# Patient Record
Sex: MALE | Race: WHITE | Employment: UNEMPLOYED | URBAN - METROPOLITAN AREA
[De-identification: names, ages, dates, MRNs, and addresses within clinical notes are randomized per-mention and may not be internally consistent; named-entity substitution may affect disease eponyms.]

---

## 2018-05-14 ENCOUNTER — HOSPITAL ENCOUNTER (EMERGENCY)
Age: 33
Discharge: HOME | End: 2018-05-14
Payer: MEDICAID

## 2018-05-14 VITALS
WEIGHT: 266 LBS | HEIGHT: 78 IN | OXYGEN SATURATION: 97 % | RESPIRATION RATE: 17 BRPM | SYSTOLIC BLOOD PRESSURE: 133 MMHG | DIASTOLIC BLOOD PRESSURE: 115 MMHG | HEART RATE: 110 BPM | TEMPERATURE: 97.7 F | BODY MASS INDEX: 30.78 KG/M2

## 2018-05-14 DIAGNOSIS — L03.114 CELLULITIS OF LEFT UPPER EXTREMITY: ICD-10-CM

## 2018-05-14 DIAGNOSIS — L02.91 ABSCESS: Primary | ICD-10-CM

## 2018-05-14 PROCEDURE — 99282 EMERGENCY DEPT VISIT SF MDM: CPT

## 2018-05-14 PROCEDURE — 10060 I&D ABSCESS SIMPLE/SINGLE: CPT

## 2018-05-14 PROCEDURE — 2500000003 HC RX 250 WO HCPCS: Performed by: PHYSICIAN ASSISTANT

## 2018-05-14 RX ORDER — IBUPROFEN 600 MG/1
600 TABLET ORAL EVERY 8 HOURS PRN
Qty: 21 TABLET | Refills: 0 | Status: SHIPPED | OUTPATIENT
Start: 2018-05-14 | End: 2018-12-17 | Stop reason: SDDI

## 2018-05-14 RX ORDER — LIDOCAINE HYDROCHLORIDE 10 MG/ML
5 INJECTION, SOLUTION EPIDURAL; INFILTRATION; INTRACAUDAL; PERINEURAL ONCE
Status: COMPLETED | OUTPATIENT
Start: 2018-05-14 | End: 2018-05-14

## 2018-05-14 RX ORDER — SULFAMETHOXAZOLE AND TRIMETHOPRIM 800; 160 MG/1; MG/1
1 TABLET ORAL 2 TIMES DAILY
Qty: 20 TABLET | Refills: 0 | Status: SHIPPED | OUTPATIENT
Start: 2018-05-14 | End: 2018-05-24

## 2018-05-14 RX ORDER — CEPHALEXIN 500 MG/1
500 CAPSULE ORAL 3 TIMES DAILY
Qty: 30 CAPSULE | Refills: 0 | Status: SHIPPED | OUTPATIENT
Start: 2018-05-14 | End: 2018-05-24

## 2018-05-14 RX ADMIN — LIDOCAINE HYDROCHLORIDE 5 ML: 10 INJECTION, SOLUTION EPIDURAL; INFILTRATION; INTRACAUDAL; PERINEURAL at 20:13

## 2018-05-17 ENCOUNTER — CARE COORDINATION (OUTPATIENT)
Dept: CARE COORDINATION | Age: 33
End: 2018-05-17

## 2018-12-17 ENCOUNTER — OFFICE VISIT (OUTPATIENT)
Dept: FAMILY MEDICINE CLINIC | Age: 33
End: 2018-12-17
Payer: MEDICAID

## 2018-12-17 VITALS
RESPIRATION RATE: 20 BRPM | HEART RATE: 82 BPM | WEIGHT: 289.6 LBS | HEIGHT: 78 IN | BODY MASS INDEX: 33.51 KG/M2 | OXYGEN SATURATION: 98 % | TEMPERATURE: 98.4 F | SYSTOLIC BLOOD PRESSURE: 158 MMHG | DIASTOLIC BLOOD PRESSURE: 100 MMHG

## 2018-12-17 DIAGNOSIS — I10 ESSENTIAL HYPERTENSION: ICD-10-CM

## 2018-12-17 DIAGNOSIS — R56.9 SEIZURE (HCC): Primary | ICD-10-CM

## 2018-12-17 DIAGNOSIS — F41.9 ANXIETY: ICD-10-CM

## 2018-12-17 PROCEDURE — G8484 FLU IMMUNIZE NO ADMIN: HCPCS | Performed by: STUDENT IN AN ORGANIZED HEALTH CARE EDUCATION/TRAINING PROGRAM

## 2018-12-17 PROCEDURE — 99212 OFFICE O/P EST SF 10 MIN: CPT | Performed by: STUDENT IN AN ORGANIZED HEALTH CARE EDUCATION/TRAINING PROGRAM

## 2018-12-17 PROCEDURE — G8427 DOCREV CUR MEDS BY ELIG CLIN: HCPCS | Performed by: STUDENT IN AN ORGANIZED HEALTH CARE EDUCATION/TRAINING PROGRAM

## 2018-12-17 PROCEDURE — 99214 OFFICE O/P EST MOD 30 MIN: CPT | Performed by: STUDENT IN AN ORGANIZED HEALTH CARE EDUCATION/TRAINING PROGRAM

## 2018-12-17 PROCEDURE — 4004F PT TOBACCO SCREEN RCVD TLK: CPT | Performed by: STUDENT IN AN ORGANIZED HEALTH CARE EDUCATION/TRAINING PROGRAM

## 2018-12-17 PROCEDURE — G8417 CALC BMI ABV UP PARAM F/U: HCPCS | Performed by: STUDENT IN AN ORGANIZED HEALTH CARE EDUCATION/TRAINING PROGRAM

## 2018-12-17 RX ORDER — LISINOPRIL 5 MG/1
5 TABLET ORAL DAILY
Qty: 30 TABLET | Refills: 3 | Status: SHIPPED | OUTPATIENT
Start: 2018-12-17 | End: 2019-06-18

## 2018-12-17 RX ORDER — PHENYTOIN SODIUM 100 MG/1
100 CAPSULE, EXTENDED RELEASE ORAL 3 TIMES DAILY
Qty: 90 CAPSULE | Refills: 0 | Status: SHIPPED | OUTPATIENT
Start: 2018-12-17 | End: 2019-04-19 | Stop reason: SDUPTHER

## 2018-12-17 ASSESSMENT — ENCOUNTER SYMPTOMS
COUGH: 1
CONSTIPATION: 0
EYE DISCHARGE: 0
CHEST TIGHTNESS: 0
WHEEZING: 0
APNEA: 0
FACIAL SWELLING: 0
BLOOD IN STOOL: 0
SHORTNESS OF BREATH: 0
SORE THROAT: 0
CHOKING: 0
DIARRHEA: 0

## 2018-12-17 ASSESSMENT — PATIENT HEALTH QUESTIONNAIRE - PHQ9
1. LITTLE INTEREST OR PLEASURE IN DOING THINGS: 1
SUM OF ALL RESPONSES TO PHQ QUESTIONS 1-9: 1
SUM OF ALL RESPONSES TO PHQ QUESTIONS 1-9: 1
DEPRESSION UNABLE TO ASSESS: URGENT/EMERGENT SITUATION

## 2018-12-17 NOTE — PROGRESS NOTES
[facility]  FAMILY MEDICINE RESIDENCY PROGRAM   OFFICE PROGRESS NOTE  DATE OF VISIT : 2018    Patient : Renetta Thomas   Sex : maleAge : 26 y.o.  : 1985   MRN : [id number]              Chief Complaint :   Chief Complaint   Patient presents with    Seizures       History of Present Illness : Renetta Thomas  26 y.o. male with past medical history of Seizure disorder, polysubstance abuse, depression who presented to the clinic today for first time to establish with a new PCP. Seizures: He has history of seizure since last 13 years and states he has tried different doctors and medications ( Phenytoin, Lamotrigine, levetiracetam). His last episode of seizures was few weeks ago, he has has total of 5 episodes this year. He states he looses consciousness and bites his tongue and has had incontinence in past episodes. He states he has been non adherent with the his medications and has not taken any anti seizure medications in last 15 months. He cites reason for not taking medication is his feeling that he have had more seizure episode on medication compared to when he is not taking them. Currently he is feeling anxious about having a episode and states he is afraid to go to bed as most of his seizure episodes have taken place during sleep. He also feels that he has a pressure on chest most of the time due to anxiety but denies any chest pain associated with anxiety. He states that he had a head trauma at the age of 21 and his seizures started after that. Depression: As per patient he has been treated for depression in past, currently he is not taking any medication for it since last 15 months. On questioning he denies any suicidal ideation intent or plan. He states that he is excited about the nery and planning it with his 3 kids. Although he states he feels he is depressed and his sleep latency has decreased.  He denies any decrease in concentration, denies any loss of interest, HAND W/ WOUND VA C       Family Medical History :   Family History   Problem Relation Age of Onset    Kidney Disease Mother     Heart Disease Father        Social History :   Social History     Social History    Marital status: Single     Spouse name: N/A    Number of children: N/A    Years of education: N/A     Occupational History    Not on file. Social History Main Topics    Smoking status: Current Every Day Smoker     Packs/day: 0.40     Years: 19.00     Types: Cigarettes    Smokeless tobacco: Never Used    Alcohol use No      Comment: not in three years     Drug use: Yes     Types: Other-see comments      Comment: hx drug history, herion. Last use 12/2/2016    Sexual activity: Yes     Partners: Female     Other Topics Concern    Not on file     Social History Narrative    No narrative on file        Current Medications    Current Outpatient Prescriptions   Medication Sig Dispense Refill    phenytoin (DILANTIN) 100 MG ER capsule Take 1 capsule by mouth 3 times daily 90 capsule 0    lisinopril (PRINIVIL;ZESTRIL) 5 MG tablet Take 1 tablet by mouth daily 30 tablet 3     No current facility-administered medications for this visit. Allergies : No Known Allergies    Review of Systems :    Review of Systems   HENT: Negative for congestion, ear discharge, ear pain, facial swelling and sore throat. Eyes: Negative for discharge and visual disturbance. Respiratory: Positive for cough. Negative for apnea, choking, chest tightness, shortness of breath and wheezing. Cardiovascular: Negative for chest pain. Gastrointestinal: Negative for blood in stool, constipation and diarrhea. Genitourinary: Negative for dysuria. Musculoskeletal: Negative for arthralgias. Neurological: Positive for seizures. Negative for headaches. Psychiatric/Behavioral: Positive for sleep disturbance. Negative for confusion, decreased concentration, hallucinations, self-injury and suicidal ideas.

## 2019-01-03 ENCOUNTER — TELEPHONE (OUTPATIENT)
Dept: NEUROLOGY | Age: 34
End: 2019-01-03

## 2019-01-08 ENCOUNTER — TELEPHONE (OUTPATIENT)
Dept: NEUROLOGY | Age: 34
End: 2019-01-08

## 2019-04-19 ENCOUNTER — APPOINTMENT (OUTPATIENT)
Dept: GENERAL RADIOLOGY | Age: 34
End: 2019-04-19
Payer: MEDICAID

## 2019-04-19 ENCOUNTER — HOSPITAL ENCOUNTER (EMERGENCY)
Age: 34
Discharge: HOME | End: 2019-04-20
Attending: EMERGENCY MEDICINE
Payer: MEDICAID

## 2019-04-19 ENCOUNTER — APPOINTMENT (OUTPATIENT)
Dept: CT IMAGING | Age: 34
End: 2019-04-19
Payer: MEDICAID

## 2019-04-19 VITALS
BODY MASS INDEX: 33.44 KG/M2 | DIASTOLIC BLOOD PRESSURE: 81 MMHG | TEMPERATURE: 98.1 F | WEIGHT: 289 LBS | SYSTOLIC BLOOD PRESSURE: 128 MMHG | HEIGHT: 78 IN | OXYGEN SATURATION: 95 % | HEART RATE: 86 BPM | RESPIRATION RATE: 18 BRPM

## 2019-04-19 DIAGNOSIS — R56.9 SEIZURE (HCC): Primary | ICD-10-CM

## 2019-04-19 LAB
ACETAMINOPHEN LEVEL: <5 MCG/ML (ref 10–30)
AMPHETAMINE SCREEN, URINE: NOT DETECTED
ANION GAP SERPL CALCULATED.3IONS-SCNC: 11 MMOL/L (ref 7–16)
BACTERIA: NORMAL /HPF
BARBITURATE SCREEN URINE: NOT DETECTED
BASOPHILS ABSOLUTE: 0.04 E9/L (ref 0–0.2)
BASOPHILS RELATIVE PERCENT: 0.4 % (ref 0–2)
BENZODIAZEPINE SCREEN, URINE: NOT DETECTED
BILIRUBIN URINE: NEGATIVE
BLOOD, URINE: NEGATIVE
BUN BLDV-MCNC: 14 MG/DL (ref 6–20)
CALCIUM SERPL-MCNC: 9.3 MG/DL (ref 8.6–10.2)
CANNABINOID SCREEN URINE: NOT DETECTED
CHLORIDE BLD-SCNC: 105 MMOL/L (ref 98–107)
CHP ED QC CHECK: YES
CLARITY: CLEAR
CO2: 21 MMOL/L (ref 22–29)
COCAINE METABOLITE SCREEN URINE: NOT DETECTED
COLOR: YELLOW
CREAT SERPL-MCNC: 1 MG/DL (ref 0.7–1.2)
EOSINOPHILS ABSOLUTE: 0.08 E9/L (ref 0.05–0.5)
EOSINOPHILS RELATIVE PERCENT: 0.8 % (ref 0–6)
ETHANOL: <10 MG/DL (ref 0–0.08)
GFR AFRICAN AMERICAN: >60
GFR NON-AFRICAN AMERICAN: >60 ML/MIN/1.73
GLUCOSE BLD-MCNC: 142 MG/DL
GLUCOSE BLD-MCNC: 242 MG/DL (ref 74–99)
GLUCOSE URINE: NEGATIVE MG/DL
HCT VFR BLD CALC: 47.4 % (ref 37–54)
HEMOGLOBIN: 16.1 G/DL (ref 12.5–16.5)
IMMATURE GRANULOCYTES #: 0.07 E9/L
IMMATURE GRANULOCYTES %: 0.7 % (ref 0–5)
KETONES, URINE: NEGATIVE MG/DL
LACTIC ACID: 1.5 MMOL/L (ref 0.5–2.2)
LACTIC ACID: 2.9 MMOL/L (ref 0.5–2.2)
LEUKOCYTE ESTERASE, URINE: NEGATIVE
LYMPHOCYTES ABSOLUTE: 2.6 E9/L (ref 1.5–4)
LYMPHOCYTES RELATIVE PERCENT: 26.4 % (ref 20–42)
MCH RBC QN AUTO: 28.5 PG (ref 26–35)
MCHC RBC AUTO-ENTMCNC: 34 % (ref 32–34.5)
MCV RBC AUTO: 84 FL (ref 80–99.9)
METER GLUCOSE: 142 MG/DL (ref 74–99)
METHADONE SCREEN, URINE: NOT DETECTED
MONOCYTES ABSOLUTE: 0.65 E9/L (ref 0.1–0.95)
MONOCYTES RELATIVE PERCENT: 6.6 % (ref 2–12)
NEUTROPHILS ABSOLUTE: 6.4 E9/L (ref 1.8–7.3)
NEUTROPHILS RELATIVE PERCENT: 65.1 % (ref 43–80)
NITRITE, URINE: NEGATIVE
OPIATE SCREEN URINE: NOT DETECTED
PDW BLD-RTO: 13.4 FL (ref 11.5–15)
PH UA: 5 (ref 5–9)
PHENCYCLIDINE SCREEN URINE: NOT DETECTED
PHENYTOIN DOSE AMOUNT: ABNORMAL
PHENYTOIN DOSE AMOUNT: NORMAL
PHENYTOIN LEVEL: <0.8 UG/ML (ref 10–20)
PLATELET # BLD: 309 E9/L (ref 130–450)
PMV BLD AUTO: 9.6 FL (ref 7–12)
POTASSIUM SERPL-SCNC: 4.4 MMOL/L (ref 3.5–5)
PROPOXYPHENE SCREEN: NOT DETECTED
PROTEIN UA: NORMAL MG/DL
RBC # BLD: 5.64 E12/L (ref 3.8–5.8)
RBC UA: NORMAL /HPF (ref 0–2)
SALICYLATE, SERUM: <0.3 MG/DL (ref 0–30)
SODIUM BLD-SCNC: 137 MMOL/L (ref 132–146)
SPECIFIC GRAVITY UA: 1.02 (ref 1–1.03)
TRICYCLIC ANTIDEPRESSANTS SCREEN SERUM: NEGATIVE NG/ML
TROPONIN: <0.01 NG/ML (ref 0–0.03)
UROBILINOGEN, URINE: 0.2 E.U./DL
WBC # BLD: 9.8 E9/L (ref 4.5–11.5)
WBC UA: NORMAL /HPF (ref 0–5)

## 2019-04-19 PROCEDURE — 82962 GLUCOSE BLOOD TEST: CPT

## 2019-04-19 PROCEDURE — 2580000003 HC RX 258: Performed by: STUDENT IN AN ORGANIZED HEALTH CARE EDUCATION/TRAINING PROGRAM

## 2019-04-19 PROCEDURE — 80307 DRUG TEST PRSMV CHEM ANLYZR: CPT

## 2019-04-19 PROCEDURE — 99285 EMERGENCY DEPT VISIT HI MDM: CPT

## 2019-04-19 PROCEDURE — 84484 ASSAY OF TROPONIN QUANT: CPT

## 2019-04-19 PROCEDURE — G0480 DRUG TEST DEF 1-7 CLASSES: HCPCS

## 2019-04-19 PROCEDURE — 6370000000 HC RX 637 (ALT 250 FOR IP): Performed by: STUDENT IN AN ORGANIZED HEALTH CARE EDUCATION/TRAINING PROGRAM

## 2019-04-19 PROCEDURE — 6360000002 HC RX W HCPCS: Performed by: STUDENT IN AN ORGANIZED HEALTH CARE EDUCATION/TRAINING PROGRAM

## 2019-04-19 PROCEDURE — 80185 ASSAY OF PHENYTOIN TOTAL: CPT

## 2019-04-19 PROCEDURE — 70450 CT HEAD/BRAIN W/O DYE: CPT

## 2019-04-19 PROCEDURE — 96365 THER/PROPH/DIAG IV INF INIT: CPT

## 2019-04-19 PROCEDURE — 83605 ASSAY OF LACTIC ACID: CPT

## 2019-04-19 PROCEDURE — 85025 COMPLETE CBC W/AUTO DIFF WBC: CPT

## 2019-04-19 PROCEDURE — 81001 URINALYSIS AUTO W/SCOPE: CPT

## 2019-04-19 PROCEDURE — 36415 COLL VENOUS BLD VENIPUNCTURE: CPT

## 2019-04-19 PROCEDURE — 71045 X-RAY EXAM CHEST 1 VIEW: CPT

## 2019-04-19 PROCEDURE — 80048 BASIC METABOLIC PNL TOTAL CA: CPT

## 2019-04-19 RX ORDER — LEVETIRACETAM 5 MG/ML
500 INJECTION INTRAVASCULAR ONCE
Status: DISCONTINUED | OUTPATIENT
Start: 2019-04-19 | End: 2019-04-19

## 2019-04-19 RX ORDER — PHENYTOIN SODIUM 100 MG/1
100 CAPSULE, EXTENDED RELEASE ORAL ONCE
Status: DISCONTINUED | OUTPATIENT
Start: 2019-04-19 | End: 2019-04-19

## 2019-04-19 RX ORDER — PHENYTOIN SODIUM 100 MG/1
100 CAPSULE, EXTENDED RELEASE ORAL 3 TIMES DAILY
Qty: 90 CAPSULE | Refills: 0 | Status: SHIPPED | OUTPATIENT
Start: 2019-04-19 | End: 2019-06-18 | Stop reason: SDUPTHER

## 2019-04-19 RX ORDER — ACETAMINOPHEN 500 MG
1000 TABLET ORAL ONCE
Status: COMPLETED | OUTPATIENT
Start: 2019-04-19 | End: 2019-04-19

## 2019-04-19 RX ADMIN — PHENYTOIN SODIUM 1000 MG: 50 INJECTION INTRAMUSCULAR; INTRAVENOUS at 21:52

## 2019-04-19 RX ADMIN — ACETAMINOPHEN 1000 MG: 500 TABLET ORAL at 21:46

## 2019-04-19 ASSESSMENT — PAIN SCALES - GENERAL
PAINLEVEL_OUTOF10: 0
PAINLEVEL_OUTOF10: 7

## 2019-04-19 ASSESSMENT — ENCOUNTER SYMPTOMS
SHORTNESS OF BREATH: 0
VOMITING: 0
COLOR CHANGE: 0
CONSTIPATION: 0
WHEEZING: 0
NAUSEA: 0
DIARRHEA: 0
COUGH: 0
ABDOMINAL PAIN: 0

## 2019-04-19 ASSESSMENT — PAIN DESCRIPTION - PAIN TYPE: TYPE: ACUTE PAIN

## 2019-04-19 ASSESSMENT — PAIN DESCRIPTION - LOCATION: LOCATION: THROAT

## 2019-04-20 NOTE — ED PROVIDER NOTES
PM.            Review of Systems   Constitutional: Negative for chills and fever. Respiratory: Negative for cough, shortness of breath and wheezing. Cardiovascular: Negative for chest pain and palpitations. Gastrointestinal: Negative for abdominal pain, constipation, diarrhea, nausea and vomiting. Genitourinary: Negative for dysuria and hematuria. Musculoskeletal: Negative for neck pain and neck stiffness. Skin: Negative for color change, pallor, rash and wound. Neurological: Positive for seizures. Negative for dizziness, syncope, light-headedness, numbness and headaches. Psychiatric/Behavioral: Negative for confusion and decreased concentration. The patient is not nervous/anxious. Physical Exam   Constitutional: He is oriented to person, place, and time. He appears well-developed and well-nourished. No distress. HENT:   Head: Normocephalic and atraumatic. Right Ear: External ear normal.   Left Ear: External ear normal.   Mouth/Throat: No oropharyngeal exudate. Eyes: Pupils are equal, round, and reactive to light. EOM are normal.   Neck: Normal range of motion. Cardiovascular: Normal rate, regular rhythm, normal heart sounds and intact distal pulses. Exam reveals no gallop and no friction rub. No murmur heard. Pulmonary/Chest: Effort normal and breath sounds normal. No respiratory distress. He has no wheezes. He has no rales. He exhibits no tenderness. Abdominal: Soft. Bowel sounds are normal. He exhibits no distension and no mass. There is no tenderness. There is no rebound and no guarding. No hernia. Musculoskeletal: Normal range of motion. He exhibits no edema, tenderness or deformity. Decreased  strength on the right upper extremity, chronic. Lymphadenopathy:     He has no cervical adenopathy. Neurological: He is alert and oriented to person, place, and time. He displays normal reflexes. No cranial nerve deficit or sensory deficit. He exhibits normal muscle tone. DETECTED Negative <300 ng/mL   Serum Drug Screen   Result Value Ref Range    Ethanol Lvl <10 mg/dL    Acetaminophen Level <5.0 (L) 10.0 - 38.8 mcg/mL    Salicylate, Serum <7.2 0.0 - 30.0 mg/dL    TCA Scrn NEGATIVE Cutoff:300 ng/mL   Troponin   Result Value Ref Range    Troponin <0.01 0.00 - 0.03 ng/mL   Phenytoin level, total   Result Value Ref Range    Phenytoin Dose Amount Unknown    Phenytoin level, total   Result Value Ref Range    Phenytoin Lvl <0.8 (L) 10.0 - 20.0 ug/mL    Phenytoin Dose Amount Unknown    POCT glucose   Result Value Ref Range    Glucose 142 mg/dL    QC OK? yes    POCT Glucose   Result Value Ref Range    Meter Glucose 142 (H) 74 - 99 mg/dL       Radiology:  XR CHEST PORTABLE   Final Result      NO ACUTE CARDIOPULMONARY PROCESS         CT Head WO Contrast   Final Result      No evidence for acute intracranial process. Encephalomalacia within the left parietal lobe from remote infarct or   underlying congenital process. There is low density within the right   centrum semiovale which measures approximately 8.5 mm. This was   present on the prior studies and show no worrisome change.          ------------------------- NURSING NOTES AND VITALS REVIEWED ---------------------------  Date / Time Roomed:  4/19/2019  7:19 PM  ED Bed Assignment:  15/15    The nursing notes within the ED encounter and vital signs as below have been reviewed. /81   Pulse 86   Temp 98.1 °F (36.7 °C) (Temporal)   Resp 18   Ht 6' 6\" (1.981 m)   Wt 289 lb (131.1 kg)   SpO2 95%   BMI 33.40 kg/m²   Oxygen Saturation Interpretation: Normal      ------------------------------------------ PROGRESS NOTES ------------------------------------------  ED Course as of Apr 20 0046 Fri Apr 19, 2019   2340 Patient resting comfortably in no acute distress. Patient will be discharged shortly. No other active seizures in the ED. Patient loaded with Dilantin.     [KS]      ED Course User Index  [KS] Avel Goodwin DO 12:46 AM  I have spoken with the patient and discussed todays results, in addition to providing specific details for the plan of care and counseling regarding the diagnosis and prognosis. Their questions are answered at this time and they are agreeable with the plan. I discussed at length with them reasons for immediate return here for re evaluation. They will followup with their primary care physician by calling their office on Monday.      --------------------------------- ADDITIONAL PROVIDER NOTES ---------------------------------  At this time the patient is without objective evidence of an acute process requiring hospitalization or inpatient management. They have remained hemodynamically stable throughout their entire ED visit and are stable for discharge with outpatient follow-up. The plan has been discussed in detail and they are aware of the specific conditions for emergent return, as well as the importance of follow-up. Discharge Medication List as of 4/19/2019 11:42 PM          Diagnosis:  1. Seizure [facility])        Disposition:  Patient's disposition: Discharge to home  Patient's condition is stable. MDM  Number of Diagnoses or Management Options  [facility]):   Diagnosis management comments: Patient is a 25-year-old male who presents to the ED for seizures. Patient was evaluated, did not have any additional seizures in the ED. Patient was loaded with phenytoin, and was monitored in the ED. He was otherwise resting, debris no acute distress. Patient had a weakness to the right upper extremity, which was chronic and not new from his previous stroke when he was younger. Patient otherwise was resting comfortably in no acute distress. Patient will be discharged at this time with a prescription for phenytoin. He was in agreement with discharge at this time.  Patient was advised to follow-up with his PCP as well as neurologist. Patient was instructed to come back to the ED if his symptoms

## 2019-04-20 NOTE — ED NOTES
Pt in room at this time, alert and oriented. Respirations are easy and unlabored. Pt aware he is awaiting disposition from ER Dr. Dutch Gonzales comfort measures at this time.   Will continue to monitor     Copiah KALEY Santa  04/20/19 0001

## 2019-04-27 LAB
EKG ATRIAL RATE: 101 BPM
EKG P AXIS: 28 DEGREES
EKG P-R INTERVAL: 164 MS
EKG Q-T INTERVAL: 324 MS
EKG QRS DURATION: 98 MS
EKG QTC CALCULATION (BAZETT): 420 MS
EKG R AXIS: -9 DEGREES
EKG T AXIS: 38 DEGREES
EKG VENTRICULAR RATE: 101 BPM

## 2019-06-18 ENCOUNTER — HOSPITAL ENCOUNTER (EMERGENCY)
Age: 34
Discharge: HOME | End: 2019-06-18
Attending: EMERGENCY MEDICINE
Payer: MEDICAID

## 2019-06-18 ENCOUNTER — APPOINTMENT (OUTPATIENT)
Dept: GENERAL RADIOLOGY | Age: 34
End: 2019-06-18
Payer: MEDICAID

## 2019-06-18 ENCOUNTER — APPOINTMENT (OUTPATIENT)
Dept: CT IMAGING | Age: 34
End: 2019-06-18
Payer: MEDICAID

## 2019-06-18 VITALS
DIASTOLIC BLOOD PRESSURE: 67 MMHG | BODY MASS INDEX: 31.82 KG/M2 | TEMPERATURE: 97.8 F | OXYGEN SATURATION: 97 % | SYSTOLIC BLOOD PRESSURE: 116 MMHG | RESPIRATION RATE: 16 BRPM | WEIGHT: 275 LBS | HEIGHT: 78 IN | HEART RATE: 84 BPM

## 2019-06-18 DIAGNOSIS — M54.2 NECK PAIN: ICD-10-CM

## 2019-06-18 DIAGNOSIS — R78.89 SUBTHERAPEUTIC SERUM DILANTIN LEVEL: ICD-10-CM

## 2019-06-18 DIAGNOSIS — Z76.0 ENCOUNTER FOR MEDICATION REFILL: ICD-10-CM

## 2019-06-18 DIAGNOSIS — S43.401A SPRAIN OF RIGHT SHOULDER, UNSPECIFIED SHOULDER SPRAIN TYPE, INITIAL ENCOUNTER: ICD-10-CM

## 2019-06-18 DIAGNOSIS — G40.919 BREAKTHROUGH SEIZURE (HCC): Primary | ICD-10-CM

## 2019-06-18 DIAGNOSIS — R56.9 SEIZURE (HCC): ICD-10-CM

## 2019-06-18 DIAGNOSIS — Z91.14 NON COMPLIANCE W MEDICATION REGIMEN: ICD-10-CM

## 2019-06-18 LAB
ACETAMINOPHEN LEVEL: <5 MCG/ML (ref 10–30)
ALBUMIN SERPL-MCNC: 3.8 G/DL (ref 3.5–5.2)
ALP BLD-CCNC: 146 U/L (ref 40–129)
ALT SERPL-CCNC: 182 U/L (ref 0–40)
AMPHETAMINE SCREEN, URINE: NOT DETECTED
ANION GAP SERPL CALCULATED.3IONS-SCNC: 14 MMOL/L (ref 7–16)
AST SERPL-CCNC: 89 U/L (ref 0–39)
BARBITURATE SCREEN URINE: NOT DETECTED
BENZODIAZEPINE SCREEN, URINE: NOT DETECTED
BILIRUB SERPL-MCNC: 0.2 MG/DL (ref 0–1.2)
BUN BLDV-MCNC: 15 MG/DL (ref 6–20)
CALCIUM SERPL-MCNC: 9.3 MG/DL (ref 8.6–10.2)
CANNABINOID SCREEN URINE: NOT DETECTED
CHLORIDE BLD-SCNC: 105 MMOL/L (ref 98–107)
CHP ED QC CHECK: YES
CO2: 20 MMOL/L (ref 22–29)
COCAINE METABOLITE SCREEN URINE: NOT DETECTED
CREAT SERPL-MCNC: 1.1 MG/DL (ref 0.7–1.2)
EKG ATRIAL RATE: 100 BPM
EKG P AXIS: 14 DEGREES
EKG P-R INTERVAL: 160 MS
EKG Q-T INTERVAL: 324 MS
EKG QRS DURATION: 96 MS
EKG QTC CALCULATION (BAZETT): 417 MS
EKG R AXIS: -14 DEGREES
EKG T AXIS: 24 DEGREES
EKG VENTRICULAR RATE: 100 BPM
ETHANOL: <10 MG/DL (ref 0–0.08)
GFR AFRICAN AMERICAN: >60
GFR NON-AFRICAN AMERICAN: >60 ML/MIN/1.73
GLUCOSE BLD-MCNC: 140 MG/DL
GLUCOSE BLD-MCNC: 163 MG/DL (ref 74–99)
HCT VFR BLD CALC: 46.9 % (ref 37–54)
HEMOGLOBIN: 15.6 G/DL (ref 12.5–16.5)
MCH RBC QN AUTO: 28.4 PG (ref 26–35)
MCHC RBC AUTO-ENTMCNC: 33.3 % (ref 32–34.5)
MCV RBC AUTO: 85.4 FL (ref 80–99.9)
METER GLUCOSE: 140 MG/DL (ref 74–99)
METHADONE SCREEN, URINE: NOT DETECTED
OPIATE SCREEN URINE: NOT DETECTED
PDW BLD-RTO: 14.2 FL (ref 11.5–15)
PHENCYCLIDINE SCREEN URINE: NOT DETECTED
PHENYTOIN DOSE AMOUNT: ABNORMAL
PHENYTOIN LEVEL: <0.8 UG/ML (ref 10–20)
PLATELET # BLD: 294 E9/L (ref 130–450)
PMV BLD AUTO: 9.4 FL (ref 7–12)
POTASSIUM SERPL-SCNC: 4.1 MMOL/L (ref 3.5–5)
PROPOXYPHENE SCREEN: NOT DETECTED
RBC # BLD: 5.49 E12/L (ref 3.8–5.8)
SALICYLATE, SERUM: <0.3 MG/DL (ref 0–30)
SODIUM BLD-SCNC: 139 MMOL/L (ref 132–146)
TOTAL PROTEIN: 8 G/DL (ref 6.4–8.3)
TRICYCLIC ANTIDEPRESSANTS SCREEN SERUM: NEGATIVE NG/ML
WBC # BLD: 9.8 E9/L (ref 4.5–11.5)

## 2019-06-18 PROCEDURE — 2580000003 HC RX 258: Performed by: STUDENT IN AN ORGANIZED HEALTH CARE EDUCATION/TRAINING PROGRAM

## 2019-06-18 PROCEDURE — 80185 ASSAY OF PHENYTOIN TOTAL: CPT

## 2019-06-18 PROCEDURE — 93005 ELECTROCARDIOGRAM TRACING: CPT | Performed by: EMERGENCY MEDICINE

## 2019-06-18 PROCEDURE — 70450 CT HEAD/BRAIN W/O DYE: CPT

## 2019-06-18 PROCEDURE — 72125 CT NECK SPINE W/O DYE: CPT

## 2019-06-18 PROCEDURE — 2580000003 HC RX 258: Performed by: EMERGENCY MEDICINE

## 2019-06-18 PROCEDURE — 85027 COMPLETE CBC AUTOMATED: CPT

## 2019-06-18 PROCEDURE — 6360000002 HC RX W HCPCS: Performed by: STUDENT IN AN ORGANIZED HEALTH CARE EDUCATION/TRAINING PROGRAM

## 2019-06-18 PROCEDURE — 73030 X-RAY EXAM OF SHOULDER: CPT

## 2019-06-18 PROCEDURE — 93010 ELECTROCARDIOGRAM REPORT: CPT | Performed by: INTERNAL MEDICINE

## 2019-06-18 PROCEDURE — 80307 DRUG TEST PRSMV CHEM ANLYZR: CPT

## 2019-06-18 PROCEDURE — G0480 DRUG TEST DEF 1-7 CLASSES: HCPCS

## 2019-06-18 PROCEDURE — 36415 COLL VENOUS BLD VENIPUNCTURE: CPT

## 2019-06-18 PROCEDURE — 99285 EMERGENCY DEPT VISIT HI MDM: CPT

## 2019-06-18 PROCEDURE — 96365 THER/PROPH/DIAG IV INF INIT: CPT

## 2019-06-18 PROCEDURE — 80053 COMPREHEN METABOLIC PANEL: CPT

## 2019-06-18 PROCEDURE — 82962 GLUCOSE BLOOD TEST: CPT

## 2019-06-18 RX ORDER — SODIUM CHLORIDE 9 MG/ML
INJECTION, SOLUTION INTRAVENOUS CONTINUOUS
Status: DISCONTINUED | OUTPATIENT
Start: 2019-06-18 | End: 2019-06-18 | Stop reason: HOSPADM

## 2019-06-18 RX ORDER — PHENYTOIN SODIUM 100 MG/1
200 CAPSULE, EXTENDED RELEASE ORAL NIGHTLY
COMMUNITY
End: 2019-06-18 | Stop reason: ALTCHOICE

## 2019-06-18 RX ORDER — LEVETIRACETAM 10 MG/ML
1000 INJECTION INTRAVASCULAR ONCE
Status: DISCONTINUED | OUTPATIENT
Start: 2019-06-18 | End: 2019-06-18

## 2019-06-18 RX ORDER — PHENYTOIN SODIUM 100 MG/1
100 CAPSULE, EXTENDED RELEASE ORAL 3 TIMES DAILY
Qty: 90 CAPSULE | Refills: 0 | Status: SHIPPED | OUTPATIENT
Start: 2019-06-18 | End: 2020-05-26 | Stop reason: ALTCHOICE

## 2019-06-18 RX ADMIN — SODIUM CHLORIDE: 9 INJECTION, SOLUTION INTRAVENOUS at 01:12

## 2019-06-18 RX ADMIN — PHENYTOIN SODIUM 1000 MG: 50 INJECTION INTRAMUSCULAR; INTRAVENOUS at 01:44

## 2019-06-18 ASSESSMENT — PAIN DESCRIPTION - PROGRESSION
CLINICAL_PROGRESSION_2: GRADUALLY WORSENING
CLINICAL_PROGRESSION: GRADUALLY WORSENING

## 2019-06-18 ASSESSMENT — ENCOUNTER SYMPTOMS
VOMITING: 0
ABDOMINAL DISTENTION: 0
SHORTNESS OF BREATH: 0
DIARRHEA: 0
COUGH: 0
NAUSEA: 0
ABDOMINAL PAIN: 0
PHOTOPHOBIA: 0
COLOR CHANGE: 0
CHEST TIGHTNESS: 0

## 2019-06-18 ASSESSMENT — PAIN SCALES - GENERAL: PAINLEVEL_OUTOF10: 5

## 2019-06-18 ASSESSMENT — PAIN DESCRIPTION - ORIENTATION
ORIENTATION: RIGHT;LEFT
ORIENTATION_2: MID

## 2019-06-18 ASSESSMENT — PAIN DESCRIPTION - ONSET
ONSET_2: SUDDEN
ONSET: SUDDEN

## 2019-06-18 ASSESSMENT — PAIN DESCRIPTION - PAIN TYPE
TYPE_2: ACUTE PAIN
TYPE: ACUTE PAIN

## 2019-06-18 ASSESSMENT — PAIN DESCRIPTION - DESCRIPTORS
DESCRIPTORS: TIGHTNESS
DESCRIPTORS_2: SHARP

## 2019-06-18 ASSESSMENT — PAIN DESCRIPTION - LOCATION
LOCATION: LEG
LOCATION_2: NECK

## 2019-06-18 ASSESSMENT — PAIN DESCRIPTION - FREQUENCY: FREQUENCY: CONTINUOUS

## 2019-06-18 ASSESSMENT — PAIN DESCRIPTION - DURATION: DURATION_2: CONTINUOUS

## 2019-06-18 ASSESSMENT — PAIN DESCRIPTION - INTENSITY: RATING_2: 7

## 2019-06-18 NOTE — ED NOTES
Pt states that he ran out of his medication for seizures either Friday night or Saturday morning. Pt states that he has been under a lot of stress the past few days with the passing of his unborn daughter. Pt states that he felt like he was going to have a seizure, so he got himself out of bed and laid himself on the floor. Pt states that he was told that his seizure lasted 10-15 minutes. Pt states that he does not think that he hit his head on anything, but can not be sure. Pt complains of right shoulder pain and neck pain.       Shaquille Kruse RN  06/18/19 1606

## 2019-06-18 NOTE — ED PROVIDER NOTES
and flank pain. Musculoskeletal: Positive for myalgias, neck pain and neck stiffness. Skin: Negative for color change, pallor and rash. Neurological: Positive for seizures. Negative for dizziness, syncope, weakness and headaches. Psychiatric/Behavioral: Negative for confusion. Physical Exam   Constitutional: He is oriented to person, place, and time. He appears well-developed and well-nourished. HENT:   Head: Normocephalic and atraumatic. Small amount of dried blood in mouth without active bleeding, small abrasion to tongue    Eyes: Pupils are equal, round, and reactive to light. Conjunctivae and EOM are normal.   Neck: Neck supple. Cardiovascular: Regular rhythm. Tachycardia present. Pulmonary/Chest: Effort normal and breath sounds normal.   Abdominal: Soft. Bowel sounds are normal.   Musculoskeletal: He exhibits tenderness (right shoulder and cervical spine tenderness). He exhibits no edema. Neurological: He is alert and oriented to person, place, and time. No cranial nerve deficit or sensory deficit. Coordination normal.   Skin: Skin is warm and dry. Capillary refill takes less than 2 seconds. No rash noted. No erythema. No pallor. Psychiatric: He has a normal mood and affect. His behavior is normal.   Nursing note and vitals reviewed. Procedures    MDM  Number of Diagnoses or Management Options  Breakthrough seizure [facility]):   Encounter for medication refill:   Neck pain:   Non compliance w medication regimen:   Sprain of right shoulder, unspecified shoulder sprain type, initial encounter:   Subtherapeutic serum dilantin level:   Diagnosis management comments: Bret Welsh presented following seizure at home. Patient is out of medication due to recent stress in family. Patient given prescription for refill of patient's home medication. Patient feels improved since arrival to ED without return of seizure activity. XR shoulder, CT head and C-spine reviewed and not remarkable.  Patient able to ambulate without difficulty. Patient stable in ED. Lab work not remarkable. Likely patient's seizure caused from running out of his medication. Patient denies any EtOH use or drug use. Advised patient to follow up with PCP by calling office tomorrow. Patient would like to be discharged home at this time. Patient's sister is at bedside. --------------------------------------------- PAST HISTORY ---------------------------------------------  Past Medical History:  has a past medical history of Anxiety, Depression, Seizures ([address] and Unspecified cerebral artery occlusion with cerebral infarction. Past Surgical History:  has a past surgical history that includes Abscess Drainage (Left, 08/22/2016) and bone incision and drainage (Left, 08/30/2016). Social History:  reports that he has been smoking cigarettes. He has a 7.60 pack-year smoking history. He has never used smokeless tobacco. He reports that he has current or past drug history. Drug: Other-see comments. He reports that he does not drink alcohol. Family History: family history includes Heart Disease in his father; Kidney Disease in his mother. The patients home medications have been reviewed. Allergies: Patient has no known allergies.     -------------------------------------------------- RESULTS -------------------------------------------------  Labs:  Results for orders placed or performed during the hospital encounter of 06/18/19   Serum Drug Screen   Result Value Ref Range    Ethanol Lvl <10 mg/dL    Acetaminophen Level <5.0 (L) 10.0 - 35.1 mcg/mL    Salicylate, Serum <7.7 0.0 - 30.0 mg/dL    TCA Scrn NEGATIVE Cutoff:300 ng/mL   Urine Drug Screen   Result Value Ref Range    Amphetamine Screen, Urine NOT DETECTED Negative <1000 ng/mL    Barbiturate Screen, Ur NOT DETECTED Negative < 200 ng/mL    Benzodiazepine Screen, Urine NOT DETECTED Negative < 200 ng/mL    Cannabinoid Scrn, Ur NOT DETECTED Negative < 50ng/mL Cocaine Metabolite Screen, Urine NOT DETECTED Negative < 300 ng/mL    Opiate Scrn, Ur NOT DETECTED Negative < 300ng/mL    PCP Screen, Urine NOT DETECTED Negative < 25 ng/mL    Methadone Screen, Urine NOT DETECTED Negative <300 ng/mL    Propoxyphene Scrn, Ur NOT DETECTED Negative <300 ng/mL   CBC   Result Value Ref Range    WBC 9.8 4.5 - 11.5 E9/L    RBC 5.49 3.80 - 5.80 E12/L    Hemoglobin 15.6 12.5 - 16.5 g/dL    Hematocrit 46.9 37.0 - 54.0 %    MCV 85.4 80.0 - 99.9 fL    MCH 28.4 26.0 - 35.0 pg    MCHC 33.3 32.0 - 34.5 %    RDW 14.2 11.5 - 15.0 fL    Platelets 672 511 - 253 E9/L    MPV 9.4 7.0 - 12.0 fL   Comprehensive Metabolic Panel   Result Value Ref Range    Sodium 139 132 - 146 mmol/L    Potassium 4.1 3.5 - 5.0 mmol/L    Chloride 105 98 - 107 mmol/L    CO2 20 (L) 22 - 29 mmol/L    Anion Gap 14 7 - 16 mmol/L    Glucose 163 (H) 74 - 99 mg/dL    BUN 15 6 - 20 mg/dL    CREATININE 1.1 0.7 - 1.2 mg/dL    GFR Non-African American >60 >=60 mL/min/1.73    GFR African American >60     Calcium 9.3 8.6 - 10.2 mg/dL    Total Protein 8.0 6.4 - 8.3 g/dL    Alb 3.8 3.5 - 5.2 g/dL    Total Bilirubin 0.2 0.0 - 1.2 mg/dL    Alkaline Phosphatase 146 (H) 40 - 129 U/L     (H) 0 - 40 U/L    AST 89 (H) 0 - 39 U/L   Phenytoin Level, Total   Result Value Ref Range    Phenytoin Lvl <0.8 (L) 10.0 - 20.0 ug/mL    Phenytoin Dose Amount Unknown    POCT Glucose   Result Value Ref Range    Glucose 140 mg/dL    QC OK? Yes    POCT Glucose   Result Value Ref Range    Meter Glucose 140 (H) 74 - 99 mg/dL       Radiology:  CT Head WO Contrast   Final Result   No acute intracranial hemorrhage. Stable left parietal atrophy. This report has been electronically signed by Ryland Zapien MD.      CT Cervical Spine WO Contrast   Final Result   No cervical spine fractures.        This report has been electronically signed by Ryland Zapien MD.      XR SHOULDER RIGHT (MIN 2 VIEWS)    (Results Pending)       ------------------------- NURSING NOTES AND VITALS REVIEWED ---------------------------  Date / Time Roomed:  6/18/2019 12:52 AM  ED Bed Assignment:  22/22    The nursing notes within the ED encounter and vital signs as below have been reviewed. /67   Pulse 84   Temp 97.8 °F (36.6 °C) (Oral)   Resp 16   Ht 6' 6\" (1.981 m)   Wt 275 lb (124.7 kg)   SpO2 97%   BMI 31.78 kg/m²   Oxygen Saturation Interpretation: Normal      ------------------------------------------ PROGRESS NOTES ------------------------------------------  4:33 AM  I have spoken with the patient and sister, discussed todays results, in addition to providing specific details for the plan of care and counseling regarding the diagnosis and prognosis. Their questions are answered at this time and they are agreeable with the plan. I discussed at length with them reasons for immediate return here for re evaluation. They will followup with their and the on call primary care physician, general surgeon and orthopedic physician by calling their office tomorrow and on Monday.      --------------------------------- ADDITIONAL PROVIDER NOTES ---------------------------------  At this time the patient is without objective evidence of an acute process requiring hospitalization or inpatient management. They have remained hemodynamically stable throughout their entire ED visit and are stable for discharge with outpatient follow-up. The plan has been discussed in detail and they are aware of the specific conditions for emergent return, as well as the importance of follow-up. Discharge Medication List as of 6/18/2019  4:18 AM          Diagnosis:  1. Breakthrough seizure ([address]    2. Non compliance w medication regimen    3. Subtherapeutic serum dilantin level    4. Sprain of right shoulder, unspecified shoulder sprain type, initial encounter    5. Neck pain    6. Seizure ([facility] [address]    7.  Encounter for medication refill        Disposition:  Patient's disposition: Discharge to home  Patient's condition is stable. Jovanny Tapia MD  [id number]    ATTENDING PROVIDER ATTESTATION:     I have personally performed and/or participated in the history, exam, medical decision making, and procedures and agree with all pertinent clinical information. I have also reviewed and agree with the past medical, family and social history unless otherwise noted. I have discussed this patient in detail with the resident, and provided the instruction and education regarding seizure. My findings/Plan: Patient presents because of seizure activity. Patient reports he has not taken his medications for the last 2 days. Patient reports he is under stress after finding out girlfriend is pregnant and fetus  at 35 weeks. Patient reporting no homicidal or suicidal thoughts. Patient reports he did bite down on his tongue and does not believe he hit his head but does report some head and neck pain. He reports no chest pain or shortness of breath or abdominal pain or vomiting. Patient awake alert oriented heart and lung exam normal patient tender to neck range of motion full but painful. Patient neurologically intact patient has noted dried blood in mouth. He reports his tetanus is up-to-date. Patient labs and CTs reviewed and EKG. Patient given Dilantin and made aware of need to take his medications and not to do any exertional activity or drive until rechecked by PCP.          Pamela Mazariegos MD  [phone] Lashaun Ga MD  [phone]

## 2020-03-24 ENCOUNTER — TELEPHONE (OUTPATIENT)
Dept: NEUROLOGY | Age: 35
End: 2020-03-24

## 2020-05-26 ENCOUNTER — OFFICE VISIT (OUTPATIENT)
Dept: NEUROLOGY | Age: 35
End: 2020-05-26
Payer: MEDICAID

## 2020-05-26 VITALS
HEIGHT: 78 IN | SYSTOLIC BLOOD PRESSURE: 143 MMHG | WEIGHT: 315 LBS | BODY MASS INDEX: 36.45 KG/M2 | DIASTOLIC BLOOD PRESSURE: 95 MMHG | TEMPERATURE: 97.1 F | OXYGEN SATURATION: 99 % | RESPIRATION RATE: 18 BRPM

## 2020-05-26 PROCEDURE — 99205 OFFICE O/P NEW HI 60 MIN: CPT | Performed by: PSYCHIATRY & NEUROLOGY

## 2020-05-26 PROCEDURE — G8419 CALC BMI OUT NRM PARAM NOF/U: HCPCS | Performed by: PSYCHIATRY & NEUROLOGY

## 2020-05-26 PROCEDURE — G8427 DOCREV CUR MEDS BY ELIG CLIN: HCPCS | Performed by: PSYCHIATRY & NEUROLOGY

## 2020-05-26 RX ORDER — BUPRENORPHINE AND NALOXONE 4; 1 MG/1; MG/1
1 FILM, SOLUBLE BUCCAL; SUBLINGUAL DAILY
Status: ON HOLD | COMMUNITY
End: 2020-08-01

## 2020-05-26 RX ORDER — CARBAMAZEPINE 100 MG/1
100 TABLET, EXTENDED RELEASE ORAL
COMMUNITY
End: 2021-07-12 | Stop reason: SDUPTHER

## 2020-05-26 RX ORDER — OXCARBAZEPINE 300 MG/1
300 TABLET, FILM COATED ORAL 2 TIMES DAILY
Qty: 60 TABLET | Refills: 11 | Status: ON HOLD
Start: 2020-05-26 | End: 2020-08-01

## 2020-05-26 NOTE — PROGRESS NOTES
chewing issues, headaches, or spinning sensations. Unfortunately, he developed his first seizures 20 years later. His first episode occurred while watching a friend play pool at a local bar. Suddenly, he noted the lights dimming and sounds decreasing; his head then turned to the right and he quickly lost consciousness. His friends noted generalized tonic-clonic movements, with incontinence and tongue biting. While hospitalized, he was started on Dilantin. He noted using Dilantin, Keppra and lamotrigine, claiming none of these drugs completely controlled his seizures. However, he was uncertain of the dosings of these medications. He previously noted similar seizures every 3 weeks. He suffered his last spell several months ago. His primary care physician then placed him on Tegretol 100 mg in the morning and 200 mg at night. He denied seizures since that time. He was tolerating that medication well. Upon reviewing his previous notes, the emergency room noted seizures in June of last year due to his not taking Dilantin. The patient denied any other seizure activity or neurological issues. He reported no medical problems as well. Physical examination displayed stable vital signs, except for a blood pressure 143/95. He was young man in no acute distress, who was alert, cooperative and oriented. He was very tall. He was very pleasant. His skin was unremarkable with no lymphadenopathy. Head examination revealed a small posterior pharyngeal aperture, without other abnormalities. His neck was supple without thyromegaly; there were +2 carotid upstrokes without bruits; there was full range of motion of his neck. His spine was without lesions. His lungs were clear to auscultation. Cardiac testing was also unremarkable. His abdomen was unrevealing. I found +1 peripheral pulses without bruits.   His limbs displayed minimal atrophy of the right arm, with increasing pes cavus deformity of the practitioners. Trileptal was started at 300 mg twice daily. This drug may be increased as needed. Tegretol was discontinued. Sodium levels much be watched serially. An EEG was also scheduled. He will call if there be any recurrent seizures. He will call at any time if problems arise. I spent 80 minutes with the patient with over 50 % spent in counseling and disease management. All patient issues were addressed and all questions were answered.

## 2020-05-27 ENCOUNTER — TELEPHONE (OUTPATIENT)
Dept: NEUROLOGY | Age: 35
End: 2020-05-27

## 2020-07-24 ENCOUNTER — TELEPHONE (OUTPATIENT)
Dept: NEUROLOGY | Age: 35
End: 2020-07-24

## 2020-08-01 ENCOUNTER — APPOINTMENT (OUTPATIENT)
Dept: CT IMAGING | Age: 35
DRG: 420 | End: 2020-08-01
Payer: MEDICAID

## 2020-08-01 ENCOUNTER — APPOINTMENT (OUTPATIENT)
Dept: GENERAL RADIOLOGY | Age: 35
DRG: 420 | End: 2020-08-01
Payer: MEDICAID

## 2020-08-01 ENCOUNTER — HOSPITAL ENCOUNTER (INPATIENT)
Age: 35
LOS: 2 days | Discharge: HOME | DRG: 420 | End: 2020-08-03
Attending: EMERGENCY MEDICINE | Admitting: FAMILY MEDICINE
Payer: MEDICAID

## 2020-08-01 PROBLEM — E10.10 DKA, TYPE 1, NOT AT GOAL (HCC): Status: ACTIVE | Noted: 2020-08-01

## 2020-08-01 LAB
ALBUMIN SERPL-MCNC: 4 G/DL (ref 3.5–5.2)
ALP BLD-CCNC: 168 U/L (ref 40–129)
ALT SERPL-CCNC: 167 U/L (ref 0–40)
ANION GAP SERPL CALCULATED.3IONS-SCNC: 12 MMOL/L (ref 7–16)
ANION GAP SERPL CALCULATED.3IONS-SCNC: 12 MMOL/L (ref 7–16)
ANION GAP SERPL CALCULATED.3IONS-SCNC: 19 MMOL/L (ref 7–16)
AST SERPL-CCNC: 86 U/L (ref 0–39)
BASOPHILS ABSOLUTE: 0.05 E9/L (ref 0–0.2)
BASOPHILS RELATIVE PERCENT: 0.6 % (ref 0–2)
BETA-HYDROXYBUTYRATE: 2.53 MMOL/L (ref 0.02–0.27)
BILIRUB SERPL-MCNC: 0.5 MG/DL (ref 0–1.2)
BILIRUBIN URINE: NEGATIVE
BLOOD, URINE: NEGATIVE
BUN BLDV-MCNC: 12 MG/DL (ref 6–20)
BUN BLDV-MCNC: 13 MG/DL (ref 6–20)
BUN BLDV-MCNC: 17 MG/DL (ref 6–20)
CALCIUM SERPL-MCNC: 8.7 MG/DL (ref 8.6–10.2)
CALCIUM SERPL-MCNC: 8.8 MG/DL (ref 8.6–10.2)
CALCIUM SERPL-MCNC: 9.5 MG/DL (ref 8.6–10.2)
CHLORIDE BLD-SCNC: 104 MMOL/L (ref 98–107)
CHLORIDE BLD-SCNC: 105 MMOL/L (ref 98–107)
CHLORIDE BLD-SCNC: 87 MMOL/L (ref 98–107)
CLARITY: CLEAR
CO2: 18 MMOL/L (ref 22–29)
CO2: 20 MMOL/L (ref 22–29)
CO2: 20 MMOL/L (ref 22–29)
COLOR: YELLOW
CREAT SERPL-MCNC: 0.7 MG/DL (ref 0.7–1.2)
CREAT SERPL-MCNC: 0.7 MG/DL (ref 0.7–1.2)
CREAT SERPL-MCNC: 0.8 MG/DL (ref 0.7–1.2)
EOSINOPHILS ABSOLUTE: 0.06 E9/L (ref 0.05–0.5)
EOSINOPHILS RELATIVE PERCENT: 0.7 % (ref 0–6)
GFR AFRICAN AMERICAN: >60
GFR NON-AFRICAN AMERICAN: >60 ML/MIN/1.73
GLUCOSE BLD-MCNC: 170 MG/DL (ref 74–99)
GLUCOSE BLD-MCNC: 198 MG/DL (ref 74–99)
GLUCOSE BLD-MCNC: 596 MG/DL (ref 74–99)
GLUCOSE URINE: >=1000 MG/DL
HCT VFR BLD CALC: 45.4 % (ref 37–54)
HEMOGLOBIN: 16 G/DL (ref 12.5–16.5)
IMMATURE GRANULOCYTES #: 0.01 E9/L
IMMATURE GRANULOCYTES %: 0.1 % (ref 0–5)
KETONES, URINE: 15 MG/DL
LACTIC ACID: 1.6 MMOL/L (ref 0.5–2.2)
LEUKOCYTE ESTERASE, URINE: NEGATIVE
LIPASE: 97 U/L (ref 13–60)
LYMPHOCYTES ABSOLUTE: 4.09 E9/L (ref 1.5–4)
LYMPHOCYTES RELATIVE PERCENT: 47 % (ref 20–42)
MAGNESIUM: 2.1 MG/DL (ref 1.6–2.6)
MAGNESIUM: 2.2 MG/DL (ref 1.6–2.6)
MCH RBC QN AUTO: 28.7 PG (ref 26–35)
MCHC RBC AUTO-ENTMCNC: 35.2 % (ref 32–34.5)
MCV RBC AUTO: 81.5 FL (ref 80–99.9)
METER GLUCOSE: 158 MG/DL (ref 74–99)
METER GLUCOSE: 177 MG/DL (ref 74–99)
METER GLUCOSE: 182 MG/DL (ref 74–99)
METER GLUCOSE: 215 MG/DL (ref 74–99)
METER GLUCOSE: 219 MG/DL (ref 74–99)
METER GLUCOSE: 223 MG/DL (ref 74–99)
METER GLUCOSE: 260 MG/DL (ref 74–99)
METER GLUCOSE: 312 MG/DL (ref 74–99)
MONOCYTES ABSOLUTE: 0.76 E9/L (ref 0.1–0.95)
MONOCYTES RELATIVE PERCENT: 8.7 % (ref 2–12)
NEUTROPHILS ABSOLUTE: 3.74 E9/L (ref 1.8–7.3)
NEUTROPHILS RELATIVE PERCENT: 42.9 % (ref 43–80)
NITRITE, URINE: NEGATIVE
PDW BLD-RTO: 12.4 FL (ref 11.5–15)
PH UA: 5.5 (ref 5–9)
PH VENOUS: 7.33 (ref 7.35–7.45)
PHOSPHORUS: 2.3 MG/DL (ref 2.5–4.5)
PHOSPHORUS: 2.6 MG/DL (ref 2.5–4.5)
PLATELET # BLD: 239 E9/L (ref 130–450)
PMV BLD AUTO: 10.6 FL (ref 7–12)
POTASSIUM REFLEX MAGNESIUM: 4 MMOL/L (ref 3.5–5)
POTASSIUM REFLEX MAGNESIUM: 4.1 MMOL/L (ref 3.5–5)
POTASSIUM REFLEX MAGNESIUM: 4.8 MMOL/L (ref 3.5–5)
PROTEIN UA: NEGATIVE MG/DL
RBC # BLD: 5.57 E12/L (ref 3.8–5.8)
SARS-COV-2, NAAT: NOT DETECTED
SODIUM BLD-SCNC: 126 MMOL/L (ref 132–146)
SODIUM BLD-SCNC: 134 MMOL/L (ref 132–146)
SODIUM BLD-SCNC: 137 MMOL/L (ref 132–146)
SPECIFIC GRAVITY UA: <=1.005 (ref 1–1.03)
TOTAL PROTEIN: 8.1 G/DL (ref 6.4–8.3)
TROPONIN: <0.01 NG/ML (ref 0–0.03)
UROBILINOGEN, URINE: 0.2 E.U./DL
WBC # BLD: 8.7 E9/L (ref 4.5–11.5)

## 2020-08-01 PROCEDURE — 6360000002 HC RX W HCPCS: Performed by: EMERGENCY MEDICINE

## 2020-08-01 PROCEDURE — 71045 X-RAY EXAM CHEST 1 VIEW: CPT

## 2020-08-01 PROCEDURE — 84681 ASSAY OF C-PEPTIDE: CPT

## 2020-08-01 PROCEDURE — 6370000000 HC RX 637 (ALT 250 FOR IP): Performed by: EMERGENCY MEDICINE

## 2020-08-01 PROCEDURE — 82800 BLOOD PH: CPT

## 2020-08-01 PROCEDURE — 2060000000 HC ICU INTERMEDIATE R&B

## 2020-08-01 PROCEDURE — 85025 COMPLETE CBC W/AUTO DIFF WBC: CPT

## 2020-08-01 PROCEDURE — 83690 ASSAY OF LIPASE: CPT

## 2020-08-01 PROCEDURE — 80053 COMPREHEN METABOLIC PANEL: CPT

## 2020-08-01 PROCEDURE — 84484 ASSAY OF TROPONIN QUANT: CPT

## 2020-08-01 PROCEDURE — 2500000003 HC RX 250 WO HCPCS: Performed by: FAMILY MEDICINE

## 2020-08-01 PROCEDURE — 82962 GLUCOSE BLOOD TEST: CPT

## 2020-08-01 PROCEDURE — 2580000003 HC RX 258: Performed by: FAMILY MEDICINE

## 2020-08-01 PROCEDURE — 81003 URINALYSIS AUTO W/O SCOPE: CPT

## 2020-08-01 PROCEDURE — 2580000003 HC RX 258: Performed by: EMERGENCY MEDICINE

## 2020-08-01 PROCEDURE — 6370000000 HC RX 637 (ALT 250 FOR IP): Performed by: FAMILY MEDICINE

## 2020-08-01 PROCEDURE — 84100 ASSAY OF PHOSPHORUS: CPT

## 2020-08-01 PROCEDURE — 36415 COLL VENOUS BLD VENIPUNCTURE: CPT

## 2020-08-01 PROCEDURE — 74176 CT ABD & PELVIS W/O CONTRAST: CPT

## 2020-08-01 PROCEDURE — 82010 KETONE BODYS QUAN: CPT

## 2020-08-01 PROCEDURE — U0002 COVID-19 LAB TEST NON-CDC: HCPCS

## 2020-08-01 PROCEDURE — 83735 ASSAY OF MAGNESIUM: CPT

## 2020-08-01 PROCEDURE — 70450 CT HEAD/BRAIN W/O DYE: CPT

## 2020-08-01 PROCEDURE — 96374 THER/PROPH/DIAG INJ IV PUSH: CPT

## 2020-08-01 PROCEDURE — 83605 ASSAY OF LACTIC ACID: CPT

## 2020-08-01 PROCEDURE — 99285 EMERGENCY DEPT VISIT HI MDM: CPT

## 2020-08-01 PROCEDURE — 80048 BASIC METABOLIC PNL TOTAL CA: CPT

## 2020-08-01 PROCEDURE — 93005 ELECTROCARDIOGRAM TRACING: CPT | Performed by: EMERGENCY MEDICINE

## 2020-08-01 RX ORDER — 0.9 % SODIUM CHLORIDE 0.9 %
1000 INTRAVENOUS SOLUTION INTRAVENOUS ONCE
Status: COMPLETED | OUTPATIENT
Start: 2020-08-01 | End: 2020-08-01

## 2020-08-01 RX ORDER — PROMETHAZINE HYDROCHLORIDE 25 MG/1
12.5 TABLET ORAL EVERY 6 HOURS PRN
Status: DISCONTINUED | OUTPATIENT
Start: 2020-08-01 | End: 2020-08-03 | Stop reason: HOSPADM

## 2020-08-01 RX ORDER — SODIUM CHLORIDE 0.9 % (FLUSH) 0.9 %
10 SYRINGE (ML) INJECTION EVERY 12 HOURS SCHEDULED
Status: DISCONTINUED | OUTPATIENT
Start: 2020-08-01 | End: 2020-08-03 | Stop reason: HOSPADM

## 2020-08-01 RX ORDER — OXCARBAZEPINE 300 MG/1
300 TABLET, FILM COATED ORAL 2 TIMES DAILY
Status: DISCONTINUED | OUTPATIENT
Start: 2020-08-01 | End: 2020-08-01 | Stop reason: ALTCHOICE

## 2020-08-01 RX ORDER — SODIUM CHLORIDE 9 MG/ML
INJECTION, SOLUTION INTRAVENOUS CONTINUOUS
Status: DISCONTINUED | OUTPATIENT
Start: 2020-08-01 | End: 2020-08-02

## 2020-08-01 RX ORDER — 0.9 % SODIUM CHLORIDE 0.9 %
15 INTRAVENOUS SOLUTION INTRAVENOUS ONCE
Status: COMPLETED | OUTPATIENT
Start: 2020-08-01 | End: 2020-08-01

## 2020-08-01 RX ORDER — CARBAMAZEPINE 100 MG/1
100 TABLET, EXTENDED RELEASE ORAL 2 TIMES DAILY
Status: DISCONTINUED | OUTPATIENT
Start: 2020-08-01 | End: 2020-08-03 | Stop reason: HOSPADM

## 2020-08-01 RX ORDER — DEXTROSE, SODIUM CHLORIDE, AND POTASSIUM CHLORIDE 5; .45; .15 G/100ML; G/100ML; G/100ML
INJECTION INTRAVENOUS CONTINUOUS PRN
Status: DISCONTINUED | OUTPATIENT
Start: 2020-08-01 | End: 2020-08-02

## 2020-08-01 RX ORDER — DEXTROSE MONOHYDRATE 25 G/50ML
12.5 INJECTION, SOLUTION INTRAVENOUS PRN
Status: DISCONTINUED | OUTPATIENT
Start: 2020-08-01 | End: 2020-08-03 | Stop reason: HOSPADM

## 2020-08-01 RX ORDER — POTASSIUM CHLORIDE 7.45 MG/ML
INJECTION INTRAVENOUS
Status: DISPENSED
Start: 2020-08-01 | End: 2020-08-02

## 2020-08-01 RX ORDER — ONDANSETRON 2 MG/ML
4 INJECTION INTRAMUSCULAR; INTRAVENOUS EVERY 6 HOURS PRN
Status: DISCONTINUED | OUTPATIENT
Start: 2020-08-01 | End: 2020-08-03 | Stop reason: HOSPADM

## 2020-08-01 RX ORDER — CLONIDINE HYDROCHLORIDE 0.1 MG/1
0.1 TABLET ORAL ONCE
Status: COMPLETED | OUTPATIENT
Start: 2020-08-01 | End: 2020-08-01

## 2020-08-01 RX ORDER — MAGNESIUM SULFATE 1 G/100ML
1 INJECTION INTRAVENOUS PRN
Status: DISCONTINUED | OUTPATIENT
Start: 2020-08-01 | End: 2020-08-03 | Stop reason: HOSPADM

## 2020-08-01 RX ORDER — DEXTROSE MONOHYDRATE 50 MG/ML
100 INJECTION, SOLUTION INTRAVENOUS PRN
Status: DISCONTINUED | OUTPATIENT
Start: 2020-08-01 | End: 2020-08-03 | Stop reason: HOSPADM

## 2020-08-01 RX ORDER — ACETAMINOPHEN 650 MG/1
650 SUPPOSITORY RECTAL EVERY 6 HOURS PRN
Status: DISCONTINUED | OUTPATIENT
Start: 2020-08-01 | End: 2020-08-03 | Stop reason: HOSPADM

## 2020-08-01 RX ORDER — POTASSIUM CHLORIDE 7.45 MG/ML
10 INJECTION INTRAVENOUS PRN
Status: DISCONTINUED | OUTPATIENT
Start: 2020-08-01 | End: 2020-08-03 | Stop reason: HOSPADM

## 2020-08-01 RX ORDER — ACETAMINOPHEN 325 MG/1
650 TABLET ORAL EVERY 6 HOURS PRN
Status: DISCONTINUED | OUTPATIENT
Start: 2020-08-01 | End: 2020-08-03 | Stop reason: HOSPADM

## 2020-08-01 RX ORDER — SODIUM CHLORIDE 0.9 % (FLUSH) 0.9 %
10 SYRINGE (ML) INJECTION PRN
Status: DISCONTINUED | OUTPATIENT
Start: 2020-08-01 | End: 2020-08-03 | Stop reason: HOSPADM

## 2020-08-01 RX ORDER — DEXTROSE MONOHYDRATE 25 G/50ML
12.5 INJECTION, SOLUTION INTRAVENOUS PRN
Status: DISCONTINUED | OUTPATIENT
Start: 2020-08-01 | End: 2020-08-01 | Stop reason: SDUPTHER

## 2020-08-01 RX ORDER — ONDANSETRON 2 MG/ML
4 INJECTION INTRAMUSCULAR; INTRAVENOUS ONCE
Status: COMPLETED | OUTPATIENT
Start: 2020-08-01 | End: 2020-08-01

## 2020-08-01 RX ORDER — NICOTINE POLACRILEX 4 MG
15 LOZENGE BUCCAL PRN
Status: DISCONTINUED | OUTPATIENT
Start: 2020-08-01 | End: 2020-08-03 | Stop reason: HOSPADM

## 2020-08-01 RX ADMIN — SODIUM CHLORIDE 1000 ML: 9 INJECTION, SOLUTION INTRAVENOUS at 13:20

## 2020-08-01 RX ADMIN — POTASSIUM CHLORIDE 10 MEQ: 10 INJECTION, SOLUTION INTRAVENOUS at 15:00

## 2020-08-01 RX ADMIN — CLONIDINE HYDROCHLORIDE 0.1 MG: 0.1 TABLET ORAL at 13:23

## 2020-08-01 RX ADMIN — SODIUM CHLORIDE 0.1 UNITS/KG/HR: 900 INJECTION, SOLUTION INTRAVENOUS at 14:57

## 2020-08-01 RX ADMIN — FAMOTIDINE 20 MG: 10 INJECTION INTRAVENOUS at 19:52

## 2020-08-01 RX ADMIN — SODIUM CHLORIDE, PRESERVATIVE FREE 10 ML: 5 INJECTION INTRAVENOUS at 19:53

## 2020-08-01 RX ADMIN — SODIUM CHLORIDE: 9 INJECTION, SOLUTION INTRAVENOUS at 16:49

## 2020-08-01 RX ADMIN — SODIUM CHLORIDE 2144 ML: 9 INJECTION, SOLUTION INTRAVENOUS at 15:01

## 2020-08-01 RX ADMIN — ACETAMINOPHEN 650 MG: 325 TABLET ORAL at 21:25

## 2020-08-01 RX ADMIN — CARBAMAZEPINE 100 MG: 100 TABLET, EXTENDED RELEASE ORAL at 19:52

## 2020-08-01 RX ADMIN — POTASSIUM CHLORIDE, DEXTROSE MONOHYDRATE AND SODIUM CHLORIDE: 150; 5; 450 INJECTION, SOLUTION INTRAVENOUS at 18:06

## 2020-08-01 RX ADMIN — ONDANSETRON 4 MG: 2 INJECTION INTRAMUSCULAR; INTRAVENOUS at 13:21

## 2020-08-01 ASSESSMENT — PAIN DESCRIPTION - PAIN TYPE
TYPE: ACUTE PAIN
TYPE: ACUTE PAIN

## 2020-08-01 ASSESSMENT — PAIN DESCRIPTION - ORIENTATION: ORIENTATION: RIGHT

## 2020-08-01 ASSESSMENT — PAIN SCALES - GENERAL
PAINLEVEL_OUTOF10: 0
PAINLEVEL_OUTOF10: 1
PAINLEVEL_OUTOF10: 6

## 2020-08-01 ASSESSMENT — PAIN DESCRIPTION - LOCATION
LOCATION: ABDOMEN;FLANK
LOCATION: HEAD

## 2020-08-01 NOTE — H&P
Hospitalist History & Physical      PCP: Cedrick Lugo MD    Date of Admission: 8/1/2020    Date of Service: Pt seen/examined on 8/1/2020    Chief Complaint:  had concerns including Abdominal Pain (Abdominal pain with N/V for past 2 weeks) and Blurred Vision (Blurred vision for past 5 days ). History Of Present Illness:    Mr. Norman Ng, a 38y.o. year old male  who  has a past medical history of Anxiety, Depression, Seizures ([address] and Unspecified cerebral artery occlusion with cerebral infarction. Patient presented to the urgency department with complaints of abdominal pain and nausea and vomiting for the last 2 weeks. Patient states this started with a cough that became very persistent. He was been have coughing fits where he would start to gag and vomit. Abdominal pain persisted as has the vomiting. About 5 days ago the patient noticed his vision was somewhat blurry. Patient denies any previous history of diabetes. Reports that he goes to a doctor in [address] [facility] but he cannot remember the physician's name. On evaluation emergency department patient is afebrile. Vital signs reveal he is hypertensive but otherwise stable. CT of the head unremarkable. CT abdomen pelvis unremarkable. Laboratory studies notable for glucose of 596, sodium 126, anion gap 19, alk phos 168, , AST 86, lipase 97, beta hydroxybutyrate 2.53, venous blood gas 7.3. Patient was started on insulin infusion and DKA protocol. Medicine consulted for admission.       Past Medical History:        Diagnosis Date    Anxiety     Depression     Seizures ([address]     Unspecified cerebral artery occlusion with cerebral infarction        Past Surgical History:        Procedure Laterality Date    ABSCESS DRAINAGE Left 08/22/2016    Left Hand    BONE INCISION AND DRAINAGE Left 08/30/2016    HAND W/ WOUND VA C       Medications Prior to Admission:      Prior to Admission medications    Medication Sig Start Date End Date 12.4        BMP:   Recent Labs     20  1207   *   K 4.8   CL 87*   CO2 20*   BUN 17   CREATININE 0.8     LFT:  Recent Labs     20  1207   PROT 8.1   ALKPHOS 168*   *   AST 86*   BILITOT 0.5   LIPASE 97*     CE:  Recent Labs     20  1207   TROPONINI <0.01     PT/INR: No results for input(s): INR, APTT in the last 72 hours. BNP: No results for input(s): BNP in the last 72 hours. ESR:   Lab Results   Component Value Date    SEDRATE 48 (H) 2016     CRP:   Lab Results   Component Value Date    CRP 5.3 (H) 2016     D Dimer: No results found for: DDIMER   Folate and B12: No results found for: VVGRSWOL15, No results found for: FOLATE  Lactic Acid:   Lab Results   Component Value Date    LACTA 1.6 2020     Thyroid Studies: No results found for: TSH, Tauna Blake    Oupatient labs:  Lab Results   Component Value Date    CHOL 197 2016    TRIG 87 2016    HDL 42 2016    LDLCALC 138 (H) 2016    INR 1.1 2016       Urinalysis:    Lab Results   Component Value Date    NITRU Negative 2020    WBCUA NONE 2019    BACTERIA NONE 2019    RBCUA NONE 2019    BLOODU Negative 2020    SPECGRAV <=1.005 2020    GLUCOSEU >=1000 2020       Imaging:  Ct Abdomen Pelvis Wo Contrast Additional Contrast? None    Result Date: 2020  Patient MRN:  [id number] : 1985 Age: 38 years Gender: Male Order Date:  2020 12:17 PM EXAM: CT ABDOMEN PELVIS WO CONTRAST NUMBER OF IMAGES \ views:  399 INDICATION:  abd pain abd pain COMPARISON: CT abdomen and pelvis May 8, 2010 Technique: Multiple computerized tomography sections of the abdomen with sagittal and coronal MPR reconstructions were obtained from the top of the diaphragm to the pelvis. LOWER THORAX: Unremarkable. ABDOMEN/PELVIS Liver: Mild hepatic stenosis. Gallbladder: Unremarkable. Spleen: Unremarkable. Pancreas: Unremarkable. Adrenals: Unremarkable. Kidneys: No hydronephrosis. Punctate nonobstructing stone on the right. Bowel: No evidence of obstruction. The appendix is normal in size. Colonic diverticula. Urinary bladder: Urinary bladder distention. Reproductive: Unremarkable. Lymph nodes: Unremarkable. Vasculature: No AAA Peritoneum/retroperitoneum: No ascites or free air. Osseous structure/soft tissue: No acute or suspicious osseous findings. 1.  No acute findings within the abdomen or pelvis. 2.  Mild hepatic steatosis which can be a cause of abdominal pain. 3.  Punctate nonobstructing right renal stone. 4.  A few scattered colonic diverticula. Ct Head Wo Contrast    Result Date: 2020  Patient MRN:  [id number] : 1985 Age: 38 years Gender: Male Order Date:  2020 12:17 PM EXAM: CT HEAD WO CONTRAST NUMBER OF IMAGES:  119 INDICATION:  visual changes and headache visual changes and headache COMPARISON: 2019. Technique: Multiple serial axial unenhanced images through the brain were obtained. Computer reconstructed images were obtained within the sagittal and coronal projections Findings: The ventricles are normal in their size, shape, and position, without evidence of midline shift. There is no evidence of mass-effect throughout the brain. No focal parenchymal abnormality is identified. Next Unchanged left parietal/occipital encephalomalacia. Unchanged right periventricular lacunar infarct. No acute infarct, intracranial hemorrhage, or abnormal extra-axial collections are seen throughout the brain. Unchanged opacification of the right maxillary sinus. 1.  No acute intracranial abnormality by noncontrast CT. 2.  Unchanged chronic findings as above. Xr Chest Portable    Result Date: 2020  Patient MRN:  [id number] : 1985 Age: 38 years Gender: Male Order Date:  2020 1:57 PM EXAM: XR CHEST PORTABLE COMPARISON: 2019 INDICATION:  pneumonia pneumonia FINDINGS: The heart is normal in size.   No focal airspace opacity. There is no pleural effusion. There is no pneumothorax. No free air beneath the diaphragms. No airspace opacities or pleural effusion. ASSESSMENT:  1.  Diabetic ketoacidosis, mild  2. Diabetes mellitus, possibly type I,new diagnosis  3. Mild acute pancreatitis  4. Intractable nausea and vomiting      PLAN:  1. Admit to medicine  2. Insulin infusion/DKA protocol  3. IV fluids per DKA protocol  4. Diabetic educator consult  5. C-peptide and hemoglobin A1c pending  6. Monitor serial serum electrolyte levels every 4 hours  7. Recheck beta hydroxybutyrate tomorrow  8. N.p.o.  9.  Once gap closes and ketones clear, will initiate basal bolus insulin and start feeding the patient        Diet: Diet NPO Effective Now  Code Status: Full Code    Surrogate decision maker confirmed with patient:  Primary Emergency ContactVince Evans, Home Phone: [phone]    DVT Prophylaxis: []Lovenox []Heparin []PCD [] [address] []Encouraged ambulation    Disposition: []Med/Surg [] Intermediate [] ICU/CCU  Admit status: [] Observation [] Inpatient     +++++++++++++++++++++++++++++++++++++++++++++++++  [address], [facility]  +++++++++++++++++++++++++++++++++++++++++++++++++  NOTE: This report was transcribed using voice recognition software. Every effort was made to ensure accuracy; however, inadvertent computerized transcription errors may be present.

## 2020-08-01 NOTE — ED NOTES
Critical lab value received by this RN. Notified Dr. Theodis Cheadle.       Mike Bonilla RN  08/01/20 [id number]

## 2020-08-01 NOTE — ED PROVIDER NOTES
HPI:  8/1/20,   Time: 3:26 PM EDT         Heidi Alanis is a 38 y.o. male presenting to the ED for cough vomiting abdominal pain and blurred vision, beginning 2 weeks ago. The complaint has been persistent, moderate in severity, and worsened by nothing. No fever or chills. Patient does complain that his cough is intermittently productive. Patient denies melena hematemesis or hematochezia. Patient states that he has no double vision but that his vision is intermittently extremely blurred. Patient states that he is no history of any kind of glucose metabolism abnormality. He states the abdominal pain is worse on the right than the left and it is worsened with movement or cough    ROS:   Pertinent positives and negatives are stated within HPI, all other systems reviewed and are negative.  --------------------------------------------- PAST HISTORY ---------------------------------------------  Past Medical History:  has a past medical history of Anxiety, Depression, Seizures ([address] and Unspecified cerebral artery occlusion with cerebral infarction. Past Surgical History:  has a past surgical history that includes Abscess Drainage (Left, 08/22/2016) and bone incision and drainage (Left, 08/30/2016). Social History:  reports that he has been smoking cigarettes. He has a 7.60 pack-year smoking history. He has never used smokeless tobacco. He reports current drug use. Drug: Other-see comments. He reports that he does not drink alcohol. Family History: family history includes Heart Disease in his father; Kidney Disease in his mother. The patients home medications have been reviewed. Allergies: Patient has no known allergies.     -------------------------------------------------- RESULTS -------------------------------------------------  All laboratory and radiology results have been personally reviewed by myself   LABS:  Results for orders placed or performed during the hospital encounter of 08/01/20 5.5 5.0 - 9.0    Protein, UA Negative Negative mg/dL    Urobilinogen, Urine 0.2 <2.0 E.U./dL    Nitrite, Urine Negative Negative    Leukocyte Esterase, Urine Negative Negative   Lactic Acid, Plasma   Result Value Ref Range    Lactic Acid 1.6 0.5 - 2.2 mmol/L   COVID-19   Result Value Ref Range    SARS-CoV-2, NAAT Not Detected Not Detected   pH, venous   Result Value Ref Range    pH, Shaun 7.33 (L) 7.35 - 7.45   Beta-Hydroxybutyrate   Result Value Ref Range    Beta-Hydroxybutyrate 2.53 (H) 0.02 - 0.27 mmol/L       RADIOLOGY:  Interpreted by Radiologist.  XR CHEST PORTABLE   Final Result      No airspace opacities or pleural effusion. CT ABDOMEN PELVIS WO CONTRAST Additional Contrast? None   Final Result   1. No acute findings within the abdomen or pelvis. 2.  Mild hepatic steatosis which can be a cause of abdominal pain. 3.  Punctate nonobstructing right renal stone. 4.  A few scattered colonic diverticula. CT HEAD WO CONTRAST   Final Result   1. No acute intracranial abnormality by noncontrast CT. 2.  Unchanged chronic findings as above.          ------------------------- NURSING NOTES AND VITALS REVIEWED ---------------------------   The nursing notes within the ED encounter and vital signs as below have been reviewed. BP (!) 150/113   Pulse 96   Temp 96.8 °F (36 °C)   Resp 20   Ht 6' 7\" (2.007 m)   Wt (!) 315 lb (142.9 kg)   SpO2 97%   BMI 35.49 kg/m²   Oxygen Saturation Interpretation: Normal      ---------------------------------------------------PHYSICAL EXAM--------------------------------------      Constitutional/General: Alert and oriented x3, well appearing, non toxic in NAD  Head: NC/AT  Eyes: PERRL, EOMI  Mouth: Oropharynx clear, handling secretions, no trismus  Neck: Supple, full ROM, no meningeal signs  Pulmonary: Lungs clear to auscultation bilaterally, no wheezes, rales, or rhonchi.  Not in respiratory distress  Cardiovascular: Tachycardic rate and rhythm, no murmurs, gallops, or rubs. 2+ distal pulses  Abdomen: Soft, diffusely tender right greater than left no guarding or rebound, non distended,   Extremities: Moves all extremities x 4.  Warm and well perfused  Skin: warm and dry without rash  Neurologic: GCS 15,  Psych: Normal Affect      ------------------------------ ED COURSE/MEDICAL DECISION MAKING----------------------  Medications   glucose (GLUTOSE) 40 % oral gel 15 g (has no administration in time range)   dextrose 50 % IV solution (has no administration in time range)   glucagon (rDNA) injection 1 mg (has no administration in time range)   dextrose 5 % solution (has no administration in time range)   insulin regular (HUMULIN R;NOVOLIN R) 100 Units in sodium chloride 0.9 % 100 mL infusion (0.1 Units/kg/hr × 142.9 kg Intravenous New Bag 8/1/20 1457)   enoxaparin (LOVENOX) injection 40 mg (has no administration in time range)   dextrose 50 % IV solution (has no administration in time range)   potassium chloride 10 mEq/100 mL IVPB (Peripheral Line) (10 mEq Intravenous New Bag 8/1/20 1500)   magnesium sulfate 1 g in dextrose 5% 100 mL IVPB (has no administration in time range)   sodium phosphate 10 mmol in dextrose 5 % 250 mL IVPB (has no administration in time range)     Or   sodium phosphate 15 mmol in dextrose 5 % 250 mL IVPB (has no administration in time range)     Or   sodium phosphate 20 mmol in dextrose 5 % 500 mL IVPB (has no administration in time range)   dextrose 5 % and 0.45 % NaCl with KCl 20 mEq infusion (has no administration in time range)   0.9 % sodium chloride bolus (2,144 mLs Intravenous New Bag 8/1/20 1501)     Followed by   0.9 % sodium chloride infusion (has no administration in time range)   potassium chloride 10 MEQ/100ML IVPB (Peripheral Line) (has no administration in time range)   0.9 % sodium chloride bolus (0 mLs Intravenous Stopped 8/1/20 1455)   ondansetron (ZOFRAN) injection 4 mg (4 mg Intravenous Given 8/1/20 1321) cloNIDine (CATAPRES) tablet 0.1 mg (0.1 mg Oral Given 8/1/20 1323)         Medical Decision Making:    Patient was given IV fluids IV insulin and clonidine. His blood pressure came down his potassium was adequate he was given hydration he was discussed with sound physicians he will be admitted to 4500. CT of the abdomen and brain were normal.  He is noted to have mild elevation of his transaminases and alkaline phosphatase. His COVID test was negative. Please note that the withdrawal or failure to initiate urgent interventions for this patient would likely result in a life threatening deterioration or permanent disability. Accordingly this patient received 30 minutes of critical care time, excluding separately billable procedures. Counseling: The emergency provider has spoken with the patient and discussed todays results, in addition to providing specific details for the plan of care and counseling regarding the diagnosis and prognosis. Questions are answered at this time and they are agreeable with the plan.      --------------------------------- IMPRESSION AND DISPOSITION ---------------------------------    IMPRESSION  1.  Diabetic ketoacidosis without coma associated with other specified diabetes mellitus [facility]        DISPOSITION  Disposition: Admit to telemetry  Patient condition is serious                  Tiffany Galeas MD  08/01/20 8870

## 2020-08-02 LAB
ANION GAP SERPL CALCULATED.3IONS-SCNC: 10 MMOL/L (ref 7–16)
ANION GAP SERPL CALCULATED.3IONS-SCNC: 12 MMOL/L (ref 7–16)
ANION GAP SERPL CALCULATED.3IONS-SCNC: 13 MMOL/L (ref 7–16)
BETA-HYDROXYBUTYRATE: 0.2 MMOL/L (ref 0.02–0.27)
BUN BLDV-MCNC: 10 MG/DL (ref 6–20)
BUN BLDV-MCNC: 9 MG/DL (ref 6–20)
BUN BLDV-MCNC: 9 MG/DL (ref 6–20)
CALCIUM SERPL-MCNC: 8.4 MG/DL (ref 8.6–10.2)
CALCIUM SERPL-MCNC: 8.5 MG/DL (ref 8.6–10.2)
CALCIUM SERPL-MCNC: 8.6 MG/DL (ref 8.6–10.2)
CHLORIDE BLD-SCNC: 101 MMOL/L (ref 98–107)
CHLORIDE BLD-SCNC: 102 MMOL/L (ref 98–107)
CHLORIDE BLD-SCNC: 102 MMOL/L (ref 98–107)
CO2: 20 MMOL/L (ref 22–29)
CO2: 21 MMOL/L (ref 22–29)
CO2: 25 MMOL/L (ref 22–29)
CREAT SERPL-MCNC: 0.7 MG/DL (ref 0.7–1.2)
CREAT SERPL-MCNC: 0.8 MG/DL (ref 0.7–1.2)
CREAT SERPL-MCNC: 0.8 MG/DL (ref 0.7–1.2)
EKG ATRIAL RATE: 99 BPM
EKG P AXIS: 32 DEGREES
EKG P-R INTERVAL: 168 MS
EKG Q-T INTERVAL: 332 MS
EKG QRS DURATION: 100 MS
EKG QTC CALCULATION (BAZETT): 426 MS
EKG R AXIS: -4 DEGREES
EKG T AXIS: 31 DEGREES
EKG VENTRICULAR RATE: 99 BPM
GFR AFRICAN AMERICAN: >60
GFR NON-AFRICAN AMERICAN: >60 ML/MIN/1.73
GLUCOSE BLD-MCNC: 206 MG/DL (ref 74–99)
GLUCOSE BLD-MCNC: 269 MG/DL (ref 74–99)
GLUCOSE BLD-MCNC: 291 MG/DL (ref 74–99)
HBA1C MFR BLD: 13.1 % (ref 4–5.6)
MAGNESIUM: 2.1 MG/DL (ref 1.6–2.6)
MAGNESIUM: 2.2 MG/DL (ref 1.6–2.6)
METER GLUCOSE: 188 MG/DL (ref 74–99)
METER GLUCOSE: 195 MG/DL (ref 74–99)
METER GLUCOSE: 210 MG/DL (ref 74–99)
METER GLUCOSE: 222 MG/DL (ref 74–99)
METER GLUCOSE: 249 MG/DL (ref 74–99)
METER GLUCOSE: 253 MG/DL (ref 74–99)
METER GLUCOSE: 295 MG/DL (ref 74–99)
METER GLUCOSE: 302 MG/DL (ref 74–99)
METER GLUCOSE: 307 MG/DL (ref 74–99)
METER GLUCOSE: 310 MG/DL (ref 74–99)
METER GLUCOSE: 326 MG/DL (ref 74–99)
METER GLUCOSE: 366 MG/DL (ref 74–99)
PHOSPHORUS: 2.5 MG/DL (ref 2.5–4.5)
PHOSPHORUS: 3 MG/DL (ref 2.5–4.5)
POTASSIUM REFLEX MAGNESIUM: 3.6 MMOL/L (ref 3.5–5)
POTASSIUM REFLEX MAGNESIUM: 3.7 MMOL/L (ref 3.5–5)
POTASSIUM SERPL-SCNC: 4.7 MMOL/L (ref 3.5–5)
SODIUM BLD-SCNC: 134 MMOL/L (ref 132–146)
SODIUM BLD-SCNC: 135 MMOL/L (ref 132–146)
SODIUM BLD-SCNC: 137 MMOL/L (ref 132–146)

## 2020-08-02 PROCEDURE — 2060000000 HC ICU INTERMEDIATE R&B

## 2020-08-02 PROCEDURE — 83735 ASSAY OF MAGNESIUM: CPT

## 2020-08-02 PROCEDURE — 84100 ASSAY OF PHOSPHORUS: CPT

## 2020-08-02 PROCEDURE — 83036 HEMOGLOBIN GLYCOSYLATED A1C: CPT

## 2020-08-02 PROCEDURE — 80048 BASIC METABOLIC PNL TOTAL CA: CPT

## 2020-08-02 PROCEDURE — 82010 KETONE BODYS QUAN: CPT

## 2020-08-02 PROCEDURE — 2580000003 HC RX 258: Performed by: INTERNAL MEDICINE

## 2020-08-02 PROCEDURE — 36415 COLL VENOUS BLD VENIPUNCTURE: CPT

## 2020-08-02 PROCEDURE — 6370000000 HC RX 637 (ALT 250 FOR IP): Performed by: FAMILY MEDICINE

## 2020-08-02 PROCEDURE — 2500000003 HC RX 250 WO HCPCS: Performed by: FAMILY MEDICINE

## 2020-08-02 PROCEDURE — 6370000000 HC RX 637 (ALT 250 FOR IP): Performed by: INTERNAL MEDICINE

## 2020-08-02 PROCEDURE — 2580000003 HC RX 258: Performed by: FAMILY MEDICINE

## 2020-08-02 PROCEDURE — 82962 GLUCOSE BLOOD TEST: CPT

## 2020-08-02 RX ORDER — INSULIN GLARGINE 100 [IU]/ML
20 INJECTION, SOLUTION SUBCUTANEOUS NIGHTLY
Status: DISCONTINUED | OUTPATIENT
Start: 2020-08-02 | End: 2020-08-02

## 2020-08-02 RX ORDER — INSULIN GLARGINE 100 [IU]/ML
15 INJECTION, SOLUTION SUBCUTANEOUS NIGHTLY
Status: DISCONTINUED | OUTPATIENT
Start: 2020-08-02 | End: 2020-08-02

## 2020-08-02 RX ORDER — INSULIN GLARGINE 100 [IU]/ML
0.25 INJECTION, SOLUTION SUBCUTANEOUS NIGHTLY
Status: DISCONTINUED | OUTPATIENT
Start: 2020-08-02 | End: 2020-08-03

## 2020-08-02 RX ORDER — SODIUM CHLORIDE 9 MG/ML
INJECTION, SOLUTION INTRAVENOUS CONTINUOUS
Status: DISCONTINUED | OUTPATIENT
Start: 2020-08-02 | End: 2020-08-03

## 2020-08-02 RX ORDER — INSULIN GLARGINE 100 [IU]/ML
15 INJECTION, SOLUTION SUBCUTANEOUS ONCE
Status: COMPLETED | OUTPATIENT
Start: 2020-08-02 | End: 2020-08-02

## 2020-08-02 RX ADMIN — SODIUM CHLORIDE, PRESERVATIVE FREE 10 ML: 5 INJECTION INTRAVENOUS at 20:03

## 2020-08-02 RX ADMIN — INSULIN LISPRO 7 UNITS: 100 INJECTION, SOLUTION INTRAVENOUS; SUBCUTANEOUS at 20:04

## 2020-08-02 RX ADMIN — SODIUM CHLORIDE: 9 INJECTION, SOLUTION INTRAVENOUS at 06:12

## 2020-08-02 RX ADMIN — SODIUM CHLORIDE, PRESERVATIVE FREE 10 ML: 5 INJECTION INTRAVENOUS at 08:15

## 2020-08-02 RX ADMIN — POTASSIUM CHLORIDE, DEXTROSE MONOHYDRATE AND SODIUM CHLORIDE: 150; 5; 450 INJECTION, SOLUTION INTRAVENOUS at 00:58

## 2020-08-02 RX ADMIN — SODIUM CHLORIDE 3.84 UNITS/HR: 900 INJECTION, SOLUTION INTRAVENOUS at 01:08

## 2020-08-02 RX ADMIN — INSULIN LISPRO 6 UNITS: 100 INJECTION, SOLUTION INTRAVENOUS; SUBCUTANEOUS at 07:04

## 2020-08-02 RX ADMIN — INSULIN LISPRO 12 UNITS: 100 INJECTION, SOLUTION INTRAVENOUS; SUBCUTANEOUS at 17:03

## 2020-08-02 RX ADMIN — CARBAMAZEPINE 100 MG: 100 TABLET, EXTENDED RELEASE ORAL at 20:03

## 2020-08-02 RX ADMIN — FAMOTIDINE 20 MG: 10 INJECTION INTRAVENOUS at 20:04

## 2020-08-02 RX ADMIN — CARBAMAZEPINE 100 MG: 100 TABLET, EXTENDED RELEASE ORAL at 08:15

## 2020-08-02 RX ADMIN — INSULIN GLARGINE 15 UNITS: 100 INJECTION, SOLUTION SUBCUTANEOUS at 04:02

## 2020-08-02 RX ADMIN — INSULIN LISPRO 12 UNITS: 100 INJECTION, SOLUTION INTRAVENOUS; SUBCUTANEOUS at 11:51

## 2020-08-02 RX ADMIN — FAMOTIDINE 20 MG: 10 INJECTION INTRAVENOUS at 08:15

## 2020-08-02 RX ADMIN — INSULIN GLARGINE 35 UNITS: 100 INJECTION, SOLUTION SUBCUTANEOUS at 20:04

## 2020-08-02 RX ADMIN — ACETAMINOPHEN 650 MG: 325 TABLET ORAL at 08:15

## 2020-08-02 RX ADMIN — SODIUM CHLORIDE: 9 INJECTION, SOLUTION INTRAVENOUS at 14:09

## 2020-08-02 ASSESSMENT — PAIN DESCRIPTION - ONSET: ONSET: AWAKENED FROM SLEEP

## 2020-08-02 ASSESSMENT — PAIN SCALES - GENERAL
PAINLEVEL_OUTOF10: 0
PAINLEVEL_OUTOF10: 0
PAINLEVEL_OUTOF10: 2

## 2020-08-02 ASSESSMENT — PAIN DESCRIPTION - FREQUENCY: FREQUENCY: INTERMITTENT

## 2020-08-02 ASSESSMENT — PAIN DESCRIPTION - PROGRESSION: CLINICAL_PROGRESSION: NOT CHANGED

## 2020-08-02 ASSESSMENT — PAIN DESCRIPTION - ORIENTATION: ORIENTATION: LOWER

## 2020-08-02 ASSESSMENT — PAIN DESCRIPTION - LOCATION: LOCATION: HEAD

## 2020-08-02 ASSESSMENT — PAIN DESCRIPTION - PAIN TYPE: TYPE: ACUTE PAIN

## 2020-08-02 ASSESSMENT — PAIN - FUNCTIONAL ASSESSMENT: PAIN_FUNCTIONAL_ASSESSMENT: ACTIVITIES ARE NOT PREVENTED

## 2020-08-02 NOTE — PROGRESS NOTES
Dr. Susan Brown ordered 0.9NS at 125 and stopped D5/. 45NaCl with 20 KCL mEq along with stopping Insulin drip

## 2020-08-02 NOTE — PLAN OF CARE
Problem: Falls - Risk of:  Goal: Will remain free from falls  Description: Will remain free from falls  8/2/2020 0934 by Mary Alice Gallagher RN  Outcome: Met This Shift  8/2/2020 0634 by Avi Ashley RN  Outcome: Met This Shift  Goal: Absence of physical injury  Description: Absence of physical injury  8/2/2020 0934 by Mary Alice Gallagher RN  Outcome: Met This Shift  8/2/2020 0634 by Avi Ashley RN  Outcome: Met This Shift     Problem: Pain:  Goal: Pain level will decrease  Description: Pain level will decrease  Outcome: Met This Shift  Goal: Control of acute pain  Description: Control of acute pain  Outcome: Met This Shift  Goal: Control of chronic pain  Description: Control of chronic pain  Outcome: Met This Shift

## 2020-08-02 NOTE — PROGRESS NOTES
I sent Dr. Nicole De Souza a perfectserve message regarding bs = 249, 15 lantus given, awaiting response

## 2020-08-02 NOTE — PROGRESS NOTES
Hospitalist Progress Note      SYNOPSIS: Patient admitted on 2020   Patient presented to the urgency department with complaints of abdominal pain and nausea and vomiting for the last 2 weeks. Patient states this started with a cough that became very persistent. He was been have coughing fits where he would start to gag and vomit. Abdominal pain persisted as has the vomiting. About 5 days ago the patient noticed his vision was somewhat blurry. Patient denies any previous history of diabetes. Reports that he goes to a doctor in [facility] but he cannot remember the physician's name.     On evaluation emergency department patient is afebrile. Vital signs reveal he is hypertensive but otherwise stable. CT of the head unremarkable. CT abdomen pelvis unremarkable. Laboratory studies notable for glucose of 596, sodium 126, anion gap 19, alk phos 168, , AST 86, lipase 97, beta hydroxybutyrate 2.53, venous blood gas 7.3. Patient was started on insulin infusion and DKA protocol. Medicine consulted for admission.       SUBJECTIVE:    Patient seen and examined  Records reviewed. Stable overnight. No other overnight issues reported. Temp (24hrs), Av.9 °F (36.1 °C), Min:96.1 °F (35.6 °C), Max:97.3 °F (36.3 °C)    DIET: DIET CARB CONTROL;  CODE: Full Code    Intake/Output Summary (Last 24 hours) at 2020 0847  Last data filed at 2020 3659  Gross per 24 hour   Intake 2400 ml   Output --   Net 2400 ml       OBJECTIVE:    BP (!) 123/59   Pulse 72   Temp 96.1 °F (35.6 °C) (Temporal)   Resp 12   Ht 6' 7\" (2.007 m)   Wt (!) 305 lb 3.2 oz (138.4 kg)   SpO2 94%   BMI 34.38 kg/m²     General appearance: No apparent distress, appears stated age and cooperative. HEENT:  Conjunctivae/corneas clear. Neck: Supple. No jugular venous distention.    Respiratory: Clear to auscultation bilaterally, normal respiratory effort  Cardiovascular: Regular rate rhythm, normal S1-S2  Abdomen: Soft, nontender, nondistended  Musculoskeletal: No clubbing, cyanosis, no bilateral lower extremity edema. Brisk capillary refill. Skin:  No rashes  on visible skin  Neurologic: awake, alert and following commands     ASSESSMENT:  1.  Diabetic ketoacidosis  2. Diabetes mellitus possibly type I, new diagnosis  3. Intractable nausea vomiting, resolving     PLAN:  1. Discontinue insulin infusion  2. Start basal bolus insulin  3. Lantus 30 units nightly  4. High-dose insulin correction  5. Diabetic educator consult  6. Start carb controlled diet      Medications:  REVIEWED DAILY    Infusion Medications    sodium chloride 125 mL/hr at 08/02/20 0612    dextrose       Scheduled Medications    insulin glargine  0.25 Units/kg Subcutaneous Nightly    insulin lispro  0-18 Units Subcutaneous TID WC    insulin lispro  0-9 Units Subcutaneous Nightly    enoxaparin  40 mg Subcutaneous Daily    carBAMazepine  100 mg Oral BID    sodium chloride flush  10 mL Intravenous 2 times per day    famotidine (PEPCID) injection  20 mg Intravenous BID     PRN Meds: glucose, dextrose, glucagon (rDNA), dextrose, potassium chloride, magnesium sulfate, sodium phosphate IVPB **OR** sodium phosphate IVPB **OR** sodium phosphate IVPB, sodium chloride flush, acetaminophen **OR** acetaminophen, promethazine **OR** ondansetron, bisacodyl    Labs:     Recent Labs     08/01/20  1207   WBC 8.7   HGB 16.0   HCT 45.4          Recent Labs     08/01/20  2112 08/02/20  0123 08/02/20  0458    137 134   K 4.1 3.6 3.7    102 101   CO2 18* 25 21*   BUN 12 10 9   CREATININE 0.7 0.8 0.7   CALCIUM 8.7 8.6 8.5*   PHOS 2.6 3.0 2.5       Recent Labs     08/01/20  1207   PROT 8.1   ALKPHOS 168*   *   AST 86*   BILITOT 0.5   LIPASE 97*       No results for input(s): INR in the last 72 hours.     Recent Labs     08/01/20  1207   TROPONINI <0.01       Chronic labs:    Lab Results   Component Value Date    CHOL 197 12/08/2016    TRIG 87 12/08/2016    HDL 42 12/08/2016    LDLCALC 138 (H) 12/08/2016    INR 1.1 08/29/2016    LABA1C 13.1 (H) 08/02/2020       Radiology: REVIEWED DAILY    +++++++++++++++++++++++++++++++++++++++++++++++++  [address], [place]  +++++++++++++++++++++++++++++++++++++++++++++++++  NOTE: This report was transcribed using voice recognition software. Every effort was made to ensure accuracy; however, inadvertent computerized transcription errors may be present.

## 2020-08-02 NOTE — PROGRESS NOTES
I sent Dr. Danish Grossman a perfectserve message regarding labs: glucose = 195, CO2 = 25, anion gap = 10; awaiting response

## 2020-08-02 NOTE — PROGRESS NOTES
I conversed with Dr. Carla Becker via [facility] regarding initiating subQ insulin orders and diet orders; Dr. Carla Becker wanted to wait until morning.   She was made aware of glucose=195, CO2=25, anion gap=10

## 2020-08-03 VITALS
WEIGHT: 306.7 LBS | RESPIRATION RATE: 12 BRPM | OXYGEN SATURATION: 97 % | BODY MASS INDEX: 35.49 KG/M2 | SYSTOLIC BLOOD PRESSURE: 138 MMHG | HEART RATE: 70 BPM | HEIGHT: 78 IN | DIASTOLIC BLOOD PRESSURE: 84 MMHG | TEMPERATURE: 96.9 F

## 2020-08-03 LAB
ANION GAP SERPL CALCULATED.3IONS-SCNC: 11 MMOL/L (ref 7–16)
BUN BLDV-MCNC: 7 MG/DL (ref 6–20)
CALCIUM SERPL-MCNC: 8.4 MG/DL (ref 8.6–10.2)
CHLORIDE BLD-SCNC: 105 MMOL/L (ref 98–107)
CO2: 20 MMOL/L (ref 22–29)
CREAT SERPL-MCNC: 0.7 MG/DL (ref 0.7–1.2)
GFR AFRICAN AMERICAN: >60
GFR NON-AFRICAN AMERICAN: >60 ML/MIN/1.73
GLUCOSE BLD-MCNC: 289 MG/DL (ref 74–99)
HCT VFR BLD CALC: 41.9 % (ref 37–54)
HEMOGLOBIN: 14.2 G/DL (ref 12.5–16.5)
MCH RBC QN AUTO: 28.8 PG (ref 26–35)
MCHC RBC AUTO-ENTMCNC: 33.9 % (ref 32–34.5)
MCV RBC AUTO: 85 FL (ref 80–99.9)
METER GLUCOSE: 271 MG/DL (ref 74–99)
METER GLUCOSE: 302 MG/DL (ref 74–99)
METER GLUCOSE: 316 MG/DL (ref 74–99)
METER GLUCOSE: 321 MG/DL (ref 74–99)
METER GLUCOSE: 324 MG/DL (ref 74–99)
PDW BLD-RTO: 12.7 FL (ref 11.5–15)
PLATELET # BLD: 201 E9/L (ref 130–450)
PMV BLD AUTO: 10.3 FL (ref 7–12)
POTASSIUM SERPL-SCNC: 3.9 MMOL/L (ref 3.5–5)
RBC # BLD: 4.93 E12/L (ref 3.8–5.8)
SODIUM BLD-SCNC: 136 MMOL/L (ref 132–146)
WBC # BLD: 6.2 E9/L (ref 4.5–11.5)

## 2020-08-03 PROCEDURE — 85027 COMPLETE CBC AUTOMATED: CPT

## 2020-08-03 PROCEDURE — 99222 1ST HOSP IP/OBS MODERATE 55: CPT | Performed by: INTERNAL MEDICINE

## 2020-08-03 PROCEDURE — 82962 GLUCOSE BLOOD TEST: CPT

## 2020-08-03 PROCEDURE — 2580000003 HC RX 258: Performed by: FAMILY MEDICINE

## 2020-08-03 PROCEDURE — 6370000000 HC RX 637 (ALT 250 FOR IP): Performed by: FAMILY MEDICINE

## 2020-08-03 PROCEDURE — 80048 BASIC METABOLIC PNL TOTAL CA: CPT

## 2020-08-03 PROCEDURE — 2500000003 HC RX 250 WO HCPCS: Performed by: FAMILY MEDICINE

## 2020-08-03 PROCEDURE — 36415 COLL VENOUS BLD VENIPUNCTURE: CPT

## 2020-08-03 PROCEDURE — 6370000000 HC RX 637 (ALT 250 FOR IP): Performed by: INTERNAL MEDICINE

## 2020-08-03 RX ORDER — INSULIN GLARGINE 100 [IU]/ML
45 INJECTION, SOLUTION SUBCUTANEOUS NIGHTLY
Qty: 5 PEN | Refills: 0 | Status: SHIPPED | OUTPATIENT
Start: 2020-08-03 | End: 2020-08-31 | Stop reason: SDUPTHER

## 2020-08-03 RX ORDER — INSULIN LISPRO 100 [IU]/ML
10 INJECTION, SOLUTION INTRAVENOUS; SUBCUTANEOUS
Qty: 1 PEN | Refills: 0 | Status: SHIPPED | OUTPATIENT
Start: 2020-08-03 | End: 2020-08-31 | Stop reason: SDUPTHER

## 2020-08-03 RX ORDER — BLOOD-GLUCOSE METER
KIT MISCELLANEOUS
Qty: 1 KIT | Refills: 0 | Status: SHIPPED | OUTPATIENT
Start: 2020-08-03

## 2020-08-03 RX ORDER — BLOOD-GLUCOSE METER
1 KIT MISCELLANEOUS 4 TIMES DAILY
Qty: 150 EACH | Refills: 5 | Status: SHIPPED
Start: 2020-08-03 | End: 2021-09-03

## 2020-08-03 RX ORDER — INSULIN GLARGINE 100 [IU]/ML
45 INJECTION, SOLUTION SUBCUTANEOUS NIGHTLY
Status: DISCONTINUED | OUTPATIENT
Start: 2020-08-03 | End: 2020-08-03 | Stop reason: HOSPADM

## 2020-08-03 RX ORDER — LANCETS 28 GAUGE
1 EACH MISCELLANEOUS 4 TIMES DAILY
Qty: 200 EACH | Refills: 5 | Status: SHIPPED | OUTPATIENT
Start: 2020-08-03

## 2020-08-03 RX ADMIN — INSULIN LISPRO 9 UNITS: 100 INJECTION, SOLUTION INTRAVENOUS; SUBCUTANEOUS at 11:49

## 2020-08-03 RX ADMIN — FAMOTIDINE 20 MG: 10 INJECTION INTRAVENOUS at 08:13

## 2020-08-03 RX ADMIN — INSULIN LISPRO 10 UNITS: 100 INJECTION, SOLUTION INTRAVENOUS; SUBCUTANEOUS at 13:21

## 2020-08-03 RX ADMIN — INSULIN LISPRO 12 UNITS: 100 INJECTION, SOLUTION INTRAVENOUS; SUBCUTANEOUS at 08:15

## 2020-08-03 RX ADMIN — SODIUM CHLORIDE, PRESERVATIVE FREE 10 ML: 5 INJECTION INTRAVENOUS at 08:13

## 2020-08-03 RX ADMIN — CARBAMAZEPINE 100 MG: 100 TABLET, EXTENDED RELEASE ORAL at 08:13

## 2020-08-03 ASSESSMENT — PAIN SCALES - GENERAL: PAINLEVEL_OUTOF10: 0

## 2020-08-03 NOTE — DISCHARGE SUMMARY
Hospitalist Discharge Summary    Patient ID: Quiana Robbins   Patient : 1985  Patient's PCP: Salina Rubio MD    Admit Date: 2020   Admitting Physician: Abraham Lyons DO    Discharge Date:  8/3/2020  Discharge Physician: Abraham Lyons DO   Discharge Condition: Stable  Discharge Disposition: [facility] course in brief:  (Please refer to daily progress notes for a comprehensive review of the hospitalization by requesting medical records)  Mr. Quiana Robbins, a 38y.o. year old male  who  has a past medical history of Anxiety, Depression, Seizures ([facility] [address] and Unspecified cerebral artery occlusion with cerebral infarction.      Patient presented to the urgency department with complaints of abdominal pain and nausea and vomiting for the last 2 weeks. Patient states this started with a cough that became very persistent. He was been have coughing fits where he would start to gag and vomit. Abdominal pain persisted as has the vomiting. About 5 days ago the patient noticed his vision was somewhat blurry. Patient denies any previous history of diabetes. Reports that he goes to a doctor in [facility] but he cannot remember the physician's name.     On evaluation emergency department patient is afebrile. Vital signs reveal he is hypertensive but otherwise stable. CT of the head unremarkable. CT abdomen pelvis unremarkable. Laboratory studies notable for glucose of 596, sodium 126, anion gap 19, alk phos 168, , AST 86, lipase 97, beta hydroxybutyrate 2.53, venous blood gas 7.3. Patient was started on insulin infusion and DKA protocol. Medicine consulted for admission. Less than 24 hours after admission patient's beta hydroxybutyrate had normalized, anion gap and closed and symptoms had completely resolved. Blood glucose levels were down to 200s. Insulin infusion was discontinued and patient started on basal bolus insulin with high dose correction. Diet resumed without difficulty. Patient was discharged on August 3, 2020 with prescription for Lantus and Humalog pens as well as prescription for diabetic educator. Consults:   IP CONSULT TO INTERNAL MEDICINE  IP CONSULT TO DIABETES EDUCATOR  IP CONSULT TO ENDOCRINOLOGY    Discharge Diagnoses:  Diabetic ketoacidosis, resolved  Diabetes mellitus new diagnosis    Discharge Instructions / Follow up:    Continued appropriate risk factor modification of blood pressure, diabetes and serum lipids will remain essential to reducing risk of future atherosclerotic development    Activity: activity as tolerated    Significant labs:  CBC:   Recent Labs     20  1207 20  0511   WBC 8.7 6.2   RBC 5.57 4.93   HGB 16.0 14.2   HCT 45.4 41.9   MCV 81.5 85.0   RDW 12.4 12.7    201     BMP:   Recent Labs     20  2112 20  0123 20  0458 20  1135 20  0511    137 134 135 136   K 4.1 3.6 3.7 4.7 3.9    102 101 102 105   CO2 18* 25 21* 20* 20*   BUN 12 10 9 9 7   CREATININE 0.7 0.8 0.7 0.8 0.7   MG 2.1 2.2 2.1  --   --    PHOS 2.6 3.0 2.5  --   --      LFT:  Recent Labs     20  1207   PROT 8.1   ALKPHOS 168*   *   AST 86*   BILITOT 0.5   LIPASE 97*     PT/INR: No results for input(s): INR, APTT in the last 72 hours. BNP: No results for input(s): BNP in the last 72 hours.   Hgb A1C:   Lab Results   Component Value Date    LABA1C 13.1 (H) 2020     Folate and B12: No results found for: [place] Dancer, No results found for: FOLATE  Thyroid Studies: No results found for: TSH, M2KGISZ, H9GXQVP, THYROIDAB    Urinalysis:    Lab Results   Component Value Date    NITRU Negative 2020    WBCUA NONE 2019    BACTERIA NONE 2019    RBCUA NONE 2019    BLOODU Negative 2020    SPECGRAV <=1.005 2020    GLUCOSEU >=1000 2020       Imaging:  Ct Abdomen Pelvis Wo Contrast Additional Contrast? None    Result Date: 2020  Patient MRN:  [id number] : 1985 Age: 38 years Gender: Male Order Date:  2020 12:17 PM EXAM: CT ABDOMEN PELVIS WO CONTRAST NUMBER OF IMAGES \ views:  594 INDICATION:  abd pain abd pain COMPARISON: CT abdomen and pelvis May 8, 2010 Technique: Multiple computerized tomography sections of the abdomen with sagittal and coronal MPR reconstructions were obtained from the top of the diaphragm to the pelvis. LOWER THORAX: Unremarkable. ABDOMEN/PELVIS Liver: Mild hepatic stenosis. Gallbladder: Unremarkable. Spleen: Unremarkable. Pancreas: Unremarkable. Adrenals: Unremarkable. Kidneys: No hydronephrosis. Punctate nonobstructing stone on the right. Bowel: No evidence of obstruction. The appendix is normal in size. Colonic diverticula. Urinary bladder: Urinary bladder distention. Reproductive: Unremarkable. Lymph nodes: Unremarkable. Vasculature: No AAA Peritoneum/retroperitoneum: No ascites or free air. Osseous structure/soft tissue: No acute or suspicious osseous findings. 1.  No acute findings within the abdomen or pelvis. 2.  Mild hepatic steatosis which can be a cause of abdominal pain. 3.  Punctate nonobstructing right renal stone. 4.  A few scattered colonic diverticula. Ct Head Wo Contrast    Result Date: 2020  Patient MRN:  [id number] : 1985 Age: 38 years Gender: Male Order Date:  2020 12:17 PM EXAM: CT HEAD WO CONTRAST NUMBER OF IMAGES:  119 INDICATION:  visual changes and headache visual changes and headache COMPARISON: 2019. Technique: Multiple serial axial unenhanced images through the brain were obtained. Computer reconstructed images were obtained within the sagittal and coronal projections Findings: The ventricles are normal in their size, shape, and position, without evidence of midline shift. There is no evidence of mass-effect throughout the brain. No focal parenchymal abnormality is identified. Next Unchanged left parietal/occipital encephalomalacia. Unchanged right periventricular lacunar infarct.  No acute infarct, intracranial hemorrhage, or abnormal extra-axial collections are seen throughout the brain. Unchanged opacification of the right maxillary sinus. 1.  No acute intracranial abnormality by noncontrast CT. 2.  Unchanged chronic findings as above. Xr Chest Portable    Result Date: 2020  Patient MRN:  [id number] : 1985 Age: 38 years Gender: Male Order Date:  2020 1:57 PM EXAM: XR CHEST PORTABLE COMPARISON: 2019 INDICATION:  pneumonia pneumonia FINDINGS: The heart is normal in size. No focal airspace opacity. There is no pleural effusion. There is no pneumothorax. No free air beneath the diaphragms. No airspace opacities or pleural effusion.        Discharge Medications:      Medication List      START taking these medications    insulin lispro (1 Unit Dial) 100 UNIT/ML Sopn  Commonly known as:  HumaLOG KwikPen  Inject 10 Units into the skin 3 times daily (before meals)     Insulin Pen Needle 31G X 6 MM Misc  1 each by Does not apply route daily     Lantus SoloStar 100 UNIT/ML injection pen  Generic drug:  insulin glargine  Inject 45 Units into the skin nightly        CONTINUE taking these medications    carBAMazepine 100 MG extended release tablet  Commonly known as:  TEGRETOL XR        STOP taking these medications    OXcarbazepine 300 MG tablet  Commonly known as:  Trileptal     Suboxone 4-1 MG Film SL film  Generic drug:  buprenorphine-naloxone           Where to Get Your Medications      These medications [facility] to [id number] [address] [phone] [facility] [phone] [address]    Phone:  [phone]   · insulin lispro (1 Unit Dial) 100 UNIT/ML Sopn  · Insulin Pen Needle 31G X 6 MM Misc  · Lantus SoloStar 100 UNIT/ML injection pen         Time Spent on discharge is more than 35 minutes in the examination, evaluation, counseling and review of medications and discharge plan.    +++++++++++++++++++++++++++++++++++++++++++++++++  [place] [address] [place] [address]  +++++++++++++++++++++++++++++++++++++++++++++++++  NOTE: This report was transcribed using voice recognition software. Every effort was made to ensure accuracy; however, inadvertent computerized transcription errors may be present.

## 2020-08-03 NOTE — CONSULTS
ENDOCRINOLOGY INITIAL CONSULTATION NOTE      Date of admission: 8/1/2020  Date of service: 8/3/2020  Admitting physician: Luis Armando Cadet DO   Primary Care Physician: Ephraim Bloch, MD  Consultant physician: Payal Shah MD       Reason for the consultation:   Newly diagnosed DM     History of Present Illness: The history is provided by the patient. Accuracy of the patient data is excellent    Rita Mock is a very pleasant 38 y.o. old male with PMH of CVA, seizure disorder, Hep C, history of IVDU, obesity, tobacco abuse was initially admitted to [facility] on 8/1/2020 because of abdominal pain, n/v and blurry vision for 1 week, endocrine team was consulted for diabetes management. In ED, He was found to have , AG 19, beta-hydroxybutyrate 2.53,  ALT//86, Lipase 97, , CT head wo contrast unremarkable, CT abdomen showed mild hepatic steatosis, non-obstructing R renal stone. He was started on DKA protocol. His Hb A1C was 13.1. Prior to admission  The patient was never been diagnosed with DM before this admission. Lab Results   Component Value Date    LABA1C 13.1 08/02/2020       After admission   While hospitalized, he was bridged with 15 units of lantus and then increased to lantus 35 units plus SSI. Point of care glucose monitoring was reviewed and has been above goal. Appetite is good.      Inpatient diet:   Carb Restricted diet     Point of care glucose monitoring (Independently reviewed)   Recent Labs     08/02/20  1150 08/02/20  1703 08/02/20  2006 08/02/20  2324 08/03/20  0413 08/03/20  0632 08/03/20  0815 08/03/20  1149   GLUMET 302* 326* 366* 307* 321* 316* 302* 271*       Past medical history:   Past Medical History:   Diagnosis Date    Anxiety     Depression     Seizures ([address]     Unspecified cerebral artery occlusion with cerebral infarction        Past surgical history:  Past Surgical History:   Procedure Laterality Date    ABSCESS DRAINAGE Left 08/22/2016    Left Hand    BONE INCISION AND DRAINAGE Left 08/30/2016    HAND W/ WOUND VA C       Social history:   Tobacco:   reports that he has been smoking cigarettes. He has a 7.60 pack-year smoking history. He has never used smokeless tobacco.  Alcohol:   reports no history of alcohol use. Drugs:   reports current drug use. Drug: Other-see comments. Family history:    Family History   Problem Relation Age of Onset    Kidney Disease Mother     Heart Disease Father        Allergy and drug reactions:   No Known Allergies    Scheduled Meds:   insulin glargine  0.25 Units/kg Subcutaneous Nightly    insulin lispro  0-18 Units Subcutaneous TID WC    insulin lispro  0-9 Units Subcutaneous Nightly    enoxaparin  40 mg Subcutaneous Daily    carBAMazepine  100 mg Oral BID    sodium chloride flush  10 mL Intravenous 2 times per day     PRN Meds:   glucose, 15 g, PRN  dextrose, 12.5 g, PRN  glucagon (rDNA), 1 mg, PRN  dextrose, 100 mL/hr, PRN  potassium chloride, 10 mEq, PRN  magnesium sulfate, 1 g, PRN  sodium phosphate IVPB, 10 mmol, PRN    Or  sodium phosphate IVPB, 15 mmol, PRN    Or  sodium phosphate IVPB, 20 mmol, PRN  sodium chloride flush, 10 mL, PRN  acetaminophen, 650 mg, Q6H PRN    Or  acetaminophen, 650 mg, Q6H PRN  promethazine, 12.5 mg, Q6H PRN    Or  ondansetron, 4 mg, Q6H PRN  bisacodyl, 5 mg, Daily PRN      Continuous Infusions:   dextrose         Review of Systems  All systems reviewed. All negative except for symptoms mentioned in HPI     Constitutional: No fever, no chills, no diaphoresis, no generalized weakness. HEENT: No blurred vision, No sore throat, no ear pain, no hair loss  Neck: denied any neck swelling, difficulty swallowing,   Cardio-pulmonary: No CP, SOB or palpitation, No orthopnea or PND. No cough or wheezing. GI: No N/V/D, no constipation, No abdominal pain, no melena or hematochezia   : Denied any dysuria, hematuria, flank pain, discharge, or incontinence.    Skin: denied any rash, ulcer, or hyperpigmentation. MSK: denied any joint deformity, joint pain/swelling, muscle pain, or back pain. Neuro: no numbness, no tingling, no weakness, _    OBJECTIVE    /84   Pulse 70   Temp 96.9 °F (36.1 °C) (Temporal)   Resp 12   Ht 6' 7\" (2.007 m)   Wt (!) 306 lb 11.2 oz (139.1 kg)   SpO2 97%   BMI 34.55 kg/m²     Intake/Output Summary (Last 24 hours) at 8/3/2020 1251  Last data filed at 8/3/2020 1274  Gross per 24 hour   Intake 3710 ml   Output 250 ml   Net 3460 ml       Physical examination:  General: awake alert, oriented x3, no abnormal position or movements. HEENT: normocephalic non-traumatic, no exophthalmos   Neck: supple, no LN enlargement, no thyromegaly, no thyroid tenderness, no JVD. Pulm: Clear equal air entry no added sounds, no wheezing or rhonchi    CVS: S1 + S2, no murmur, no heave  Abd: soft lax, no tenderness, no organomegaly, audible bowel sounds. Skin: warm, no lesions, no rash. Feet: sensory exam of the feet is present, tested with the monofilament. No ulcers or open wounds.  Good peripheral pulses  Neuro: CN intact, muscle power normal  Psych: normal mood, and affect    Review of Laboratory Data:  I personally reviewed the following labs:   Recent Labs     08/01/20  1207 08/03/20  0511   WBC 8.7 6.2   RBC 5.57 4.93   HGB 16.0 14.2   HCT 45.4 41.9   MCV 81.5 85.0   MCH 28.7 28.8   MCHC 35.2* 33.9   RDW 12.4 12.7    201   MPV 10.6 10.3     Recent Labs     08/01/20  1207  08/02/20  0458 08/02/20  1135 08/03/20  0511   *   < > 134 135 136   K 4.8   < > 3.7 4.7 3.9   CL 87*   < > 101 102 105   CO2 20*   < > 21* 20* 20*   BUN 17   < > 9 9 7   CREATININE 0.8   < > 0.7 0.8 0.7   GLUCOSE 596*   < > 269* 291* 289*   CALCIUM 9.5   < > 8.5* 8.4* 8.4*   PROT 8.1  --   --   --   --    LABALBU 4.0  --   --   --   --    BILITOT 0.5  --   --   --   --    ALKPHOS 168*  --   --   --   --    AST 86*  --   --   --   --    *  --   --   --   --     < > = values in this interval not displayed. Beta-Hydroxybutyrate   Date Value Ref Range Status   08/02/2020 0.20 0.02 - 0.27 mmol/L Final   08/01/2020 2.53 (H) 0.02 - 0.27 mmol/L Final     Lab Results   Component Value Date    LABA1C 13.1 08/02/2020     No results found for: TSH, T4FREE, P3CHVWD, FT3, Z9GSJDU, TSI, TPOABS, THGAB  Lab Results   Component Value Date    LABA1C 13.1 08/02/2020    GLUCOSE 289 08/03/2020     Lab Results   Component Value Date    TRIG 87 12/08/2016    HDL 42 12/08/2016    LDLCALC 138 12/08/2016    CHOL 197 12/08/2016       Blood culture   Lab Results   Component Value Date    BC 5 Days- no growth 08/29/2016    BC 5 Days- no growth 08/20/2016       Radiology:  XR CHEST PORTABLE   Final Result      No airspace opacities or pleural effusion. CT ABDOMEN PELVIS WO CONTRAST Additional Contrast? None   Final Result   1. No acute findings within the abdomen or pelvis. 2.  Mild hepatic steatosis which can be a cause of abdominal pain. 3.  Punctate nonobstructing right renal stone. 4.  A few scattered colonic diverticula. CT HEAD WO CONTRAST   Final Result   1. No acute intracranial abnormality by noncontrast CT. 2.  Unchanged chronic findings as above. Medical Records/Labs/Images review:   I personally reviewed and summarized previous records   All labs and imaging were reviewed independently     Juana Castellanos, a 38 y.o.-old male seen today for inpatient diabetes management     Newly diagnosed Diabetes Mellitus type 2  · Patient's diabetes is uncontrolled   · For now, will change diabetes regimen to:  · Lantus 45 units at bedtime   · Humalog 10 units pre-meal x 3  · High sliding scale insulin    · Continue glucose check with meals and at bedtime   · Will titrate insulin dose based on the blood glucose trend & insulin requirement  · Glucose goal 140-180 mg/dl  · Pt will follow with us shortly after discharge.  Endocrine follow up appointment Friday 8/14 at

## 2020-08-03 NOTE — PROGRESS NOTES
Reason for consult:  diabetes    A1C: 13.1 %   [] Not available                 Patient states the following concerns/barriers to diabetes self-management:       [x] None       [] Medication cost   [] Food cost/availability          [] Reading  [] Hearing   [] Vision                  [] Work    [] Transportation  [] No insurance    [] Physical limitations    [] Other:              Diabetes survival packet provided to:   [x] Patient     [] Other:    Information reviewed:   Definition of diabetes      Target glucose ranges/A1C      Self-monitoring of blood glucose      Prevention/symptoms/treatment of hypo-/hyperglycemia      Medication adherence      The plate method/meal planning guidelines      The benefits of exercise and recommendations      Reducing the risk of chronic complications            Comment:  Pt newly diagnosed. Pt will need a script for a glucometer,strips and lancets. Pt states he was drinking a lot of water prior to admission and large amount of milk at night. Discussed general healthy eating guidelines and food groups high in carbohydrate. Pt will be discharged today, will not be able to meet with the floor dietitian. Pt interested in outpatient diabetes education. Diabetes medications reviewed (use, purpose, action, side effects):  Discussed action of insulin, long and short acting. Provided medication action booklet.  Instructed on how to administer insulin with pen and syringe and vial.     Evaluation/Plan/Recommendations:   Patient's understanding of diabetes:   [x]Poor   []Fair    []Good   [] Excellent     Outpatient diabetes education is recommended:   [x] Yes  [] No   [] As needed  Patient is interested in outpatient diabetes education:   [x] Yes    [] No    [] Unsure    Recommended:     [] Consult to social work; patient has no insurance or financial hardship      [x] Script for glucometer and supplies (per preference of patient's insurance)               [x] Script for outpatient

## 2020-08-04 ENCOUNTER — TELEPHONE (OUTPATIENT)
Dept: PRIMARY CARE CLINIC | Age: 35
End: 2020-08-04

## 2020-08-04 LAB — C-PEPTIDE: 2.8 NG/ML (ref 0.8–3.5)

## 2020-08-12 NOTE — TELEPHONE ENCOUNTER
Lm for pt to call office if he would like office to schedule EEG and f/u appointment with Dr Cherylene Sensor

## 2020-08-14 ENCOUNTER — VIRTUAL VISIT (OUTPATIENT)
Dept: ENDOCRINOLOGY | Age: 35
End: 2020-08-14
Payer: MEDICAID

## 2020-08-14 PROBLEM — Z91.119 DIETARY NONCOMPLIANCE: Status: ACTIVE | Noted: 2020-08-14

## 2020-08-14 PROBLEM — R73.9 HYPERGLYCEMIA: Status: ACTIVE | Noted: 2020-08-14

## 2020-08-14 PROBLEM — E11.9 NEWLY DIAGNOSED DIABETES (HCC): Status: ACTIVE | Noted: 2020-08-14

## 2020-08-14 PROCEDURE — G8417 CALC BMI ABV UP PARAM F/U: HCPCS | Performed by: INTERNAL MEDICINE

## 2020-08-14 PROCEDURE — 99214 OFFICE O/P EST MOD 30 MIN: CPT | Performed by: INTERNAL MEDICINE

## 2020-08-14 PROCEDURE — 3046F HEMOGLOBIN A1C LEVEL >9.0%: CPT | Performed by: INTERNAL MEDICINE

## 2020-08-14 PROCEDURE — 4004F PT TOBACCO SCREEN RCVD TLK: CPT | Performed by: INTERNAL MEDICINE

## 2020-08-14 PROCEDURE — 1111F DSCHRG MED/CURRENT MED MERGE: CPT | Performed by: INTERNAL MEDICINE

## 2020-08-14 PROCEDURE — G8427 DOCREV CUR MEDS BY ELIG CLIN: HCPCS | Performed by: INTERNAL MEDICINE

## 2020-08-14 PROCEDURE — 2022F DILAT RTA XM EVC RTNOPTHY: CPT | Performed by: INTERNAL MEDICINE

## 2020-08-14 NOTE — PROGRESS NOTES
Josseline Amezcua was read the following message We want to confirm that, for purposes of billing, this is a virtual visit with your provider for which we will submit a claim for reimbursement with your insurance company. You will be responsible for any copays, coinsurance amounts or other amounts not covered by your insurance company. If you do not accept this, unfortunately we will not be able to schedule a virtual visit with the provider. Do you accept?  Lola Beltran responded Catracho Browne

## 2020-08-14 NOTE — PROGRESS NOTES
[address] [place] [address] Department of Endocrinology Diabetes and Metabolism   [address] [facility] [address]   Phone: [phone]  Fax: [phone]    Date of Service: 8/14/2020    Primary Care Physician: Thad Mascorro MD  Provider: Kai Blackmon MD     Reason for the visit:  Newly diagnosed DM     History of Present Illness: The history is provided by the patient. No  was used. Accuracy of the patient data is excellent. Josseline Amezcua is a very pleasant 38 y.o. male seen today for diabetes management     Pt was admitted to [address] [place] [address] on 8/1/2020 because of abdominal pain, n/v and blurry vision for 1 week and found to be in DKA (, AG 19, beta-hydroxybutyrate 2.53)   Component 8/1/2020   C-Peptide 2.8     The patient is currently on lantus 45 units at bedtime, humalog 10 units + ss 3:50>150   The patient has been checking blood sugar 4 times a day.  I reviewed point-of-care blood glucose levels and BS ranging b/w 150-200    Most recent A1c results summarized below  Lab Results   Component Value Date    LABA1C 13.1 08/02/2020     Patient has had no hypoglycemic episodes   The patient has been mindful of what has been eating and following diabetic diet as encouraged  I reviewed current medications and the patient has no issues with diabetes medications  The patient is due for an eye exam  The patient performs his own feet care  Microvascular complications:  No Retinopathy, Nephropathy or Neuropathy   Macrovascular complications: no CAD, PVD, or Stroke    PAST MEDICAL HISTORY   Past Medical History:   Diagnosis Date    Anxiety     Depression     Seizures ([address]     Unspecified cerebral artery occlusion with cerebral infarction        PAST SURGICAL HISTORY   Past Surgical History:   Procedure Laterality Date    ABSCESS DRAINAGE Left 08/22/2016    Left Hand    BONE INCISION AND DRAINAGE Left 08/30/2016    HAND W/ WOUND VA C       SOCIAL HISTORY   Tobacco:   reports that he has been smoking cigarettes. He has a 7.60 pack-year smoking history. He has never used smokeless tobacco.  Alcohol:   reports no history of alcohol use. Drugs:   reports current drug use. Drug: Other-see comments. FAMILY HISTORY   Family History   Problem Relation Age of Onset    Kidney Disease Mother     Heart Disease Father        ALLERGIES AND DRUG REACTIONS   No Known Allergies    CURRENT MEDICATIONS   Current Outpatient Medications   Medication Sig Dispense Refill    insulin lispro, 1 Unit Dial, (HUMALOG KWIKPEN) 100 UNIT/ML SOPN Inject 10 Units into the skin 3 times daily (before meals) 1 pen 0    insulin glargine (LANTUS SOLOSTAR) 100 UNIT/ML injection pen Inject 45 Units into the skin nightly 5 pen 0    Insulin Pen Needle 31G X 6 MM MISC 1 each by Does not apply route daily 100 each 3    glucose monitoring kit (FREESTYLE) monitoring kit To test blood sugar 4x/day 1 kit 0    FreeStyle Lancets MISC 1 each by Does not apply route 4 times daily 200 each 5    blood glucose test strips (FREESTYLE LITE) strip 1 each by In Vitro route 4 times daily As needed. 150 each 5    carBAMazepine (TEGRETOL XR) 100 MG extended release tablet Take 100 mg by mouth 1 tablet in am and 2 tablets in pm       No current facility-administered medications for this visit. Review of Systems  Constitutional: No fever, no chills, no diaphoresis, no generalized weakness. HEENT: No blurred vision, No sore throat, no ear pain, no hair loss  Neck: denied any neck swelling, difficulty swallowing,   Cardio-pulmonary: No CP, SOB or palpitation, No orthopnea or PND. No cough or wheezing. GI: No N/V/D, no constipation, No abdominal pain, no melena or hematochezia   : Denied any dysuria, hematuria, flank pain, discharge, or incontinence. Skin: denied any rash, ulcer, Hirsute, or hyperpigmentation. MSK: denied any joint deformity, joint pain/swelling, muscle pain, or back pain.   Neuro: no numbness, no tingling, no FT3, P2YBAWI, TSI, TPOABS, THGAB  Lab Results   Component Value Date    LABA1C 13.1 08/02/2020    GLUCOSE 289 08/03/2020     Lab Results   Component Value Date    LABA1C 13.1 08/02/2020     Lab Results   Component Value Date    TRIG 87 12/08/2016    HDL 42 12/08/2016    LDLCALC 138 12/08/2016    CHOL 197 12/08/2016     No results found for: [facility] [address] Records/Labs/Images review:   I personally reviewed and summarized previous records   All labs and imaging studies were independently reviewed     Ami Araujo, a 38 y.o.-old male seen in for the following issues     Diabetes Mellitus Type 2     · Patient's diabetes is uncontrol. c-peptide at diagnosis was 2.8   · Continue lantus 45 units at bedtime, humalog 10 units + ss 3:50>150   · Start Metformin 500 mg BID   · Pt will send us sugar log fransisco  Week and at that time if possible I am planning to take him off humalog   · The patient was advised to check blood sugars 4 times a day before meals and at bedtime and send BS readings to our office in a week. · Discussed with patient A1c and blood sugar goals   · Patient will need routine diabetes maintenance and prevention    Dietary noncompliance   Improved    Discussed with patient the importance of eating consistent carbohydrate meals, avoiding high glycemic index food. Also, discussed with patient the risk and negative consequences of dietary noncompliance on blood glucose control, blood pressure and weight    Hyperglycemia   · Improving   · Adjusting insulin regimen     No follow-ups on file. The above issues were reviewed with the patient who understood and agreed with the plan. Thank you for allowing us to participate in the care of this patient. Please do not hesitate to contact us with any additional questions. Diagnosis Orders   1. Newly diagnosed diabetes ([facility] [address]     2. Hyperglycemia     3.  Dietary noncompliance       Qiana Hoang MD  Endocrinologist, [facility] Diabetes Care and Endocrinology   [address] [place] [id number]   Phone: [phone]  Fax: [phone]  --------------------------------------------  An electronic signature was used to authenticate this note.  Gaile Litten, MD on 8/14/2020 at 9:20 PM

## 2020-08-31 RX ORDER — BLOOD SUGAR DIAGNOSTIC
1 STRIP MISCELLANEOUS 3 TIMES DAILY
Qty: 100 EACH | Refills: 5 | Status: SHIPPED
Start: 2020-08-31 | End: 2021-09-03 | Stop reason: SDUPTHER

## 2020-08-31 RX ORDER — INSULIN GLARGINE 100 [IU]/ML
45 INJECTION, SOLUTION SUBCUTANEOUS NIGHTLY
Qty: 5 PEN | Refills: 5 | Status: SHIPPED
Start: 2020-08-31 | End: 2021-09-03 | Stop reason: SDUPTHER

## 2020-08-31 RX ORDER — INSULIN LISPRO 100 [IU]/ML
INJECTION, SOLUTION INTRAVENOUS; SUBCUTANEOUS
Qty: 5 PEN | Refills: 5 | Status: SHIPPED
Start: 2020-08-31 | End: 2021-09-03 | Stop reason: SDUPTHER

## 2020-09-01 ENCOUNTER — TELEPHONE (OUTPATIENT)
Dept: NEUROLOGY | Age: 35
End: 2020-09-01

## 2020-09-01 NOTE — TELEPHONE ENCOUNTER
LM for pt to call office to reschedule his EEG. Pt did not show for previous scheduled EEG.  And to make an appointment follow up with one of our NP per Dr Rome Zamorano

## 2021-03-30 ENCOUNTER — HOSPITAL ENCOUNTER (EMERGENCY)
Age: 36
Discharge: LEFT BEFORE DISCHARGE | End: 2021-03-30
Attending: EMERGENCY MEDICINE
Payer: MEDICAID

## 2021-03-30 VITALS
DIASTOLIC BLOOD PRESSURE: 102 MMHG | WEIGHT: 315 LBS | OXYGEN SATURATION: 94 % | TEMPERATURE: 97.5 F | HEIGHT: 78 IN | BODY MASS INDEX: 36.45 KG/M2 | SYSTOLIC BLOOD PRESSURE: 153 MMHG | HEART RATE: 116 BPM | RESPIRATION RATE: 28 BRPM

## 2021-03-30 DIAGNOSIS — R56.9 SEIZURE (HCC): Primary | ICD-10-CM

## 2021-03-30 LAB
ACETAMINOPHEN LEVEL: <5 MCG/ML (ref 10–30)
ALBUMIN SERPL-MCNC: 4 G/DL (ref 3.5–5.2)
ALP BLD-CCNC: 149 U/L (ref 40–129)
ALT SERPL-CCNC: 182 U/L (ref 0–40)
ANION GAP SERPL CALCULATED.3IONS-SCNC: 14 MMOL/L (ref 7–16)
AST SERPL-CCNC: 98 U/L (ref 0–39)
BASOPHILS ABSOLUTE: 0.04 E9/L (ref 0–0.2)
BASOPHILS RELATIVE PERCENT: 0.4 % (ref 0–2)
BILIRUB SERPL-MCNC: 0.3 MG/DL (ref 0–1.2)
BUN BLDV-MCNC: 9 MG/DL (ref 6–20)
CALCIUM SERPL-MCNC: 9.5 MG/DL (ref 8.6–10.2)
CHLORIDE BLD-SCNC: 99 MMOL/L (ref 98–107)
CO2: 19 MMOL/L (ref 22–29)
CREAT SERPL-MCNC: 0.9 MG/DL (ref 0.7–1.2)
EOSINOPHILS ABSOLUTE: 0.04 E9/L (ref 0.05–0.5)
EOSINOPHILS RELATIVE PERCENT: 0.4 % (ref 0–6)
ETHANOL: <10 MG/DL (ref 0–0.08)
GFR AFRICAN AMERICAN: >60
GFR NON-AFRICAN AMERICAN: >60 ML/MIN/1.73
GLUCOSE BLD-MCNC: 410 MG/DL (ref 74–99)
HCT VFR BLD CALC: 46.7 % (ref 37–54)
HEMOGLOBIN: 15.5 G/DL (ref 12.5–16.5)
IMMATURE GRANULOCYTES #: 0.04 E9/L
IMMATURE GRANULOCYTES %: 0.4 % (ref 0–5)
LYMPHOCYTES ABSOLUTE: 2.61 E9/L (ref 1.5–4)
LYMPHOCYTES RELATIVE PERCENT: 28.9 % (ref 20–42)
MCH RBC QN AUTO: 27.8 PG (ref 26–35)
MCHC RBC AUTO-ENTMCNC: 33.2 % (ref 32–34.5)
MCV RBC AUTO: 83.7 FL (ref 80–99.9)
MONOCYTES ABSOLUTE: 0.7 E9/L (ref 0.1–0.95)
MONOCYTES RELATIVE PERCENT: 7.8 % (ref 2–12)
NEUTROPHILS ABSOLUTE: 5.6 E9/L (ref 1.8–7.3)
NEUTROPHILS RELATIVE PERCENT: 62.1 % (ref 43–80)
PDW BLD-RTO: 13.2 FL (ref 11.5–15)
PLATELET # BLD: 219 E9/L (ref 130–450)
PMV BLD AUTO: 10.2 FL (ref 7–12)
POTASSIUM SERPL-SCNC: 4.5 MMOL/L (ref 3.5–5)
RBC # BLD: 5.58 E12/L (ref 3.8–5.8)
SALICYLATE, SERUM: <0.3 MG/DL (ref 0–30)
SODIUM BLD-SCNC: 132 MMOL/L (ref 132–146)
TOTAL CK: 117 U/L (ref 20–200)
TOTAL PROTEIN: 7.7 G/DL (ref 6.4–8.3)
TRICYCLIC ANTIDEPRESSANTS SCREEN SERUM: NEGATIVE NG/ML
WBC # BLD: 9 E9/L (ref 4.5–11.5)

## 2021-03-30 PROCEDURE — 80143 DRUG ASSAY ACETAMINOPHEN: CPT

## 2021-03-30 PROCEDURE — 82077 ASSAY SPEC XCP UR&BREATH IA: CPT

## 2021-03-30 PROCEDURE — 6360000002 HC RX W HCPCS: Performed by: STUDENT IN AN ORGANIZED HEALTH CARE EDUCATION/TRAINING PROGRAM

## 2021-03-30 PROCEDURE — 80307 DRUG TEST PRSMV CHEM ANLYZR: CPT

## 2021-03-30 PROCEDURE — 80179 DRUG ASSAY SALICYLATE: CPT

## 2021-03-30 PROCEDURE — 80053 COMPREHEN METABOLIC PANEL: CPT

## 2021-03-30 PROCEDURE — 82550 ASSAY OF CK (CPK): CPT

## 2021-03-30 PROCEDURE — 99284 EMERGENCY DEPT VISIT MOD MDM: CPT

## 2021-03-30 PROCEDURE — 96374 THER/PROPH/DIAG INJ IV PUSH: CPT

## 2021-03-30 PROCEDURE — 85025 COMPLETE CBC W/AUTO DIFF WBC: CPT

## 2021-03-30 PROCEDURE — 93005 ELECTROCARDIOGRAM TRACING: CPT | Performed by: STUDENT IN AN ORGANIZED HEALTH CARE EDUCATION/TRAINING PROGRAM

## 2021-03-30 RX ORDER — CARBAMAZEPINE 100 MG/1
100 TABLET, CHEWABLE ORAL ONCE
Status: DISCONTINUED | OUTPATIENT
Start: 2021-03-30 | End: 2021-03-30 | Stop reason: HOSPADM

## 2021-03-30 RX ORDER — CARBAMAZEPINE 100 MG/1
200 TABLET, CHEWABLE ORAL ONCE
Status: DISCONTINUED | OUTPATIENT
Start: 2021-03-30 | End: 2021-03-30

## 2021-03-30 RX ORDER — LEVETIRACETAM 10 MG/ML
1000 INJECTION INTRAVASCULAR ONCE
Status: DISCONTINUED | OUTPATIENT
Start: 2021-03-30 | End: 2021-03-30

## 2021-03-30 RX ORDER — CARBAMAZEPINE 100 MG/1
100 TABLET, CHEWABLE ORAL ONCE
Status: DISCONTINUED | OUTPATIENT
Start: 2021-03-30 | End: 2021-03-30

## 2021-03-30 RX ORDER — KETOROLAC TROMETHAMINE 30 MG/ML
15 INJECTION, SOLUTION INTRAMUSCULAR; INTRAVENOUS ONCE
Status: COMPLETED | OUTPATIENT
Start: 2021-03-30 | End: 2021-03-30

## 2021-03-30 RX ADMIN — KETOROLAC TROMETHAMINE 15 MG: 30 INJECTION, SOLUTION INTRAMUSCULAR at 18:45

## 2021-03-30 ASSESSMENT — PAIN DESCRIPTION - PAIN TYPE: TYPE: ACUTE PAIN

## 2021-03-30 ASSESSMENT — ENCOUNTER SYMPTOMS
NAUSEA: 0
PHOTOPHOBIA: 0
VOMITING: 0
ABDOMINAL DISTENTION: 0
TROUBLE SWALLOWING: 0
SHORTNESS OF BREATH: 0
ABDOMINAL PAIN: 0
EYE PAIN: 0
BACK PAIN: 0
SINUS PRESSURE: 0
COUGH: 0
SORE THROAT: 0
WHEEZING: 0

## 2021-03-30 ASSESSMENT — PAIN SCALES - GENERAL: PAINLEVEL_OUTOF10: 7

## 2021-03-30 NOTE — ED PROVIDER NOTES
not toxic-appearing. HENT:      Head: Normocephalic. Comments: Left temporal contusion; minimally tender to palpation; slightly raised; about 2 cm in diameter     Right Ear: External ear normal.      Left Ear: External ear normal.      Nose: Nose normal.   Eyes:      Extraocular Movements: Extraocular movements intact. Neck:      Musculoskeletal: Neck supple. Muscular tenderness present. Cardiovascular:      Rate and Rhythm: Regular rhythm. Tachycardia present. Pulses: Normal pulses. Heart sounds: Normal heart sounds. Pulmonary:      Effort: Pulmonary effort is normal.      Breath sounds: Normal breath sounds. Abdominal:      General: Abdomen is flat. Palpations: Abdomen is soft. Musculoskeletal:      Right lower leg: No edema. Left lower leg: No edema. Skin:     General: Skin is warm. Capillary Refill: Capillary refill takes less than 2 seconds. Neurological:      General: No focal deficit present. Mental Status: He is alert. Mental status is at baseline. Procedures     EKG #1:  Interpreted by emergency department physician unless otherwise noted. Time:  1834    Rate: 105  Rhythm: Sinus. Interpretation: sinus tachycardia no st or t wave abnormalities  Comparison: stable as compared to patient's most recent EKG. MDM    The patient was seen and evaluated for breakthrough seizure. He admitted to compliance with AED medication. He was given IVF and nighttime dose of carbamazepine while in ED. Labwork was unremarkable. Transaminitis secondary to seizure. Hyperglycemia secondary to seizure. Slight acidosis secondary to seizure. However afebrile with no leukocytosis hemoglobin stable. CK unremarkable. Patient became restless while in ED and wanted to leave AMA. Patient did not receive CT imaging of his head and neck prior to leaving AMA. It was explained to the patient it was in his best interest to receiving imaging to evaluate for seizure etiology. The patient understood this and decided to leave AMA. Risks of leaving AMA including disability, death, and continued seizures were explained to the patient who understood and decided to leave AMA. The patient has decision making capacity and is fully capable of making his own decisions and decided to leave AMA. Paperwork was signed and patient left ama    Patient was seen and evaluated by myself and my attending Noreen Dumont DO. Assessment and Plan discussed with attending provider, please see attestation for final plan of care. Chase Lee DO     --------------------------------------------- PAST HISTORY ---------------------------------------------  Past Medical History:  has a past medical history of Anxiety, Depression, Seizures ([address] and Unspecified cerebral artery occlusion with cerebral infarction. Past Surgical History:  has a past surgical history that includes Abscess Drainage (Left, 08/22/2016) and bone incision and drainage (Left, 08/30/2016). Social History:  reports that he has been smoking cigarettes. He has a 7.60 pack-year smoking history. He has never used smokeless tobacco. He reports current drug use. Drug: Other-see comments. He reports that he does not drink alcohol. Family History: family history includes Heart Disease in his father; Kidney Disease in his mother. The patients home medications have been reviewed. Allergies: Patient has no known allergies.     -------------------------------------------------- RESULTS -------------------------------------------------  Labs:  Results for orders placed or performed during the hospital encounter of 03/30/21   CBC Auto Differential   Result Value Ref Range    WBC 9.0 4.5 - 11.5 E9/L    RBC 5.58 3.80 - 5.80 E12/L    Hemoglobin 15.5 12.5 - 16.5 g/dL    Hematocrit 46.7 37.0 - 54.0 %    MCV 83.7 80.0 - 99.9 fL    MCH 27.8 26.0 - 35.0 pg    MCHC 33.2 32.0 - 34.5 %    RDW 13.2 11.5 - 15.0 fL    Platelets 230 042 - 405 E9/L ------------------------------------------  I have spoken with the patient and discussed todays availabe results to this point, in addition to providing specific details for the plan of care and counseling regarding the diagnosis and prognosis. Their questions are answered, however they are not agreeable to admission.     --------------------------------- ADDITIONAL PROVIDER NOTES ---------------------------------  This patient has chosen to leave against medical advice. I have personally explained to them that choosing to do so may result in permanent bodily harm or death. I discussed at length that without further evaluation and monitoring there may be unforeseen circumstances and deterioration resulting in permanent bodily harm or death as a result of their choice. They are alert, oriented, and competent at this time. They state that they are aware of the serious risks as explained, but they continue to wish to leave against medical   advice. In light of their decision to leave against medical advice, follow-up has been arranged and they are aware of the importance of following up as instructed. They have been advised that they should return to the ED immediately if they change their mind at any time, or if their condition begins to change or worsen. The follow up plan has been discussed in detail and they are aware of the specific conditions for emergent return, as well as the importance of follow-up. Discharge Medication List as of 3/30/2021  8:05 PM          Diagnosis:  1. Seizure [facility])        Disposition:  Patient's disposition: Left against medical advice. Patient's condition is stable.        Leobardo Horowitz DO  Resident  03/30/21 2111

## 2021-03-31 LAB
EKG ATRIAL RATE: 105 BPM
EKG P AXIS: 19 DEGREES
EKG P-R INTERVAL: 162 MS
EKG Q-T INTERVAL: 332 MS
EKG QRS DURATION: 98 MS
EKG QTC CALCULATION (BAZETT): 438 MS
EKG R AXIS: -6 DEGREES
EKG T AXIS: 45 DEGREES
EKG VENTRICULAR RATE: 105 BPM

## 2021-07-11 ENCOUNTER — HOSPITAL ENCOUNTER (EMERGENCY)
Age: 36
Discharge: HOME | End: 2021-07-12
Attending: EMERGENCY MEDICINE
Payer: MEDICAID

## 2021-07-11 ENCOUNTER — APPOINTMENT (OUTPATIENT)
Dept: CT IMAGING | Age: 36
End: 2021-07-11
Payer: MEDICAID

## 2021-07-11 DIAGNOSIS — G40.919 BREAKTHROUGH SEIZURE (HCC): ICD-10-CM

## 2021-07-11 DIAGNOSIS — R73.9 HYPERGLYCEMIA: Primary | ICD-10-CM

## 2021-07-11 LAB
ALBUMIN SERPL-MCNC: 4 G/DL (ref 3.5–5.2)
ALP BLD-CCNC: 149 U/L (ref 40–129)
ALT SERPL-CCNC: 83 U/L (ref 0–40)
ANION GAP SERPL CALCULATED.3IONS-SCNC: 12 MMOL/L (ref 7–16)
AST SERPL-CCNC: 34 U/L (ref 0–39)
BASOPHILS ABSOLUTE: 0.04 E9/L (ref 0–0.2)
BASOPHILS RELATIVE PERCENT: 0.4 % (ref 0–2)
BETA-HYDROXYBUTYRATE: 0.16 MMOL/L (ref 0.02–0.27)
BILIRUB SERPL-MCNC: 0.5 MG/DL (ref 0–1.2)
BILIRUBIN URINE: NEGATIVE
BLOOD, URINE: NEGATIVE
BUN BLDV-MCNC: 15 MG/DL (ref 6–20)
CALCIUM SERPL-MCNC: 9.3 MG/DL (ref 8.6–10.2)
CHLORIDE BLD-SCNC: 93 MMOL/L (ref 98–107)
CLARITY: CLEAR
CO2: 23 MMOL/L (ref 22–29)
COLOR: YELLOW
CREAT SERPL-MCNC: 0.8 MG/DL (ref 0.7–1.2)
EOSINOPHILS ABSOLUTE: 0.06 E9/L (ref 0.05–0.5)
EOSINOPHILS RELATIVE PERCENT: 0.6 % (ref 0–6)
GFR AFRICAN AMERICAN: >60
GFR NON-AFRICAN AMERICAN: >60 ML/MIN/1.73
GLUCOSE BLD-MCNC: 627 MG/DL (ref 74–99)
GLUCOSE URINE: >=1000 MG/DL
HCT VFR BLD CALC: 44.6 % (ref 37–54)
HEMOGLOBIN: 15.5 G/DL (ref 12.5–16.5)
IMMATURE GRANULOCYTES #: 0.02 E9/L
IMMATURE GRANULOCYTES %: 0.2 % (ref 0–5)
KETONES, URINE: NEGATIVE MG/DL
LACTIC ACID: 2.7 MMOL/L (ref 0.5–2.2)
LEUKOCYTE ESTERASE, URINE: NEGATIVE
LIPASE: 56 U/L (ref 13–60)
LYMPHOCYTES ABSOLUTE: 4.44 E9/L (ref 1.5–4)
LYMPHOCYTES RELATIVE PERCENT: 46.4 % (ref 20–42)
MCH RBC QN AUTO: 28.3 PG (ref 26–35)
MCHC RBC AUTO-ENTMCNC: 34.8 % (ref 32–34.5)
MCV RBC AUTO: 81.5 FL (ref 80–99.9)
METER GLUCOSE: >500 MG/DL (ref 74–99)
METER GLUCOSE: >500 MG/DL (ref 74–99)
MONOCYTES ABSOLUTE: 0.78 E9/L (ref 0.1–0.95)
MONOCYTES RELATIVE PERCENT: 8.2 % (ref 2–12)
NEUTROPHILS ABSOLUTE: 4.23 E9/L (ref 1.8–7.3)
NEUTROPHILS RELATIVE PERCENT: 44.2 % (ref 43–80)
NITRITE, URINE: NEGATIVE
PDW BLD-RTO: 13.4 FL (ref 11.5–15)
PH UA: 6 (ref 5–9)
PH VENOUS: 7.39 (ref 7.35–7.45)
PLATELET # BLD: 206 E9/L (ref 130–450)
PMV BLD AUTO: 10.5 FL (ref 7–12)
POTASSIUM REFLEX MAGNESIUM: 4.6 MMOL/L (ref 3.5–5)
PROTEIN UA: NEGATIVE MG/DL
RBC # BLD: 5.47 E12/L (ref 3.8–5.8)
SODIUM BLD-SCNC: 128 MMOL/L (ref 132–146)
SPECIFIC GRAVITY UA: 1.01 (ref 1–1.03)
TOTAL PROTEIN: 7.5 G/DL (ref 6.4–8.3)
UROBILINOGEN, URINE: 0.2 E.U./DL
WBC # BLD: 9.6 E9/L (ref 4.5–11.5)

## 2021-07-11 PROCEDURE — 85025 COMPLETE CBC W/AUTO DIFF WBC: CPT

## 2021-07-11 PROCEDURE — 6360000002 HC RX W HCPCS: Performed by: EMERGENCY MEDICINE

## 2021-07-11 PROCEDURE — 96365 THER/PROPH/DIAG IV INF INIT: CPT

## 2021-07-11 PROCEDURE — 2580000003 HC RX 258: Performed by: EMERGENCY MEDICINE

## 2021-07-11 PROCEDURE — 83605 ASSAY OF LACTIC ACID: CPT

## 2021-07-11 PROCEDURE — 82010 KETONE BODYS QUAN: CPT

## 2021-07-11 PROCEDURE — 70450 CT HEAD/BRAIN W/O DYE: CPT

## 2021-07-11 PROCEDURE — 81003 URINALYSIS AUTO W/O SCOPE: CPT

## 2021-07-11 PROCEDURE — 72125 CT NECK SPINE W/O DYE: CPT

## 2021-07-11 PROCEDURE — 83690 ASSAY OF LIPASE: CPT

## 2021-07-11 PROCEDURE — 80053 COMPREHEN METABOLIC PANEL: CPT

## 2021-07-11 PROCEDURE — 99282 EMERGENCY DEPT VISIT SF MDM: CPT

## 2021-07-11 RX ORDER — 0.9 % SODIUM CHLORIDE 0.9 %
1000 INTRAVENOUS SOLUTION INTRAVENOUS ONCE
Status: COMPLETED | OUTPATIENT
Start: 2021-07-11 | End: 2021-07-12

## 2021-07-11 RX ORDER — 0.9 % SODIUM CHLORIDE 0.9 %
1000 INTRAVENOUS SOLUTION INTRAVENOUS ONCE
Status: COMPLETED | OUTPATIENT
Start: 2021-07-11 | End: 2021-07-11

## 2021-07-11 RX ORDER — LEVETIRACETAM 10 MG/ML
2000 INJECTION INTRAVASCULAR ONCE
Status: COMPLETED | OUTPATIENT
Start: 2021-07-11 | End: 2021-07-11

## 2021-07-11 RX ADMIN — SODIUM CHLORIDE 1000 ML: 9 INJECTION, SOLUTION INTRAVENOUS at 22:15

## 2021-07-11 RX ADMIN — LEVETIRACETAM 2000 MG: 1000 INJECTION, SOLUTION INTRAVENOUS at 21:34

## 2021-07-11 RX ADMIN — SODIUM CHLORIDE 1000 ML: 9 INJECTION, SOLUTION INTRAVENOUS at 21:28

## 2021-07-11 ASSESSMENT — PAIN DESCRIPTION - LOCATION: LOCATION: ABDOMEN

## 2021-07-11 ASSESSMENT — PAIN SCALES - GENERAL: PAINLEVEL_OUTOF10: 6

## 2021-07-11 ASSESSMENT — PAIN DESCRIPTION - ORIENTATION: ORIENTATION: LEFT;LOWER

## 2021-07-12 VITALS
SYSTOLIC BLOOD PRESSURE: 152 MMHG | DIASTOLIC BLOOD PRESSURE: 107 MMHG | BODY MASS INDEX: 36.21 KG/M2 | RESPIRATION RATE: 16 BRPM | TEMPERATURE: 98.2 F | WEIGHT: 313 LBS | HEIGHT: 78 IN | HEART RATE: 77 BPM | OXYGEN SATURATION: 97 %

## 2021-07-12 LAB — METER GLUCOSE: 368 MG/DL (ref 74–99)

## 2021-07-12 PROCEDURE — 82962 GLUCOSE BLOOD TEST: CPT

## 2021-07-12 RX ORDER — CARBAMAZEPINE 100 MG/1
100 TABLET, EXTENDED RELEASE ORAL SEE ADMIN INSTRUCTIONS
Qty: 90 TABLET | Refills: 0 | Status: SHIPPED | OUTPATIENT
Start: 2021-07-12 | End: 2021-08-11

## 2021-07-12 NOTE — ED NOTES
Bed: 01  Expected date:   Expected time:   Means of arrival:   Comments:  ROOM 18A     Annette Vidal RN  07/11/21 1112

## 2021-07-13 ASSESSMENT — ENCOUNTER SYMPTOMS
ABDOMINAL PAIN: 0
SORE THROAT: 0
VOMITING: 0
EYE REDNESS: 0
WHEEZING: 0
SHORTNESS OF BREATH: 0
EYE DISCHARGE: 0
EYE PAIN: 0
CONSTIPATION: 0
DIARRHEA: 0
BACK PAIN: 0
RHINORRHEA: 0
COUGH: 0
ABDOMINAL DISTENTION: 0
BLOOD IN STOOL: 0
NAUSEA: 0

## 2021-07-13 NOTE — ED PROVIDER NOTES
KYLEE Alonzo is a 38 y.o. male with a PMHx significant for CVA, epilepsy, DM, anxiety and depression who presents with concerns for hyperglycemia and breakthrough seizures. His complaints are persistent, moderate in severity and worsened by nothing. The patient states that he ran out of his seizure medication yesterday and reports having had two seizures which were witnessed by his girlfriend today. He says his last seizure was approximately six months ago. He says he thinks the first seizure lasted approximately 10 minutes and the second seizure last about one minute. He states he fell down, but was caught by his girlfriend and did not hit his head. He denies pain anywhere. He says his diabetes is typically well-managed and that he has been taking his meds as prescribed. The patient also endorses increased thirst and increased urine output. The patient denies recent fever, chills, fatigue, HA, dizziness, vision changes, congestion, rhinorrhea, neck pain, chest pain, palpitations, hx of MI, hx of blood clots, LE edema, SOB, cough, wheezing, abdominal pain, N/V/D/C, hematochezia, melena, dysuria, hematuria, generalized weakness and paresthesias. The patient is currently taking no blood thinners. Tobacco Hx:   reports that he has been smoking cigarettes. He has a 7.60 pack-year smoking history. He has never used smokeless tobacco.    Alcohol Hx:   reports no history of alcohol use. Illicit Drug Hx:  Reports hx of illicit drug use. The history is provided by the patient. Last Tetanus (if applicable): n/a    Review of Systems   Constitutional: Negative for chills, diaphoresis, fatigue and fever. HENT: Negative for congestion, rhinorrhea and sore throat. Eyes: Negative for pain, discharge and redness. Respiratory: Negative for cough, shortness of breath and wheezing. Cardiovascular: Negative for chest pain, palpitations and leg swelling.    Gastrointestinal: Negative for abdominal distention, abdominal pain, blood in stool, constipation, diarrhea, nausea and vomiting. Endocrine: Positive for polydipsia and polyuria. Negative for polyphagia. Genitourinary: Negative for decreased urine volume, difficulty urinating, dysuria, flank pain, frequency and hematuria. Musculoskeletal: Negative for arthralgias, back pain, myalgias and neck pain. Skin: Negative for rash and wound. Neurological: Positive for seizures. Negative for dizziness, syncope, speech difficulty, weakness, light-headedness, numbness and headaches. Physical Exam  Vitals and nursing note reviewed. Constitutional:       General: He is awake. He is not in acute distress. Appearance: He is not diaphoretic. HENT:      Head: Normocephalic and atraumatic. Right Ear: External ear normal.      Left Ear: External ear normal.      Nose: Nose normal.      Mouth/Throat:      Mouth: Mucous membranes are dry. Pharynx: Oropharynx is clear. Eyes:      General: No visual field deficit or scleral icterus. Right eye: No discharge. Left eye: No discharge. Extraocular Movements: Extraocular movements intact. Conjunctiva/sclera: Conjunctivae normal.      Pupils: Pupils are equal, round, and reactive to light. Cardiovascular:      Rate and Rhythm: Normal rate and regular rhythm. Heart sounds: Normal heart sounds. No murmur heard. No friction rub. No gallop. Comments: Upper extremity and lower extremity distal pulses intact bilaterally +2/4  Pulmonary:      Effort: Pulmonary effort is normal. No respiratory distress. Breath sounds: Normal breath sounds. No wheezing, rhonchi or rales. Comments: Good air movement noted throughout  Chest:      Chest wall: No tenderness. Abdominal:      General: Bowel sounds are normal. There is no distension. Palpations: Abdomen is soft. Tenderness: There is no abdominal tenderness. There is no guarding or rebound. Musculoskeletal:         General: No tenderness or deformity. Normal range of motion. Cervical back: Normal range of motion and neck supple. No rigidity. No muscular tenderness. Right lower leg: No edema. Left lower leg: No edema. Lymphadenopathy:      Cervical: No cervical adenopathy. Skin:     General: Skin is warm and dry. Capillary Refill: Capillary refill takes less than 2 seconds. Findings: No erythema or rash. Neurological:      General: No focal deficit present. Mental Status: He is alert and oriented to person, place, and time. GCS: GCS eye subscore is 4. GCS verbal subscore is 5. GCS motor subscore is 6. Cranial Nerves: Cranial nerves are intact. No cranial nerve deficit, dysarthria or facial asymmetry. Sensory: Sensation is intact. No sensory deficit. Motor: Motor function is intact. No weakness. Coordination: Coordination normal.      Gait: Gait is intact.       ---------------------------------- PAST HISTORY ---------------------------------------------  Past Medical History:  has a past medical history of Anxiety, Depression, Seizures ([facility] [address] and Unspecified cerebral artery occlusion with cerebral infarction. Past Surgical History:  has a past surgical history that includes Abscess Drainage (Left, 08/22/2016) and bone incision and drainage (Left, 08/30/2016). Social History:  reports that he has been smoking cigarettes. He has a 7.60 pack-year smoking history. He has never used smokeless tobacco. He reports current drug use. Drug: Other-see comments. He reports that he does not drink alcohol. Family History: family history includes Heart Disease in his father; Kidney Disease in his mother. Home Meds: Not in a hospital admission. The patients home medications have been reviewed. Allergies: Patient has no known allergies.     ------------------------- NURSING NOTES AND VITALS REVIEWED ---------------------------  Date / Time Roomed:  7/11/2021  8:59 PM  ED Bed Assignment:  01/01    The nursing notes within the ED encounter and vital signs as below have been reviewed. BP (!) 152/107   Pulse 77   Temp 98.2 °F (36.8 °C) (Oral)   Resp 16   Ht 6' 7\" (2.007 m)   Wt (!) 313 lb (142 kg)   SpO2 97%   BMI 35.26 kg/m²   -------------------------------------------------- RESULTS / INTERVENTIONS -------------------------------------------------  All laboratory and radiology tests have been reviewed by this physician.     LABS:  Results for orders placed or performed during the hospital encounter of 07/11/21   Beta-Hydroxybutyrate   Result Value Ref Range    Beta-Hydroxybutyrate 0.16 0.02 - 0.27 mmol/L   CBC Auto Differential   Result Value Ref Range    WBC 9.6 4.5 - 11.5 E9/L    RBC 5.47 3.80 - 5.80 E12/L    Hemoglobin 15.5 12.5 - 16.5 g/dL    Hematocrit 44.6 37.0 - 54.0 %    MCV 81.5 80.0 - 99.9 fL    MCH 28.3 26.0 - 35.0 pg    MCHC 34.8 (H) 32.0 - 34.5 %    RDW 13.4 11.5 - 15.0 fL    Platelets 688 839 - 922 E9/L    MPV 10.5 7.0 - 12.0 fL    Neutrophils % 44.2 43.0 - 80.0 %    Immature Granulocytes % 0.2 0.0 - 5.0 %    Lymphocytes % 46.4 (H) 20.0 - 42.0 %    Monocytes % 8.2 2.0 - 12.0 %    Eosinophils % 0.6 0.0 - 6.0 %    Basophils % 0.4 0.0 - 2.0 %    Neutrophils Absolute 4.23 1.80 - 7.30 E9/L    Immature Granulocytes # 0.02 E9/L    Lymphocytes Absolute 4.44 (H) 1.50 - 4.00 E9/L    Monocytes Absolute 0.78 0.10 - 0.95 E9/L    Eosinophils Absolute 0.06 0.05 - 0.50 E9/L    Basophils Absolute 0.04 0.00 - 0.20 E9/L   Comprehensive Metabolic Panel w/ Reflex to MG   Result Value Ref Range    Sodium 128 (L) 132 - 146 mmol/L    Potassium reflex Magnesium 4.6 3.5 - 5.0 mmol/L    Chloride 93 (L) 98 - 107 mmol/L    CO2 23 22 - 29 mmol/L    Anion Gap 12 7 - 16 mmol/L    Glucose 627 (HH) 74 - 99 mg/dL    BUN 15 6 - 20 mg/dL    CREATININE 0.8 0.7 - 1.2 mg/dL    GFR Non-African American >60 >=60 mL/min/1.73    GFR African American >60     Calcium 9.3 8.6 - 10.2 mg/dL    Total Protein 7.5 6.4 - 8.3 g/dL    Albumin 4.0 3.5 - 5.2 g/dL    Total Bilirubin 0.5 0.0 - 1.2 mg/dL    Alkaline Phosphatase 149 (H) 40 - 129 U/L    ALT 83 (H) 0 - 40 U/L    AST 34 0 - 39 U/L   Lipase   Result Value Ref Range    Lipase 56 13 - 60 U/L   Urinalysis, reflex to microscopic   Result Value Ref Range    Color, UA Yellow Straw/Yellow    Clarity, UA Clear Clear    Glucose, Ur >=1000 (A) Negative mg/dL    Bilirubin Urine Negative Negative    Ketones, Urine Negative Negative mg/dL    Specific Gravity, UA 1.010 1.005 - 1.030    Blood, Urine Negative Negative    pH, UA 6.0 5.0 - 9.0    Protein, UA Negative Negative mg/dL    Urobilinogen, Urine 0.2 <2.0 E.U./dL    Nitrite, Urine Negative Negative    Leukocyte Esterase, Urine Negative Negative   Lactic Acid, Plasma   Result Value Ref Range    Lactic Acid 2.7 (H) 0.5 - 2.2 mmol/L   pH, venous   Result Value Ref Range    pH, Shaun 7.39 7.35 - 7.45   POCT Glucose   Result Value Ref Range    Meter Glucose >500 (H) 74 - 99 mg/dL   POCT Glucose   Result Value Ref Range    Meter Glucose >500 (H) 74 - 99 mg/dL   POCT Glucose   Result Value Ref Range    Meter Glucose 368 (H) 74 - 99 mg/dL       RADIOLOGY: Interpreted by Radiologist unless otherwise noted. CT Head WO Contrast   Final Result   No acute abnormality of the cervical spine. No acute intracranial abnormality. CT Cervical Spine WO Contrast   Final Result   No acute abnormality of the cervical spine. No acute intracranial abnormality. Oxygen Saturation Interpretation: Normal    Meds Given:  Medications   0.9 % sodium chloride bolus (0 mLs Intravenous Stopped 7/11/21 2214)   levETIRAcetam (KEPPRA) 2000 mg/200 mL IVPB (0 mg Intravenous Stopped 7/11/21 2214)   0.9 % sodium chloride bolus (0 mLs Intravenous Stopped 7/12/21 0008)       Procedures:  No procedures performed.     --------------------------------- PROGRESS NOTES / ADDITIONAL PROVIDER NOTES fractures or dislocations. The patient was given a dose of keppra and two liters of NSIVF and his glucose responded appropriately. He did not have any seizures while in the department. Given his improvement and the lack of acute findings requiring emergent intervention and hospitalization, the decision was made to discharge him with recommended follow up with his PCP and neurology. The patient was stable at the time of his disposition. Discharge Medication List as of 7/12/2021 12:35 AM          Diagnosis:  1. Hyperglycemia    2. Breakthrough seizure ([facility] [address]        Disposition:  Patient's disposition: Discharge to home  Patient's condition is stable. This patient was seen, examined and treated with Dr. Blaise Dempsey. All pertinent aspects of the patient's care were discussed with the attending physician.        Craolina Palacio DO  Resident  07/13/21 2008

## 2021-09-01 DIAGNOSIS — E11.65 POORLY CONTROLLED DIABETES MELLITUS (HCC): ICD-10-CM

## 2021-09-01 DIAGNOSIS — E11.9 NEWLY DIAGNOSED DIABETES (HCC): Primary | ICD-10-CM

## 2021-09-01 DIAGNOSIS — R73.9 HYPERGLYCEMIA: ICD-10-CM

## 2021-09-01 RX ORDER — SUBCUTANEOUS INSULIN PUMP
EACH MISCELLANEOUS
Qty: 1 KIT | Refills: 0 | Status: SHIPPED | OUTPATIENT
Start: 2021-09-01 | End: 2021-09-03

## 2021-09-01 RX ORDER — BLOOD-GLUCOSE TRANSMITTER
EACH MISCELLANEOUS
Qty: 1 EACH | Refills: 0 | Status: SHIPPED | OUTPATIENT
Start: 2021-09-01

## 2021-09-01 RX ORDER — BLOOD-GLUCOSE SENSOR
EACH MISCELLANEOUS
Qty: 12 EACH | Refills: 3 | Status: SHIPPED | OUTPATIENT
Start: 2021-09-01

## 2021-09-03 ENCOUNTER — VIRTUAL VISIT (OUTPATIENT)
Dept: ENDOCRINOLOGY | Age: 36
End: 2021-09-03
Payer: MEDICAID

## 2021-09-03 DIAGNOSIS — E55.9 VITAMIN D DEFICIENCY: ICD-10-CM

## 2021-09-03 DIAGNOSIS — Z91.119 DIETARY NONCOMPLIANCE: ICD-10-CM

## 2021-09-03 DIAGNOSIS — R73.9 HYPERGLYCEMIA: ICD-10-CM

## 2021-09-03 DIAGNOSIS — E11.65 POORLY CONTROLLED DIABETES MELLITUS (HCC): Primary | ICD-10-CM

## 2021-09-03 PROCEDURE — 99214 OFFICE O/P EST MOD 30 MIN: CPT | Performed by: INTERNAL MEDICINE

## 2021-09-03 PROCEDURE — 3046F HEMOGLOBIN A1C LEVEL >9.0%: CPT | Performed by: INTERNAL MEDICINE

## 2021-09-03 PROCEDURE — 2022F DILAT RTA XM EVC RTNOPTHY: CPT | Performed by: INTERNAL MEDICINE

## 2021-09-03 PROCEDURE — G8427 DOCREV CUR MEDS BY ELIG CLIN: HCPCS | Performed by: INTERNAL MEDICINE

## 2021-09-03 PROCEDURE — 4004F PT TOBACCO SCREEN RCVD TLK: CPT | Performed by: INTERNAL MEDICINE

## 2021-09-03 PROCEDURE — G8417 CALC BMI ABV UP PARAM F/U: HCPCS | Performed by: INTERNAL MEDICINE

## 2021-09-03 RX ORDER — INSULIN GLARGINE 100 [IU]/ML
52 INJECTION, SOLUTION SUBCUTANEOUS NIGHTLY
Qty: 20 PEN | Refills: 3 | Status: SHIPPED
Start: 2021-09-03 | End: 2021-09-24

## 2021-09-03 RX ORDER — BLOOD SUGAR DIAGNOSTIC
1 STRIP MISCELLANEOUS
Qty: 250 EACH | Refills: 5 | Status: SHIPPED
Start: 2021-09-03 | End: 2021-09-24

## 2021-09-03 RX ORDER — INSULIN LISPRO 100 [IU]/ML
INJECTION, SOLUTION INTRAVENOUS; SUBCUTANEOUS
Qty: 20 PEN | Refills: 5 | Status: SHIPPED
Start: 2021-09-03 | End: 2021-09-24

## 2021-09-03 RX ORDER — INSULIN LISPRO 100 [IU]/ML
INJECTION, SOLUTION INTRAVENOUS; SUBCUTANEOUS
Qty: 5 PEN | Refills: 5 | Status: SHIPPED
Start: 2021-09-03 | End: 2021-09-24

## 2021-09-03 NOTE — PROGRESS NOTES
difficulty swallowing,   Cardio-pulmonary: No CP, SOB or palpitation, No orthopnea or PND. No cough or wheezing. GI: No N/V/D, no constipation, No abdominal pain, no melena or hematochezia   : Denied any dysuria, hematuria, flank pain, discharge, or incontinence. Skin: denied any rash, ulcer, Hirsute, or hyperpigmentation. MSK: denied any joint deformity, joint pain/swelling, muscle pain, or back pain. Neuro: no numbness, no tingling, no weakness, _    OBJECTIVE    There were no vitals taken for this visit. BP Readings from Last 4 Encounters:   07/12/21 (!) 152/107   03/30/21 (!) 153/102   08/03/20 138/84   05/26/20 (!) 143/95     Wt Readings from Last 6 Encounters:   07/11/21 (!) 313 lb (142 kg)   03/30/21 (!) 315 lb (142.9 kg)   08/03/20 (!) 306 lb 11.2 oz (139.1 kg)   05/26/20 (!) 320 lb (145.2 kg)   06/18/19 275 lb (124.7 kg)   04/19/19 289 lb (131.1 kg)       Physical examination:  General: awake alert, oriented x3, no abnormal position or movements. HEENT: normocephalic non-traumatic, no exophthalmos   Neck: supple, no LN enlargement, no thyromegaly, no thyroid tenderness, no JVD. Pulm: Clear equal air entry no added sounds, no wheezing or rhonchi    CVS: S1 + S2, no murmur, no heave. Dorsalis pedis pulse palpable   Abd: soft lax, no tenderness, no organomegaly, audible bowel sounds. Skin: warm, no lesions, no rash.  No callus, no Ulcers, No acanthosis nigricans  Musculoskeletal: No back tenderness, no kyphosis/scoliosis    Neuro: CN intact, Monofilament sensation decreased bilateral , muscle power normal  Psych: normal mood, and affect      Review of Laboratory Data:  I personally reviewed the following lab:  Lab Results   Component Value Date/Time    WBC 9.6 07/11/2021 09:31 PM    RBC 5.47 07/11/2021 09:31 PM    HGB 15.5 07/11/2021 09:31 PM    HCT 44.6 07/11/2021 09:31 PM    MCV 81.5 07/11/2021 09:31 PM    MCH 28.3 07/11/2021 09:31 PM    MCHC 34.8 (H) 07/11/2021 09:31 PM    RDW 13.4 07/11/2021 09:31 PM     07/11/2021 09:31 PM    MPV 10.5 07/11/2021 09:31 PM      Lab Results   Component Value Date/Time     (L) 07/11/2021 09:31 PM    K 4.6 07/11/2021 09:31 PM    CO2 23 07/11/2021 09:31 PM    BUN 15 07/11/2021 09:31 PM    CREATININE 0.8 07/11/2021 09:31 PM    CALCIUM 9.3 07/11/2021 09:31 PM    LABGLOM >60 07/11/2021 09:31 PM    GFRAA >60 07/11/2021 09:31 PM      No results found for: TSH, T4FREE, T0RZNDP, FT3, D0ACLAM, TSI, TPOABS, THGAB  Lab Results   Component Value Date    LABA1C 13.1 08/02/2020    GLUCOSE 627 07/11/2021     Lab Results   Component Value Date    LABA1C 13.1 08/02/2020     Lab Results   Component Value Date    TRIG 87 12/08/2016    HDL 42 12/08/2016    LDLCALC 138 12/08/2016    CHOL 197 12/08/2016     No results found for: [address], a 43 y.o.-old male seen in for the following issues     Diabetes Mellitus Type 2     · Patient's diabetes is uncontrol. c-peptide at diagnosis was 2.8   · Change DM regimen to lantus 52 units at bedtime, humalog 10 units + ss 3:50>150   · The patient was advised to check blood sugars 4 times a day before meals and at bedtime and send BS readings to our office in a week. · Discussed with patient A1c and blood sugar goals   · Patient will need routine diabetes maintenance and prevention    Dietary noncompliance   Improved    Discussed with patient the importance of eating consistent carbohydrate meals, avoiding high glycemic index food. Also, discussed with patient the risk and negative consequences of dietary noncompliance on blood glucose control, blood pressure and weight    Hyperglycemia   · Improving   · Adjusting insulin regimen     I personally reviewed external notes from PCP and other patient's care team providers, and personally interpreted labs associated with the above diagnosis.  I also ordered labs to further assess and manage the above addressed medical conditions    No follow-ups on file.    The above issues were reviewed with the patient who understood and agreed with the plan. Thank you for allowing us to participate in the care of this patient. Please do not hesitate to contact us with any additional questions. Diagnosis Orders   1. Poorly controlled diabetes mellitus (HCC)  insulin lispro, 1 Unit Dial, (HUMALOG KWIKPEN) 100 UNIT/ML SOPN    insulin glargine (LANTUS SOLOSTAR) 100 UNIT/ML injection pen    insulin lispro, 1 Unit Dial, (HUMALOG KWIKPEN) 100 UNIT/ML SOPN    blood glucose test strips (ONETOUCH VERIO) strip    Blood Glucose Monitoring Suppl MISC    Hemoglobin E7D    Basic Metabolic Panel    Hemoglobin A1C    Comprehensive Metabolic Panel    Microalbumin / Creatinine Urine Ratio    Lipid Panel   2. Dietary noncompliance     3. Hyperglycemia     4. Vitamin D deficiency  Vitamin D 25 Hydroxy    Basic Metabolic Panel    Vitamin D 25 Hydroxy     Natalie Snell MD  Endocrinologist, [facility]   [address] [place] [id number]   Phone: [phone]  Fax: [phone]  --------------------------------------------  An electronic signature was used to authenticate this note.  Tatyana Santana MD on 9/3/2021 at 9:40 AM

## 2021-09-03 NOTE — PROGRESS NOTES
Radha Solo was read the following message We want to confirm that, for purposes of billing, this is a virtual visit with your provider for which we will submit a claim for reimbursement with your insurance company. You will be responsible for any copays, coinsurance amounts or other amounts not covered by your insurance company. If you do not accept this, unfortunately we will not be able to schedule or proceed with a virtual visit with the provider. Do you accept? Alexa Leyva responded Yes .

## 2021-09-11 DIAGNOSIS — E11.65 POORLY CONTROLLED DIABETES MELLITUS (HCC): Primary | ICD-10-CM

## 2021-09-13 RX ORDER — PEN NEEDLE, DIABETIC 31 GX5/16"
NEEDLE, DISPOSABLE MISCELLANEOUS
Qty: 200 EACH | Refills: 5 | Status: SHIPPED | OUTPATIENT
Start: 2021-09-13

## 2021-09-24 DIAGNOSIS — E11.65 POORLY CONTROLLED DIABETES MELLITUS (HCC): Primary | ICD-10-CM

## 2021-09-24 RX ORDER — BLOOD SUGAR DIAGNOSTIC
1 STRIP MISCELLANEOUS 4 TIMES DAILY
Qty: 150 EACH | Refills: 11 | Status: SHIPPED | OUTPATIENT
Start: 2021-09-24

## 2021-09-24 RX ORDER — LANCETS
EACH MISCELLANEOUS
Qty: 200 EACH | Refills: 11 | Status: SHIPPED | OUTPATIENT
Start: 2021-09-24

## 2021-10-19 ENCOUNTER — TELEPHONE (OUTPATIENT)
Dept: ENDOCRINOLOGY | Age: 36
End: 2021-10-19

## 2021-10-19 NOTE — TELEPHONE ENCOUNTER
PA submitted through CoverMyMeds for patients insulin lispro. Received approval and notified pharmacy.

## 2022-07-22 ENCOUNTER — HOSPITAL ENCOUNTER (EMERGENCY)
Age: 37
Discharge: HOME | End: 2022-07-23
Attending: EMERGENCY MEDICINE
Payer: MEDICAID

## 2022-07-22 ENCOUNTER — APPOINTMENT (OUTPATIENT)
Dept: GENERAL RADIOLOGY | Age: 37
End: 2022-07-22
Payer: MEDICAID

## 2022-07-22 DIAGNOSIS — R56.9 SEIZURE (HCC): Primary | ICD-10-CM

## 2022-07-22 DIAGNOSIS — R73.9 HYPERGLYCEMIA: ICD-10-CM

## 2022-07-22 LAB
ALBUMIN SERPL-MCNC: 4 G/DL (ref 3.5–5.2)
ALP BLD-CCNC: 122 U/L (ref 40–129)
ALT SERPL-CCNC: 121 U/L (ref 0–40)
ANION GAP SERPL CALCULATED.3IONS-SCNC: 13 MMOL/L (ref 7–16)
AST SERPL-CCNC: 62 U/L (ref 0–39)
BASOPHILS ABSOLUTE: 0.05 E9/L (ref 0–0.2)
BASOPHILS RELATIVE PERCENT: 0.4 % (ref 0–2)
BILIRUB SERPL-MCNC: 0.4 MG/DL (ref 0–1.2)
BUN BLDV-MCNC: 15 MG/DL (ref 6–20)
CALCIUM SERPL-MCNC: 8.8 MG/DL (ref 8.6–10.2)
CHLORIDE BLD-SCNC: 100 MMOL/L (ref 98–107)
CO2: 21 MMOL/L (ref 22–29)
CREAT SERPL-MCNC: 1 MG/DL (ref 0.7–1.2)
EOSINOPHILS ABSOLUTE: 0.09 E9/L (ref 0.05–0.5)
EOSINOPHILS RELATIVE PERCENT: 0.8 % (ref 0–6)
GFR AFRICAN AMERICAN: >60
GFR NON-AFRICAN AMERICAN: >60 ML/MIN/1.73
GLUCOSE BLD-MCNC: 302 MG/DL
GLUCOSE BLD-MCNC: 347 MG/DL (ref 74–99)
HCT VFR BLD CALC: 43.7 % (ref 37–54)
HEMOGLOBIN: 14.9 G/DL (ref 12.5–16.5)
IMMATURE GRANULOCYTES #: 0.04 E9/L
IMMATURE GRANULOCYTES %: 0.4 % (ref 0–5)
LACTIC ACID: 4.9 MMOL/L (ref 0.5–2.2)
LIPASE: 29 U/L (ref 13–60)
LYMPHOCYTES ABSOLUTE: 4.53 E9/L (ref 1.5–4)
LYMPHOCYTES RELATIVE PERCENT: 40.6 % (ref 20–42)
MCH RBC QN AUTO: 27.7 PG (ref 26–35)
MCHC RBC AUTO-ENTMCNC: 34.1 % (ref 32–34.5)
MCV RBC AUTO: 81.2 FL (ref 80–99.9)
METER GLUCOSE: 302 MG/DL (ref 74–99)
MONOCYTES ABSOLUTE: 0.91 E9/L (ref 0.1–0.95)
MONOCYTES RELATIVE PERCENT: 8.2 % (ref 2–12)
NEUTROPHILS ABSOLUTE: 5.54 E9/L (ref 1.8–7.3)
NEUTROPHILS RELATIVE PERCENT: 49.6 % (ref 43–80)
PDW BLD-RTO: 13.3 FL (ref 11.5–15)
PH VENOUS: 7.42 (ref 7.35–7.45)
PLATELET # BLD: 252 E9/L (ref 130–450)
PMV BLD AUTO: 9.3 FL (ref 7–12)
POTASSIUM REFLEX MAGNESIUM: 4 MMOL/L (ref 3.5–5)
RBC # BLD: 5.38 E12/L (ref 3.8–5.8)
SODIUM BLD-SCNC: 134 MMOL/L (ref 132–146)
TOTAL PROTEIN: 7.1 G/DL (ref 6.4–8.3)
WBC # BLD: 11.2 E9/L (ref 4.5–11.5)

## 2022-07-22 PROCEDURE — 93005 ELECTROCARDIOGRAM TRACING: CPT | Performed by: EMERGENCY MEDICINE

## 2022-07-22 PROCEDURE — 80053 COMPREHEN METABOLIC PANEL: CPT

## 2022-07-22 PROCEDURE — 83605 ASSAY OF LACTIC ACID: CPT

## 2022-07-22 PROCEDURE — 82800 BLOOD PH: CPT

## 2022-07-22 PROCEDURE — 83690 ASSAY OF LIPASE: CPT

## 2022-07-22 PROCEDURE — 6360000002 HC RX W HCPCS: Performed by: EMERGENCY MEDICINE

## 2022-07-22 PROCEDURE — 85025 COMPLETE CBC W/AUTO DIFF WBC: CPT

## 2022-07-22 PROCEDURE — 2580000003 HC RX 258: Performed by: EMERGENCY MEDICINE

## 2022-07-22 PROCEDURE — 82010 KETONE BODYS QUAN: CPT

## 2022-07-22 PROCEDURE — 82962 GLUCOSE BLOOD TEST: CPT

## 2022-07-22 PROCEDURE — 96365 THER/PROPH/DIAG IV INF INIT: CPT

## 2022-07-22 RX ORDER — LEVETIRACETAM 10 MG/ML
1000 INJECTION INTRAVASCULAR ONCE
Status: COMPLETED | OUTPATIENT
Start: 2022-07-22 | End: 2022-07-22

## 2022-07-22 RX ORDER — 0.9 % SODIUM CHLORIDE 0.9 %
1000 INTRAVENOUS SOLUTION INTRAVENOUS ONCE
Status: COMPLETED | OUTPATIENT
Start: 2022-07-22 | End: 2022-07-23

## 2022-07-22 RX ADMIN — SODIUM CHLORIDE 1000 ML: 9 INJECTION, SOLUTION INTRAVENOUS at 23:17

## 2022-07-22 RX ADMIN — LEVETIRACETAM 1000 MG: 10 INJECTION INTRAVENOUS at 23:23

## 2022-07-22 ASSESSMENT — PAIN - FUNCTIONAL ASSESSMENT: PAIN_FUNCTIONAL_ASSESSMENT: NONE - DENIES PAIN

## 2022-07-23 ENCOUNTER — APPOINTMENT (OUTPATIENT)
Dept: GENERAL RADIOLOGY | Age: 37
End: 2022-07-23
Payer: MEDICAID

## 2022-07-23 ENCOUNTER — APPOINTMENT (OUTPATIENT)
Dept: CT IMAGING | Age: 37
End: 2022-07-23
Payer: MEDICAID

## 2022-07-23 VITALS
DIASTOLIC BLOOD PRESSURE: 55 MMHG | TEMPERATURE: 98.7 F | BODY MASS INDEX: 32.4 KG/M2 | HEART RATE: 84 BPM | HEIGHT: 78 IN | SYSTOLIC BLOOD PRESSURE: 109 MMHG | OXYGEN SATURATION: 94 % | WEIGHT: 280 LBS | RESPIRATION RATE: 22 BRPM

## 2022-07-23 LAB
BACTERIA: ABNORMAL /HPF
BETA-HYDROXYBUTYRATE: 0.09 MMOL/L (ref 0.02–0.27)
BILIRUBIN URINE: NEGATIVE
BLOOD, URINE: ABNORMAL
CHP ED QC CHECK: NORMAL
CLARITY: CLEAR
COLOR: YELLOW
EPITHELIAL CELLS, UA: ABNORMAL /HPF
GLUCOSE BLD-MCNC: 323 MG/DL
GLUCOSE URINE: >=1000 MG/DL
KETONES, URINE: NEGATIVE MG/DL
LACTIC ACID, SEPSIS: 2.8 MMOL/L (ref 0.5–1.9)
LEUKOCYTE ESTERASE, URINE: NEGATIVE
METER GLUCOSE: 323 MG/DL (ref 74–99)
NITRITE, URINE: NEGATIVE
PH UA: 6.5 (ref 5–9)
PROTEIN UA: NEGATIVE MG/DL
RBC UA: ABNORMAL /HPF (ref 0–2)
SPECIFIC GRAVITY UA: 1.01 (ref 1–1.03)
UROBILINOGEN, URINE: 0.2 E.U./DL
WBC UA: ABNORMAL /HPF (ref 0–5)

## 2022-07-23 PROCEDURE — 2580000003 HC RX 258: Performed by: RADIOLOGY

## 2022-07-23 PROCEDURE — 71045 X-RAY EXAM CHEST 1 VIEW: CPT

## 2022-07-23 PROCEDURE — 82962 GLUCOSE BLOOD TEST: CPT

## 2022-07-23 PROCEDURE — 6370000000 HC RX 637 (ALT 250 FOR IP): Performed by: EMERGENCY MEDICINE

## 2022-07-23 PROCEDURE — 6360000002 HC RX W HCPCS: Performed by: EMERGENCY MEDICINE

## 2022-07-23 PROCEDURE — 83605 ASSAY OF LACTIC ACID: CPT

## 2022-07-23 PROCEDURE — 96367 TX/PROPH/DG ADDL SEQ IV INF: CPT

## 2022-07-23 PROCEDURE — 96361 HYDRATE IV INFUSION ADD-ON: CPT

## 2022-07-23 PROCEDURE — 81001 URINALYSIS AUTO W/SCOPE: CPT

## 2022-07-23 PROCEDURE — 6360000004 HC RX CONTRAST MEDICATION: Performed by: RADIOLOGY

## 2022-07-23 PROCEDURE — 2580000003 HC RX 258: Performed by: EMERGENCY MEDICINE

## 2022-07-23 PROCEDURE — 71275 CT ANGIOGRAPHY CHEST: CPT

## 2022-07-23 PROCEDURE — 96375 TX/PRO/DX INJ NEW DRUG ADDON: CPT

## 2022-07-23 PROCEDURE — 99285 EMERGENCY DEPT VISIT HI MDM: CPT

## 2022-07-23 RX ORDER — 0.9 % SODIUM CHLORIDE 0.9 %
1000 INTRAVENOUS SOLUTION INTRAVENOUS ONCE
Status: COMPLETED | OUTPATIENT
Start: 2022-07-23 | End: 2022-07-23

## 2022-07-23 RX ORDER — LEVETIRACETAM 500 MG/1
500 TABLET ORAL 2 TIMES DAILY
Qty: 60 TABLET | Refills: 0 | Status: SHIPPED | OUTPATIENT
Start: 2022-07-23 | End: 2022-08-22

## 2022-07-23 RX ORDER — SODIUM CHLORIDE 0.9 % (FLUSH) 0.9 %
10 SYRINGE (ML) INJECTION PRN
Status: COMPLETED | OUTPATIENT
Start: 2022-07-23 | End: 2022-07-23

## 2022-07-23 RX ORDER — AMOXICILLIN AND CLAVULANATE POTASSIUM 875; 125 MG/1; MG/1
1 TABLET, FILM COATED ORAL 2 TIMES DAILY WITH MEALS
Qty: 20 TABLET | Refills: 0 | Status: SHIPPED | OUTPATIENT
Start: 2022-07-23 | End: 2022-08-02

## 2022-07-23 RX ADMIN — SODIUM CHLORIDE 1000 ML: 9 INJECTION, SOLUTION INTRAVENOUS at 00:52

## 2022-07-23 RX ADMIN — IOPAMIDOL 75 ML: 755 INJECTION, SOLUTION INTRAVENOUS at 02:28

## 2022-07-23 RX ADMIN — SODIUM CHLORIDE 3000 MG: 900 INJECTION INTRAVENOUS at 00:53

## 2022-07-23 RX ADMIN — Medication 10 ML: at 02:29

## 2022-07-23 RX ADMIN — INSULIN HUMAN 10 UNITS: 100 INJECTION, SOLUTION PARENTERAL at 02:07

## 2022-07-23 NOTE — DISCHARGE INSTRUCTIONS
No driving until released by your doctor or neurologist  Take your keppra as directed, take your other seizure medications as directed as well.

## 2022-07-23 NOTE — ED PROVIDER NOTES
Department of Emergency Medicine   ED  Provider Note  Admit Date/RoomTime: 7/22/2022 10:49 PM  ED Room: 21/21          History of Present Illness:  7/22/22, Time: 11:01 PM EDT  Chief Complaint   Patient presents with    Seizures     Pt brought in by EMS for c/o seizure. Family called and stated pt had full tonic/colonic seizure for 3-4min. Pt lethargic on arrival but A&Ox4 at this time. 89% on RA per EMS and now 95% on 4L, Not normally on O2. History of seizures but non compliant with medications. Ranulfo Monson is a 42 y.o. male presenting to the ED for seizure, beginning prior to arrival.  The complaint has been intermittent, severe in severity, and worsened by nothing. Patient has a history of seizure, he reports he is noncompliant with his antiepileptic therapy, he reports his last dose of seizure medication was nearly 1 month ago. He reports he takes Keppra. He reports he had a generalized tonic-clonic seizure while in bed today. He denies any injuries from the seizure. He is back to baseline now. He denies fever chills. No recent illness. No chest pain or shortness of breath. No abdominal pain. He has a history of diabetes, has an insulin pump. Blood sugar in the 350s on arrival.  He denies any infectious symptoms. He denies any neck pain or neck stiffness. He denies any trauma. He denies any other complaints. EMS reports he was hypoxemic on their arrival.    On arrival here, he is not hypoxemic    Review of Systems:   A complete review of systems was performed and pertinent positives and negatives are stated within HPI, all other systems reviewed and are negative.        --------------------------------------------- PAST HISTORY ---------------------------------------------  Past Medical History:  has a past medical history of Anxiety, Depression, Seizures ([address] and Unspecified cerebral artery occlusion with cerebral infarction.     Past Surgical History:  has a past surgical Affect          -------------------------------------------------- RESULTS -------------------------------------------------  I have personally reviewed all laboratory and imaging results for this patient. Results are listed below.      LABS: (Lab results interpreted by me)  Results for orders placed or performed during the hospital encounter of 07/22/22   CBC with Auto Differential   Result Value Ref Range    WBC 11.2 4.5 - 11.5 E9/L    RBC 5.38 3.80 - 5.80 E12/L    Hemoglobin 14.9 12.5 - 16.5 g/dL    Hematocrit 43.7 37.0 - 54.0 %    MCV 81.2 80.0 - 99.9 fL    MCH 27.7 26.0 - 35.0 pg    MCHC 34.1 32.0 - 34.5 %    RDW 13.3 11.5 - 15.0 fL    Platelets 307 767 - 287 E9/L    MPV 9.3 7.0 - 12.0 fL    Neutrophils % 49.6 43.0 - 80.0 %    Immature Granulocytes % 0.4 0.0 - 5.0 %    Lymphocytes % 40.6 20.0 - 42.0 %    Monocytes % 8.2 2.0 - 12.0 %    Eosinophils % 0.8 0.0 - 6.0 %    Basophils % 0.4 0.0 - 2.0 %    Neutrophils Absolute 5.54 1.80 - 7.30 E9/L    Immature Granulocytes # 0.04 E9/L    Lymphocytes Absolute 4.53 (H) 1.50 - 4.00 E9/L    Monocytes Absolute 0.91 0.10 - 0.95 E9/L    Eosinophils Absolute 0.09 0.05 - 0.50 E9/L    Basophils Absolute 0.05 0.00 - 0.20 E9/L   Comprehensive Metabolic Panel w/ Reflex to MG   Result Value Ref Range    Sodium 134 132 - 146 mmol/L    Potassium reflex Magnesium 4.0 3.5 - 5.0 mmol/L    Chloride 100 98 - 107 mmol/L    CO2 21 (L) 22 - 29 mmol/L    Anion Gap 13 7 - 16 mmol/L    Glucose 347 (H) 74 - 99 mg/dL    BUN 15 6 - 20 mg/dL    Creatinine 1.0 0.7 - 1.2 mg/dL    GFR Non-African American >60 >=60 mL/min/1.73    GFR African American >60     Calcium 8.8 8.6 - 10.2 mg/dL    Total Protein 7.1 6.4 - 8.3 g/dL    Albumin 4.0 3.5 - 5.2 g/dL    Total Bilirubin 0.4 0.0 - 1.2 mg/dL    Alkaline Phosphatase 122 40 - 129 U/L     (H) 0 - 40 U/L    AST 62 (H) 0 - 39 U/L   Lipase   Result Value Ref Range    Lipase 29 13 - 60 U/L   pH, venous   Result Value Ref Range    pH, Shaun 7.42 7.35 - 7.45 Beta-Hydroxybutyrate   Result Value Ref Range    Beta-Hydroxybutyrate 0.09 0.02 - 0.27 mmol/L   Lactic Acid   Result Value Ref Range    Lactic Acid 4.9 (HH) 0.5 - 2.2 mmol/L   POCT Glucose   Result Value Ref Range    Glucose 302 mg/dL   POCT Glucose   Result Value Ref Range    Meter Glucose 302 (H) 74 - 99 mg/dL   ,       RADIOLOGY:  Interpreted by Radiologist unless otherwise specified  XR CHEST PORTABLE   Final Result   Significant enlarged cardiac silhouette likely exaggerated by low inspiratory   volume and AP technique. Pericardial effusion cannot be excluded. Bibasilar airspace disease most likely represents atelectasis. Pneumonia or   aspiration cannot be excluded. CTA PULMONARY W CONTRAST    (Results Pending)         EKG Interpretation  Interpreted by emergency department physician, Dr. Magalys Ch     Date of EK22  Time:     Rhythm: sinus tachycardia  Rate: normal  Axis: left  Conduction: normal  ST Segments: no acute change  T Waves: no acute change    Clinical Impression: sinus tachycardia, no acute ischemic changes  Comparison to prior EKG: stable as compared to patient's most recent EKG      ------------------------- NURSING NOTES AND VITALS REVIEWED ---------------------------   The nursing notes within the ED encounter and vital signs as below have been reviewed by myself  /63   Pulse 84   Temp 98.7 °F (37.1 °C) (Oral)   Resp 24   Ht 6' 7\" (2.007 m)   Wt 280 lb (127 kg)   SpO2 95%   BMI 31.54 kg/m²     Oxygen Saturation Interpretation: Normal    The patients available past medical records and past encounters were reviewed.         ------------------------------ ED COURSE/MEDICAL DECISION MAKING----------------------  Medications   insulin regular (HUMULIN R;NOVOLIN R) injection 10 Units (has no administration in time range)   levETIRAcetam (KEPPRA) 1000 mg/100 mL IVPB (0 mg IntraVENous Stopped 22 0297)   0.9 % sodium chloride bolus (1,000 mLs IntraVENous New Bag 7/22/22 8950)   0.9 % sodium chloride bolus (1,000 mLs IntraVENous New Bag 7/23/22 0052)   ampicillin-sulbactam (UNASYN) 3000 mg in 100 mL NS IVPB minibag (0 mg IntraVENous Stopped 7/23/22 0124)           The cardiac monitor revealed sinus rhythm with a heart rate in the 80s as interpreted by me. The cardiac monitor was ordered secondary to the patient's seizure and to monitor the patient for dysrhythmia. [facility] [id number]       I, Dr. Alli Hahn, am the primary provider of record    Medical Decision Making:   Seizure with known seizure disorder, likely from medication non compliance (he admits not taking any of his AED). He is improving and back to baseline. Not hypoxemic. ? aspiration on cxr. CT scan to better evaluate. Plan for dc home as long as CT scan is reassuring and he remains hemodynamically stable. Mild hyperglycemia, not in DKA. Oxygen Saturation Interpretation: 95 % on RA. Re-Evaluations:       improving      This patient's ED course included: a personal history and physicial examination, re-evaluation prior to disposition, multiple bedside re-evaluations, IV medications, cardiac monitoring, continuous pulse oximetry, and complex medical decision making and emergency management    This patient has remained hemodynamically stable during their ED course. Counseling: The emergency provider has spoken with the patient and discussed todays results, in addition to providing specific details for the plan of care and counseling regarding the diagnosis and prognosis. Questions are answered at this time and they are agreeable with the plan.       --------------------------------- IMPRESSION AND DISPOSITION ---------------------------------    IMPRESSION  1. Seizure ([address]    2. Hyperglycemia        DISPOSITION  Disposition: Discharge to home as long as CT is reassuring  Patient condition is stable        NOTE: This report was transcribed using voice recognition software.  Every effort was made to ensure accuracy; however, inadvertent computerized transcription errors may be present       Tejas Davila MD  07/23/22 1015

## 2022-07-27 LAB
EKG ATRIAL RATE: 103 BPM
EKG P AXIS: 30 DEGREES
EKG P-R INTERVAL: 164 MS
EKG Q-T INTERVAL: 328 MS
EKG QRS DURATION: 102 MS
EKG QTC CALCULATION (BAZETT): 429 MS
EKG R AXIS: -17 DEGREES
EKG T AXIS: 33 DEGREES
EKG VENTRICULAR RATE: 103 BPM

## 2023-01-18 ENCOUNTER — HOSPITAL ENCOUNTER (EMERGENCY)
Age: 38
Discharge: HOME | End: 2023-01-18
Payer: MEDICAID

## 2023-01-18 VITALS
HEART RATE: 89 BPM | SYSTOLIC BLOOD PRESSURE: 136 MMHG | TEMPERATURE: 96.9 F | DIASTOLIC BLOOD PRESSURE: 83 MMHG | OXYGEN SATURATION: 99 %

## 2023-01-18 DIAGNOSIS — B35.6 JOCK ITCH: ICD-10-CM

## 2023-01-18 DIAGNOSIS — B35.6 TINEA CRURIS: Primary | ICD-10-CM

## 2023-01-18 PROCEDURE — 99283 EMERGENCY DEPT VISIT LOW MDM: CPT

## 2023-01-18 RX ORDER — NYSTATIN 100000 [USP'U]/G
POWDER TOPICAL
Qty: 1 EACH | Refills: 0 | Status: SHIPPED | OUTPATIENT
Start: 2023-01-18

## 2023-01-18 RX ORDER — KETOCONAZOLE 20 MG/G
CREAM TOPICAL
Qty: 30 G | Refills: 1 | Status: SHIPPED | OUTPATIENT
Start: 2023-01-18

## 2023-01-18 ASSESSMENT — LIFESTYLE VARIABLES
HOW OFTEN DO YOU HAVE A DRINK CONTAINING ALCOHOL: NEVER
HOW MANY STANDARD DRINKS CONTAINING ALCOHOL DO YOU HAVE ON A TYPICAL DAY: PATIENT DOES NOT DRINK

## 2023-01-18 NOTE — DISCHARGE INSTRUCTIONS
Use medications as prescribed.    Apply the cream in the morning and at bedtime.  Use the powder 3-4 times during the day when you are sweating.  Follow up with your PCP in the next 3-5 days.  If you do not have a PCP, use the information on your discharge packet to get one.

## 2023-01-18 NOTE — ED PROVIDER NOTES
Independent [facility] Visit. [address]  Department of Emergency Medicine   ED  Encounter Note  Admit Date/RoomTime: 2023  4:44 PM  ED Room: [place] [address]    NAME: Nabor Alonzo  : 1985  MRN: [id number]     Chief Complaint:  Groin Pain (Patient states he has a rash in the groin area and it itches, burns and is painful. )    History of Present Illness       Nabor Alonzo is a 42 y.o. old male who presents to the emergency department by private vehicle, for persistent of itchy, red, and burning area in bilateral groin region which began 4 day(s) prior to arrival.  The symptoms were caused by none. Since onset the symptoms have been gradually worsening. Prior history of similar episodes: No.  He stated he has been working outside and thought that because he was wearing so many layers, it was rubbing causing irritation. He said he has been applying neosporin to it but has been worsening since. Denies fevers, dysuria, nausea, vomiting, diarrhea, chest pain, shortness of breath. ROS   Pertinent positives and negatives are stated within HPI, all other systems reviewed and are negative. Past Medical History:  has a past medical history of Anxiety, Depression, Seizures ([address] and Unspecified cerebral artery occlusion with cerebral infarction. Surgical History:  has a past surgical history that includes Abscess Drainage (Left, 2016) and bone incision and drainage (Left, 2016). Social History:  reports that he has been smoking cigarettes. He has a 7.60 pack-year smoking history. He has never used smokeless tobacco. He reports current drug use. Drug: Other-see comments. He reports that he does not drink alcohol. Family History: family history includes Heart Disease in his father; Kidney Disease in his mother. Allergies: Patient has no known allergies.     Physical Exam   Oxygen Saturation Interpretation: Normal.        ED Triage Vitals   BP Temp Temp src Heart Rate Resp SpO2 Height Weight   01/18/23 1518 01/18/23 1511 -- 01/18/23 1511 -- 01/18/23 1511 -- --   (!) 134/111 96.9 °F (36.1 °C)  (!) 110  99 %           Constitutional:  Alert, development consistent with age. HEENT:  NC/NT. Airway patent. Eyes:  PERRL, EOMI, no discharge. Mouth:  Mucous membranes moist without lesions, tongue and gums normal.  Neck:  Supple. No lymphadenopathy. Respiratory:  Regular, non-labored, no tachypnea. CV:  Skin warm, pink, dry  GI:  Abdomen Soft, nontender, +BS. Integument:  Skin turgor: Normal.              Erythema within bilateral groin folds. A few satellite lesions are present. Appears to be tinea. Has been pruritic. No drainage, streaking, warmth. Neurological:  Orientation age-appropriate unless noted elseware. Motor functions intact. Lab / Imaging Results   (All laboratory and radiology results have been personally reviewed by myself)  Labs:  No results found for this visit on 01/18/23. Imaging: All Radiology results interpreted by Radiologist unless otherwise noted. No orders to display       ED Course / Medical Decision Making   Medications - No data to display     Consults:   None    Procedures:   none    MDM:   Patient presented to the ER for a pruritic, burning rash in bilateral groin region. Denies fevers, no drainage from this rash. Patient was contributing it to wearing multiple layers outside and sweating  Differential diagnosis: Jock itch, tinea cruris, other fungal infection  There was in fact a bilateral red rash to bilateral groin/thigh region. There were some satellite lesions that led me to believe this is fungal and moisture associated dermatitis.   Patient was discharged home with prescriptions for ketoconazole 2% cream to apply twice daily and nystatin powder to apply during the day when he is at work and sweating most.  I advised him to follow-up with his primary care for further evaluation and to return to the ER for any worsening symptoms. Plan of Care/Counseling:  OSVALDO Stafford CNP reviewed today's visit with the patient in addition to providing specific details for the plan of care and counseling regarding the diagnosis and prognosis. Questions are answered at this time and are agreeable with the plan. Assessment      1. Tinea cruris    2. Jock itch      Plan   Discharged home. Patient condition is stable    New Medications     New Prescriptions    KETOCONAZOLE (NIZORAL) 2 % CREAM    Apply topically twice daily to rash. NYSTATIN (MYCOSTATIN) 478398 UNIT/GM POWDER    Apply topically 4 times daily. Electronically signed by OSVALDO Stafford CNP   DD: 1/18/23  **This report was transcribed using voice recognition software. Every effort was made to ensure accuracy; however, inadvertent computerized transcription errors may be present.   END OF ED PROVIDER NOTE       OSVALDO Stafford CNP  01/18/23 8915

## 2023-02-01 ENCOUNTER — HOSPITAL ENCOUNTER (INPATIENT)
Age: 38
LOS: 4 days | Discharge: HOME | DRG: 420 | End: 2023-02-05
Attending: EMERGENCY MEDICINE | Admitting: INTERNAL MEDICINE
Payer: MEDICAID

## 2023-02-01 ENCOUNTER — APPOINTMENT (OUTPATIENT)
Dept: GENERAL RADIOLOGY | Age: 38
DRG: 420 | End: 2023-02-01
Payer: MEDICAID

## 2023-02-01 ENCOUNTER — APPOINTMENT (OUTPATIENT)
Dept: CT IMAGING | Age: 38
DRG: 420 | End: 2023-02-01
Payer: MEDICAID

## 2023-02-01 DIAGNOSIS — R07.9 CHEST PAIN, UNSPECIFIED TYPE: ICD-10-CM

## 2023-02-01 DIAGNOSIS — E13.10 DIABETIC KETOACIDOSIS WITHOUT COMA ASSOCIATED WITH OTHER SPECIFIED DIABETES MELLITUS (HCC): Primary | ICD-10-CM

## 2023-02-01 DIAGNOSIS — E87.20 LACTIC ACIDOSIS: ICD-10-CM

## 2023-02-01 DIAGNOSIS — D72.829 LEUKOCYTOSIS, UNSPECIFIED TYPE: ICD-10-CM

## 2023-02-01 DIAGNOSIS — E11.65 POORLY CONTROLLED DIABETES MELLITUS (HCC): ICD-10-CM

## 2023-02-01 PROBLEM — E11.10 DKA, TYPE 2, NOT AT GOAL (HCC): Status: ACTIVE | Noted: 2023-02-01

## 2023-02-01 LAB
ALBUMIN SERPL-MCNC: 3.7 G/DL (ref 3.5–5.2)
ALP BLD-CCNC: 120 U/L (ref 40–129)
ALT SERPL-CCNC: 113 U/L (ref 0–40)
AMPHETAMINE SCREEN, URINE: NOT DETECTED
ANION GAP SERPL CALCULATED.3IONS-SCNC: 24 MMOL/L (ref 7–16)
ANION GAP SERPL CALCULATED.3IONS-SCNC: 31 MMOL/L (ref 7–16)
ANION GAP SERPL CALCULATED.3IONS-SCNC: 41 MMOL/L (ref 7–16)
ANISOCYTOSIS: ABNORMAL
AST SERPL-CCNC: 53 U/L (ref 0–39)
B.E.: -16.1 MMOL/L (ref -3–3)
BACTERIA: ABNORMAL /HPF
BARBITURATE SCREEN URINE: NOT DETECTED
BASOPHILS ABSOLUTE: 0 E9/L (ref 0–0.2)
BASOPHILS ABSOLUTE: 0 E9/L (ref 0–0.2)
BASOPHILS RELATIVE PERCENT: 0.4 % (ref 0–2)
BASOPHILS RELATIVE PERCENT: 0.8 % (ref 0–2)
BENZODIAZEPINE SCREEN, URINE: NOT DETECTED
BETA-HYDROXYBUTYRATE: >4.5 MMOL/L (ref 0.02–0.27)
BILIRUB SERPL-MCNC: <0.2 MG/DL (ref 0–1.2)
BILIRUBIN DIRECT: <0.2 MG/DL (ref 0–0.3)
BILIRUBIN URINE: ABNORMAL
BILIRUBIN, INDIRECT: ABNORMAL MG/DL (ref 0–1)
BLOOD, URINE: ABNORMAL
BUN BLDV-MCNC: 14 MG/DL (ref 6–20)
BUN BLDV-MCNC: 19 MG/DL (ref 6–20)
BUN BLDV-MCNC: 23 MG/DL (ref 6–20)
BURR CELLS: ABNORMAL
BURR CELLS: ABNORMAL
C-REACTIVE PROTEIN: 0.7 MG/DL (ref 0–0.4)
CALCIUM SERPL-MCNC: 8.1 MG/DL (ref 8.6–10.2)
CALCIUM SERPL-MCNC: 8.3 MG/DL (ref 8.6–10.2)
CALCIUM SERPL-MCNC: 9.6 MG/DL (ref 8.6–10.2)
CANNABINOID SCREEN URINE: NOT DETECTED
CARBAMAZEPINE DOSE: ABNORMAL
CARBAMAZEPINE LEVEL: <2 MCG/ML (ref 4–10)
CHLORIDE BLD-SCNC: 101 MMOL/L (ref 98–107)
CHLORIDE BLD-SCNC: 103 MMOL/L (ref 98–107)
CHLORIDE BLD-SCNC: 86 MMOL/L (ref 98–107)
CHOLESTEROL, TOTAL: 151 MG/DL (ref 0–199)
CLARITY: CLEAR
CO2: 10 MMOL/L (ref 22–29)
CO2: 7 MMOL/L (ref 22–29)
CO2: 7 MMOL/L (ref 22–29)
COCAINE METABOLITE SCREEN URINE: NOT DETECTED
COHB: 0.4 % (ref 0–1.5)
COHB: 0.5 % (ref 0–1.5)
COLOR: YELLOW
COMMENT: ABNORMAL
CREAT SERPL-MCNC: 0.8 MG/DL (ref 0.7–1.2)
CREAT SERPL-MCNC: 1 MG/DL (ref 0.7–1.2)
CREAT SERPL-MCNC: 1.3 MG/DL (ref 0.7–1.2)
CREATININE URINE: 38 MG/DL (ref 40–278)
CRITICAL: ABNORMAL
CRITICAL: ABNORMAL
DATE ANALYZED: ABNORMAL
DATE ANALYZED: ABNORMAL
DATE OF COLLECTION: ABNORMAL
DATE OF COLLECTION: ABNORMAL
EKG ATRIAL RATE: 127 BPM
EKG ATRIAL RATE: 143 BPM
EKG P AXIS: 48 DEGREES
EKG P AXIS: 63 DEGREES
EKG P-R INTERVAL: 136 MS
EKG P-R INTERVAL: 154 MS
EKG Q-T INTERVAL: 270 MS
EKG Q-T INTERVAL: 306 MS
EKG QRS DURATION: 82 MS
EKG QRS DURATION: 84 MS
EKG QTC CALCULATION (BAZETT): 416 MS
EKG QTC CALCULATION (BAZETT): 444 MS
EKG R AXIS: -30 DEGREES
EKG R AXIS: -48 DEGREES
EKG T AXIS: 55 DEGREES
EKG T AXIS: 78 DEGREES
EKG VENTRICULAR RATE: 127 BPM
EKG VENTRICULAR RATE: 143 BPM
EOSINOPHILS ABSOLUTE: 0 E9/L (ref 0.05–0.5)
EOSINOPHILS ABSOLUTE: 0.23 E9/L (ref 0.05–0.5)
EOSINOPHILS RELATIVE PERCENT: 0.1 % (ref 0–6)
EOSINOPHILS RELATIVE PERCENT: 0.9 % (ref 0–6)
EPITHELIAL CELLS, UA: ABNORMAL /HPF
FENTANYL SCREEN, URINE: NOT DETECTED
GFR SERPL CREATININE-BSD FRML MDRD: >60 ML/MIN/1.73
GLUCOSE BLD-MCNC: 280 MG/DL (ref 74–99)
GLUCOSE BLD-MCNC: 327 MG/DL (ref 74–99)
GLUCOSE BLD-MCNC: 587 MG/DL (ref 74–99)
GLUCOSE BLD-MCNC: 672 MG/DL (ref 74–99)
GLUCOSE URINE: 500 MG/DL
HCO3: 8.9 MMOL/L (ref 22–26)
HCT VFR BLD CALC: 41.4 % (ref 37–54)
HCT VFR BLD CALC: 47.7 % (ref 37–54)
HDLC SERPL-MCNC: 11 MG/DL
HEMOGLOBIN: 15.6 G/DL (ref 12.5–16.5)
HEMOGLOBIN: 17.9 G/DL (ref 12.5–16.5)
HHB: 0.1 % (ref 0–5)
HHB: 2.4 % (ref 0–5)
KETONES, URINE: >=80 MG/DL
LAB: ABNORMAL
LAB: ABNORMAL
LACTIC ACID: 1.4 MMOL/L (ref 0.5–2.2)
LACTIC ACID: 2.2 MMOL/L (ref 0.5–2.2)
LACTIC ACID: 4.3 MMOL/L (ref 0.5–2.2)
LDL CHOLESTEROL CALCULATED: ABNORMAL MG/DL (ref 0–99)
LEUKOCYTE ESTERASE, URINE: NEGATIVE
LIPASE: 1552 U/L (ref 13–60)
LYMPHOCYTES ABSOLUTE: 14.56 E9/L (ref 1.5–4)
LYMPHOCYTES ABSOLUTE: 5.7 E9/L (ref 1.5–4)
LYMPHOCYTES RELATIVE PERCENT: 27 % (ref 20–42)
LYMPHOCYTES RELATIVE PERCENT: 58.4 % (ref 20–42)
Lab: ABNORMAL
Lab: ABNORMAL
Lab: NORMAL
MAGNESIUM: 1.9 MG/DL (ref 1.6–2.6)
MAGNESIUM: 2.1 MG/DL (ref 1.6–2.6)
MAGNESIUM: 2.4 MG/DL (ref 1.6–2.6)
MCH RBC QN AUTO: 29.8 PG (ref 26–35)
MCH RBC QN AUTO: 30.6 PG (ref 26–35)
MCHC RBC AUTO-ENTMCNC: 37.5 % (ref 32–34.5)
MCHC RBC AUTO-ENTMCNC: 37.7 % (ref 32–34.5)
MCV RBC AUTO: 79.4 FL (ref 80–99.9)
MCV RBC AUTO: 81.2 FL (ref 80–99.9)
METAMYELOCYTES RELATIVE PERCENT: 1.8 % (ref 0–1)
METER GLUCOSE: 218 MG/DL (ref 74–99)
METER GLUCOSE: 222 MG/DL (ref 74–99)
METER GLUCOSE: 224 MG/DL (ref 74–99)
METER GLUCOSE: 226 MG/DL (ref 74–99)
METER GLUCOSE: 239 MG/DL (ref 74–99)
METER GLUCOSE: 240 MG/DL (ref 74–99)
METER GLUCOSE: 252 MG/DL (ref 74–99)
METER GLUCOSE: 260 MG/DL (ref 74–99)
METER GLUCOSE: 269 MG/DL (ref 74–99)
METER GLUCOSE: 278 MG/DL (ref 74–99)
METER GLUCOSE: 289 MG/DL (ref 74–99)
METER GLUCOSE: 353 MG/DL (ref 74–99)
METER GLUCOSE: 389 MG/DL (ref 74–99)
METER GLUCOSE: 411 MG/DL (ref 74–99)
METER GLUCOSE: >500 MG/DL (ref 74–99)
METER GLUCOSE: >500 MG/DL (ref 74–99)
METHADONE SCREEN, URINE: NOT DETECTED
METHB: 0.6 % (ref 0–1.5)
METHB: 0.6 % (ref 0–1.5)
MODE: ABNORMAL
MODE: ABNORMAL
MONOCYTES ABSOLUTE: 1.48 E9/L (ref 0.1–0.95)
MONOCYTES ABSOLUTE: 1.51 E9/L (ref 0.1–0.95)
MONOCYTES RELATIVE PERCENT: 6.2 % (ref 2–12)
MONOCYTES RELATIVE PERCENT: 6.9 % (ref 2–12)
NEUTROPHILS ABSOLUTE: 13.93 E9/L (ref 1.8–7.3)
NEUTROPHILS ABSOLUTE: 8.79 E9/L (ref 1.8–7.3)
NEUTROPHILS RELATIVE PERCENT: 32.7 % (ref 43–80)
NEUTROPHILS RELATIVE PERCENT: 66.1 % (ref 43–80)
NITRITE, URINE: NEGATIVE
NUCLEATED RED BLOOD CELLS: 1.8 /100 WBC
O2 CONTENT: 23.4 ML/DL
O2 SATURATION: 97.6 % (ref 92–98.5)
O2 SATURATION: 99.9 % (ref 92–98.5)
O2HB: 96.6 % (ref 94–97)
O2HB: 98.8 % (ref 94–97)
OPERATOR ID: 1210
OPERATOR ID: ABNORMAL
OPIATE SCREEN URINE: NOT DETECTED
OXYCODONE URINE: NOT DETECTED
PATIENT TEMP: 37 C
PATIENT TEMP: 37 C
PCO2: 21.1 MMHG (ref 35–45)
PCO2: <15 MMHG (ref 35–45)
PDW BLD-RTO: 14 FL (ref 11.5–15)
PDW BLD-RTO: 14 FL (ref 11.5–15)
PEEP/CPAP: 8 CMH2O
PERFORMED ON: ABNORMAL
PH BLOOD GAS: 7.05 (ref 7.35–7.45)
PH BLOOD GAS: 7.25 (ref 7.35–7.45)
PH UA: 5.5 (ref 5–9)
PH VENOUS: 7.14 (ref 7.35–7.45)
PH VENOUS: 7.16 (ref 7.35–7.45)
PHENCYCLIDINE SCREEN URINE: NOT DETECTED
PHOSPHORUS: 1.8 MG/DL (ref 2.5–4.5)
PHOSPHORUS: 3.6 MG/DL (ref 2.5–4.5)
PLATELET # BLD: 289 E9/L (ref 130–450)
PLATELET # BLD: 351 E9/L (ref 130–450)
PMV BLD AUTO: 10.2 FL (ref 7–12)
PMV BLD AUTO: 10.4 FL (ref 7–12)
PO2: 315.4 MMHG (ref 75–100)
PO2: 95.5 MMHG (ref 75–100)
POC CHLORIDE: 109 MMOL/L (ref 100–108)
POC CREATININE: 1.2 MG/DL (ref 0.7–1.2)
POC POTASSIUM: 4.7 MMOL/L (ref 3.5–5)
POC SODIUM: 126 MMOL/L (ref 132–146)
POIKILOCYTES: ABNORMAL
POIKILOCYTES: ABNORMAL
POTASSIUM SERPL-SCNC: 3.7 MMOL/L (ref 3.5–5)
POTASSIUM SERPL-SCNC: 3.9 MMOL/L (ref 3.5–5)
POTASSIUM SERPL-SCNC: 4.7 MMOL/L (ref 3.5–5)
PROCALCITONIN: 0.11 NG/ML (ref 0–0.08)
PROTEIN PROTEIN: 57 MG/DL (ref 0–12)
PROTEIN UA: >=300 MG/DL
PROTEIN/CREAT RATIO: 1.5
PROTEIN/CREAT RATIO: 1.5 (ref 0–0.2)
PS: 16 CMH20
RBC # BLD: 5.1 E12/L (ref 3.8–5.8)
RBC # BLD: 6.01 E12/L (ref 3.8–5.8)
RBC UA: ABNORMAL /HPF (ref 0–2)
REASON FOR REJECTION: NORMAL
REJECTED TEST: NORMAL
RR MECHANICAL: 16 B/MIN
SODIUM BLD-SCNC: 134 MMOL/L (ref 132–146)
SODIUM BLD-SCNC: 137 MMOL/L (ref 132–146)
SODIUM BLD-SCNC: 139 MMOL/L (ref 132–146)
SOURCE, BLOOD GAS: ABNORMAL
SOURCE, BLOOD GAS: ABNORMAL
SPECIFIC GRAVITY UA: >=1.03 (ref 1–1.03)
THB: 15.4 G/DL (ref 11.5–16.5)
THB: 16.3 G/DL (ref 11.5–16.5)
TIME ANALYZED: 1511
TIME ANALYZED: 740
TOTAL PROTEIN: 7.3 G/DL (ref 6.4–8.3)
TRIGL SERPL-MCNC: 840 MG/DL (ref 0–149)
TROPONIN, HIGH SENSITIVITY: 15 NG/L (ref 0–11)
TROPONIN, HIGH SENSITIVITY: 15 NG/L (ref 0–11)
TROPONIN, HIGH SENSITIVITY: 16 NG/L (ref 0–11)
TROPONIN, HIGH SENSITIVITY: 17 NG/L (ref 0–11)
UROBILINOGEN, URINE: 0.2 E.U./DL
VITAMIN D 25-HYDROXY: 24 NG/ML (ref 30–100)
VLDLC SERPL CALC-MCNC: ABNORMAL MG/DL
WBC # BLD: 21.1 E9/L (ref 4.5–11.5)
WBC # BLD: 25.1 E9/L (ref 4.5–11.5)
WBC UA: ABNORMAL /HPF (ref 0–5)

## 2023-02-01 PROCEDURE — 6370000000 HC RX 637 (ALT 250 FOR IP): Performed by: INTERNAL MEDICINE

## 2023-02-01 PROCEDURE — 82570 ASSAY OF URINE CREATININE: CPT

## 2023-02-01 PROCEDURE — 84100 ASSAY OF PHOSPHORUS: CPT

## 2023-02-01 PROCEDURE — 2500000003 HC RX 250 WO HCPCS

## 2023-02-01 PROCEDURE — 6370000000 HC RX 637 (ALT 250 FOR IP)

## 2023-02-01 PROCEDURE — 6360000002 HC RX W HCPCS

## 2023-02-01 PROCEDURE — 82565 ASSAY OF CREATININE: CPT

## 2023-02-01 PROCEDURE — 80156 ASSAY CARBAMAZEPINE TOTAL: CPT

## 2023-02-01 PROCEDURE — 93010 ELECTROCARDIOGRAM REPORT: CPT | Performed by: INTERNAL MEDICINE

## 2023-02-01 PROCEDURE — 6360000002 HC RX W HCPCS: Performed by: EMERGENCY MEDICINE

## 2023-02-01 PROCEDURE — C9113 INJ PANTOPRAZOLE SODIUM, VIA: HCPCS | Performed by: INTERNAL MEDICINE

## 2023-02-01 PROCEDURE — 80076 HEPATIC FUNCTION PANEL: CPT

## 2023-02-01 PROCEDURE — 2580000003 HC RX 258

## 2023-02-01 PROCEDURE — 82306 VITAMIN D 25 HYDROXY: CPT

## 2023-02-01 PROCEDURE — 96375 TX/PRO/DX INJ NEW DRUG ADDON: CPT

## 2023-02-01 PROCEDURE — 94660 CPAP INITIATION&MGMT: CPT

## 2023-02-01 PROCEDURE — 2580000003 HC RX 258: Performed by: EMERGENCY MEDICINE

## 2023-02-01 PROCEDURE — 82800 BLOOD PH: CPT

## 2023-02-01 PROCEDURE — 84484 ASSAY OF TROPONIN QUANT: CPT

## 2023-02-01 PROCEDURE — 99291 CRITICAL CARE FIRST HOUR: CPT | Performed by: INTERNAL MEDICINE

## 2023-02-01 PROCEDURE — 84295 ASSAY OF SERUM SODIUM: CPT

## 2023-02-01 PROCEDURE — 82435 ASSAY OF BLOOD CHLORIDE: CPT

## 2023-02-01 PROCEDURE — 2500000003 HC RX 250 WO HCPCS: Performed by: INTERNAL MEDICINE

## 2023-02-01 PROCEDURE — 81001 URINALYSIS AUTO W/SCOPE: CPT

## 2023-02-01 PROCEDURE — 82010 KETONE BODYS QUAN: CPT

## 2023-02-01 PROCEDURE — 93005 ELECTROCARDIOGRAM TRACING: CPT

## 2023-02-01 PROCEDURE — 36600 WITHDRAWAL OF ARTERIAL BLOOD: CPT

## 2023-02-01 PROCEDURE — 71275 CT ANGIOGRAPHY CHEST: CPT

## 2023-02-01 PROCEDURE — 96365 THER/PROPH/DIAG IV INF INIT: CPT

## 2023-02-01 PROCEDURE — 93005 ELECTROCARDIOGRAM TRACING: CPT | Performed by: EMERGENCY MEDICINE

## 2023-02-01 PROCEDURE — 2000000000 HC ICU R&B

## 2023-02-01 PROCEDURE — 70450 CT HEAD/BRAIN W/O DYE: CPT

## 2023-02-01 PROCEDURE — 6360000002 HC RX W HCPCS: Performed by: INTERNAL MEDICINE

## 2023-02-01 PROCEDURE — 83690 ASSAY OF LIPASE: CPT

## 2023-02-01 PROCEDURE — 93005 ELECTROCARDIOGRAM TRACING: CPT | Performed by: INTERNAL MEDICINE

## 2023-02-01 PROCEDURE — 86140 C-REACTIVE PROTEIN: CPT

## 2023-02-01 PROCEDURE — 82044 UR ALBUMIN SEMIQUANTITATIVE: CPT

## 2023-02-01 PROCEDURE — 80048 BASIC METABOLIC PNL TOTAL CA: CPT

## 2023-02-01 PROCEDURE — 82947 ASSAY GLUCOSE BLOOD QUANT: CPT

## 2023-02-01 PROCEDURE — 82805 BLOOD GASES W/O2 SATURATION: CPT

## 2023-02-01 PROCEDURE — 83735 ASSAY OF MAGNESIUM: CPT

## 2023-02-01 PROCEDURE — 83036 HEMOGLOBIN GLYCOSYLATED A1C: CPT

## 2023-02-01 PROCEDURE — 80307 DRUG TEST PRSMV CHEM ANLYZR: CPT

## 2023-02-01 PROCEDURE — 84156 ASSAY OF PROTEIN URINE: CPT

## 2023-02-01 PROCEDURE — 6360000004 HC RX CONTRAST MEDICATION: Performed by: RADIOLOGY

## 2023-02-01 PROCEDURE — 99285 EMERGENCY DEPT VISIT HI MDM: CPT

## 2023-02-01 PROCEDURE — 83605 ASSAY OF LACTIC ACID: CPT

## 2023-02-01 PROCEDURE — 84145 PROCALCITONIN (PCT): CPT

## 2023-02-01 PROCEDURE — 82962 GLUCOSE BLOOD TEST: CPT

## 2023-02-01 PROCEDURE — 74177 CT ABD & PELVIS W/CONTRAST: CPT

## 2023-02-01 PROCEDURE — 2580000003 HC RX 258: Performed by: INTERNAL MEDICINE

## 2023-02-01 PROCEDURE — 36415 COLL VENOUS BLD VENIPUNCTURE: CPT

## 2023-02-01 PROCEDURE — 80061 LIPID PANEL: CPT

## 2023-02-01 PROCEDURE — 2700000000 HC OXYGEN THERAPY PER DAY

## 2023-02-01 PROCEDURE — 84132 ASSAY OF SERUM POTASSIUM: CPT

## 2023-02-01 PROCEDURE — 85025 COMPLETE CBC W/AUTO DIFF WBC: CPT

## 2023-02-01 PROCEDURE — 71045 X-RAY EXAM CHEST 1 VIEW: CPT

## 2023-02-01 RX ORDER — HYDROXYZINE PAMOATE 25 MG/1
25 CAPSULE ORAL 3 TIMES DAILY PRN
Status: DISCONTINUED | OUTPATIENT
Start: 2023-02-01 | End: 2023-02-05 | Stop reason: HOSPADM

## 2023-02-01 RX ORDER — SODIUM CHLORIDE 0.9 % (FLUSH) 0.9 %
10 SYRINGE (ML) INJECTION
Status: ACTIVE | OUTPATIENT
Start: 2023-02-01 | End: 2023-02-02

## 2023-02-01 RX ORDER — 0.9 % SODIUM CHLORIDE 0.9 %
1000 INTRAVENOUS SOLUTION INTRAVENOUS ONCE
Status: COMPLETED | OUTPATIENT
Start: 2023-02-01 | End: 2023-02-01

## 2023-02-01 RX ORDER — ENOXAPARIN SODIUM 100 MG/ML
30 INJECTION SUBCUTANEOUS 2 TIMES DAILY
Status: DISCONTINUED | OUTPATIENT
Start: 2023-02-01 | End: 2023-02-05 | Stop reason: HOSPADM

## 2023-02-01 RX ORDER — POTASSIUM CHLORIDE 7.45 MG/ML
10 INJECTION INTRAVENOUS PRN
Status: DISCONTINUED | OUTPATIENT
Start: 2023-02-01 | End: 2023-02-03

## 2023-02-01 RX ORDER — CARBAMAZEPINE 100 MG/1
200 TABLET, EXTENDED RELEASE ORAL NIGHTLY
Status: DISCONTINUED | OUTPATIENT
Start: 2023-02-01 | End: 2023-02-05 | Stop reason: HOSPADM

## 2023-02-01 RX ORDER — SODIUM CHLORIDE 0.9 % (FLUSH) 0.9 %
5-40 SYRINGE (ML) INJECTION EVERY 12 HOURS SCHEDULED
Status: DISCONTINUED | OUTPATIENT
Start: 2023-02-01 | End: 2023-02-05 | Stop reason: HOSPADM

## 2023-02-01 RX ORDER — SODIUM CHLORIDE, SODIUM LACTATE, POTASSIUM CHLORIDE, AND CALCIUM CHLORIDE .6; .31; .03; .02 G/100ML; G/100ML; G/100ML; G/100ML
1000 INJECTION, SOLUTION INTRAVENOUS ONCE
Status: COMPLETED | OUTPATIENT
Start: 2023-02-01 | End: 2023-02-01

## 2023-02-01 RX ORDER — DEXTROSE AND SODIUM CHLORIDE 5; .45 G/100ML; G/100ML
INJECTION, SOLUTION INTRAVENOUS CONTINUOUS PRN
Status: DISCONTINUED | OUTPATIENT
Start: 2023-02-01 | End: 2023-02-03

## 2023-02-01 RX ORDER — CHOLECALCIFEROL (VITAMIN D3) 50 MCG
2000 TABLET ORAL DAILY
Status: DISCONTINUED | OUTPATIENT
Start: 2023-02-02 | End: 2023-02-05 | Stop reason: HOSPADM

## 2023-02-01 RX ORDER — CARBAMAZEPINE 100 MG/1
100 TABLET, EXTENDED RELEASE ORAL EVERY MORNING
Status: DISCONTINUED | OUTPATIENT
Start: 2023-02-02 | End: 2023-02-05 | Stop reason: HOSPADM

## 2023-02-01 RX ORDER — MAGNESIUM SULFATE 1 G/100ML
1000 INJECTION INTRAVENOUS PRN
Status: DISCONTINUED | OUTPATIENT
Start: 2023-02-01 | End: 2023-02-03

## 2023-02-01 RX ORDER — POLYETHYLENE GLYCOL 3350 17 G/17G
17 POWDER, FOR SOLUTION ORAL DAILY PRN
Status: DISCONTINUED | OUTPATIENT
Start: 2023-02-01 | End: 2023-02-03

## 2023-02-01 RX ORDER — ONDANSETRON 4 MG/1
4 TABLET, ORALLY DISINTEGRATING ORAL EVERY 8 HOURS PRN
Status: DISCONTINUED | OUTPATIENT
Start: 2023-02-01 | End: 2023-02-05 | Stop reason: HOSPADM

## 2023-02-01 RX ORDER — CARBAMAZEPINE 100 MG/1
100 TABLET, EXTENDED RELEASE ORAL EVERY MORNING
COMMUNITY

## 2023-02-01 RX ORDER — HYDROXYZINE HYDROCHLORIDE 10 MG/1
25 TABLET, FILM COATED ORAL 3 TIMES DAILY PRN
Status: DISCONTINUED | OUTPATIENT
Start: 2023-02-01 | End: 2023-02-01

## 2023-02-01 RX ORDER — ONDANSETRON 2 MG/ML
4 INJECTION INTRAMUSCULAR; INTRAVENOUS EVERY 6 HOURS PRN
Status: DISCONTINUED | OUTPATIENT
Start: 2023-02-01 | End: 2023-02-05 | Stop reason: HOSPADM

## 2023-02-01 RX ORDER — CARBAMAZEPINE 100 MG/1
200 TABLET, EXTENDED RELEASE ORAL NIGHTLY
Status: ON HOLD | COMMUNITY
End: 2023-02-05 | Stop reason: HOSPADM

## 2023-02-01 RX ORDER — SODIUM CHLORIDE 0.9 % (FLUSH) 0.9 %
5-40 SYRINGE (ML) INJECTION PRN
Status: DISCONTINUED | OUTPATIENT
Start: 2023-02-01 | End: 2023-02-05 | Stop reason: HOSPADM

## 2023-02-01 RX ORDER — ENOXAPARIN SODIUM 100 MG/ML
30 INJECTION SUBCUTANEOUS 2 TIMES DAILY
Status: DISCONTINUED | OUTPATIENT
Start: 2023-02-01 | End: 2023-02-01 | Stop reason: SDUPTHER

## 2023-02-01 RX ORDER — BUPRENORPHINE AND NALOXONE 8; 2 MG/1; MG/1
1 FILM, SOLUBLE BUCCAL; SUBLINGUAL 2 TIMES DAILY
COMMUNITY

## 2023-02-01 RX ORDER — SODIUM CHLORIDE 9 MG/ML
INJECTION, SOLUTION INTRAVENOUS PRN
Status: DISCONTINUED | OUTPATIENT
Start: 2023-02-01 | End: 2023-02-05 | Stop reason: HOSPADM

## 2023-02-01 RX ORDER — ACETAMINOPHEN 650 MG/1
650 SUPPOSITORY RECTAL EVERY 6 HOURS PRN
Status: DISCONTINUED | OUTPATIENT
Start: 2023-02-01 | End: 2023-02-05 | Stop reason: HOSPADM

## 2023-02-01 RX ORDER — ACETAMINOPHEN 325 MG/1
650 TABLET ORAL EVERY 6 HOURS PRN
Status: DISCONTINUED | OUTPATIENT
Start: 2023-02-01 | End: 2023-02-05 | Stop reason: HOSPADM

## 2023-02-01 RX ORDER — PANTOPRAZOLE SODIUM 40 MG/10ML
40 INJECTION, POWDER, LYOPHILIZED, FOR SOLUTION INTRAVENOUS DAILY
Status: DISCONTINUED | OUTPATIENT
Start: 2023-02-01 | End: 2023-02-03

## 2023-02-01 RX ORDER — SODIUM CHLORIDE 9 MG/ML
INJECTION, SOLUTION INTRAVENOUS CONTINUOUS
Status: DISCONTINUED | OUTPATIENT
Start: 2023-02-01 | End: 2023-02-03

## 2023-02-01 RX ORDER — BUPRENORPHINE HYDROCHLORIDE AND NALOXONE HYDROCHLORIDE DIHYDRATE 8; 2 MG/1; MG/1
1 TABLET SUBLINGUAL 2 TIMES DAILY
Status: DISCONTINUED | OUTPATIENT
Start: 2023-02-01 | End: 2023-02-05 | Stop reason: HOSPADM

## 2023-02-01 RX ADMIN — SODIUM CHLORIDE 250 ML/HR: 9 INJECTION, SOLUTION INTRAVENOUS at 09:30

## 2023-02-01 RX ADMIN — Medication 10 MEQ: at 16:07

## 2023-02-01 RX ADMIN — Medication 50 MEQ: at 07:45

## 2023-02-01 RX ADMIN — CARBAMAZEPINE 200 MG: 100 TABLET, EXTENDED RELEASE ORAL at 20:52

## 2023-02-01 RX ADMIN — DEXTROSE AND SODIUM CHLORIDE: 5; 450 INJECTION, SOLUTION INTRAVENOUS at 14:15

## 2023-02-01 RX ADMIN — ENOXAPARIN SODIUM 30 MG: 100 INJECTION SUBCUTANEOUS at 13:28

## 2023-02-01 RX ADMIN — HYDROMORPHONE HYDROCHLORIDE 0.5 MG: 1 INJECTION, SOLUTION INTRAMUSCULAR; INTRAVENOUS; SUBCUTANEOUS at 13:28

## 2023-02-01 RX ADMIN — ENOXAPARIN SODIUM 30 MG: 100 INJECTION SUBCUTANEOUS at 20:51

## 2023-02-01 RX ADMIN — PANTOPRAZOLE SODIUM 40 MG: 40 INJECTION, POWDER, FOR SOLUTION INTRAVENOUS at 13:28

## 2023-02-01 RX ADMIN — Medication 10 MEQ: at 20:39

## 2023-02-01 RX ADMIN — SODIUM CHLORIDE, POTASSIUM CHLORIDE, SODIUM LACTATE AND CALCIUM CHLORIDE 1000 ML: 600; 310; 30; 20 INJECTION, SOLUTION INTRAVENOUS at 08:30

## 2023-02-01 RX ADMIN — HYDROXYZINE PAMOATE 25 MG: 25 CAPSULE ORAL at 20:52

## 2023-02-01 RX ADMIN — CEFEPIME 2000 MG: 2 INJECTION, POWDER, FOR SOLUTION INTRAVENOUS at 09:55

## 2023-02-01 RX ADMIN — SODIUM CHLORIDE 15.8 UNITS/HR: 9 INJECTION, SOLUTION INTRAVENOUS at 09:15

## 2023-02-01 RX ADMIN — Medication 10 MEQ: at 21:57

## 2023-02-01 RX ADMIN — Medication 10 MEQ: at 17:19

## 2023-02-01 RX ADMIN — SODIUM CHLORIDE 1000 ML: 9 INJECTION, SOLUTION INTRAVENOUS at 07:35

## 2023-02-01 RX ADMIN — SODIUM BICARBONATE: 84 INJECTION, SOLUTION INTRAVENOUS at 13:44

## 2023-02-01 RX ADMIN — SODIUM CHLORIDE 21.1 UNITS/HR: 9 INJECTION, SOLUTION INTRAVENOUS at 10:15

## 2023-02-01 RX ADMIN — Medication 10 MEQ: at 23:04

## 2023-02-01 RX ADMIN — SODIUM CHLORIDE, PRESERVATIVE FREE 10 ML: 5 INJECTION INTRAVENOUS at 20:53

## 2023-02-01 RX ADMIN — SODIUM CHLORIDE 10.5 UNITS/HR: 9 INJECTION, SOLUTION INTRAVENOUS at 08:24

## 2023-02-01 RX ADMIN — Medication 10 MEQ: at 10:00

## 2023-02-01 RX ADMIN — DEXTROSE AND SODIUM CHLORIDE: 5; 450 INJECTION, SOLUTION INTRAVENOUS at 20:59

## 2023-02-01 RX ADMIN — ONDANSETRON 4 MG: 2 INJECTION INTRAMUSCULAR; INTRAVENOUS at 21:24

## 2023-02-01 RX ADMIN — SODIUM PHOSPHATE, MONOBASIC, MONOHYDRATE AND SODIUM PHOSPHATE, DIBASIC, ANHYDROUS 15 MMOL: 276; 142 INJECTION, SOLUTION INTRAVENOUS at 20:51

## 2023-02-01 RX ADMIN — HYDROMORPHONE HYDROCHLORIDE 1 MG: 1 INJECTION, SOLUTION INTRAMUSCULAR; INTRAVENOUS; SUBCUTANEOUS at 07:23

## 2023-02-01 RX ADMIN — SODIUM CHLORIDE 10.5 UNITS/HR: 9 INJECTION, SOLUTION INTRAVENOUS at 11:17

## 2023-02-01 RX ADMIN — BUPRENORPHINE HYDROCHLORIDE AND NALOXONE HYDROCHLORIDE DIHYDRATE 1 TABLET: 8; 2 TABLET SUBLINGUAL at 20:52

## 2023-02-01 RX ADMIN — SODIUM CHLORIDE 14 UNITS/HR: 9 INJECTION, SOLUTION INTRAVENOUS at 17:22

## 2023-02-01 RX ADMIN — Medication 10 MEQ: at 15:06

## 2023-02-01 RX ADMIN — IOPAMIDOL 90 ML: 755 INJECTION, SOLUTION INTRAVENOUS at 07:54

## 2023-02-01 RX ADMIN — SODIUM CHLORIDE: 9 INJECTION, SOLUTION INTRAVENOUS at 20:11

## 2023-02-01 ASSESSMENT — PAIN DESCRIPTION - LOCATION
LOCATION: ABDOMEN
LOCATION: ABDOMEN;BACK
LOCATION: CHEST;BACK
LOCATION: BACK;ABDOMEN
LOCATION: ABDOMEN;BACK

## 2023-02-01 ASSESSMENT — PAIN SCALES - GENERAL
PAINLEVEL_OUTOF10: 3
PAINLEVEL_OUTOF10: 5
PAINLEVEL_OUTOF10: 5
PAINLEVEL_OUTOF10: 10
PAINLEVEL_OUTOF10: 3
PAINLEVEL_OUTOF10: 5
PAINLEVEL_OUTOF10: 8
PAINLEVEL_OUTOF10: 4

## 2023-02-01 ASSESSMENT — PAIN DESCRIPTION - DESCRIPTORS
DESCRIPTORS: CRAMPING
DESCRIPTORS: ACHING;SHARP
DESCRIPTORS: ACHING;SHARP
DESCRIPTORS: ACHING;SHARP;CRAMPING
DESCRIPTORS: ACHING
DESCRIPTORS: ACHING;SHARP
DESCRIPTORS: ACHING;SHARP
DESCRIPTORS: ACHING;CRAMPING;DISCOMFORT

## 2023-02-01 ASSESSMENT — PAIN - FUNCTIONAL ASSESSMENT
PAIN_FUNCTIONAL_ASSESSMENT: PREVENTS OR INTERFERES SOME ACTIVE ACTIVITIES AND ADLS
PAIN_FUNCTIONAL_ASSESSMENT: 0-10
PAIN_FUNCTIONAL_ASSESSMENT: PREVENTS OR INTERFERES SOME ACTIVE ACTIVITIES AND ADLS

## 2023-02-01 ASSESSMENT — ENCOUNTER SYMPTOMS
VOMITING: 1
CHEST TIGHTNESS: 0
CONSTIPATION: 0
EYE DISCHARGE: 0
COUGH: 0
BACK PAIN: 1
NAUSEA: 1
COLOR CHANGE: 0
ABDOMINAL PAIN: 1
DIARRHEA: 0
BLOOD IN STOOL: 0
SHORTNESS OF BREATH: 1

## 2023-02-01 ASSESSMENT — PAIN DESCRIPTION - ORIENTATION: ORIENTATION: LOWER

## 2023-02-01 ASSESSMENT — PAIN DESCRIPTION - ONSET
ONSET: ON-GOING

## 2023-02-01 ASSESSMENT — PAIN DESCRIPTION - FREQUENCY: FREQUENCY: CONTINUOUS

## 2023-02-01 ASSESSMENT — PAIN DESCRIPTION - PAIN TYPE: TYPE: ACUTE PAIN

## 2023-02-01 ASSESSMENT — LIFESTYLE VARIABLES: HOW OFTEN DO YOU HAVE A DRINK CONTAINING ALCOHOL: NEVER

## 2023-02-01 NOTE — CONSULTS
[facility]  Internal Medicine Residency Program  Consult Note  MICU    Patient:  Candie Peguero 42 y.o. male MRN: [id number]     Date of Service: 2/1/2023    Hospital Day: 1      Chief complaint: Chest pain, nausea vomiting, feels dehydrated, back pain  History of Present Illness   The patient is a 42 y.o. male with a past medical history of CVA infarct,L parietal,L temporal lobe, epilepsy on carbamazepine, hepatitis C, IV drug use, heroin addiction on suboxone, anxiety, depression, diabetes who came into the ED today for chest pain, and shortness of breath that started 1 day prior. History is obtained from EHR, patient refuses to give further history upon questioning. The patient stated that he has been having trouble breathing aggravated by back pain pain is difficult for him to breathe. He also had nausea and vomiting and epigastric pain. He states that he has not been taking his insulin for a month and he has never been on insulin pump. He denies any fever, cough, diarrhea. At the ED, /99, , RR 29, temp 97.3 °F, initial O2 sat 100% on room air, however he was complaining of difficulty breathing hence he was placed on BiPAP at 60% FiO2. Initial labs showed CO2 7, anion gap 41, creatinine 1.3, K4.7,Na 134, blood glucose 672, lactic acid 4.3, beta hydroxybutyrate > 4.5, pH 7.04,pCO2 <15. Urinalysis showed specific gravity 1.030, glucose 500, ketones > 80, MARISELA > 300, negative for nitrites or leukocyte esterase. WBC 25.1, Hb 17.9. CT of the head was done which showed encephalomalacia posterior left temporal lobe, and posterior left parietal lobe, old infarct. CT of the chest was negative for any PE. Chest x-ray showed no acute process. CT of the abdomen showed fluid distention with dilatation of stomach, thickening of wall of distal esophagus, tiny calculus upper lobe right kidney.   Troponin 15, EKG showed sinus tachycardia, , QTc 444 ms, LAD, T wave inversion III, AVF. He was given 1 L by EMS, an additional 1 L NS bolus and 1 L LR bolus in the ED. He was also given cefepime 2 g IV, Dilaudid 1 mg IV, sodium bicarbonate 50 megs IV once and was started on sodium bicarbonate drip. He was then transferred to ICU for further evaluation and management. Past Medical History:      Diagnosis Date    Anxiety     Depression     Seizures ([address]     Unspecified cerebral artery occlusion with cerebral infarction        Past Surgical History:        Procedure Laterality Date    ABCESS DRAINAGE Left 08/22/2016    Left Hand    BONE INCISION AND DRAINAGE Left 08/30/2016    HAND W/ WOUND VA C       Medications Prior to Admission:    Prior to Admission medications    Medication Sig Start Date End Date Taking? Authorizing Provider   carBAMazepine (TEGRETOL XR) 100 MG extended release tablet Take 100 mg by mouth every morning   Yes Historical Provider, MD   carBAMazepine (TEGRETOL XR) 100 MG extended release tablet Take 200 mg by mouth at bedtime   Yes Historical Provider, MD   buprenorphine-naloxone (SUBOXONE) 8-2 MG FILM SL film Place 1 Film under the tongue in the morning and at bedtime   Yes Historical Provider, MD   ketoconazole (NIZORAL) 2 % cream Apply topically twice daily to rash. 1/18/23   OSVALDO Wood CNP   nystatin (MYCOSTATIN) 038110 UNIT/GM powder Apply topically 4 times daily. 1/18/23   OSVALDO Wood CNP       Allergies:  Patient has no known allergies. Social History:   TOBACCO:   reports that he has been smoking cigarettes. He has a 7.60 pack-year smoking history. He has never used smokeless tobacco.  ETOH:   reports no history of alcohol use. OCCUPATION:      Family History:       Problem Relation Age of Onset    Kidney Disease Mother     Heart Disease Father        REVIEW OF SYSTEMS:    Constitutional: No fever, no chills, no change in weight; good appetite  HEENT: No blurred vision, no ear problems, no sore throat, no rhinorrhea.   Respiratory: No cough, no sputum production, no pleuritic chest pain, no shortness of breath  Cardiology: No angina, no dyspnea on exertion, no paroxysmal nocturnal dyspnea, no orthopnea, no palpitation, no leg swelling. Gastroenterology: No dysphagia, no reflux; no abdominal pain, (+) nausea or vomiting; no constipation or diarrhea.  No hematochezia   Genitourinary: No dysuria, no frequency, hesitancy; no hematuria  Musculoskeletal: no joint pain, no myalgia, no change in range of movement  Neurology: no focal weakness in extremities, no slurred speech, no double vision, no tingling or numbness sensation  Endocrinology: no temperature intolerance, no polyphagia, polydipsia or polyuria  Hematology: no increased bleeding, no bruising, no lymphadenopathy  Skin: no skin changes noticed by patient  Psychology: no depressed mood, no suicidal ideation    Physical Exam   Vitals: BP (!) 141/98   Pulse (!) 129   Temp 97.3 °F (36.3 °C) (Axillary)   Resp 25   Ht 6' 7\" (2.007 m)   Wt 250 lb (113.4 kg)   SpO2 100%   BMI 28.16 kg/m²           General Appearance: alert and oriented to person, place and time, irritable and anxious, in no acute distress, with BIPAP mask in place  Skin: warm and dry, no rash or erythema  Head: normocephalic and atraumatic  Eyes: pupils equal, round, and reactive to light, extraocular eye movements intact, conjunctivae normal  ENT: tympanic membrane, external ear and ear canal normal bilaterally, nose without deformity, nasal mucosa and turbinates normal without polyps  Neck: supple and non-tender without mass, no thyromegaly or thyroid nodules, no cervical lymphadenopathy  Pulmonary/Chest: clear to auscultation bilaterally- no wheezes, rales or rhonchi, normal air movement, no respiratory distress  Cardiovascular: normal rate, regular rhythm, normal S1 and S2, no murmurs, rubs, clicks, or gallops, distal pulses intact, no carotid bruits  Abdomen: soft, mildly-tender epigastric area, non-distended, normal bowel sounds, no masses or organomegaly  Extremities: no cyanosis, clubbing or edema  Musculoskeletal: normal range of motion, no joint swelling, deformity or tenderness  Neurologic: reflexes normal and symmetric, no cranial nerve deficit, gait, coordination and speech normal     Lines     site day    Art line   None    TLC None    PICC None    Hemoaccess None    Oxygen:    BIPAP at 15/8 since 2/1/23 changed to CPAP at 8 cmH2O  Mechanical Ventilation:  N/A  ABG:     Lab Results   Component Value Date/Time    PH 7.048 02/01/2023 07:40 AM    PCO2 <15.0 02/01/2023 07:40 AM    PO2 315.4 02/01/2023 07:40 AM    THB 16.3 02/01/2023 07:40 AM    O2SAT 99.9 02/01/2023 07:40 AM     Labs and Imaging Studies   Basic Labs  CBC:   Lab Results   Component Value Date/Time    WBC 25.1 02/01/2023 07:26 AM    RBC 6.01 02/01/2023 07:26 AM    HGB 17.9 02/01/2023 07:26 AM    HCT 47.7 02/01/2023 07:26 AM    MCV 79.4 02/01/2023 07:26 AM    RDW 14.0 02/01/2023 07:26 AM     02/01/2023 07:26 AM     CMP:  Lab Results   Component Value Date/Time     02/01/2023 07:26 AM    K 4.7 02/01/2023 07:26 AM    K 4.0 07/22/2022 11:07 PM    CL 86 02/01/2023 07:26 AM    CO2 7 02/01/2023 07:26 AM    BUN 23 02/01/2023 07:26 AM    PROT 7.1 07/22/2022 11:07 PM     U/A:  No components found for: Dorise Anita, USPGRAV, UPH, UPROTEIN, UGLUCOSE, UKETONE, UBILI, UBLOOD, UNITRITE, UUROBIL, ULEUKEST, USQEPI, URENEPI, UWBC, URBC, UBACTERIA, UHYALINE  TSH:  No results found for: TSH  HgBA1c:  No components found for: HGBA1C  VITAMIN B12: No components found for: B12  FOLATE:  No results found for: FOLATE    Imaging Studies:     CT HEAD WO CONTRAST    Result Date: 2/1/2023  EXAMINATION: CT OF THE HEAD WITHOUT CONTRAST  2/1/2023 7:54 am TECHNIQUE: CT of the head was performed without the administration of intravenous contrast. Automated exposure control, iterative reconstruction, and/or weight based adjustment of the mA/kV was utilized to reduce the radiation dose to as low as reasonably achievable. COMPARISON: None. HISTORY: ORDERING SYSTEM PROVIDED HISTORY: Altered Mental Status TECHNOLOGIST PROVIDED HISTORY: Reason for exam:->Altered Mental Status Has a \"code stroke\" or \"stroke alert\" been called? ->No Decision Support Exception - unselect if not a suspected or confirmed emergency medical condition->Emergency Medical Condition (MA) What reading provider will be dictating this exam?->CRC FINDINGS: BRAIN/VENTRICLES: There is no acute intracranial hemorrhage, mass effect or midline shift. No abnormal extra-axial fluid collection. The gray-white differentiation is maintained without evidence of an acute infarct. There is no evidence of hydrocephalus. There is encephalomalacia is seen within the posterior left temporal lobe and posterior left parietal lobe consistent with an old infarct. These findings are stable. ORBITS: The visualized portion of the orbits demonstrate no acute abnormality. SINUSES: The visualized paranasal sinuses and mastoid air cells demonstrate no acute abnormality. SOFT TISSUES/SKULL:  No acute abnormality of the visualized skull or soft tissues. 1. There is no acute intracranial hemorrhage 2. Encephalomalacia is seen within the posterior left temporal lobe and posterior left parietal lobe consistent with old infarct. CT ABDOMEN PELVIS W IV CONTRAST Additional Contrast? None    Result Date: 2/1/2023  EXAMINATION: CT OF THE ABDOMEN AND PELVIS WITH CONTRAST 2/1/2023 7:54 am TECHNIQUE: CT of the abdomen and pelvis was performed with the administration of intravenous contrast. Multiplanar reformatted images are provided for review. Automated exposure control, iterative reconstruction, and/or weight based adjustment of the mA/kV was utilized to reduce the radiation dose to as low as reasonably achievable. COMPARISON: None.  HISTORY: ORDERING SYSTEM PROVIDED HISTORY: vomiting, pain, r/o perforation TECHNOLOGIST PROVIDED HISTORY: Additional Contrast?->None Reason for exam:->vomiting, pain, r/o perforation Decision Support Exception - unselect if not a suspected or confirmed emergency medical condition->Emergency Medical Condition (MA) What reading provider will be dictating this exam?->CRC FINDINGS: For findings in the chest see report CT chest performed the same occasion. There is no free intraperitoneal air. There are no acute inflammatory changes in the omental/mesenteric fat planes of the abdomen and pelvis. There no stranding inflammatory reaction or fluid accumulation in the retroperitoneal spaces of the abdomen or pelvis. There is predominant fluid distension with some dilatation of the stomach. This has to be correlate clinically. Cannot exclude a component of pylorus spasm or a partial gastric outlet obstruction pattern. There is also some thickening of the mucosa of the distal esophagus at and just above the esophagogastric junction. No conspicuous hiatal hernia is seen. Fair amount of fecal contents throughout the colon represent a component of mild constipation. The appendix seen and has unremarkable appearance. There is no pericolonic inflammatory process This study is in a late portal venous IV contrast enhancement as it follows the CT chest.  The enhancement of the liver is uniform. The liver appears to be with borderline size the left lobe extends into the left upper quadrant being position of the anterior to the stomach and spleen. No focal lesions are seen in the liver in this more late portal phase IV contrast. Gallbladder is normally distended, it appears unremarkable. The biliary tree and pancreatic duct systems are not dilated. There is partial fat replacement of the pancreas which has normal size. No focal lesions are seen in the pancreas. The spleen appear unremarkable. Adrenals not enlarged. Kidneys are preserved size and cortical thickness. There is normal pattern of cortical enhancement for the kidneys. Small focal scar like is seen in the posterior aspect of the right kidney. There is a 1 mm calculus in the upper pole of the right kidney which present causes obstruction. No calculus seen in the left kidney. There is no obstructive uropathy in the right or the left kidneys. There is no stranding perirenal fat planes. Discrete calcified plaques are seen in the distal abdominal aorta and iliac arteries. There is a discrete dilatation of the most distal abdominal aorta measuring up to 1.6 cm, the infrarenal abdominal aorta measures 1.3 cm, which indicates a small fusiform aneurysm like formation. The IVC appear unremarkable. There is no midline retroperitoneal adenopathy. The bladder is relative well distended and of unremarkable appearance. Prostate gland and seminal vesicles appear unremarkable. Visualized bone structures demonstrate mild degenerative changes in the thoracic and lumbar spine. Loss of height of lower thoracic vertebral bodies can be manifestation of previous Scheuermann's disease. 1.  Presence of predominant fluid distension with some dilatation of the stomach as above discussed. There is also some thickening of the wall of the distal esophagus across the esophagogastric junction. Findings to be correlate clinically. See above comments. 2.  No free intraperitoneal air or acute inflammatory process in the omental/mesentery fat planes no free intra peritoneal fluid accumulation. 3.  Tiny calculus in the upper pole of the right kidney present causes no obstruction. No obstructive uropathy in the right or in the left kidneys. 4.  Pattern of mild to moderate constipation. XR CHEST PORTABLE    Result Date: 2/1/2023  EXAMINATION: ONE XRAY VIEW OF THE CHEST 2/1/2023 7:27 am COMPARISON: None.  HISTORY: ORDERING SYSTEM PROVIDED HISTORY: Shortness of breath TECHNOLOGIST PROVIDED HISTORY: Reason for exam:->Shortness of breath What reading provider will be dictating this exam?->CRC FINDINGS: The lungs are without acute focal process. There is no effusion or pneumothorax. The cardiomediastinal silhouette is without acute process. The osseous structures are without acute process. No acute process. CTA CHEST W CONTRAST    Result Date: 2/1/2023  EXAMINATION: CTA OF THE CHEST 2/1/2023 7:54 am TECHNIQUE: CTA of the chest was performed after the administration of intravenous contrast.  Multiplanar reformatted images are provided for review. MIP images are provided for review. Automated exposure control, iterative reconstruction, and/or weight based adjustment of the mA/kV was utilized to reduce the radiation dose to as low as reasonably achievable. COMPARISON: None. HISTORY: ORDERING SYSTEM PROVIDED HISTORY: chest pain, back pain, r/o dissection TECHNOLOGIST PROVIDED HISTORY: Reason for exam:->chest pain, back pain, r/o dissection Decision Support Exception - unselect if not a suspected or confirmed emergency medical condition->Emergency Medical Condition (MA) What reading provider will be dictating this exam?->CRC FINDINGS: Pulmonary Arteries: Pulmonary arteries are adequately opacified for evaluation. No evidence of intraluminal filling defect to suggest pulmonary embolism. Main pulmonary artery is normal in caliber. Mediastinum: No evidence of mediastinal lymphadenopathy. The heart and pericardium demonstrate no acute abnormality. There is no acute abnormality of the thoracic aorta. Specifically, there is no dissection of the thoracic aorta. Lungs/pleura: The lungs are without acute process. No focal consolidation or pulmonary edema. No evidence of pleural effusion or pneumothorax. Upper Abdomen: Limited images of the upper abdomen are unremarkable. Soft Tissues/Bones: No acute bone or soft tissue abnormality. 1. There is no pulmonary embolus 2. There is no thoracic aortic aneurysm or dissection. 3. The lungs are clear. There is no focal infiltrate.  4. No gross upper abdominal pathology is noted. Please note the pancreas is not included on the examination. EKG: sinus tachycardia,  bpm, Qtc 444 ms, T wave inversion III, AVF, possible inferior infarct. Resident's Assessment and Plan     ASSESSMENT:  Nausea/vomiting likely 2/2 DKA. BHB >4.5, AG 41, CO2 7  DM type 2, poorly controlled. HBA1C 13.1  HAGMA multifactorial 2/2 DKA, starvation ketosis, lactic acidosis. (+) ketonuria, glucosuria  ANNE stage I likely pre-renal (dehydration from DKA, poor intake, GI loss). Baseline crea 0.7-0.8, creatinine at 1.3 mg/dL  Pseudohyponatremia 2/2 hyperglycemia  Chest pain can be demand ischemia vs NSTEMI, with elevated troponin, EKG ST, T wave inversion III, AVF (can be rate related changes). . Trop 15>>15  Sinus tachycardia 2/2 DHN, with ischemic changes on EKG, can be rate related  Elevated lipase likely 2/2 DKA. Lipase at 1552. CT abdomen negative for pancreatic changes  Leukocytosis, likely reactive, r/o infectious process  Elevated hemoglobin likely hemoconcentration 2/2 dehydration due to DKA  Hx CVA infarct L temporal and L parietal lobe  Hx epilepsy. On carbamazepine  Hx hepatitis C, HCV RNA in 2016 3.6 million.  ? If treated  Hx drug abuse, on Suboxone  Hx tobacco abuse      PLAN:  -BMP, Mg, Phos q4hrs  -venous pH q4hrs  -POCT glucose q 1-2 hrs  -trend troponin  -serial EKG  -DKA protocol  -continue insulin drip  -continue NaHCO3 drip at 150 ml/hr, d/c for pH >/= 7.2  -discontinue NaHCO3 drip once CO2>15  -bridge patient to basal bolus once AG <12, CO2 >15 x 2 consecutive times, correct AG for albumin  -strict I & O  -resume Suboxone, carbamazepine home medication, can have sips of water with PO meds  -enoxaparin for DVT prophylaxis  -pantoprazole for GI prophylaxis  -hydroxyzine 25 mg TID PO prn for anxiety  -consult endocrinology, follow recs  -continue to monitor renal function, avoid NSAIDs and other nephrotoxic meds    PT/OT evaluation:  DVT prophylaxis/ GI prophylaxis: enoxaparin/pantoprazole  Disposition: home +/- home health / [facility] / Ángela Young / Henok Barrow MD, PGY-2  Attending physician: Dr. Bernard Necessary

## 2023-02-01 NOTE — ED PROVIDER NOTES
Name: Luca Garcias    MRN: [id number]     Date / Time Roomed:  2/1/2023  6:57 AM  ED Bed Assignment:  [id number]    ------------------ History of Present Illness --------------------  2/1/23, Time: 7:11 AM EST   Chief Complaint   Patient presents with    Chest Pain     Called EMS for chest pain stated that he couldn't tell when it stated but has had N/V that started yesterday and is dehydrated     Back Pain     C/O back pain that started when EMS placed him on the cot       HPI    Luca Garcias is a 42 y.o. male, with hx of anxiety, depression, diabetes, who presents to the ED today for chest pain with shortness of breath, which began yesterday. The pt denies other ROS at this time. Patient comes in with a 1 day history of trouble breathing, patient states that he is short of breath, pain makes it difficult for him to breathe. Patient also notes that he has had some intermittent chest pain along with this. Patient is struggling to answer questions, states that the pain makes it difficult to breathe for him. Patient is a known diabetic. Patient unable to answer when he last took his medications. Patient does admit to having some mild nausea and vomiting however he has only had it occur once or twice. PCP: No primary care provider on file. Marlyn Fatima -------------------- PMH --------------------  Past Medical History:  has a past medical history of Anxiety, Depression, Seizures ([facility] [address] and Unspecified cerebral artery occlusion with cerebral infarction. Past Surgical History:  has a past surgical history that includes Abscess Drainage (Left, 08/22/2016) and bone incision and drainage (Left, 08/30/2016). Social History:  reports that he has been smoking cigarettes. He has a 7.60 pack-year smoking history. He has never used smokeless tobacco. He reports current drug use. Drug: Other-see comments. He reports that he does not drink alcohol.     Family History: family history includes Heart Disease in his father; Kidney Disease in his mother. Allergies: Patient has no known allergies. The patients past medical history has been reviewed.     -------------------- Current Meds --------------------  Meds:   Current Facility-Administered Medications:     dextrose bolus 10% 125 mL, 125 mL, IntraVENous, PRN **OR** dextrose bolus 10% 250 mL, 250 mL, IntraVENous, PRN, Naldo Madden MD    potassium chloride 10 mEq/100 mL IVPB (Peripheral Line), 10 mEq, IntraVENous, PRN, Naldo Madden MD, Stopped at 02/02/23 0457    magnesium sulfate 1000 mg in dextrose 5% 100 mL IVPB, 1,000 mg, IntraVENous, PRN, Naldo Madden MD    sodium phosphate 10 mmol in sodium chloride 0.9 % 250 mL IVPB, 10 mmol, IntraVENous, PRN **OR** sodium phosphate 15 mmol in dextrose 5 % 250 mL IVPB, 15 mmol, IntraVENous, PRN, Stopped at 02/02/23 0624 **OR** sodium phosphate 20 mmol in dextrose 5 % 500 mL IVPB, 20 mmol, IntraVENous, PRN, Naldo Madden MD    0.9 % sodium chloride infusion, , IntraVENous, Continuous, Naldo Madden MD, Stopped at 02/01/23 1408    dextrose 5 % and 0.45 % sodium chloride infusion, , IntraVENous, Continuous PRN, Naldo Madden MD, Last Rate: 150 mL/hr at 02/02/23 0937, New Bag at 02/02/23 0937    insulin regular (HUMULIN R;NOVOLIN R) 100 Units in sodium chloride 0.9 % 100 mL infusion, 0.1-50 Units/hr, IntraVENous, Continuous, Naldo Madden MD, Last Rate: 14.3 mL/hr at 02/02/23 0930, 14.25 Units/hr at 02/02/23 0930    pantoprazole (PROTONIX) injection 40 mg, 40 mg, IntraVENous, Daily, Melissa Bishop MD, 40 mg at 02/02/23 0919    sodium chloride flush 0.9 % injection 5-40 mL, 5-40 mL, IntraVENous, 2 times per day, Carley Escobedo MD, 10 mL at 02/02/23 0924    sodium chloride flush 0.9 % injection 5-40 mL, 5-40 mL, IntraVENous, PRN, Carley Escobedo MD    0.9 % sodium chloride infusion, , IntraVENous, PRN, Carley Escobedo MD, Stopped at 02/02/23 0229    enoxaparin Sodium (LOVENOX) injection 30 mg, 30 mg, SubCUTAneous, BID, Jeanie Bergman MD, 30 mg at 02/02/23 0919    ondansetron (ZOFRAN-ODT) disintegrating tablet 4 mg, 4 mg, Oral, Q8H PRN **OR** ondansetron (ZOFRAN) injection 4 mg, 4 mg, IntraVENous, Q6H PRN, Jeanie Bergman MD, 4 mg at 02/01/23 2124    polyethylene glycol (GLYCOLAX) packet 17 g, 17 g, Oral, Daily PRN, Jeanie Bergman MD    acetaminophen (TYLENOL) tablet 650 mg, 650 mg, Oral, Q6H PRN, 650 mg at 02/02/23 0933 **OR** acetaminophen (TYLENOL) suppository 650 mg, 650 mg, Rectal, Q6H PRN, Jeanie Bergman MD    buprenorphine-naloxone (SUBOXONE) 8-2 MG SL tablet 1 tablet, 1 tablet, SubLINGual, BID, Jeanie Bergman MD, 1 tablet at 02/01/23 2052    carBAMazepine (TEGRETOL XR) extended release tablet 100 mg, 100 mg, Oral, QAM, Jeanie Bergman MD, 100 mg at 02/02/23 0920    carBAMazepine (TEGRETOL XR) extended release tablet 200 mg, 200 mg, Oral, Nightly, Jeanie Bergman MD, 200 mg at 02/01/23 2052    hydrOXYzine pamoate (VISTARIL) capsule 25 mg, 25 mg, Oral, TID PRN, Jeanie Bergman MD, 25 mg at 02/01/23 2052    vitamin D (CHOLECALCIFEROL) tablet 2,000 Units, 2,000 Units, Oral, Daily, Jaenie Bergman MD, 2,000 Units at 02/02/23 0919     The patients home medications have been reviewed. -------------------- PE --------------------  Physical Exam  Constitutional:       Appearance: Normal appearance. Eyes:      Pupils: Pupils are equal, round, and reactive to light. Cardiovascular:      Rate and Rhythm: Regular rhythm. Tachycardia present. Pulses: Normal pulses. Heart sounds: Normal heart sounds. No murmur heard. Pulmonary:      Effort: Pulmonary effort is normal. Tachypnea present. Breath sounds: Normal breath sounds. No wheezing or rhonchi. Abdominal:      General: Abdomen is flat. There is no distension. Palpations: Abdomen is soft. Tenderness: There is no abdominal tenderness. Musculoskeletal:         General: Normal range of motion.       Cervical back: Normal range of motion. Skin:     General: Skin is warm and dry. Capillary Refill: Capillary refill takes less than 2 seconds. Neurological:      General: No focal deficit present. Mental Status: He is alert and oriented to person, place, and time. Psychiatric:         Mood and Affect: Mood normal.         Behavior: Behavior normal.          -------------------- MDM --------------------  Patient presents to the hospital for diffuse generalized body pains. Patient states that he has chest pain along with trouble breathing. Patient's main complaint is shortness of breath and trouble breathing. Patient is noted to be tachypneic and tachycardic on physical exam.  Patient was placed on BiPAP which helped to improve his rate. Initial labs were obtained, showed sugar of over 500, along with elevated anion gap. Beta hydroxybutyrate was also found to be elevated above 4. Troponin was also obtained was 15 on initial, repeat was 15. Patient was also found to have elevated lactic acid of 4.3. Patient was also found to have white blood cell count of 25.1 with no obvious source of infection. Patient was found to have initial pH of 7.048 with bicarb of 7. Given these findings determined that patient was in DKA as such DKA protocol was started. Patient was started on insulin drip and as needed electrolyte replacement along with fluid resuscitation. Patient was also given 1 amp of bicarb given his low pH. Given these findings of severe DKA patient requires ICU admission, ICU was contacted and they are agreeable to accept patient. Internal medicine will accept patient for general admission.   CT of the abdomen and chest were also obtained did not show any signs of PE or dissection or any acute processes    /84   Pulse (!) 114   Temp 98.4 °F (36.9 °C) (Temporal)   Resp 20   Ht 6' 7\" (2.007 m)   Wt 234 lb 12.6 oz (106.5 kg)   SpO2 93%   BMI 26.45 kg/m²     Exam remarkable for tachypnea tachycardia    Diagnoses considered, but not limited to, include MI, DKA, PE, dissection. Labwork ordered and interpretation by myself. Details below. Imaging ordered and interpretations by myself and radiologist. Details below. ED Course as of 02/02/23 1001   Wed Feb 01, 2023   4542 2/1/23  Time: 0707    Rhythm: sinus bradycardia  Rate: tachycardia  Axis: left  Conduction: normal  ST Segments: no acute change  T Waves: peaked in  diffusely    Clinical Impression: Tachycardia, diffusely peaked T waves. Comparison to Old EKG sinus changes from prior      Repeat EKG    EKG Interpretation  Interpreted by emergency department physician, Dr. Yamileth Gatica     2/1/23  Time: 0740    Rhythm: sinus tachycardia  Rate: tachycardia  Axis: left  Conduction: normal  ST Segments: no acute change  T Waves: peaked in  diffusely    Clinical Impression: Sinus tachycardia, peaked T waves remain, baseline improved, no ST segment elevation or depression. Comparison to Old EKG  change from prior         [CD]   0859 My independent or rotation of laboratory tests arterial blood gas with pH 7.0, bicarb less than 15, urinalysis with ketones, CBC with 24,000 white count, hemoglobin 17.5, lactate 4.3, point-of-care chemistry has sodium 126, glucose 587, potassium 4.7 [CD]   0946 Beta hydroxybutyrate greater than 4.5, troponin 15, bicarb 7, glucose 672, anion gap 41, creatinine 1.3, BUN 23.  [CD]      ED Course User Index  [CD] Karina Larios MD          Medications given include:    Medications   dextrose bolus 10% 125 mL (has no administration in time range)     Or   dextrose bolus 10% 250 mL (has no administration in time range)   potassium chloride 10 mEq/100 mL IVPB (Peripheral Line) ( IntraVENous Stopped 2/2/23 5234)   magnesium sulfate 1000 mg in dextrose 5% 100 mL IVPB (has no administration in time range)   sodium phosphate 10 mmol in sodium chloride 0.9 % 250 mL IVPB ( IntraVENous See Alternative 2/2/23 5602)     Or   sodium phosphate 15 mmol in dextrose 5 % 250 mL IVPB (0 mmol IntraVENous Stopped 2/2/23 0624)     Or   sodium phosphate 20 mmol in dextrose 5 % 500 mL IVPB ( IntraVENous See Alternative 2/2/23 0624)   0.9 % sodium chloride infusion (0 mL/hr IntraVENous Stopped 2/1/23 1408)   dextrose 5 % and 0.45 % sodium chloride infusion ( IntraVENous New Bag 2/2/23 0937)   insulin regular (HUMULIN R;NOVOLIN R) 100 Units in sodium chloride 0.9 % 100 mL infusion (14.25 Units/hr IntraVENous Rate/Dose Change 2/2/23 0930)   sodium chloride flush 0.9 % injection 10 mL (has no administration in time range)   pantoprazole (PROTONIX) injection 40 mg (40 mg IntraVENous Given 2/2/23 0919)   sodium chloride flush 0.9 % injection 5-40 mL (10 mLs IntraVENous Given 2/2/23 0924)   sodium chloride flush 0.9 % injection 5-40 mL (has no administration in time range)   0.9 % sodium chloride infusion ( IntraVENous Stopped 2/2/23 0229)   enoxaparin Sodium (LOVENOX) injection 30 mg (30 mg SubCUTAneous Given 2/2/23 0919)   ondansetron (ZOFRAN-ODT) disintegrating tablet 4 mg ( Oral See Alternative 2/1/23 2124)     Or   ondansetron (ZOFRAN) injection 4 mg (4 mg IntraVENous Given 2/1/23 2124)   polyethylene glycol (GLYCOLAX) packet 17 g (has no administration in time range)   acetaminophen (TYLENOL) tablet 650 mg (650 mg Oral Given 2/2/23 0933)     Or   acetaminophen (TYLENOL) suppository 650 mg ( Rectal See Alternative 2/2/23 0933)   buprenorphine-naloxone (SUBOXONE) 8-2 MG SL tablet 1 tablet (1 tablet SubLINGual Not Given 2/2/23 0924)   carBAMazepine (TEGRETOL XR) extended release tablet 100 mg (100 mg Oral Given 2/2/23 0920)   carBAMazepine (TEGRETOL XR) extended release tablet 200 mg (200 mg Oral Given 2/1/23 2052)   hydrOXYzine pamoate (VISTARIL) capsule 25 mg (25 mg Oral Given 2/1/23 2052)   vitamin D (CHOLECALCIFEROL) tablet 2,000 Units (2,000 Units Oral Given 2/2/23 5439)   0.9 % sodium chloride bolus (0 mLs IntraVENous Stopped 2/1/23 5232) HYDROmorphone (DILAUDID) injection 1 mg (1 mg IntraVENous Given 2/1/23 8555)   lactated ringers bolus (0 mLs IntraVENous Stopped 2/1/23 0973)   sodium bicarbonate 8.4 % injection 50 mEq (50 mEq IntraVENous Given 2/1/23 0702)   iopamidol (ISOVUE-370) 76 % injection 90 mL (90 mLs IntraVENous Given 2/1/23 9164)   cefepime (MAXIPIME) 2,000 mg in sodium chloride 0.9 % 50 mL IVPB ([id number]) (0 mg IntraVENous Stopped 2/1/23 1010)   HYDROmorphone (DILAUDID) injection 0.5 mg (0.5 mg IntraVENous Given 2/1/23 1328)     -------------------- Consultations --------------------    Internal medicine was consulted for admission    -------------------- RESULTS --------------------    LABS:  Results for orders placed or performed during the hospital encounter of 61/12/21   Basic Metabolic Panel   Result Value Ref Range    Sodium 134 132 - 146 mmol/L    Potassium 4.7 3.5 - 5.0 mmol/L    Chloride 86 (L) 98 - 107 mmol/L    CO2 7 (LL) 22 - 29 mmol/L    Anion Gap 41 (H) 7 - 16 mmol/L    Glucose 672 (HH) 74 - 99 mg/dL    BUN 23 (H) 6 - 20 mg/dL    Creatinine 1.3 (H) 0.7 - 1.2 mg/dL    Est, Glom Filt Rate >60 >=60 mL/min/1.73    Calcium 9.6 8.6 - 10.2 mg/dL   CBC with Auto Differential   Result Value Ref Range    WBC 25.1 (H) 4.5 - 11.5 E9/L    RBC 6.01 (H) 3.80 - 5.80 E12/L    Hemoglobin 17.9 (H) 12.5 - 16.5 g/dL    Hematocrit 47.7 37.0 - 54.0 %    MCV 79.4 (L) 80.0 - 99.9 fL    MCH 29.8 26.0 - 35.0 pg    MCHC 37.5 (H) 32.0 - 34.5 %    RDW 14.0 11.5 - 15.0 fL    Platelets 962 462 - 560 E9/L    MPV 10.4 7.0 - 12.0 fL    Neutrophils % 32.7 (L) 43.0 - 80.0 %    Lymphocytes % 58.4 (H) 20.0 - 42.0 %    Monocytes % 6.2 2.0 - 12.0 %    Eosinophils % 0.9 0.0 - 6.0 %    Basophils % 0.8 0.0 - 2.0 %    Neutrophils Absolute 8.79 (H) 1.80 - 7.30 E9/L    Lymphocytes Absolute 14.56 (H) 1.50 - 4.00 E9/L    Monocytes Absolute 1.51 (H) 0.10 - 0.95 E9/L    Eosinophils Absolute 0.23 0.05 - 0.50 E9/L    Basophils Absolute 0.00 0.00 - 0.20 E9/L Metamyelocytes Relative 1.8 (H) 0.0 - 1.0 %    nRBC 1.8 /100 WBC    Poikilocytes 2+     [place] Cells 2+    Troponin   Result Value Ref Range    Troponin, High Sensitivity 15 (H) 0 - 11 ng/L   Magnesium   Result Value Ref Range    Magnesium 2.4 1.6 - 2.6 mg/dL   Lactic Acid   Result Value Ref Range    Lactic Acid 4.3 (HH) 0.5 - 2.2 mmol/L   URINE DRUG SCREEN   Result Value Ref Range    Amphetamine Screen, Urine NOT DETECTED Negative <1000 ng/mL    Barbiturate Screen, Ur NOT DETECTED Negative < 200 ng/mL    Benzodiazepine Screen, Urine NOT DETECTED Negative < 200 ng/mL    Cannabinoid Scrn, Ur NOT DETECTED Negative < 50ng/mL    Cocaine Metabolite Screen, Urine NOT DETECTED Negative < 300 ng/mL    Opiate Scrn, Ur NOT DETECTED Negative < 300ng/mL    PCP Screen, Urine NOT DETECTED Negative < 25 ng/mL    Methadone Screen, Urine NOT DETECTED Negative <300 ng/mL    Oxycodone Urine NOT DETECTED Negative <100 ng/mL    FENTANYL SCREEN, URINE NOT DETECTED Negative <1 ng/mL    Drug Screen Comment: see below    Urinalysis   Result Value Ref Range    Color, UA Yellow Straw/Yellow    Clarity, UA Clear Clear    Glucose, Ur 500 (A) Negative mg/dL    Bilirubin Urine SMALL (A) Negative    Ketones, Urine >=80 (A) Negative mg/dL    Specific Gravity, UA >=1.030 1.005 - 1.030    Blood, Urine SMALL (A) Negative    pH, UA 5.5 5.0 - 9.0    Protein, UA >=300 (A) Negative mg/dL    Urobilinogen, Urine 0.2 <2.0 E.U./dL    Nitrite, Urine Negative Negative    Leukocyte Esterase, Urine Negative Negative   Beta-Hydroxybutyrate   Result Value Ref Range    Beta-Hydroxybutyrate >4.50 (H) 0.02 - 0.27 mmol/L   Basic Metabolic Panel   Result Value Ref Range    Sodium 139 132 - 146 mmol/L    Potassium 3.9 3.5 - 5.0 mmol/L    Chloride 101 98 - 107 mmol/L    CO2 7 (LL) 22 - 29 mmol/L    Anion Gap 31 (H) 7 - 16 mmol/L    Glucose 327 (H) 74 - 99 mg/dL    BUN 19 6 - 20 mg/dL    Creatinine 1.0 0.7 - 1.2 mg/dL    Est, Glom Filt Rate >60 >=60 mL/min/1.73    Calcium 8.3 (L) 8.6 - 10.2 mg/dL   Magnesium   Result Value Ref Range    Magnesium 2.1 1.6 - 2.6 mg/dL   Phosphorus   Result Value Ref Range    Phosphorus 3.6 2.5 - 4.5 mg/dL   Lipase   Result Value Ref Range    Lipase 1,552 (H) 13 - 60 U/L   Blood Gas, Arterial   Result Value Ref Range    Date Analyzed [id number]     Time Analyzed 0740     Source: Blood Arterial     pH, Blood Gas 7.048 (LL) 7.350 - 7.450    PCO2 <15.0 (LL) 35.0 - 45.0 mmHg    PO2 315.4 (H) 75.0 - 100.0 mmHg    O2 Sat 99.9 (H) 92.0 - 98.5 %    O2Hb 98.8 (H) 94.0 - 97.0 %    COHb 0.5 0.0 - 1.5 %    MetHb 0.6 0.0 - 1.5 %    O2 Content 23.4 mL/dL    HHb 0.1 0.0 - 5.0 %    tHb (est) 16.3 11.5 - 16.5 g/dL    Mode BILEVEL     Rr Mechanical 16.0 b/min    Peep/Cpap 8.0 cmH2O    PS 16 cmH20    Comment spo2 100 via ox     Date Of Collection      Time Collected      Pt Temp 37.0 C     ID [id number]     Lab [id number]     Critical(s) Notified Handed report to Dr/RN    Basic Metabolic Panel   Result Value Ref Range    Sodium 137 132 - 146 mmol/L    Potassium 3.7 3.5 - 5.0 mmol/L    Chloride 103 98 - 107 mmol/L    CO2 10 (L) 22 - 29 mmol/L    Anion Gap 24 (H) 7 - 16 mmol/L    Glucose 280 (H) 74 - 99 mg/dL    BUN 14 6 - 20 mg/dL    Creatinine 0.8 0.7 - 1.2 mg/dL    Est, Glom Filt Rate >60 >=60 mL/min/1.73    Calcium 8.1 (L) 8.6 - 10.2 mg/dL   Magnesium   Result Value Ref Range    Magnesium 1.9 1.6 - 2.6 mg/dL   Phosphorus   Result Value Ref Range    Phosphorus 1.8 (L) 2.5 - 4.5 mg/dL   SPECIMEN REJECTION   Result Value Ref Range    Rejected Test DIMER     Reason for Rejection LIPEMIC    Microscopic Urinalysis   Result Value Ref Range    WBC, UA 0-1 0 - 5 /HPF    RBC, UA 1-3 0 - 2 /HPF    Epithelial Cells, UA RARE /HPF    Bacteria, UA FEW (A) None Seen /HPF   Lipid Panel   Result Value Ref Range    Cholesterol, Total 151 0 - 199 mg/dL    Triglycerides 840 (H) 0 - 149 mg/dL    HDL 11 >40 mg/dL    LDL Calculated - (AA) 0 - 99 mg/dL    VLDL Cholesterol Calculated - (AA) mg/dL Lactic Acid   Result Value Ref Range    Lactic Acid 2.2 0.5 - 2.2 mmol/L   Lactic Acid   Result Value Ref Range    Lactic Acid 1.4 0.5 - 2.2 mmol/L   Troponin   Result Value Ref Range    Troponin, High Sensitivity 15 (H) 0 - 11 ng/L   Troponin   Result Value Ref Range    Troponin, High Sensitivity 17 (H) 0 - 11 ng/L   Procalcitonin   Result Value Ref Range    Procalcitonin 0.11 (H) 0.00 - 0.08 ng/mL   CBC with Auto Differential   Result Value Ref Range    WBC 21.1 (H) 4.5 - 11.5 E9/L    RBC 5.10 3.80 - 5.80 E12/L    Hemoglobin 15.6 12.5 - 16.5 g/dL    Hematocrit 41.4 37.0 - 54.0 %    MCV 81.2 80.0 - 99.9 fL    MCH 30.6 26.0 - 35.0 pg    MCHC 37.7 (H) 32.0 - 34.5 %    RDW 14.0 11.5 - 15.0 fL    Platelets 004 798 - 601 E9/L    MPV 10.2 7.0 - 12.0 fL    Neutrophils % 66.1 43.0 - 80.0 %    Lymphocytes % 27.0 20.0 - 42.0 %    Monocytes % 6.9 2.0 - 12.0 %    Eosinophils % 0.1 0.0 - 6.0 %    Basophils % 0.4 0.0 - 2.0 %    Neutrophils Absolute 13.93 (H) 1.80 - 7.30 E9/L    Lymphocytes Absolute 5.70 (H) 1.50 - 4.00 E9/L    Monocytes Absolute 1.48 (H) 0.10 - 0.95 E9/L    Eosinophils Absolute 0.00 (L) 0.05 - 0.50 E9/L    Basophils Absolute 0.00 0.00 - 0.20 E9/L    Anisocytosis 1+     Poikilocytes 1+     [place] Cells 1+    Vitamin D 25 Hydroxy   Result Value Ref Range    Vit D, 25-Hydroxy 24 (L) 30 - 100 ng/mL   PH, VENOUS   Result Value Ref Range    pH, Shaun 7.16 (LL) 7.35 - 7.45   PH, VENOUS   Result Value Ref Range    pH, Shaun 7.14 (LL) 7.35 - 7.45   PH, VENOUS   Result Value Ref Range    pH, Shaun 7.23 (L) 7.35 - 7.45   C-Reactive Protein   Result Value Ref Range    CRP 0.7 (H) 0.0 - 0.4 mg/dL   Blood Gas, Arterial   Result Value Ref Range    Date Analyzed 20230201     Time Analyzed 1511     Source: Blood Arterial     pH, Blood Gas 7.245 (L) 7.350 - 7.450    PCO2 21.1 (L) 35.0 - 45.0 mmHg    PO2 95.5 75.0 - 100.0 mmHg    HCO3 8.9 (L) 22.0 - 26.0 mmol/L    B.E. -16.1 (L) -3.0 - 3.0 mmol/L    O2 Sat 97.6 92.0 - 98.5 %    O2Hb 96.6 94.0 - 97.0 %    COHb 0.4 0.0 - 1.5 %    MetHb 0.6 0.0 - 1.5 %    HHb 2.4 0.0 - 5.0 %    tHb (est) 15.4 11.5 - 16.5 g/dL    Mode RA     Date Of Collection      Time Collected      Pt Temp 37.0 C     ID [id number]     Lab [id number]     Critical(s) Notified .  No Critical Values    Carbamazepine Level, Total   Result Value Ref Range    Carbamazepine Lvl <2.0 (L) 4.0 - 10.0 mcg/mL    Carbamazepine Dose Unknown    Microalbumin / creatinine urine ratio   Result Value Ref Range    Microalbumin, Random Urine 172.1 (H) Not Established mg/L    Creatinine, Ur 38 (L) 40 - 278 mg/dL    Microalbumin Creatinine Ratio 452.9 (H) 0.0 - 30.0   PROTEIN / CREATININE RATIO, URINE   Result Value Ref Range    Protein, Ur 57 (H) 0 - 12 mg/dL    Creatinine, Ur 38 (L) 40 - 278 mg/dL    Protein/Creat Ratio 1.5 (H) 0.0 - 0.2    Protein/Creat Ratio 1.5    PH, VENOUS   Result Value Ref Range    pH, Shaun 7.31 (L) 7.35 - 7.45   SPECIMEN REJECTION   Result Value Ref Range    Rejected Test BMP LIVER MG     Reason for Rejection see below    CBC with Auto Differential   Result Value Ref Range    WBC 12.1 (H) 4.5 - 11.5 E9/L    RBC 5.07 3.80 - 5.80 E12/L    Hemoglobin 14.8 12.5 - 16.5 g/dL    Hematocrit 40.9 37.0 - 54.0 %    MCV 80.7 80.0 - 99.9 fL    MCH 29.4 26.0 - 35.0 pg    MCHC 36.5 (H) 32.0 - 34.5 %    RDW 14.6 11.5 - 15.0 fL    Platelets 885 209 - 708 E9/L    MPV 11.3 7.0 - 12.0 fL   Hepatic Function Panel   Result Value Ref Range    Total Protein 7.3 6.4 - 8.3 g/dL    Albumin 3.7 3.5 - 5.2 g/dL    Alkaline Phosphatase 120 40 - 129 U/L     (H) 0 - 40 U/L    AST 53 (H) 0 - 39 U/L    Total Bilirubin <0.2 0.0 - 1.2 mg/dL    Bilirubin, Direct <0.2 0.0 - 0.3 mg/dL    Bilirubin, Indirect see below 0.0 - 1.0 mg/dL   PH, VENOUS   Result Value Ref Range    pH, Shaun 7.31 (L) 7.35 - 7.03   Basic Metabolic Panel   Result Value Ref Range    Sodium 137 132 - 146 mmol/L    Potassium 3.6 3.5 - 5.0 mmol/L    Chloride 106 98 - 107 mmol/L    CO2 12 (L) 22 - 29 mmol/L    Anion Gap 19 (H) 7 - 16 mmol/L    Glucose 221 (H) 74 - 99 mg/dL    BUN 9 6 - 20 mg/dL    Creatinine 0.6 (L) 0.7 - 1.2 mg/dL    Est, Glom Filt Rate >60 >=60 mL/min/1.73    Calcium 8.5 (L) 8.6 - 10.2 mg/dL   Magnesium   Result Value Ref Range    Magnesium 1.8 1.6 - 2.6 mg/dL   Phosphorus   Result Value Ref Range    Phosphorus 1.6 (L) 2.5 - 4.5 mg/dL   Basic Metabolic Panel   Result Value Ref Range    Sodium 138 132 - 146 mmol/L    Potassium 3.7 3.5 - 5.0 mmol/L    Chloride 109 (H) 98 - 107 mmol/L    CO2 15 (L) 22 - 29 mmol/L    Anion Gap 14 7 - 16 mmol/L    Glucose 193 (H) 74 - 99 mg/dL    BUN 7 6 - 20 mg/dL    Creatinine 0.6 (L) 0.7 - 1.2 mg/dL    Est, Glom Filt Rate >60 >=60 mL/min/1.73    Calcium 8.6 8.6 - 10.2 mg/dL   Magnesium   Result Value Ref Range    Magnesium 1.8 1.6 - 2.6 mg/dL   Phosphorus   Result Value Ref Range    Phosphorus 1.9 (L) 2.5 - 4.5 mg/dL   SPECIMEN REJECTION   Result Value Ref Range    Rejected Test VPH     Reason for Rejection see below    SPECIMEN REJECTION   Result Value Ref Range    Rejected Test TRP5     Reason for Rejection see below    Troponin   Result Value Ref Range    Troponin, High Sensitivity 16 (H) 0 - 11 ng/L   Urinalysis with Microscopic   Result Value Ref Range    Color, UA Yellow Straw/Yellow    Clarity, UA TURBID (A) Clear    Glucose, Ur 500 (A) Negative mg/dL    Bilirubin Urine MODERATE (A) Negative    Ketones, Urine 15 (A) Negative mg/dL    Specific Gravity, UA >=1.030 1.005 - 1.030    Blood, Urine MODERATE (A) Negative    pH, UA 6.0 5.0 - 9.0    Protein,  (A) Negative mg/dL    Urobilinogen, Urine 0.2 <2.0 E.U./dL    Nitrite, Urine Negative Negative    Leukocyte Esterase, Urine Negative Negative    Fine Casts, UA 3-5 (A) 0 - 2 /LPF    WBC, UA NONE 0 - 5 /HPF    RBC, UA 1-3 0 - 2 /HPF    Bacteria, UA MANY (A) None Seen /HPF    Amorphous, UA PRESENT    SPECIMEN REJECTION   Result Value Ref Range    Rejected Test bmp, mg, phos     Reason for Rejection see below    POCT Venous   Result Value Ref Range    POC Sodium 126 (L) 132 - 146 mmol/L    POC Potassium 4.7 3.5 - 5.0 mmol/L    POC Chloride 109 (H) 100 - 108 mmol/L    POC Glucose 587 (HH) 74 - 99 mg/dl    POC Creatinine 1.2 0.7 - 1.2 mg/dL    Est, Glom Filt Rate >60 >=60 mL/min/1.73    Performed on SEE BELOW    POCT Glucose   Result Value Ref Range    Meter Glucose 389 (H) 74 - 99 mg/dL   POCT Glucose   Result Value Ref Range    Meter Glucose >500 (H) 74 - 99 mg/dL   POCT Glucose   Result Value Ref Range    Meter Glucose >500 (H) 74 - 99 mg/dL   POCT Glucose   Result Value Ref Range    Meter Glucose 411 (H) 74 - 99 mg/dL   POCT Glucose   Result Value Ref Range    Meter Glucose 353 (H) 74 - 99 mg/dL   POCT Glucose   Result Value Ref Range    Meter Glucose 269 (H) 74 - 99 mg/dL   POCT Glucose   Result Value Ref Range    Meter Glucose 240 (H) 74 - 99 mg/dL   POCT Glucose   Result Value Ref Range    Meter Glucose 239 (H) 74 - 99 mg/dL   POCT Glucose   Result Value Ref Range    Meter Glucose 252 (H) 74 - 99 mg/dL   POCT Glucose   Result Value Ref Range    Meter Glucose 260 (H) 74 - 99 mg/dL   POCT Glucose   Result Value Ref Range    Meter Glucose 289 (H) 74 - 99 mg/dL   POCT Glucose   Result Value Ref Range    Meter Glucose 278 (H) 74 - 99 mg/dL   POCT Glucose   Result Value Ref Range    Meter Glucose 226 (H) 74 - 99 mg/dL   POCT Glucose   Result Value Ref Range    Meter Glucose 224 (H) 74 - 99 mg/dL   POCT Glucose   Result Value Ref Range    Meter Glucose 218 (H) 74 - 99 mg/dL   POCT Glucose   Result Value Ref Range    Meter Glucose 222 (H) 74 - 99 mg/dL   POCT Glucose   Result Value Ref Range    Meter Glucose 182 (H) 74 - 99 mg/dL   POCT Glucose   Result Value Ref Range    Meter Glucose 207 (H) 74 - 99 mg/dL   POCT Glucose   Result Value Ref Range    Meter Glucose 195 (H) 74 - 99 mg/dL   POCT Glucose   Result Value Ref Range    Meter Glucose 173 (H) 74 - 99 mg/dL   POCT Glucose   Result Value Ref Range    Meter Glucose 184 (H) 74 - 99 mg/dL   POCT Glucose   Result Value Ref Range    Meter Glucose 205 (H) 74 - 99 mg/dL   POCT Glucose   Result Value Ref Range    Meter Glucose 194 (H) 74 - 99 mg/dL   POCT Glucose   Result Value Ref Range    Meter Glucose 191 (H) 74 - 99 mg/dL   POCT Glucose   Result Value Ref Range    Meter Glucose 173 (H) 74 - 99 mg/dL   POCT Glucose   Result Value Ref Range    Meter Glucose 155 (H) 74 - 99 mg/dL   EKG 12 Lead   Result Value Ref Range    Ventricular Rate 143 BPM    Atrial Rate 143 BPM    P-R Interval 136 ms    QRS Duration 82 ms    Q-T Interval 270 ms    QTc Calculation (Bazett) 416 ms    P Axis 63 degrees    R Axis -48 degrees    T Axis 78 degrees   EKG 12 Lead   Result Value Ref Range    Ventricular Rate 127 BPM    Atrial Rate 127 BPM    P-R Interval 154 ms    QRS Duration 84 ms    Q-T Interval 306 ms    QTc Calculation (Bazett) 444 ms    P Axis 48 degrees    R Axis -30 degrees    T Axis 55 degrees   EKG 12 Lead   Result Value Ref Range    Ventricular Rate 111 BPM    Atrial Rate 111 BPM    P-R Interval 182 ms    QRS Duration 88 ms    Q-T Interval 310 ms    QTc Calculation (Bazett) 421 ms    P Axis 26 degrees    R Axis -15 degrees    T Axis 15 degrees       RADIOLOGY:  CTA CHEST W CONTRAST   Final Result   1. There is no pulmonary embolus   2. There is no thoracic aortic aneurysm or dissection. 3. The lungs are clear. There is no focal infiltrate. 4. No gross upper abdominal pathology is noted. Please note the pancreas is   not included on the examination. CT ABDOMEN PELVIS W IV CONTRAST Additional Contrast? None   Final Result   1. Presence of predominant fluid distension with some dilatation of the   stomach as above discussed. There is also some thickening of the wall of the   distal esophagus across the esophagogastric junction. Findings to be   correlate clinically. See above comments.       2.  No free intraperitoneal air or acute inflammatory process in the omental/mesentery fat planes no free intra peritoneal fluid accumulation. 3.  Tiny calculus in the upper pole of the right kidney present causes no   obstruction. No obstructive uropathy in the right or in the left kidneys. 4.  Pattern of mild to moderate constipation. CT HEAD WO CONTRAST   Final Result   1. There is no acute intracranial hemorrhage   2. 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 XR CHEST PORTABLE   Final Result   No acute process. -------------------- NURSING NOTES & VITALS --------------------    The nursing notes within the ED encounter and vital signs have been reviewed. Vitals:    02/02/23 0900   BP: 131/84   Pulse: (!) 114   Resp: 20   Temp:    SpO2:         Patient Vitals for the past 8 hrs:   BP Temp Temp src Pulse Resp SpO2 Weight   02/02/23 0900 131/84 -- -- (!) 114 20 -- --   02/02/23 0800 136/80 98.4 °F (36.9 °C) Temporal (!) 118 12 93 % --   02/02/23 0700 (!) 143/90 -- -- (!) 118 21 91 % --   02/02/23 0600 (!) 137/90 -- -- (!) 117 21 92 % --   02/02/23 0500 139/87 -- -- (!) 116 21 91 % --   02/02/23 0400 (!) 130/93 98.2 °F (36.8 °C) Oral (!) 116 21 92 % --   02/02/23 0300 115/84 -- -- (!) 113 23 93 % --   02/02/23 0235 -- -- -- -- -- -- 234 lb 12.6 oz (106.5 kg)       Oxygen Saturation Interpretation: Normal      -------------------- PROGRESS NOTES --------------------  Counseling:  I have spoken with the patient and discussed todays results, in addition to providing specific details for the plan of care and counseling regarding the diagnosis and prognosis. Their questions are answered at this time and they are agreeable with the plan of admission.    -------------------- ADDITIONAL PROVIDER NOTES --------------------    Admission Consultation:  Time: 845. Spoke with Dr. Hilaria Rosen and Sonido Mchugh. Discussed case. They will admit the patient.   This patient's ED course included: a personal history and physicial examination, re-evaluation prior to disposition, multiple bedside re-evaluations, IV medications, cardiac monitoring, and continuous pulse oximetry    Diagnosis:  1. Diabetic ketoacidosis without coma associated with other specified diabetes mellitus ([facility] [address]    2. Lactic acidosis    3. Leukocytosis, unspecified type    4. Chest pain, unspecified type        Disposition:  Patient's disposition: Admit to CCU/ICU  Patient's condition is fair. Naldo Madden MD      *NOTE: This report was transcribed using voice recognition software. Every effort was made to ensure accuracy; however, inadvertent computerized transcription errors may be present.        Naldo Madden MD  Resident  02/02/23 1158

## 2023-02-01 NOTE — PROGRESS NOTES
Dr. Maureen Lofton,    Your patient is on a medication that requires a renal and/or weight dose adjustment. Renal/Body Weight Function Assessment:    Date Body Weight IBW  Adjusted BW SCr  CrCl Dialysis status   2/1/2023 250 lb (113.4 kg) Ideal body weight: 93.7 kg (206 lb 9.1 oz)  Adjusted ideal body weight: 101.6 kg (223 lb 15.1 oz) Serum creatinine: 1.3 mg/dL (H) 02/01/23 0726  Estimated creatinine clearance: 110 mL/min (A) N/a       Pharmacy has dose-adjusted the following medication(s):    Date Previous Order Adjusted Order   2/1/2023 Lovenox 40 mg qd Lovenox 30 mg bid       These changes were made per protocol according to the [facility] Renal Dosing Policy/ [facility] Pharmacist Anticoagulant Review. *Please note this dose may need readjusted if your patient's condition changes. Please contact pharmacy with any questions regarding these changes.     Lillian Donovan PharmD, BCPS 2/1/2023 11:15 AM

## 2023-02-01 NOTE — ED PROVIDER NOTES
ATTENDING PROVIDER ATTESTATION:     Bo Duong presented to the emergency department for evaluation of Chest Pain (Called EMS for chest pain stated that he couldn't tell when it stated but has had N/V that started yesterday and is dehydrated ) and Back Pain (C/O back pain that started when EMS placed him on the cot )   and was initially evaluated by the Medical Resident. See Original ED Note for H&P and ED course above. I have reviewed and discussed the case, including pertinent history (medical, surgical, family and social) and exam findings with the Medical Resident assigned to Bo Duong. I have personally performed and/or participated in the history, exam, medical decision making, and procedures and agree with all pertinent clinical information and any additional changes or corrections are noted below in my assessment and plan. I have discussed this patient in detail with the resident, and provided the instruction and education,       I have reviewed my findings and recommendations with the assigned Medical Resident, Bo Panda and members of family present at the time of disposition. I have performed a history and physical examination of this patient and directly supervised the resident caring for this patient              [address]        Pt Name: Bo Duong  MRN: [id number]  [place] 1985  Date of evaluation: 2/1/2023  Provider: Sylvain Patterson MD  PCP: No primary care provider on file.   Note Started: 7:22 AM EST 2/1/23    CHIEF COMPLAINT       Chief Complaint   Patient presents with    Chest Pain     Called EMS for chest pain stated that he couldn't tell when it stated but has had N/V that started yesterday and is dehydrated     Back Pain     C/O back pain that started when EMS placed him on the cot        HISTORY OF PRESENT ILLNESS: 1 or more Elements        Limitations to history : None    Mario Gutierrez Nikita Espino is a 42 y.o. male who presents for complaints of chest pain as well as back pain as well as nausea vomiting. Patient reports yesterday he was not feeling well. He reports nonbloody, nonbilious emesis. He says he feels dehydrated. He reports he vomited multiple times. He reports his chest and back pain started after vomiting. He denies fever or chills. He also says he feels short of breath as he was breathing quickly. He denies recent illness. No trauma. He denies drug use. He has a history of seizure. Denies seizure. On arrival he was in respiratory distress. He was placed on BiPAP to prevent life-threatening deterioration. BiPAP helped with his work of breathing. Nursing Notes were all reviewed and agreed with or any disagreements were addressed in the HPI. REVIEW OF EXTERNAL NOTE :       08/3/20 [facility] internal medicine hospitalist discharge summary    REVIEW OF SYSTEMS :      Positives and Pertinent negatives as per HPI. SURGICAL HISTORY     Past Surgical History:   Procedure Laterality Date    ABCESS DRAINAGE Left 08/22/2016    Left Hand    BONE INCISION AND DRAINAGE Left 08/30/2016    HAND W/ WOUND VA C       CURRENTMEDICATIONS       Previous Medications    BUPRENORPHINE-NALOXONE (SUBOXONE) 8-2 MG FILM SL FILM    Place 1 Film under the tongue in the morning and at bedtime    CARBAMAZEPINE (TEGRETOL XR) 100 MG EXTENDED RELEASE TABLET    Take 100 mg by mouth every morning    CARBAMAZEPINE (TEGRETOL XR) 100 MG EXTENDED RELEASE TABLET    Take 200 mg by mouth at bedtime    KETOCONAZOLE (NIZORAL) 2 % CREAM    Apply topically twice daily to rash. NYSTATIN (MYCOSTATIN) 618015 UNIT/GM POWDER    Apply topically 4 times daily. ALLERGIES     Patient has no known allergies.     FAMILYHISTORY       Family History   Problem Relation Age of Onset    Kidney Disease Mother     Heart Disease Father         SOCIAL HISTORY       Social History     Tobacco Use    Smoking status: Every Day Packs/day: 0.40     Years: 19.00     Pack years: 7.60     Types: Cigarettes    Smokeless tobacco: Never   Substance Use Topics    Alcohol use: No     Comment: not in three years     Drug use: Yes     Types: Other-see comments     Comment: hx drug history, herion. Last use 12/2/2016       SCREENINGS        Pau Coma Scale  Eye Opening: Spontaneous  Best Verbal Response: Oriented  Best Motor Response: Obeys commands  Pau Coma Scale Score: 15                CIWA Assessment  BP: (!) 147/102  Heart Rate: (!) 131           PHYSICAL EXAM  1 or more Elements     ED Triage Vitals [02/01/23 0658]   BP Temp Temp Source Heart Rate Resp SpO2 Height Weight   (!) 130/99 97.3 °F (36.3 °C) Axillary (!) 137 29 100 % 6' 7\" (2.007 m) 250 lb (113.4 kg)       Constitutional/General: Alert and oriented x3  Head: Normocephalic and atraumatic  Eyes: PERRL, EOMI, conjunctiva normal, sclera non icteric  ENT:  Oropharynx clear, handling secretions, no trismus, no asymmetry of the posterior oropharynx or uvular edema  Neck: Supple, full ROM, no stridor, no meningeal signs  Respiratory: Lungs clear to auscultation bilaterally, no wheezes, rales, or rhonchi. Not in respiratory distress  Cardiovascular:  tachycardic but Regular rhythm. No murmurs, no gallops, no rubs. 2+ distal pulses. Equal extremity pulses. Chest: No chest wall tenderness  GI:  Abdomen Soft, generalized tenderness, non distended. No rebound, guarding, or rigidity. No pulsatile masses. Musculoskeletal: Moves all extremities x 4. Warm and well perfused, no clubbing, no cyanosis, no edema. Capillary refill <3 seconds  Integument: skin warm and dry. No rashes.    Neurologic: GCS 15, no focal deficits, symmetric strength 5/5 in the upper and lower extremities bilaterally  Psychiatric: Normal Affect            DIAGNOSTIC RESULTS   LABS:    Labs Reviewed   BASIC METABOLIC PANEL - Abnormal; Notable for the following components:       Result Value    Chloride 86 (*)     CO2 7 (*)     Anion Gap 41 (*)     Glucose 672 (*)     BUN 23 (*)     Creatinine 1.3 (*)     All other components within normal limits    Narrative:     CALL  May  H34 tel. ,  Rejected Test Name/Called to: DR Oziel Amaya, 02/01/2023 08:38, by [address] [place] [address] tel. ,  Chemistry results called to and read back by: nicholas boswell rn, 02/01/2023  09:37, by ROBERT   CBC WITH AUTO DIFFERENTIAL - Abnormal; Notable for the following components:    WBC 25.1 (*)     RBC 6.01 (*)     Hemoglobin 17.9 (*)     MCV 79.4 (*)     MCHC 37.5 (*)     Neutrophils % 32.7 (*)     Lymphocytes % 58.4 (*)     Neutrophils Absolute 8.79 (*)     Lymphocytes Absolute 14.56 (*)     Monocytes Absolute 1.51 (*)     Metamyelocytes Relative 1.8 (*)     All other components within normal limits    Narrative:     CALL  May  [employer] tel. ,  Rejected Test Name/Called to: DR Oziel Amaya, 02/01/2023 08:38, by Tova Daniels   TROPONIN - Abnormal; Notable for the following components:    Troponin, High Sensitivity 15 (*)     All other components within normal limits    Narrative:     CALL  May  4 tel. ,  Rejected Test Name/Called to: DR Oziel Amaya, 02/01/2023 08:38, by [address] [place] [address] tel. ,  Chemistry results called to and read back by: nicholas boswell rn, 02/01/2023  09:37, by ROBERT   LACTIC ACID - Abnormal; Notable for the following components:    Lactic Acid 4.3 (*)     All other components within normal limits    Narrative:     CALL  May  4 tel. ,  Chemistry results called to and read back by: dr Mccarty , 02/01/2023 08:11, by  Alva Sutton - Abnormal; Notable for the following components:    Glucose, Ur 500 (*)     Bilirubin Urine SMALL (*)     Ketones, Urine >=80 (*)     Blood, Urine SMALL (*)     Protein, UA >=300 (*)     All other components within normal limits   BETA-HYDROXYBUTYRATE - Abnormal; Notable for the following components:    Beta-Hydroxybutyrate >4.50 (*)     All other components within normal limits    Narrative:     CALL  May  H34 tel. ,  Rejected Test Name/Called to: DR Esperanza Castellanos, 02/01/2023 08:38, by [facility] tel. ,  Chemistry results called to and read back by: nicholas boswell rn, 02/01/2023  09:37, by [facility]   BLOOD GAS, ARTERIAL - Abnormal; Notable for the following components:    pH, Blood Gas 7.048 (*)     PCO2 <15.0 (*)     PO2 315.4 (*)     O2 Sat 99.9 (*)     O2Hb 98.8 (*)     All other components within normal limits   MICROSCOPIC URINALYSIS - Abnormal; Notable for the following components:    Bacteria, UA FEW (*)     All other components within normal limits   POCT VENOUS - Abnormal; Notable for the following components:    POC Sodium 126 (*)     POC Chloride 109 (*)     POC Glucose 587 (*)     All other components within normal limits   POCT GLUCOSE - Abnormal; Notable for the following components:    Meter Glucose 389 (*)     All other components within normal limits   POCT GLUCOSE - Abnormal; Notable for the following components:    Meter Glucose >500 (*)     All other components within normal limits   POCT GLUCOSE - Abnormal; Notable for the following components:    Meter Glucose >500 (*)     All other components within normal limits   MAGNESIUM    Narrative:     CALL  May  H34 tel. ,  Rejected Test Name/Called to: DR Esperanza Castellanos, 02/01/2023 08:38, by [facility] tel. ,  Chemistry results called to and read back by: nicholas boswell rn, 02/01/2023  09:37, by [address] [place] [address]    Narrative:     CALL  May  H34 tel. ,  Rejected Test Name/Called to: DR KEMP, 02/01/2023 08:38, by Whitney Antis   ARTERIAL BLOOD GAS, RESPIRATORY ONLY   BASIC METABOLIC PANEL   BASIC METABOLIC PANEL   MAGNESIUM   MAGNESIUM   PHOSPHORUS   PHOSPHORUS   LIPASE   BASIC METABOLIC PANEL   MAGNESIUM   PHOSPHORUS   HEMOGLOBIN A1C   LIPID PANEL   POC CHEMISTRY (NA,K,CL,GLU,CREAT)       As interpreted by me, the above displayed labs are abnormal. All other labs obtained during this visit were within normal range or not returned as of this dictation. RADIOLOGY:   Non-plain film images such as CT, Ultrasound and MRI are read by the radiologist. Plain radiographic images are visualized and preliminarily interpreted by the ED Provider with the findings documented in the ED Course. Interpretation per the Radiologist below, if available at the time of this note:    CTA CHEST W CONTRAST   Final Result   1. There is no pulmonary embolus   2. There is no thoracic aortic aneurysm or dissection. 3. The lungs are clear. There is no focal infiltrate. 4. No gross upper abdominal pathology is noted. Please note the pancreas is   not included on the examination. CT ABDOMEN PELVIS W IV CONTRAST Additional Contrast? None   Final Result   1. Presence of predominant fluid distension with some dilatation of the   stomach as above discussed. There is also some thickening of the wall of the   distal esophagus across the esophagogastric junction. Findings to be   correlate clinically. See above comments. 2.  No free intraperitoneal air or acute inflammatory process in the   omental/mesentery fat planes no free intra peritoneal fluid accumulation. 3.  Tiny calculus in the upper pole of the right kidney present causes no   obstruction. No obstructive uropathy in the right or in the left kidneys. 4.  Pattern of mild to moderate constipation. CT HEAD WO CONTRAST   Final Result   1. There is no acute intracranial hemorrhage   2. 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 XR CHEST PORTABLE   Final Result   No acute process. No results found. No results found.     PROCEDURES   Unless otherwise noted below, none       CRITICAL CARE TIME (.cct)   CRITICAL CARE:  Please note that the withdrawal or failure to initiate urgent interventions for this patient would likely result in a life threatening deterioration or permanent disability. Accordingly this patient received 30 minutes of critical care time, including coordination of care, and direct bedside care and excluding separately billable procedures. PAST MEDICAL HISTORY/Chronic Conditions Affecting Care      has a past medical history of Anxiety, Depression, Seizures ([address] and Unspecified cerebral artery occlusion with cerebral infarction.      EMERGENCY DEPARTMENT COURSE    Vitals:    Vitals:    02/01/23 0930 02/01/23 0945 02/01/23 1000 02/01/23 1015   BP: (!) 137/99 (!) 131/101 (!) 133/99 (!) 147/102   Pulse: (!) 130 (!) 129 (!) 131 (!) 131   Resp: 24 23 26 19   Temp:       TempSrc:       SpO2: 100% 100% 100% 100%   Weight:       Height:           Patient was given the following medications:  Medications   dextrose bolus 10% 125 mL (has no administration in time range)     Or   dextrose bolus 10% 250 mL (has no administration in time range)   potassium chloride 10 mEq/100 mL IVPB (Peripheral Line) (10 mEq IntraVENous New Bag 2/1/23 1000)   magnesium sulfate 1000 mg in dextrose 5% 100 mL IVPB (has no administration in time range)   sodium phosphate 10 mmol in sodium chloride 0.9 % 250 mL IVPB (has no administration in time range)     Or   sodium phosphate 15 mmol in dextrose 5 % 250 mL IVPB (has no administration in time range)     Or   sodium phosphate 20 mmol in dextrose 5 % 500 mL IVPB (has no administration in time range)   0.9 % sodium chloride infusion (250 mL/hr IntraVENous New Bag 2/1/23 0930)   dextrose 5 % and 0.45 % sodium chloride infusion (has no administration in time range)   insulin regular (HUMULIN R;NOVOLIN R) 100 Units in sodium chloride 0.9 % 100 mL infusion (21.1 Units/hr IntraVENous New Bag 2/1/23 1015)   sodium chloride flush 0.9 % injection 10 mL (has no administration in time range)   0.9 % sodium chloride bolus (1,000 mLs IntraVENous New Bag 2/1/23 0735)   HYDROmorphone (DILAUDID) injection 1 mg (1 mg IntraVENous Given 2/1/23 0200)   lactated ringers bolus (0 mLs IntraVENous Stopped 2/1/23 0919)   sodium bicarbonate 8.4 % injection 50 mEq (50 mEq IntraVENous Given 2/1/23 0792)   iopamidol (ISOVUE-370) 76 % injection 90 mL (90 mLs IntraVENous Given 2/1/23 0754)   cefepime (MAXIPIME) 2,000 mg in sodium chloride 0.9 % 50 mL IVPB ([id number]) (0 mg IntraVENous Stopped 2/1/23 1010)                Medical Decision Making/Differential Diagnosis:    CC/HPI Summary, Social Determinants of health, Records Reviewed, DDx, testing done/not done, ED Course, Reassessment, disposition considerations/shared decision making with patient, consults, disposition:        ED Course as of 02/01/23 1036   Wed Feb 01, 2023   0739 2/1/23  Time: 0707    Rhythm: sinus bradycardia  Rate: tachycardia  Axis: left  Conduction: normal  ST Segments: no acute change  T Waves: peaked in  diffusely    Clinical Impression: Tachycardia, diffusely peaked T waves. Comparison to Old EKG sinus changes from prior      Repeat EKG    EKG Interpretation  Interpreted by emergency department physician, Dr. Loida García     2/1/23  Time: 0740    Rhythm: sinus tachycardia  Rate: tachycardia  Axis: left  Conduction: normal  ST Segments: no acute change  T Waves: peaked in  diffusely    Clinical Impression: Sinus tachycardia, peaked T waves remain, baseline improved, no ST segment elevation or depression. Comparison to Old EKG  change from prior         [CD]   0859 My independent or rotation of laboratory tests arterial blood gas with pH 7.0, bicarb less than 15, urinalysis with ketones, CBC with 24,000 white count, hemoglobin 17.5, lactate 4.3, point-of-care chemistry has sodium 126, glucose 587, potassium 4.7 [CD]   0946 Beta hydroxybutyrate greater than 4.5, troponin 15, bicarb 7, glucose 672, anion gap 41, creatinine 1.3, BUN 23.  [CD]      ED Course User Index  [CD] Kelsey Young MD       Medical Decision Making  Chest pain, back pain, not feeling well, broad differential including myocardial infarction, perforated viscus, aortic dissection, diabetic ketoacidosis to name a few. EKG without ST segment elevation. First troponin 15. Repeat pending. Laboratory testing shows significant metabolic acidosis with elevated anion gap and ketones as well as hyperglycemia consistent with diabetic ketoacidosis. IV fluid boluses, insulin drip and DKA protocol initiated to prevent life-threatening deterioration. He was placed on noninvasive positive pressure ventilation to help with work of breathing improved significantly. He has awake and alert and oriented. His symptoms are improving. CT angiogram without dissection. Chest x-ray per my wet read interpretation shows no pneumothorax. Consult placed intensive care, they accepted for admission. Consult placed to medicine. They will admit. Does have leukocytosis but no obvious source of infection. Problems Addressed:  Chest pain, unspecified type: acute illness or injury  Diabetic ketoacidosis without coma associated with other specified diabetes mellitus ([facility] [address]): acute illness or injury that poses a threat to life or bodily functions  Lactic acidosis: acute illness or injury  Leukocytosis, unspecified type: acute illness or injury    Amount and/or Complexity of Data Reviewed  Independent Historian: EMS  External Data Reviewed: notes. Details: EMS run sheet as well  Labs: ordered. Decision-making details documented in ED Course. Radiology: ordered and independent interpretation performed. ECG/medicine tests: ordered and independent interpretation performed. Decision-making details documented in ED Course. Discussion of management or test interpretation with external provider(s): ICU Dr. Bernard Necessary  Internal Medicine Dr. Soniay Gonzales drug management. Parenteral controlled substances. Drug therapy requiring intensive monitoring for toxicity. Decision regarding hospitalization.     Critical Care  Total time providing critical care: (CRITICAL CARE:  Please note that the withdrawal or failure to initiate urgent interventions for this patient would likely result in a life threatening deterioration or permanent disability. Accordingly this patient received 30 minutes of critical care time, including coordination of care, and direct bedside care and excluding separately billable procedures.    )             CONSULTS:   IP CONSULT TO CRITICAL CARE  IP CONSULT TO INTERNAL MEDICINE         I am the Primary Clinician of Record. FINAL IMPRESSION      1. Diabetic ketoacidosis without coma associated with other specified diabetes mellitus ([facility] [address]    2. Lactic acidosis    3. Leukocytosis, unspecified type    4. Chest pain, unspecified type          DISPOSITION/PLAN     DISPOSITION Admitted 02/01/2023 09:15:57 AM      PATIENT REFERRED TO:  No follow-up provider specified. DISCHARGE MEDICATIONS:  New Prescriptions    No medications on file       DISCONTINUED MEDICATIONS:  Discontinued Medications    ACCU-CHEK FASTCLIX LANCETS MISC    To test 4x/day    B-D UF III MINI PEN NEEDLES 31G X 5 MM MISC    USE FOUR TIMES DAILY AS DIRECTED    BLOOD GLUCOSE MONITORING SUPPL MISC    OneTouchVerio meter    BLOOD GLUCOSE TEST STRIPS (ACCU-CHEK GUIDE) STRIP    1 each by In Vitro route 4 times daily As needed.     CARBAMAZEPINE (TEGRETOL XR) 100 MG EXTENDED RELEASE TABLET    Take 1 tablet by mouth See Admin Instructions 1 tablet in am and 2 tablets in pm    CONTINUOUS BLOOD GLUC SENSOR (GUARDIAN SENSOR 3) MISC    To change every 7 days    CONTINUOUS BLOOD GLUC TRANSMIT (GUARDIAN LINK 3 TRANSMITTER) MISC    To use with sensors    FREESTYLE LANCETS MISC    1 each by Does not apply route 4 times daily    GLUCOSE MONITORING KIT (FREESTYLE) MONITORING KIT    To test blood sugar 4x/day    INSULIN LISPRO (HUMALOG) 100 UNIT/ML INJECTION VIAL    To use 150 units/day via insulin pump    LEVETIRACETAM (KEPPRA) 500 MG TABLET    Take 1 tablet by mouth in the morning and 1 tablet before bedtime.               (Please note that portions of this note were completed with a voice recognition program.  Efforts were made to edit the dictations but occasionally words are mis-transcribed.)    Lizette Cancino MD (electronically signed)            Brennen Almanza MD  02/01/23 1037

## 2023-02-01 NOTE — LETTER
[address] PICU  [facility]  Phone: [phone]        February 5, 2023    [address] [place] [address] [facility] [place] [address]      To whom it may concern,    Amrit Schmitz was a patient in the hospital due to an ongoing medical condition from 2/1/23 to 2/5/23. Please excuse him from work during this time period. If you have any questions or concerns, please don't hesitate to call.     Sincerely,    Internal Medicine Team    Physician Name:

## 2023-02-02 PROBLEM — Z91.14 H/O MEDICATION NONCOMPLIANCE: Status: ACTIVE | Noted: 2023-02-02

## 2023-02-02 PROBLEM — Z91.148 H/O MEDICATION NONCOMPLIANCE: Status: ACTIVE | Noted: 2023-02-02

## 2023-02-02 PROBLEM — E11.65 POORLY CONTROLLED DIABETES MELLITUS (HCC): Status: ACTIVE | Noted: 2023-02-02

## 2023-02-02 LAB
ALBUMIN SERPL-MCNC: 3.5 G/DL (ref 3.5–5.2)
ALP BLD-CCNC: 98 U/L (ref 40–129)
ALT SERPL-CCNC: 87 U/L (ref 0–40)
AMORPHOUS: PRESENT
ANION GAP SERPL CALCULATED.3IONS-SCNC: 12 MMOL/L (ref 7–16)
ANION GAP SERPL CALCULATED.3IONS-SCNC: 13 MMOL/L (ref 7–16)
ANION GAP SERPL CALCULATED.3IONS-SCNC: 14 MMOL/L (ref 7–16)
ANION GAP SERPL CALCULATED.3IONS-SCNC: 14 MMOL/L (ref 7–16)
ANION GAP SERPL CALCULATED.3IONS-SCNC: 19 MMOL/L (ref 7–16)
ANISOCYTOSIS: ABNORMAL
AST SERPL-CCNC: 35 U/L (ref 0–39)
BACTERIA: ABNORMAL /HPF
BASOPHILS ABSOLUTE: 0 E9/L (ref 0–0.2)
BASOPHILS ABSOLUTE: 0.04 E9/L (ref 0–0.2)
BASOPHILS RELATIVE PERCENT: 0.3 % (ref 0–2)
BASOPHILS RELATIVE PERCENT: 0.3 % (ref 0–2)
BILIRUB SERPL-MCNC: <0.2 MG/DL (ref 0–1.2)
BILIRUBIN DIRECT: <0.2 MG/DL (ref 0–0.3)
BILIRUBIN URINE: ABNORMAL
BILIRUBIN, INDIRECT: ABNORMAL MG/DL (ref 0–1)
BLOOD, URINE: ABNORMAL
BUN BLDV-MCNC: 5 MG/DL (ref 6–20)
BUN BLDV-MCNC: 5 MG/DL (ref 6–20)
BUN BLDV-MCNC: 6 MG/DL (ref 6–20)
BUN BLDV-MCNC: 7 MG/DL (ref 6–20)
BUN BLDV-MCNC: 9 MG/DL (ref 6–20)
BURR CELLS: ABNORMAL
BURR CELLS: ABNORMAL
CALCIUM IONIZED: 1.24 MMOL/L (ref 1.15–1.33)
CALCIUM SERPL-MCNC: 7.9 MG/DL (ref 8.6–10.2)
CALCIUM SERPL-MCNC: 7.9 MG/DL (ref 8.6–10.2)
CALCIUM SERPL-MCNC: 8.4 MG/DL (ref 8.6–10.2)
CALCIUM SERPL-MCNC: 8.5 MG/DL (ref 8.6–10.2)
CALCIUM SERPL-MCNC: 8.6 MG/DL (ref 8.6–10.2)
CHLORIDE BLD-SCNC: 102 MMOL/L (ref 98–107)
CHLORIDE BLD-SCNC: 105 MMOL/L (ref 98–107)
CHLORIDE BLD-SCNC: 106 MMOL/L (ref 98–107)
CHLORIDE BLD-SCNC: 108 MMOL/L (ref 98–107)
CHLORIDE BLD-SCNC: 109 MMOL/L (ref 98–107)
CLARITY: ABNORMAL
CO2: 12 MMOL/L (ref 22–29)
CO2: 14 MMOL/L (ref 22–29)
CO2: 15 MMOL/L (ref 22–29)
CO2: 15 MMOL/L (ref 22–29)
CO2: 16 MMOL/L (ref 22–29)
COLOR: YELLOW
CREAT SERPL-MCNC: 0.6 MG/DL (ref 0.7–1.2)
CREAT SERPL-MCNC: 0.7 MG/DL (ref 0.7–1.2)
CREATININE URINE: 38 MG/DL (ref 40–278)
EKG ATRIAL RATE: 111 BPM
EKG P AXIS: 26 DEGREES
EKG P-R INTERVAL: 182 MS
EKG Q-T INTERVAL: 310 MS
EKG QRS DURATION: 88 MS
EKG QTC CALCULATION (BAZETT): 421 MS
EKG R AXIS: -15 DEGREES
EKG T AXIS: 15 DEGREES
EKG VENTRICULAR RATE: 111 BPM
EOSINOPHILS ABSOLUTE: 0.02 E9/L (ref 0.05–0.5)
EOSINOPHILS ABSOLUTE: 0.11 E9/L (ref 0.05–0.5)
EOSINOPHILS RELATIVE PERCENT: 0.2 % (ref 0–6)
EOSINOPHILS RELATIVE PERCENT: 0.9 % (ref 0–6)
FINE CASTS, UA: ABNORMAL /LPF (ref 0–2)
GFR SERPL CREATININE-BSD FRML MDRD: >60 ML/MIN/1.73
GLUCOSE BLD-MCNC: 166 MG/DL (ref 74–99)
GLUCOSE BLD-MCNC: 167 MG/DL (ref 74–99)
GLUCOSE BLD-MCNC: 193 MG/DL (ref 74–99)
GLUCOSE BLD-MCNC: 215 MG/DL (ref 74–99)
GLUCOSE BLD-MCNC: 221 MG/DL (ref 74–99)
GLUCOSE URINE: 500 MG/DL
HCT VFR BLD CALC: 39.9 % (ref 37–54)
HCT VFR BLD CALC: 40.9 % (ref 37–54)
HEMOGLOBIN: 14.8 G/DL (ref 12.5–16.5)
HEMOGLOBIN: 14.9 G/DL (ref 12.5–16.5)
IMMATURE GRANULOCYTES #: 0.22 E9/L
IMMATURE GRANULOCYTES %: 1.7 % (ref 0–5)
KETONES, URINE: 15 MG/DL
LEUKOCYTE ESTERASE, URINE: NEGATIVE
LYMPHOCYTES ABSOLUTE: 2.3 E9/L (ref 1.5–4)
LYMPHOCYTES ABSOLUTE: 3.21 E9/L (ref 1.5–4)
LYMPHOCYTES RELATIVE PERCENT: 19.3 % (ref 20–42)
LYMPHOCYTES RELATIVE PERCENT: 24.2 % (ref 20–42)
MAGNESIUM: 1.5 MG/DL (ref 1.6–2.6)
MAGNESIUM: 1.6 MG/DL (ref 1.6–2.6)
MAGNESIUM: 1.8 MG/DL (ref 1.6–2.6)
MCH RBC QN AUTO: 29.4 PG (ref 26–35)
MCH RBC QN AUTO: 29.8 PG (ref 26–35)
MCHC RBC AUTO-ENTMCNC: 36.5 % (ref 32–34.5)
MCHC RBC AUTO-ENTMCNC: 37.1 % (ref 32–34.5)
MCV RBC AUTO: 78.2 FL (ref 80–99.9)
MCV RBC AUTO: 80.7 FL (ref 80–99.9)
METER GLUCOSE: 139 MG/DL (ref 74–99)
METER GLUCOSE: 140 MG/DL (ref 74–99)
METER GLUCOSE: 145 MG/DL (ref 74–99)
METER GLUCOSE: 155 MG/DL (ref 74–99)
METER GLUCOSE: 155 MG/DL (ref 74–99)
METER GLUCOSE: 157 MG/DL (ref 74–99)
METER GLUCOSE: 162 MG/DL (ref 74–99)
METER GLUCOSE: 165 MG/DL (ref 74–99)
METER GLUCOSE: 169 MG/DL (ref 74–99)
METER GLUCOSE: 173 MG/DL (ref 74–99)
METER GLUCOSE: 173 MG/DL (ref 74–99)
METER GLUCOSE: 180 MG/DL (ref 74–99)
METER GLUCOSE: 182 MG/DL (ref 74–99)
METER GLUCOSE: 184 MG/DL (ref 74–99)
METER GLUCOSE: 186 MG/DL (ref 74–99)
METER GLUCOSE: 190 MG/DL (ref 74–99)
METER GLUCOSE: 191 MG/DL (ref 74–99)
METER GLUCOSE: 191 MG/DL (ref 74–99)
METER GLUCOSE: 194 MG/DL (ref 74–99)
METER GLUCOSE: 195 MG/DL (ref 74–99)
METER GLUCOSE: 198 MG/DL (ref 74–99)
METER GLUCOSE: 198 MG/DL (ref 74–99)
METER GLUCOSE: 205 MG/DL (ref 74–99)
METER GLUCOSE: 207 MG/DL (ref 74–99)
MICROALBUMIN UR-MCNC: 172.1 MG/L
MICROALBUMIN/CREAT UR-RTO: 452.9 (ref 0–30)
MONOCYTES ABSOLUTE: 0.97 E9/L (ref 0.1–0.95)
MONOCYTES ABSOLUTE: 1.94 E9/L (ref 0.1–0.95)
MONOCYTES RELATIVE PERCENT: 15.8 % (ref 2–12)
MONOCYTES RELATIVE PERCENT: 7.3 % (ref 2–12)
NEUTROPHILS ABSOLUTE: 7.74 E9/L (ref 1.8–7.3)
NEUTROPHILS ABSOLUTE: 8.83 E9/L (ref 1.8–7.3)
NEUTROPHILS RELATIVE PERCENT: 64 % (ref 43–80)
NEUTROPHILS RELATIVE PERCENT: 66.3 % (ref 43–80)
NITRITE, URINE: NEGATIVE
OVALOCYTES: ABNORMAL
OVALOCYTES: ABNORMAL
PDW BLD-RTO: 14.6 FL (ref 11.5–15)
PDW BLD-RTO: 14.6 FL (ref 11.5–15)
PH UA: 6 (ref 5–9)
PH VENOUS: 7.23 (ref 7.35–7.45)
PH VENOUS: 7.29 (ref 7.35–7.45)
PH VENOUS: 7.31 (ref 7.35–7.45)
PH VENOUS: 7.31 (ref 7.35–7.45)
PH VENOUS: 7.34 (ref 7.35–7.45)
PH VENOUS: 7.34 (ref 7.35–7.45)
PHOSPHORUS: 1.6 MG/DL (ref 2.5–4.5)
PHOSPHORUS: 1.7 MG/DL (ref 2.5–4.5)
PHOSPHORUS: 1.9 MG/DL (ref 2.5–4.5)
PHOSPHORUS: 2.1 MG/DL (ref 2.5–4.5)
PHOSPHORUS: 2.2 MG/DL (ref 2.5–4.5)
PLATELET # BLD: 256 E9/L (ref 130–450)
PLATELET # BLD: 266 E9/L (ref 130–450)
PMV BLD AUTO: 10.4 FL (ref 7–12)
PMV BLD AUTO: 11.3 FL (ref 7–12)
POIKILOCYTES: ABNORMAL
POIKILOCYTES: ABNORMAL
POTASSIUM SERPL-SCNC: 3.5 MMOL/L (ref 3.5–5)
POTASSIUM SERPL-SCNC: 3.6 MMOL/L (ref 3.5–5)
POTASSIUM SERPL-SCNC: 3.7 MMOL/L (ref 3.5–5)
POTASSIUM SERPL-SCNC: 3.7 MMOL/L (ref 3.5–5)
POTASSIUM SERPL-SCNC: 4.1 MMOL/L (ref 3.5–5)
PROTEIN UA: 100 MG/DL
RBC # BLD: 5.07 E12/L (ref 3.8–5.8)
RBC # BLD: 5.1 E12/L (ref 3.8–5.8)
RBC UA: ABNORMAL /HPF (ref 0–2)
REASON FOR REJECTION: NORMAL
REJECTED TEST: NORMAL
SODIUM BLD-SCNC: 130 MMOL/L (ref 132–146)
SODIUM BLD-SCNC: 133 MMOL/L (ref 132–146)
SODIUM BLD-SCNC: 136 MMOL/L (ref 132–146)
SODIUM BLD-SCNC: 137 MMOL/L (ref 132–146)
SODIUM BLD-SCNC: 138 MMOL/L (ref 132–146)
SPECIFIC GRAVITY UA: >=1.03 (ref 1–1.03)
TOTAL PROTEIN: 6.6 G/DL (ref 6.4–8.3)
TRIGL SERPL-MCNC: 4410 MG/DL (ref 0–149)
UROBILINOGEN, URINE: 0.2 E.U./DL
WBC # BLD: 12.1 E9/L (ref 4.5–11.5)
WBC # BLD: 13.3 E9/L (ref 4.5–11.5)
WBC UA: ABNORMAL /HPF (ref 0–5)

## 2023-02-02 PROCEDURE — 82800 BLOOD PH: CPT

## 2023-02-02 PROCEDURE — 6360000002 HC RX W HCPCS: Performed by: INTERNAL MEDICINE

## 2023-02-02 PROCEDURE — 80076 HEPATIC FUNCTION PANEL: CPT

## 2023-02-02 PROCEDURE — 36415 COLL VENOUS BLD VENIPUNCTURE: CPT

## 2023-02-02 PROCEDURE — 2000000000 HC ICU R&B

## 2023-02-02 PROCEDURE — 6360000002 HC RX W HCPCS

## 2023-02-02 PROCEDURE — C9113 INJ PANTOPRAZOLE SODIUM, VIA: HCPCS | Performed by: INTERNAL MEDICINE

## 2023-02-02 PROCEDURE — 83735 ASSAY OF MAGNESIUM: CPT

## 2023-02-02 PROCEDURE — 6370000000 HC RX 637 (ALT 250 FOR IP): Performed by: INTERNAL MEDICINE

## 2023-02-02 PROCEDURE — 80048 BASIC METABOLIC PNL TOTAL CA: CPT

## 2023-02-02 PROCEDURE — 85025 COMPLETE CBC W/AUTO DIFF WBC: CPT

## 2023-02-02 PROCEDURE — 82962 GLUCOSE BLOOD TEST: CPT

## 2023-02-02 PROCEDURE — 99291 CRITICAL CARE FIRST HOUR: CPT | Performed by: INTERNAL MEDICINE

## 2023-02-02 PROCEDURE — 84100 ASSAY OF PHOSPHORUS: CPT

## 2023-02-02 PROCEDURE — 2580000003 HC RX 258: Performed by: INTERNAL MEDICINE

## 2023-02-02 PROCEDURE — 6370000000 HC RX 637 (ALT 250 FOR IP)

## 2023-02-02 PROCEDURE — 82330 ASSAY OF CALCIUM: CPT

## 2023-02-02 PROCEDURE — 81001 URINALYSIS AUTO W/SCOPE: CPT

## 2023-02-02 PROCEDURE — 94660 CPAP INITIATION&MGMT: CPT

## 2023-02-02 PROCEDURE — 84478 ASSAY OF TRIGLYCERIDES: CPT

## 2023-02-02 PROCEDURE — 99222 1ST HOSP IP/OBS MODERATE 55: CPT | Performed by: INTERNAL MEDICINE

## 2023-02-02 PROCEDURE — 2580000003 HC RX 258

## 2023-02-02 PROCEDURE — 2500000003 HC RX 250 WO HCPCS

## 2023-02-02 RX ORDER — SODIUM CHLORIDE, SODIUM LACTATE, POTASSIUM CHLORIDE, AND CALCIUM CHLORIDE .6; .31; .03; .02 G/100ML; G/100ML; G/100ML; G/100ML
1000 INJECTION, SOLUTION INTRAVENOUS
Status: DISCONTINUED | OUTPATIENT
Start: 2023-02-02 | End: 2023-02-02

## 2023-02-02 RX ORDER — FENOFIBRATE 160 MG/1
160 TABLET ORAL ONCE
Status: COMPLETED | OUTPATIENT
Start: 2023-02-02 | End: 2023-02-02

## 2023-02-02 RX ORDER — FENTANYL CITRATE 50 UG/ML
25 INJECTION, SOLUTION INTRAMUSCULAR; INTRAVENOUS EVERY 4 HOURS PRN
Status: DISCONTINUED | OUTPATIENT
Start: 2023-02-02 | End: 2023-02-05 | Stop reason: HOSPADM

## 2023-02-02 RX ORDER — FENOFIBRATE 160 MG/1
160 TABLET ORAL DAILY
Status: DISCONTINUED | OUTPATIENT
Start: 2023-02-03 | End: 2023-02-05 | Stop reason: HOSPADM

## 2023-02-02 RX ORDER — SODIUM CHLORIDE, SODIUM LACTATE, POTASSIUM CHLORIDE, AND CALCIUM CHLORIDE .6; .31; .03; .02 G/100ML; G/100ML; G/100ML; G/100ML
1000 INJECTION, SOLUTION INTRAVENOUS
Status: COMPLETED | OUTPATIENT
Start: 2023-02-02 | End: 2023-02-02

## 2023-02-02 RX ORDER — ATORVASTATIN CALCIUM 40 MG/1
80 TABLET, FILM COATED ORAL NIGHTLY
Status: DISCONTINUED | OUTPATIENT
Start: 2023-02-02 | End: 2023-02-05 | Stop reason: HOSPADM

## 2023-02-02 RX ADMIN — SODIUM PHOSPHATE, MONOBASIC, MONOHYDRATE AND SODIUM PHOSPHATE, DIBASIC, ANHYDROUS 15 MMOL: 276; 142 INJECTION, SOLUTION INTRAVENOUS at 15:09

## 2023-02-02 RX ADMIN — CARBAMAZEPINE 100 MG: 100 TABLET, EXTENDED RELEASE ORAL at 09:20

## 2023-02-02 RX ADMIN — DEXTROSE AND SODIUM CHLORIDE: 5; 450 INJECTION, SOLUTION INTRAVENOUS at 02:56

## 2023-02-02 RX ADMIN — FENOFIBRATE 160 MG: 160 TABLET ORAL at 15:06

## 2023-02-02 RX ADMIN — Medication 2000 UNITS: at 09:19

## 2023-02-02 RX ADMIN — Medication 10 MEQ: at 14:38

## 2023-02-02 RX ADMIN — SODIUM CHLORIDE, PRESERVATIVE FREE 10 ML: 5 INJECTION INTRAVENOUS at 21:01

## 2023-02-02 RX ADMIN — ACETAMINOPHEN 650 MG: 325 TABLET ORAL at 21:00

## 2023-02-02 RX ADMIN — ATORVASTATIN CALCIUM 80 MG: 40 TABLET, FILM COATED ORAL at 14:38

## 2023-02-02 RX ADMIN — PANTOPRAZOLE SODIUM 40 MG: 40 INJECTION, POWDER, FOR SOLUTION INTRAVENOUS at 09:19

## 2023-02-02 RX ADMIN — MAGNESIUM SULFATE HEPTAHYDRATE 1000 MG: 1 INJECTION, SOLUTION INTRAVENOUS at 22:14

## 2023-02-02 RX ADMIN — MAGNESIUM SULFATE HEPTAHYDRATE 1000 MG: 1 INJECTION, SOLUTION INTRAVENOUS at 23:19

## 2023-02-02 RX ADMIN — SODIUM CHLORIDE, PRESERVATIVE FREE 10 ML: 5 INJECTION INTRAVENOUS at 09:24

## 2023-02-02 RX ADMIN — ENOXAPARIN SODIUM 30 MG: 100 INJECTION SUBCUTANEOUS at 09:19

## 2023-02-02 RX ADMIN — Medication 10 MEQ: at 01:51

## 2023-02-02 RX ADMIN — ENOXAPARIN SODIUM 30 MG: 100 INJECTION SUBCUTANEOUS at 21:01

## 2023-02-02 RX ADMIN — SODIUM CHLORIDE 11.4 UNITS/HR: 9 INJECTION, SOLUTION INTRAVENOUS at 12:28

## 2023-02-02 RX ADMIN — SODIUM CHLORIDE, POTASSIUM CHLORIDE, SODIUM LACTATE AND CALCIUM CHLORIDE 1000 ML: 600; 310; 30; 20 INJECTION, SOLUTION INTRAVENOUS at 10:53

## 2023-02-02 RX ADMIN — SODIUM CHLORIDE 15.86 UNITS/HR: 9 INJECTION, SOLUTION INTRAVENOUS at 00:13

## 2023-02-02 RX ADMIN — SODIUM PHOSPHATE, MONOBASIC, MONOHYDRATE AND SODIUM PHOSPHATE, DIBASIC, ANHYDROUS 15 MMOL: 276; 142 INJECTION, SOLUTION INTRAVENOUS at 02:29

## 2023-02-02 RX ADMIN — CARBAMAZEPINE 200 MG: 100 TABLET, EXTENDED RELEASE ORAL at 21:03

## 2023-02-02 RX ADMIN — ACETAMINOPHEN 650 MG: 325 TABLET ORAL at 09:33

## 2023-02-02 RX ADMIN — SODIUM PHOSPHATE, MONOBASIC, MONOHYDRATE AND SODIUM PHOSPHATE, DIBASIC, ANHYDROUS 15 MMOL: 276; 142 INJECTION, SOLUTION INTRAVENOUS at 10:57

## 2023-02-02 RX ADMIN — Medication 10 MEQ: at 11:06

## 2023-02-02 RX ADMIN — DEXTROSE AND SODIUM CHLORIDE: 5; 450 INJECTION, SOLUTION INTRAVENOUS at 09:37

## 2023-02-02 RX ADMIN — SODIUM CHLORIDE, POTASSIUM CHLORIDE, SODIUM LACTATE AND CALCIUM CHLORIDE 1000 ML: 600; 310; 30; 20 INJECTION, SOLUTION INTRAVENOUS at 12:56

## 2023-02-02 RX ADMIN — SODIUM CHLORIDE 9.68 UNITS/HR: 9 INJECTION, SOLUTION INTRAVENOUS at 21:59

## 2023-02-02 RX ADMIN — SODIUM CHLORIDE 20.1 UNITS/HR: 9 INJECTION, SOLUTION INTRAVENOUS at 05:53

## 2023-02-02 RX ADMIN — ONDANSETRON 4 MG: 2 INJECTION INTRAMUSCULAR; INTRAVENOUS at 21:38

## 2023-02-02 RX ADMIN — Medication 10 MEQ: at 15:41

## 2023-02-02 RX ADMIN — Medication 10 MEQ: at 12:11

## 2023-02-02 RX ADMIN — Medication 10 MEQ: at 23:17

## 2023-02-02 RX ADMIN — BUPRENORPHINE HYDROCHLORIDE AND NALOXONE HYDROCHLORIDE DIHYDRATE 1 TABLET: 8; 2 TABLET SUBLINGUAL at 21:00

## 2023-02-02 RX ADMIN — Medication 10 MEQ: at 22:02

## 2023-02-02 RX ADMIN — Medication 10 MEQ: at 03:57

## 2023-02-02 RX ADMIN — Medication 10 MEQ: at 02:52

## 2023-02-02 RX ADMIN — Medication 10 MEQ: at 10:07

## 2023-02-02 RX ADMIN — Medication 10 MEQ: at 13:38

## 2023-02-02 RX ADMIN — SODIUM PHOSPHATE, MONOBASIC, MONOHYDRATE AND SODIUM PHOSPHATE, DIBASIC, ANHYDROUS 15 MMOL: 276; 142 INJECTION, SOLUTION INTRAVENOUS at 21:54

## 2023-02-02 ASSESSMENT — PAIN DESCRIPTION - ONSET
ONSET: ON-GOING
ONSET: ON-GOING

## 2023-02-02 ASSESSMENT — PAIN DESCRIPTION - LOCATION
LOCATION: BACK;ABDOMEN
LOCATION: BACK
LOCATION: ABDOMEN
LOCATION: BACK

## 2023-02-02 ASSESSMENT — PAIN DESCRIPTION - DESCRIPTORS
DESCRIPTORS: ACHING;DISCOMFORT;CRAMPING
DESCRIPTORS: CRAMPING
DESCRIPTORS: ACHING;DISCOMFORT;CRAMPING

## 2023-02-02 ASSESSMENT — PAIN SCALES - GENERAL
PAINLEVEL_OUTOF10: 4
PAINLEVEL_OUTOF10: 0
PAINLEVEL_OUTOF10: 8
PAINLEVEL_OUTOF10: 0
PAINLEVEL_OUTOF10: 8
PAINLEVEL_OUTOF10: 5

## 2023-02-02 ASSESSMENT — PAIN DESCRIPTION - PAIN TYPE
TYPE: ACUTE PAIN
TYPE: CHRONIC PAIN

## 2023-02-02 ASSESSMENT — PAIN - FUNCTIONAL ASSESSMENT
PAIN_FUNCTIONAL_ASSESSMENT: PREVENTS OR INTERFERES SOME ACTIVE ACTIVITIES AND ADLS
PAIN_FUNCTIONAL_ASSESSMENT: PREVENTS OR INTERFERES SOME ACTIVE ACTIVITIES AND ADLS

## 2023-02-02 ASSESSMENT — PAIN DESCRIPTION - FREQUENCY
FREQUENCY: CONTINUOUS
FREQUENCY: CONTINUOUS

## 2023-02-02 ASSESSMENT — PAIN DESCRIPTION - ORIENTATION
ORIENTATION: LOWER
ORIENTATION: LOWER

## 2023-02-02 NOTE — PLAN OF CARE
Problem: Chronic Conditions and Co-morbidities  Goal: Patient's chronic conditions and co-morbidity symptoms are monitored and maintained or improved  2/2/2023 0726 by Billy Palacio RN  Outcome: Progressing     Problem: Discharge Planning  Goal: Discharge to home or other facility with appropriate resources  2/2/2023 0726 by Billy Palacio RN  Outcome: Progressing     Problem: Pain  Goal: Verbalizes/displays adequate comfort level or baseline comfort level  2/2/2023 0726 by Billy Palacio RN  Outcome: Progressing     Problem: Gastrointestinal - Adult  Goal: Maintains adequate nutritional intake  2/2/2023 0109 by Chelly Benjamin RN  Outcome: Not Progressing  2/1/2023 2345 by Chelly Benjamin RN  Outcome: Not Progressing  2/1/2023 2343 by Chelly Benjamin RN  Outcome: Not Progressing

## 2023-02-02 NOTE — PROGRESS NOTES
[facility]  Internal [facility] Residency Program  Progress Note - House Team     Patient:  Luca Garcias 42 y.o. male MRN: [id number]     Date of Service: 2/2/2023     CC: Chest pain, N/V, feels dehydrated, back pain. Overnight events: Milky appearing urine, sent urinalysis     Subjective     Patient was seen and examined this morning at bedside in no acute distress. He reports no new complaints. Objective     Physical Exam:  Vitals: /76   Pulse (!) 118   Temp 98.4 °F (36.9 °C) (Temporal)   Resp 19   Ht 6' 7\" (2.007 m)   Wt 234 lb 12.6 oz (106.5 kg)   SpO2 95%   BMI 26.45 kg/m²     I & O - 24hr: I/O this shift:  In: -   Out: 185 [Urine:185]   General Appearance: fatigued  HEENT:  Head: Normal, normocephalic, atraumatic.   Neck: no carotid bruit  Lung: clear to auscultation bilaterally  Heart: regular rate and rhythm, S1, S2 normal, no murmur, click, rub or gallop  Extremities:  extremities normal, atraumatic, no cyanosis or edema  Neurologic: Mental status: Alert, oriented, thought content appropriate    Pertinent Labs & Imaging Studies     CBC:   Lab Results   Component Value Date/Time    WBC 13.3 02/02/2023 08:17 AM    RBC 5.10 02/02/2023 08:17 AM    HGB 14.9 02/02/2023 08:17 AM    HCT 39.9 02/02/2023 08:17 AM    MCV 78.2 02/02/2023 08:17 AM    MCH 29.8 02/02/2023 08:17 AM    MCHC 37.1 02/02/2023 08:17 AM    RDW 14.6 02/02/2023 08:17 AM     02/02/2023 08:17 AM    MPV 10.4 02/02/2023 08:17 AM     CMP:    Lab Results   Component Value Date/Time     02/02/2023 11:23 AM    K 3.7 02/02/2023 11:23 AM    K 4.0 07/22/2022 11:07 PM     02/02/2023 11:23 AM    CO2 15 02/02/2023 11:23 AM    BUN 6 02/02/2023 11:23 AM    CREATININE 0.7 02/02/2023 11:23 AM    GFRAA >60 07/22/2022 11:07 PM    LABGLOM >60 02/02/2023 11:23 AM    GLUCOSE 167 02/02/2023 11:23 AM    PROT 6.6 02/02/2023 08:17 AM    LABALBU 3.5 02/02/2023 08:17 AM    CALCIUM 8.4 02/02/2023 11:23 AM    BILITOT <0.2 02/02/2023 08:17 AM    ALKPHOS 98 02/02/2023 08:17 AM    AST 35 02/02/2023 08:17 AM    ALT 87 02/02/2023 08:17 AM       CTA CHEST W CONTRAST   Final Result   1. There is no pulmonary embolus   2. There is no thoracic aortic aneurysm or dissection. 3. The lungs are clear. There is no focal infiltrate. 4. No gross upper abdominal pathology is noted. Please note the pancreas is   not included on the examination. CT ABDOMEN PELVIS W IV CONTRAST Additional Contrast? None   Final Result   1. Presence of predominant fluid distension with some dilatation of the   stomach as above discussed. There is also some thickening of the wall of the   distal esophagus across the esophagogastric junction. Findings to be   correlate clinically. See above comments. 2.  No free intraperitoneal air or acute inflammatory process in the   omental/mesentery fat planes no free intra peritoneal fluid accumulation. 3.  Tiny calculus in the upper pole of the right kidney present causes no   obstruction. No obstructive uropathy in the right or in the left kidneys. 4.  Pattern of mild to moderate constipation. CT HEAD WO CONTRAST   Final Result   1. There is no acute intracranial hemorrhage   2. 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 XR CHEST PORTABLE   Final Result   No acute process.               Resident's Assessment and Plan     Assessment and Plan:    Diabetic ketoacidosis, 2/2 medications noncompliance   Follow DKA protocol, bridge when AG closes twice   Replete electrolytes as needed  Follow endocrinology recommendations for insulin pump management     Type 2 diabetes, poorly controlled HbA1c 13.1%  Insulin pump, notes he has not sued insulin in over a month  U/A: turbid with proteinuria     HAGMA 2/2 DKA, starvation ketosis    ANNE stage 1 likely pre-renal (dehydration from DKA, poor intake, GI loss) Baseline creatinine 0.7-0.8 - RESOLVED    Concerns for acute hypertriglyceridemic pancreatitis  CT of the abdomen is negative for any pancreatic changes  Start statin and fibraes   Trend TG    Leukocytosis, likely reactive secondary to DKA    Sinus tachycardia 2/2 DKA    History of tobacco abuse    History of drug abuse, on Suboxone    History of hepatitis C    History of epilepsy, on carbamazepine - resumed    History of left temporal and left parietal lobe    Hypertriglyceridemia hyperlipoproteinemia likely in the setting of DKA        PT/OT evaluation: -  DVT prophylaxis/ GI prophylaxis: Enoxaparin / pantoprazole   Disposition: continue current care    Néstor Satnos MD, PGY-1  Attending physician: Dr. Soni Akers

## 2023-02-02 NOTE — PLAN OF CARE
Problem: Gastrointestinal - Adult  Goal: Maintains adequate nutritional intake  2/2/2023 0109 by Diony Israel RN  Outcome: Not Progressing  2/1/2023 2345 by Diony Israel RN  Outcome: Not Progressing  2/1/2023 2343 by Diony Israel RN  Outcome: Not Progressing     Problem: Chronic Conditions and Co-morbidities  Goal: Patient's chronic conditions and co-morbidity symptoms are monitored and maintained or improved  2/2/2023 0109 by Diony Israel RN  Outcome: Progressing  2/1/2023 2345 by Diony Israel RN  Outcome: Progressing  2/1/2023 2343 by Diony Israel RN  Outcome: Progressing  Flowsheets (Taken 2/1/2023 2000)  Care Plan - Patient's Chronic Conditions and Co-Morbidity Symptoms are Monitored and Maintained or Improved:   Monitor and assess patient's chronic conditions and comorbid symptoms for stability, deterioration, or improvement   Collaborate with multidisciplinary team to address chronic and comorbid conditions and prevent exacerbation or deterioration   Update acute care plan with appropriate goals if chronic or comorbid symptoms are exacerbated and prevent overall improvement and discharge     Problem: Discharge Planning  Goal: Discharge to home or other facility with appropriate resources  2/2/2023 0109 by Diony Israel RN  Outcome: Progressing  2/1/2023 2345 by Diony Israel RN  Outcome: Progressing  2/1/2023 2343 by Diony Israel RN  Outcome: Progressing  Flowsheets (Taken 2/1/2023 2000)  Discharge to home or other facility with appropriate resources: Identify barriers to discharge with patient and caregiver     Problem: Pain  Goal: Verbalizes/displays adequate comfort level or baseline comfort level  2/2/2023 0109 by Diony Israel RN  Outcome: Progressing  2/1/2023 2345 by Diony Israel RN  Outcome: Progressing  2/1/2023 2343 by Diony Israel RN  Outcome: Progressing  Flowsheets  Taken 2/1/2023 2000 by Diony Israel RN  Verbalizes/displays adequate comfort level or baseline comfort level:   Encourage patient to monitor pain and request assistance   Assess pain using appropriate pain scale   Administer analgesics based on type and severity of pain and evaluate response   Implement non-pharmacological measures as appropriate and evaluate response   Consider cultural and social influences on pain and pain management   Notify Licensed Independent Practitioner if interventions unsuccessful or patient reports new pain  Taken 2/1/2023 1147 by Ulises Thorne RN  Verbalizes/displays adequate comfort level or baseline comfort level:   Encourage patient to monitor pain and request assistance   Assess pain using appropriate pain scale   Administer analgesics based on type and severity of pain and evaluate response   Implement non-pharmacological measures as appropriate and evaluate response   Consider cultural and social influences on pain and pain management   Notify Licensed Independent Practitioner if interventions unsuccessful or patient reports new pain     Problem: Neurosensory - Adult  Goal: Achieves stable or improved neurological status  2/2/2023 0109 by Siria Quick RN  Outcome: Progressing  2/1/2023 2345 by Siria Quick RN  Outcome: Progressing  2/1/2023 2343 by Siria Quick RN  Outcome: Progressing  Flowsheets (Taken 2/1/2023 2000)  Achieves stable or improved neurological status: Assess for and report changes in neurological status  Goal: Absence of seizures  2/2/2023 0109 by Siria Quick RN  Outcome: Progressing  2/1/2023 2345 by Siria Quick RN  Outcome: Progressing  2/1/2023 2343 by Siria Quick RN  Outcome: Progressing  Flowsheets (Taken 2/1/2023 2000)  Absence of seizures:   Monitor for seizure activity.   If seizure occurs, document type and location of movements and any associated apnea   If seizure occurs, turn head to side and suction secretions as needed  Goal: Remains free of injury related to seizures activity  2/2/2023 0109 by Siria Quick RN  Outcome: Progressing  2/1/2023 2345 by Nila Granado RN  Outcome: Progressing  2/1/2023 2343 by Nila Granado RN  Outcome: Progressing  Flowsheets (Taken 2/1/2023 2000)  Remains free of injury related to seizure activity:   Maintain airway, patient safety  and administer oxygen as ordered   Monitor patient for seizure activity, document and report duration and description of seizure to Licensed Independent Practitioner   If seizure occurs, turn patient to side and suction secretions as needed     Problem: Respiratory - Adult  Goal: Achieves optimal ventilation and oxygenation  2/2/2023 0109 by Nila Granado RN  Outcome: Progressing  2/1/2023 2345 by Nila Granado RN  Outcome: Progressing  2/1/2023 2343 by Nila Granado RN  Outcome: Progressing  Flowsheets (Taken 2/1/2023 2000)  Achieves optimal ventilation and oxygenation:   Assess for changes in respiratory status   Assess for changes in mentation and behavior   Position to facilitate oxygenation and minimize respiratory effort   Oxygen supplementation based on oxygen saturation or arterial blood gases   Initiate smoking cessation protocol as indicated   Encourage broncho-pulmonary hygiene including cough, deep breathe, incentive spirometry   Assess the need for suctioning and aspirate as needed   Assess and instruct to report shortness of breath or any respiratory difficulty   Respiratory therapy support as indicated     Problem: Cardiovascular - Adult  Goal: Maintains optimal cardiac output and hemodynamic stability  2/2/2023 0109 by Nila Granado RN  Outcome: Progressing  2/1/2023 2345 by Nila Granado RN  Outcome: Progressing  2/1/2023 2343 by Nila Granado RN  Outcome: Progressing  Flowsheets (Taken 2/1/2023 2000)  Maintains optimal cardiac output and hemodynamic stability:   Monitor blood pressure and heart rate   Monitor urine output and notify Licensed Independent Practitioner for values outside of normal range  Goal: Absence of cardiac dysrhythmias or at baseline  2/2/2023 0109 by Tor Leggett RN  Outcome: Progressing  2/1/2023 2345 by Tor Leggett RN  Outcome: Progressing  2/1/2023 2343 by Tor Leggett RN  Outcome: Progressing  Flowsheets (Taken 2/1/2023 2000)  Absence of cardiac dysrhythmias or at baseline:   Monitor cardiac rate and rhythm   Assess for signs of decreased cardiac output     Problem: Skin/Tissue Integrity - Adult  Goal: Skin integrity remains intact  2/2/2023 0109 by Tor Leggett RN  Outcome: Progressing  2/1/2023 2345 by Tor Leggett RN  Outcome: Progressing  2/1/2023 2343 by Tor Leggett RN  Outcome: Progressing  Flowsheets (Taken 2/1/2023 2000)  Skin Integrity Remains Intact:   Monitor for areas of redness and/or skin breakdown   Assess vascular access sites hourly   Every 4-6 hours minimum: Change oxygen saturation probe site   Every 4-6 hours: If on nasal continuous positive airway pressure, respiratory therapy assesses nares and determine need for appliance change or resting period  Goal: Oral mucous membranes remain intact  2/2/2023 0109 by Tor Leggett RN  Outcome: Progressing  2/1/2023 2345 by Tor Leggett RN  Outcome: Progressing  2/1/2023 2343 by Tor Leggett RN  Outcome: Progressing  Flowsheets (Taken 2/1/2023 2000)  Oral Mucous Membranes Remain Intact:   Assess oral mucosa and hygiene practices   Implement preventative oral hygiene regimen     Problem: Musculoskeletal - Adult  Goal: Return mobility to safest level of function  2/2/2023 0109 by Tor Leggett RN  Outcome: Progressing  2/1/2023 2343 by Tor Leggett RN  Outcome: Progressing  Goal: Return ADL status to a safe level of function  2/2/2023 0109 by Tor Leggett RN  Outcome: Progressing  2/1/2023 2343 by Tor Leggett RN  Outcome: Progressing     Problem: Gastrointestinal - Adult  Goal: Minimal or absence of nausea and vomiting  2/2/2023 0109 by Tor Leggett RN  Outcome: Progressing  2/1/2023 2345 by Tor Leggett RN  Outcome: Progressing  2/1/2023 2343 by Jane Isabel, RN  Outcome: Progressing  Goal: Maintains or returns to baseline bowel function  2/2/2023 0109 by Jane Hall RN  Outcome: Progressing  2/1/2023 2345 by Jane Hall RN  Outcome: Progressing  2/1/2023 2343 by Jane Hall RN  Outcome: Progressing  Flowsheets (Taken 2/1/2023 2000)  Maintains or returns to baseline bowel function: Assess bowel function     Problem: Genitourinary - Adult  Goal: Urinary catheter remains patent  2/2/2023 0109 by Jane Hall RN  Outcome: Progressing  2/1/2023 2345 by Jane Hall RN  Outcome: Progressing  2/1/2023 2343 by Jane Hall RN  Outcome: Progressing  Flowsheets (Taken 2/1/2023 2000)  Urinary catheter remains patent: Assess patency of urinary catheter     Problem: Infection - Adult  Goal: Absence of infection at discharge  2/2/2023 0109 by Jane Hall RN  Outcome: Progressing  2/1/2023 2345 by Jane Hall RN  Outcome: Progressing  2/1/2023 2343 by Jane Hall RN  Outcome: Progressing  Flowsheets (Taken 2/1/2023 2000)  Absence of infection at discharge:   Assess and monitor for signs and symptoms of infection   Monitor lab/diagnostic results   Monitor all insertion sites i.e., indwelling lines, tubes and drains  Goal: Absence of infection during hospitalization  2/2/2023 0109 by Jane Hall RN  Outcome: Progressing  2/1/2023 2345 by Jane Hall RN  Outcome: Progressing  2/1/2023 2343 by Jane Hall RN  Outcome: Progressing  Flowsheets (Taken 2/1/2023 2000)  Absence of infection during hospitalization:   Assess and monitor for signs and symptoms of infection   Monitor lab/diagnostic results   Monitor all insertion sites i.e., indwelling lines, tubes and drains  Goal: Absence of fever/infection during anticipated neutropenic period  2/2/2023 0109 by Jane Hall RN  Outcome: Progressing  2/1/2023 2345 by Jane Hall RN  Outcome: Progressing  2/1/2023 2343 by Jane Hall RN  Outcome: Progressing  Flowsheets (Taken 2/1/2023 2000)  Absence of fever/infection during anticipated neutropenic period: Monitor white blood cell count     Problem: Metabolic/Fluid and Electrolytes - Adult  Goal: Electrolytes maintained within normal limits  2/2/2023 0109 by Néstor Mccullough RN  Outcome: Progressing  2/1/2023 2345 by Néstor Mccullough RN  Outcome: Progressing  2/1/2023 2343 by Néstor Mccullough RN  Outcome: Progressing  Flowsheets (Taken 2/1/2023 2000)  Electrolytes maintained within normal limits:   Monitor labs and assess patient for signs and symptoms of electrolyte imbalances   Administer electrolyte replacement as ordered   Monitor response to electrolyte replacements, including repeat lab results as appropriate  Goal: Hemodynamic stability and optimal renal function maintained  2/2/2023 0109 by Néstor Mccullough RN  Outcome: Progressing  2/1/2023 2345 by Néstor Mccullough RN  Outcome: Progressing  2/1/2023 2343 by Néstor Mccullough RN  Outcome: Progressing  Flowsheets (Taken 2/1/2023 2000)  Hemodynamic stability and optimal renal function maintained:   Monitor labs and assess for signs and symptoms of volume excess or deficit   Monitor intake, output and patient weight  Goal: Glucose maintained within prescribed range  2/2/2023 0109 by Néstor Mccullough RN  Outcome: Progressing  2/1/2023 2345 by Néstor Mccullough RN  Outcome: Progressing  2/1/2023 2343 by Néstor Mccullough RN  Outcome: Progressing  Flowsheets (Taken 2/1/2023 2000)  Glucose maintained within prescribed range:   Monitor blood glucose as ordered   Assess for signs and symptoms of hyperglycemia and hypoglycemia   Administer ordered medications to maintain glucose within target range   Assess barriers to adequate nutritional intake and initiate nutrition consult as needed   Instruct patient on self management of diabetes and initiate consult as needed     Problem: Hematologic - Adult  Goal: Maintains hematologic stability  2/2/2023 0109 by Néstor Mccullough RN  Outcome: Progressing  2/1/2023 2345 by Randa Sensing, RN  Outcome: Progressing  2/1/2023 2343 by Randa Sensing, RN  Outcome: Progressing  Flowsheets (Taken 2/1/2023 2000)  Maintains hematologic stability:   Assess for signs and symptoms of bleeding or hemorrhage   Monitor labs for bleeding or clotting disorders     Problem: Safety - Adult  Goal: Free from fall injury  2/2/2023 0109 by Randa Gonzalez, RN  Outcome: Progressing  2/1/2023 2345 by Randa Sensing, RN  Outcome: Progressing  2/1/2023 2343 by Randa Sensing, RN  Outcome: Progressing  Flowsheets (Taken 2/1/2023 2000)  Free From Fall Injury:   Instruct family/caregiver on patient safety   Based on caregiver fall risk screen, instruct family/caregiver to ask for assistance with transferring infant if caregiver noted to have fall risk factors     Problem: Skin/Tissue Integrity  Goal: Absence of new skin breakdown  Description: 1. Monitor for areas of redness and/or skin breakdown  2. Assess vascular access sites hourly  3. Every 4-6 hours minimum:  Change oxygen saturation probe site  4. Every 4-6 hours:  If on nasal continuous positive airway pressure, respiratory therapy assess nares and determine need for appliance change or resting period.   2/2/2023 0109 by Randa Sensing, RN  Outcome: Progressing  2/1/2023 2345 by Randa Lisa, RN  Outcome: Progressing  2/1/2023 2343 by Randa Sensing, RN  Outcome: Progressing     Problem: ABCDS Injury Assessment  Goal: Absence of physical injury  2/2/2023 0109 by Randa Gonzalez, RN  Outcome: Progressing  2/1/2023 2345 by Randa Lisa, RN  Outcome: Progressing  2/1/2023 2343 by Randa Sensing, RN  Outcome: Progressing  Flowsheets (Taken 2/1/2023 2000)  Absence of Physical Injury: Implement safety measures based on patient assessment

## 2023-02-02 NOTE — H&P
J[facility]o [facility]  Internal Medicine Residency Program  History and Physical    Patient:  Dutch Echavarria 42 y.o. male MRN: [id number]     Date of Service: 2/1/2023    Hospital Day: 1      Chief complaint: had concerns including Chest Pain (Called EMS for chest pain stated that he couldn't tell when it stated but has had N/V that started yesterday and is dehydrated ) and Back Pain (C/O back pain that started when EMS placed him on the cot ). History Obtained From:  patient    Date of Admission: 2/1/23  History of Present Illness   Dutch Echavarria is a 42 y.o. 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 According to the patient, he has been non compliant with his insulin, doesn't remember when was the last time that his insulin pump was filled, and didn't take his anti seizure medications. Last seizure-approximately a year ago. On the arrival to the ER, pt was in AMS, vitals were stable, later due to desaturation, pt was put on BiPap, weaned down to NC, and to Room air. Na 134 ( corrected 139), K 4.7, Cl 86, HCO3 7, BUN 23, Creatinine 1.3, Anion ap 41, LA 4.3, , Calcium 9.6, Troponin 15>15, Lipase 1552, beta-hydroxybutyrate >4.5, UDS -negative, Vit D 24, WBC 25.1, RBC 6.01, UA-few bacteria, negative nitrite, Negative Leuk esterase, pH 7.048, pCo2 < 15.0, pO2 315.4, HCO3-8.9, O2 ssat 99.9, pH vein 7.16, CT head-no acute changes, encephalomalacia 2/2 old infarcts, CTA-no signs of PE, CT abdomen-fluid collection with so,e distention of the stomach, no signs of perforation, small calculus non obstructing within the right kidney. Pt was started on DKA protocol and was admitted to ICU for further care.       ED Meds: Patient was given cefepime 2000 mg once, dilaudid 0.5 mg once, 1 mg once, NAHCO3 50 mEq once  ED Fluids: Patient was given  1 L NS, 1 L LR    Previous Hospital Admission:1/18/23: tinea cruris      Past Medical History:       Diagnosis Date    Anxiety     Depression     Seizures ([facility] [address]     Unspecified cerebral artery occlusion with cerebral infarction        Past Surgical History:        Procedure Laterality Date    ABCESS DRAINAGE Left 08/22/2016    Left Hand    BONE INCISION AND DRAINAGE Left 08/30/2016    HAND W/ WOUND VA C       Medications Prior to Admission:    Prior to Admission medications    Medication Sig Start Date End Date Taking? Authorizing Provider   carBAMazepine (TEGRETOL XR) 100 MG extended release tablet Take 100 mg by mouth every morning   Yes Historical Provider, MD   carBAMazepine (TEGRETOL XR) 100 MG extended release tablet Take 200 mg by mouth at bedtime   Yes Historical Provider, MD   buprenorphine-naloxone (SUBOXONE) 8-2 MG FILM SL film Place 1 Film under the tongue in the morning and at bedtime   Yes Historical Provider, MD       Allergies:  Patient has no known allergies. Social History:   TOBACCO:   reports that he has been smoking cigarettes. He has a 7.60 pack-year smoking history. He has never used smokeless tobacco.  ETOH:   reports no history of alcohol use. OCCUPATION: works outside    Family History:       Problem Relation Age of Onset    Kidney Disease Mother     Heart Disease Father        REVIEW OF SYSTEMS:    Review of Systems   Constitutional:  Positive for appetite change and fatigue. Negative for activity change. HENT:  Negative for congestion. Eyes:  Negative for discharge. Respiratory:  Positive for shortness of breath. Negative for cough and chest tightness. Cardiovascular:  Positive for chest pain. Negative for palpitations and leg swelling. Gastrointestinal:  Positive for abdominal pain, nausea and vomiting. Negative for blood in stool, constipation and diarrhea.    Endocrine: Negative for cold intolerance and heat intolerance. Genitourinary:  Negative for difficulty urinating. Musculoskeletal:  Positive for back pain. Negative for arthralgias. Skin:  Negative for color change. Neurological:  Positive for headaches. Negative for dizziness and seizures. Psychiatric/Behavioral:  Negative for agitation and behavioral problems. Physical Exam   Vitals: BP (!) 153/93   Pulse (!) 112   Temp 97.6 °F (36.4 °C) (Axillary)   Resp 27   Ht 6' 7\" (2.007 m)   Wt 231 lb 11.3 oz (105.1 kg)   SpO2 96%   BMI 26.10 kg/m²     Physical Exam  Constitutional:       Appearance: Normal appearance. He is normal weight. HENT:      Head: Normocephalic and atraumatic. Cardiovascular:      Rate and Rhythm: Normal rate and regular rhythm. Pulses: Normal pulses. Heart sounds: Normal heart sounds. Pulmonary:      Effort: Pulmonary effort is normal.      Breath sounds: Normal breath sounds. Abdominal:      General: Abdomen is flat. Bowel sounds are normal.      Palpations: Abdomen is soft. Musculoskeletal:         General: Normal range of motion. Cervical back: Normal range of motion. Skin:     General: Skin is warm and dry. Neurological:      Mental Status: He is oriented to person, place, and time. Mental status is at baseline.    Psychiatric:         Mood and Affect: Mood normal.         Behavior: Behavior normal.      Labs and Imaging Studies   Basic Labs  Recent Labs     02/01/23  0725 02/01/23  0726 02/01/23  1214   NA  --  134 139   K  --  4.7 3.9   CL  --  86* 101   CO2  --  7* 7*   BUN  --  23* 19   CREATININE 1.2 1.3* 1.0   GLUCOSE  --  672* 327*   CALCIUM  --  9.6 8.3*       Recent Labs     02/01/23  0726 02/01/23  1214   WBC 25.1* 21.1*   RBC 6.01* 5.10   HGB 17.9* 15.6   HCT 47.7 41.4   MCV 79.4* 81.2   MCH 29.8 30.6   MCHC 37.5* 37.7*   RDW 14.0 14.0    289   MPV 10.4 10.2       CBC:   Lab Results   Component Value Date/Time    WBC 21.1 02/01/2023 12:14 PM    RBC 5.10 02/01/2023 12:14 PM HGB 15.6 02/01/2023 12:14 PM    HCT 41.4 02/01/2023 12:14 PM    MCV 81.2 02/01/2023 12:14 PM    RDW 14.0 02/01/2023 12:14 PM     02/01/2023 12:14 PM     BMP:    Lab Results   Component Value Date/Time     02/01/2023 12:14 PM    K 3.9 02/01/2023 12:14 PM    K 4.0 07/22/2022 11:07 PM     02/01/2023 12:14 PM    CO2 7 02/01/2023 12:14 PM    BUN 19 02/01/2023 12:14 PM     U/A:  No components found for: Maryln Maiers, USPGRAV, UPH, Izaiah Folds, UKETONE, UBILI, UBLOOD, UNITRITE, UUROBIL, [facility] cruz, USQEPI, [place], UWBC, URBC, Synchari, [place]  HgBA1c:  No components found for: HGBA1C    Imaging Studies:     CT HEAD WO CONTRAST    Result Date: 2/1/2023  EXAMINATION: CT OF THE HEAD WITHOUT CONTRAST  2/1/2023 7:54 am TECHNIQUE: CT of the head was performed without the administration of intravenous contrast. Automated exposure control, iterative reconstruction, and/or weight based adjustment of the mA/kV was utilized to reduce the radiation dose to as low as reasonably achievable. COMPARISON: None. HISTORY: ORDERING SYSTEM PROVIDED HISTORY: Altered Mental Status TECHNOLOGIST PROVIDED HISTORY: Reason for exam:->Altered Mental Status Has a \"code stroke\" or \"stroke alert\" been called? ->No Decision Support Exception - unselect if not a suspected or confirmed emergency medical condition->Emergency Medical Condition (MA) What reading provider will be dictating this exam?->CRC FINDINGS: BRAIN/VENTRICLES: There is no acute intracranial hemorrhage, mass effect or midline shift. No abnormal extra-axial fluid collection. The gray-white differentiation is maintained without evidence of an acute infarct. There is no evidence of hydrocephalus. There is encephalomalacia is seen within the posterior left temporal lobe and posterior left parietal lobe consistent with an old infarct. These findings are stable. ORBITS: The visualized portion of the orbits demonstrate no acute abnormality.  SINUSES: The visualized paranasal sinuses and mastoid air cells demonstrate no acute abnormality. SOFT TISSUES/SKULL:  No acute abnormality of the visualized skull or soft tissues. 1. There is no acute intracranial hemorrhage 2. Encephalomalacia is seen within the posterior left temporal lobe and posterior left parietal lobe consistent with old infarct. CT ABDOMEN PELVIS W IV CONTRAST Additional Contrast? None    Result Date: 2/1/2023  EXAMINATION: CT OF THE ABDOMEN AND PELVIS WITH CONTRAST 2/1/2023 7:54 am TECHNIQUE: CT of the abdomen and pelvis was performed with the administration of intravenous contrast. Multiplanar reformatted images are provided for review. Automated exposure control, iterative reconstruction, and/or weight based adjustment of the mA/kV was utilized to reduce the radiation dose to as low as reasonably achievable. COMPARISON: None. HISTORY: ORDERING SYSTEM PROVIDED HISTORY: vomiting, pain, r/o perforation TECHNOLOGIST PROVIDED HISTORY: Additional Contrast?->None Reason for exam:->vomiting, pain, r/o perforation Decision Support Exception - unselect if not a suspected or confirmed emergency medical condition->Emergency Medical Condition (MA) What reading provider will be dictating this exam?->CRC FINDINGS: For findings in the chest see report CT chest performed the same occasion. There is no free intraperitoneal air. There are no acute inflammatory changes in the omental/mesenteric fat planes of the abdomen and pelvis. There no stranding inflammatory reaction or fluid accumulation in the retroperitoneal spaces of the abdomen or pelvis. There is predominant fluid distension with some dilatation of the stomach. This has to be correlate clinically. Cannot exclude a component of pylorus spasm or a partial gastric outlet obstruction pattern. There is also some thickening of the mucosa of the distal esophagus at and just above the esophagogastric junction. No conspicuous hiatal hernia is seen.  [place] Breeding amount of fecal contents throughout the colon represent a component of mild constipation. The appendix seen and has unremarkable appearance. There is no pericolonic inflammatory process This study is in a late portal venous IV contrast enhancement as it follows the CT chest.  The enhancement of the liver is uniform. The liver appears to be with borderline size the left lobe extends into the left upper quadrant being position of the anterior to the stomach and spleen. No focal lesions are seen in the liver in this more late portal phase IV contrast. Gallbladder is normally distended, it appears unremarkable. The biliary tree and pancreatic duct systems are not dilated. There is partial fat replacement of the pancreas which has normal size. No focal lesions are seen in the pancreas. The spleen appear unremarkable. Adrenals not enlarged. Kidneys are preserved size and cortical thickness. There is normal pattern of cortical enhancement for the kidneys. Small focal scar like is seen in the posterior aspect of the right kidney. There is a 1 mm calculus in the upper pole of the right kidney which present causes obstruction. No calculus seen in the left kidney. There is no obstructive uropathy in the right or the left kidneys. There is no stranding perirenal fat planes. Discrete calcified plaques are seen in the distal abdominal aorta and iliac arteries. There is a discrete dilatation of the most distal abdominal aorta measuring up to 1.6 cm, the infrarenal abdominal aorta measures 1.3 cm, which indicates a small fusiform aneurysm like formation. The IVC appear unremarkable. There is no midline retroperitoneal adenopathy. The bladder is relative well distended and of unremarkable appearance. Prostate gland and seminal vesicles appear unremarkable. Visualized bone structures demonstrate mild degenerative changes in the thoracic and lumbar spine.   Loss of height of lower thoracic vertebral bodies can be manifestation of previous Scheuermann's disease. 1.  Presence of predominant fluid distension with some dilatation of the stomach as above discussed. There is also some thickening of the wall of the distal esophagus across the esophagogastric junction. Findings to be correlate clinically. See above comments. 2.  No free intraperitoneal air or acute inflammatory process in the omental/mesentery fat planes no free intra peritoneal fluid accumulation. 3.  Tiny calculus in the upper pole of the right kidney present causes no obstruction. No obstructive uropathy in the right or in the left kidneys. 4.  Pattern of mild to moderate constipation. XR CHEST PORTABLE    Result Date: 2/1/2023  EXAMINATION: ONE XRAY VIEW OF THE CHEST 2/1/2023 7:27 am COMPARISON: None. HISTORY: ORDERING SYSTEM PROVIDED HISTORY: Shortness of breath TECHNOLOGIST PROVIDED HISTORY: Reason for exam:->Shortness of breath What reading provider will be dictating this exam?->CRC FINDINGS: The lungs are without acute focal process. There is no effusion or pneumothorax. The cardiomediastinal silhouette is without acute process. The osseous structures are without acute process. No acute process. CTA CHEST W CONTRAST    Result Date: 2/1/2023  EXAMINATION: CTA OF THE CHEST 2/1/2023 7:54 am TECHNIQUE: CTA of the chest was performed after the administration of intravenous contrast.  Multiplanar reformatted images are provided for review. MIP images are provided for review. Automated exposure control, iterative reconstruction, and/or weight based adjustment of the mA/kV was utilized to reduce the radiation dose to as low as reasonably achievable. COMPARISON: None.  HISTORY: ORDERING SYSTEM PROVIDED HISTORY: chest pain, back pain, r/o dissection TECHNOLOGIST PROVIDED HISTORY: Reason for exam:->chest pain, back pain, r/o dissection Decision Support Exception - unselect if not a suspected or confirmed emergency medical condition->Emergency Medical Condition (MA) What reading provider will be dictating this exam?->CRC FINDINGS: Pulmonary Arteries: Pulmonary arteries are adequately opacified for evaluation. No evidence of intraluminal filling defect to suggest pulmonary embolism. Main pulmonary artery is normal in caliber. Mediastinum: No evidence of mediastinal lymphadenopathy. The heart and pericardium demonstrate no acute abnormality. There is no acute abnormality of the thoracic aorta. Specifically, there is no dissection of the thoracic aorta. Lungs/pleura: The lungs are without acute process. No focal consolidation or pulmonary edema. No evidence of pleural effusion or pneumothorax. Upper Abdomen: Limited images of the upper abdomen are unremarkable. Soft Tissues/Bones: No acute bone or soft tissue abnormality. 1. There is no pulmonary embolus 2. There is no thoracic aortic aneurysm or dissection. 3. The lungs are clear. There is no focal infiltrate. 4. No gross upper abdominal pathology is noted. Please note the pancreas is not included on the examination. EKG: Sinus tachycardia  Left axis deviation  Inferior infarct , age undetermined      Resident's Assessment and Plan       Jasiel Avila is a 42 y.o. male with  has a past medical history of Anxiety, Depression, Seizures ([address] and Unspecified cerebral artery occlusion with cerebral infarction.     Came with CC: nausea, vomiting    Assessment  and plan:    DKA 2/2 medication noncompliance in setting of DMT2, HbA1c 13.1  Was not taking home insulin and doesn't know last time his insulin pump was refilled  pH 7.048, pCo2 < 15.0, pO2 315.4, HCO3-8.9, O2 ssat 99.9, pH vein 7.16  Na 134 ( corrected 139), K 4.7, Cl 86, HCO3 7, BUN 23, Creatinine 1.3, Anion ap 41, LA 4.3, , Calcium 9.6, Troponin 15>15, Lipase 1552, beta-hydroxybutyrate >4.5, UDS -negative,   Plan:  Follow DKA protocol  Replace electrolytes as needed  Stat on maintenance fluids 250 cc/hr  Follow endo recommendations    2. HAGMA 2/2 DKA and lactic acidosis  La 2.2>1.4  pCo2 < 15.0, pO2 315.4, HCO3-8.9, O2 ssat 99.9, pH vein 7.16  Plan:  Continue maintenance fluids    3. ANNE stage I , most likely prerenal in setting of vomiting and DKA  Creatinine 1.3 on admission  Plan:  Urine electrolytes, creatinine  Continnue maintenance fluids    4. Chest pain 2/2 DKA vs NSTEMI  Troponin stable 15>15>17  EKG: Inferior infarct , age undetermined  Plan:  Trend troponin q4      5. Elevated lipase-reactive 2/2 DKA vs pancreatitis  Ct abdomen-negative for pancreatitis  Lipase 1552  Plan:  Monitor clinical picture  Conitnue Dkaprotocol until AG < 12 *2, then bridge    6. Hx of Seizures, on carbamazepine, not taking. Last seizure-July 2022    7. Hx of Drug abuse, on suboxone, Didn't use drugs for the alst 7 years  Plan:  Resume suboxone, call pharmacy for the correct dosage    8. Hx of alcohol abuse, last drink-7 years ago  Hx of smoking-1/2 pack for many years    9. Hx of Hep C, HCV RNA 2016 3/6 MLN  Plan: Will evaluate  for any tx in the past after resolution on DKA    10. Hx of CVA infarct in L temporal and L parietal lobes      PT/OT: not indicated  DVT ppx: 30 mg BID  GI ppx: protonix    Next of Kin/ POA: none    Code Status: full    Monica Yung MD, PGY-1  Attending physician: Dr. Rodney Lazcano       NOTE: This report was transcribed using voice recognition software. Every effort was made to ensure accuracy; however, inadvertent computerized transcription errors may be present.

## 2023-02-02 NOTE — PROGRESS NOTES
[address] [facility]  Department of Internal Medicine   Internal Medicine Residency   MICU Progress Note    Patient:  Bo Duong 42 y.o. male  MRN: [id number]     Date of Service: 2/2/2023    Allergy: Patient has no known allergies. Subjective     Patient is seen and examined this morning. He is alert and oriented, with complaints of low back pain. He denies headache, dyspnea, chest pain, abdominal/epigastric pain, nausea, vomiting. Vital stable overnight.     24h change: AG trended down, TG elevated at 840, LDL not read >400, VLDL >400. CRP 0.7, creatinine improved to 1mg/dL. ROS: Denies Fever/chills/CP/SOB/N/V/D/C/Dysuria/Blood in stool or urine  Objective     VS: /81   Pulse (!) 110   Temp 97.5 °F (36.4 °C) (Temporal)   Resp 19   Ht 6' 7\" (2.007 m)   Wt 234 lb 12.6 oz (106.5 kg)   SpO2 95%   BMI 26.45 kg/m²           I & O - 24hr:   Intake/Output Summary (Last 24 hours) at 2/2/2023 1325  Last data filed at 2/2/2023 1300  Gross per 24 hour   Intake 3899.64 ml   Output 2825 ml   Net 1074.64 ml       Physical Exam:  General Appearance: alert, appears stated age, and cooperative  Neck: no adenopathy, no carotid bruit, no JVD, supple, symmetrical, trachea midline, and thyroid not enlarged, symmetric, no tenderness/mass/nodules  Lung: clear to auscultation bilaterally  Heart: regular rate and rhythm, S1, S2 normal, no murmur, click, rub or gallop  Abdomen: soft, non-tender; bowel sounds normal; no masses,  no organomegaly  Extremities:  extremities normal, atraumatic, no cyanosis or edema  Musculoskeletal: No joint swelling, no muscle tenderness. ROM normal in all joints of extremities.    Neurologic: Mental status: Alert, oriented, thought content appropriate    Lines     site day    Art line   None    TLC None    PICC None    Hemoaccess None    Oxygen:    Room air  Mechanical Ventilation:  N/A  ABG:     Lab Results   Component Value Date/Time    PH 7.245 02/01/2023 03:11 PM    PCO2 21.1 02/01/2023 03:11 PM    PO2 95.5 02/01/2023 03:11 PM    HCO3 8.9 02/01/2023 03:11 PM    BE -16.1 02/01/2023 03:11 PM    THB 15.4 02/01/2023 03:11 PM    O2SAT 97.6 02/01/2023 03:11 PM        Medications     Infusions: (Fluid, Sedation, Vasopressors)  IVF:   D5 0.45% NS at 150 ml/hr  Insulin drip at 16 units/hr  Vasopressors  N/A  Sedation  N/A    Nutrition:   NPO except sips of water  ATB:   Antibiotics  Days   None              Skin issues: None  Patient currently has   Urinary cath  DVT prophylaxis/ GI prophylaxis,    PT/OT    Labs   CBC with Differential:    Lab Results   Component Value Date/Time    WBC 13.3 02/02/2023 08:17 AM    RBC 5.10 02/02/2023 08:17 AM    HGB 14.9 02/02/2023 08:17 AM    HCT 39.9 02/02/2023 08:17 AM     02/02/2023 08:17 AM    MCV 78.2 02/02/2023 08:17 AM    MCH 29.8 02/02/2023 08:17 AM    MCHC 37.1 02/02/2023 08:17 AM    RDW 14.6 02/02/2023 08:17 AM    NRBC 1.8 02/01/2023 07:26 AM    SEGSPCT 72 10/12/2012 07:40 PM    BANDSPCT 1 08/20/2016 05:08 PM    METASPCT 1.8 02/01/2023 07:26 AM    LYMPHOPCT 24.2 02/02/2023 08:17 AM    MONOPCT 7.3 02/02/2023 08:17 AM    BASOPCT 0.3 02/02/2023 08:17 AM    MONOSABS 0.97 02/02/2023 08:17 AM    LYMPHSABS 3.21 02/02/2023 08:17 AM    EOSABS 0.02 02/02/2023 08:17 AM    BASOSABS 0.04 02/02/2023 08:17 AM     CMP:    Lab Results   Component Value Date/Time     02/02/2023 11:23 AM    K 3.7 02/02/2023 11:23 AM    K 4.0 07/22/2022 11:07 PM     02/02/2023 11:23 AM    CO2 15 02/02/2023 11:23 AM    BUN 6 02/02/2023 11:23 AM    CREATININE 0.7 02/02/2023 11:23 AM    GFRAA >60 07/22/2022 11:07 PM    LABGLOM >60 02/02/2023 11:23 AM    GLUCOSE 167 02/02/2023 11:23 AM    PROT 6.6 02/02/2023 08:17 AM    LABALBU 3.5 02/02/2023 08:17 AM    CALCIUM 8.4 02/02/2023 11:23 AM    BILITOT <0.2 02/02/2023 08:17 AM    ALKPHOS 98 02/02/2023 08:17 AM    AST 35 02/02/2023 08:17 AM    ALT 87 02/02/2023 08:17 AM     Magnesium:    Lab Results   Component Value Date/Time MG 1.8 02/02/2023 11:23 AM     Phosphorus:    Lab Results   Component Value Date/Time    PHOS 1.7 02/02/2023 11:23 AM     Troponin:    Lab Results   Component Value Date/Time    TROPONINI <0.01 08/01/2020 12:07 PM     Last 3 Troponin:    Lab Results   Component Value Date/Time    TROPONINI <0.01 08/01/2020 12:07 PM    TROPONINI <0.01 04/19/2019 08:30 PM    TROPONINI <0.01 03/04/2017 12:06 AM     U/A:    Lab Results   Component Value Date/Time    COLORU Yellow 02/02/2023 06:25 AM    PROTEINU 100 02/02/2023 06:25 AM    PHUR 6.0 02/02/2023 06:25 AM    WBCUA NONE 02/02/2023 06:25 AM    RBCUA 1-3 02/02/2023 06:25 AM    BACTERIA MANY 02/02/2023 06:25 AM    CLARITYU TURBID 02/02/2023 06:25 AM    SPECGRAV >=1.030 02/02/2023 06:25 AM    LEUKOCYTESUR Negative 02/02/2023 06:25 AM    UROBILINOGEN 0.2 02/02/2023 06:25 AM    BILIRUBINUR MODERATE 02/02/2023 06:25 AM    BLOODU MODERATE 02/02/2023 06:25 AM    GLUCOSEU 500 02/02/2023 06:25 AM    AMORPHOUS PRESENT 02/02/2023 06:25 AM     ABG:    Lab Results   Component Value Date/Time    PH 7.245 02/01/2023 03:11 PM    PCO2 21.1 02/01/2023 03:11 PM    PO2 95.5 02/01/2023 03:11 PM    HCO3 8.9 02/01/2023 03:11 PM    BE -16.1 02/01/2023 03:11 PM    O2SAT 97.6 02/01/2023 03:11 PM     HgBA1c:    Lab Results   Component Value Date/Time    LABA1C 13.1 08/02/2020 04:58 AM     Microalbumen/Creatinine ratio:  No components found for: RUCREAT    Imaging Studies:  CT head wo contrast 2/1/23   1. There is no acute intracranial hemorrhage   2. 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 CTA chest w contrast 2/1/23   1. There is no pulmonary embolus   2. There is no thoracic aortic aneurysm or dissection. 3. The lungs are clear. There is no focal infiltrate. 4. No gross upper abdominal pathology is noted. Please note the pancreas is   not included on the examination.          CT abdomen pelvis wo contrast 2/1/23   1. Presence of predominant fluid distension with some dilatation of the   stomach as above discussed. There is also some thickening of the wall of the   distal esophagus across the esophagogastric junction. Findings to be   correlate clinically. See above comments. 2.  No free intraperitoneal air or acute inflammatory process in the   omental/mesentery fat planes no free intra peritoneal fluid accumulation. 3.  Tiny calculus in the upper pole of the right kidney present causes no   obstruction. No obstructive uropathy in the right or in the left kidneys. 4.  Pattern of mild to moderate constipation. CXR 2/1/23   No acute process. Resident's Assessment and Plan     ASSESSMENT:  Nausea/vomiting likely 2/2 DKA. BHB >4.5, AG 41, CO2 7  DM type 2, poorly controlled. HBA1C 13.1  HAGMA multifactorial 2/2 DKA, starvation ketosis, lactic acidosis. (+) ketonuria, glucosuria  ANNE stage I likely pre-renal (dehydration from DKA, poor intake, GI loss). Baseline crea 0.7-0.8, creatinine at 1.3 mg/dL  Pseudohyponatremia 2/2 hyperglycemia  Chest pain can be demand ischemia vs NSTEMI, with elevated troponin, EKG ST, T wave inversion III, AVF (can be rate related changes). . Trop 15>>15>16>17  Sinus tachycardia 2/2 DHN, with ischemic changes on EKG, can be rate related, rpt EKG ST  bpm, with q waves III, AVF, denies chest pain  Acute pancreatitis 2/2 DKA. With elevated lipase at 1552, (+) epigastric pain on admission,  >>4410. CT abdomen negative for pancreatic changes  Leukocytosis, likely reactive 2/2 DKA  Elevated hemoglobin likely hemoconcentration 2/2 dehydration due to DKA, resolved. Hb down to 14.8  Hypertriglyceridemia and hyperlipoproteinemia 2/2 DKA. >>4410  Hx L temporal and L parietal lobe  Hx epilepsy. On carbamazepine  Hx hepatitis C, HCV RNA in 2016 3.6 million.  ? If treated  Hx drug abuse, on Suboxone  Hx tobacco abuse        PLAN:  -BMP, Mg, Phos q4hrs  -venous pH q4hrs  -POCT glucose q 1-2 hrs  -DKA protocol  -IV team to insert midline for frequent lab draws and need for  increased blood amount for labs to run (because of high TG)  -start atorvastatin 80 mg at HS, star now  -give fenofibrate 150 mg now, x one dose  -continue insulin drip  -continue D5 1/2 NS at 150 ml/hr  -NaHCO3 drip dcd yesterday  -continue NaHCO3 drip at 150 ml/hr, d/c for pH >/= 7.2  -bridge patient to basal bolus once AG <12, CO2 >15 x 2 consecutive times, correct AG for albumin  -strict I & O  -resume Suboxone, carbamazepine home medication, can have sips of water with PO meds  -enoxaparin for DVT prophylaxis  -pantoprazole for GI prophylaxis  -hydroxyzine 25 mg TID PO prn for anxiety  -follow endocrinology recs  -continue to monitor renal function, avoid NSAIDs and other nephrotoxic meds     PT/OT evaluation:  DVT prophylaxis/ GI prophylaxis: enoxaparin/pantoprazole  Disposition: home +/- home health / [facility] / [place] [id number] / Kori Guillory MD, PGY-2    Attending physician: Dr. Colette Inman

## 2023-02-02 NOTE — CARE COORDINATION
Care Coordination :  42 yr old male admitted with DKA. Met at bedside with patient and his partner Rakesh Matos  to assess for discharge planning. Patient alert. Lives with Cristal and their 3 children ages 17,7,and 5 in a one floor rented duplex. 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 Rakesh Matos is an amputee who is independent  with wheelchair and not working. Patient has an insulin pump. He had been following with  Dr. Ilir Mcmahan in endocrinology. He was told to make an appointment for new medication for his pump. Patient did not follow up and was using insulin pens he had at home but admits he was inconsistent. He has not PCP but has a phone number and plans to call and set up an appointment . Patient and Rakesh Matos verbalized understanding to follow with endocrinology and establish PCP at discharge. Plan is to discharge home. PT OT evaluations have been ordered. No needs anticipated.

## 2023-02-02 NOTE — PROGRESS NOTES
Patient vomiting brownish colored emesis into basin. Patient given zofran. Resident aware. No new orders at this time.

## 2023-02-02 NOTE — PLAN OF CARE
Problem: Gastrointestinal - Adult  Goal: Maintains adequate nutritional intake  2/1/2023 2345 by Lupe Peterson RN  Outcome: Not Progressing  2/1/2023 2343 by Lupe Peterson RN  Outcome: Not Progressing     Problem: Chronic Conditions and Co-morbidities  Goal: Patient's chronic conditions and co-morbidity symptoms are monitored and maintained or improved  2/1/2023 2345 by Lupe Peterson RN  Outcome: Progressing  2/1/2023 2343 by Lupe Peterson RN  Outcome: Progressing  Flowsheets (Taken 2/1/2023 2000)  Care Plan - Patient's Chronic Conditions and Co-Morbidity Symptoms are Monitored and Maintained or Improved:   Monitor and assess patient's chronic conditions and comorbid symptoms for stability, deterioration, or improvement   Collaborate with multidisciplinary team to address chronic and comorbid conditions and prevent exacerbation or deterioration   Update acute care plan with appropriate goals if chronic or comorbid symptoms are exacerbated and prevent overall improvement and discharge     Problem: Discharge Planning  Goal: Discharge to home or other facility with appropriate resources  2/1/2023 2345 by Lupe Peterson RN  Outcome: Progressing  2/1/2023 2343 by Lupe Peterson RN  Outcome: Progressing  Flowsheets (Taken 2/1/2023 2000)  Discharge to home or other facility with appropriate resources: Identify barriers to discharge with patient and caregiver     Problem: Pain  Goal: Verbalizes/displays adequate comfort level or baseline comfort level  2/1/2023 2345 by Lupe Peterson RN  Outcome: Progressing  2/1/2023 2343 by Lupe Peterson RN  Outcome: Progressing  Flowsheets  Taken 2/1/2023 2000 by Lupe Peterson RN  Verbalizes/displays adequate comfort level or baseline comfort level:   Encourage patient to monitor pain and request assistance   Assess pain using appropriate pain scale   Administer analgesics based on type and severity of pain and evaluate response   Implement non-pharmacological measures as appropriate and evaluate response   Consider cultural and social influences on pain and pain management   Notify Licensed Independent Practitioner if interventions unsuccessful or patient reports new pain  Taken 2/1/2023 1147 by Cesia Zaidi RN  Verbalizes/displays adequate comfort level or baseline comfort level:   Encourage patient to monitor pain and request assistance   Assess pain using appropriate pain scale   Administer analgesics based on type and severity of pain and evaluate response   Implement non-pharmacological measures as appropriate and evaluate response   Consider cultural and social influences on pain and pain management   Notify Licensed Independent Practitioner if interventions unsuccessful or patient reports new pain     Problem: Neurosensory - Adult  Goal: Achieves stable or improved neurological status  2/1/2023 2345 by April Ley RN  Outcome: Progressing  2/1/2023 2343 by April Ley RN  Outcome: Progressing  Flowsheets (Taken 2/1/2023 2000)  Achieves stable or improved neurological status: Assess for and report changes in neurological status  Goal: Absence of seizures  2/1/2023 2345 by April Ley RN  Outcome: Progressing  2/1/2023 2343 by April Ley RN  Outcome: Progressing  Flowsheets (Taken 2/1/2023 2000)  Absence of seizures:   Monitor for seizure activity.   If seizure occurs, document type and location of movements and any associated apnea   If seizure occurs, turn head to side and suction secretions as needed  Goal: Remains free of injury related to seizures activity  2/1/2023 2345 by April Ley RN  Outcome: Progressing  2/1/2023 2343 by April Ley RN  Outcome: Progressing  Flowsheets (Taken 2/1/2023 2000)  Remains free of injury related to seizure activity:   Maintain airway, patient safety  and administer oxygen as ordered   Monitor patient for seizure activity, document and report duration and description of seizure to Licensed Independent Practitioner If seizure occurs, turn patient to side and suction secretions as needed     Problem: Respiratory - Adult  Goal: Achieves optimal ventilation and oxygenation  2/1/2023 2345 by Derik Bruno RN  Outcome: Progressing  2/1/2023 2343 by Derik Bruno RN  Outcome: Progressing  Flowsheets (Taken 2/1/2023 2000)  Achieves optimal ventilation and oxygenation:   Assess for changes in respiratory status   Assess for changes in mentation and behavior   Position to facilitate oxygenation and minimize respiratory effort   Oxygen supplementation based on oxygen saturation or arterial blood gases   Initiate smoking cessation protocol as indicated   Encourage broncho-pulmonary hygiene including cough, deep breathe, incentive spirometry   Assess the need for suctioning and aspirate as needed   Assess and instruct to report shortness of breath or any respiratory difficulty   Respiratory therapy support as indicated     Problem: Cardiovascular - Adult  Goal: Maintains optimal cardiac output and hemodynamic stability  2/1/2023 2345 by Derik Bruno RN  Outcome: Progressing  2/1/2023 2343 by Derik Bruno RN  Outcome: Progressing  Flowsheets (Taken 2/1/2023 2000)  Maintains optimal cardiac output and hemodynamic stability:   Monitor blood pressure and heart rate   Monitor urine output and notify Licensed Independent Practitioner for values outside of normal range  Goal: Absence of cardiac dysrhythmias or at baseline  2/1/2023 2345 by Derik Bruno RN  Outcome: Progressing  2/1/2023 2343 by Derik Bruno RN  Outcome: Progressing  Flowsheets (Taken 2/1/2023 2000)  Absence of cardiac dysrhythmias or at baseline:   Monitor cardiac rate and rhythm   Assess for signs of decreased cardiac output     Problem: Skin/Tissue Integrity - Adult  Goal: Skin integrity remains intact  2/1/2023 2345 by Derik Bruno RN  Outcome: Progressing  2/1/2023 2343 by Derik Bruno RN  Outcome: Progressing  Flowsheets (Taken 2/1/2023 2000)  Skin Integrity Remains Intact:   Monitor for areas of redness and/or skin breakdown   Assess vascular access sites hourly   Every 4-6 hours minimum: Change oxygen saturation probe site   Every 4-6 hours: If on nasal continuous positive airway pressure, respiratory therapy assesses nares and determine need for appliance change or resting period  Goal: Oral mucous membranes remain intact  2/1/2023 2345 by Rachna Mascorro RN  Outcome: Progressing  2/1/2023 2343 by Rachna Mascorro RN  Outcome: Progressing  Flowsheets (Taken 2/1/2023 2000)  Oral Mucous Membranes Remain Intact:   Assess oral mucosa and hygiene practices   Implement preventative oral hygiene regimen     Problem: Musculoskeletal - Adult  Goal: Return mobility to safest level of function  Outcome: Progressing  Goal: Return ADL status to a safe level of function  Outcome: Progressing     Problem: Gastrointestinal - Adult  Goal: Minimal or absence of nausea and vomiting  2/1/2023 2345 by Rachna Mascorro RN  Outcome: Progressing  2/1/2023 2343 by Rachna Mascorro RN  Outcome: Progressing  Goal: Maintains or returns to baseline bowel function  2/1/2023 2345 by Rachna Mascorro RN  Outcome: Progressing  2/1/2023 2343 by Rachna Mascorro RN  Outcome: Progressing  Flowsheets (Taken 2/1/2023 2000)  Maintains or returns to baseline bowel function: Assess bowel function     Problem: Genitourinary - Adult  Goal: Urinary catheter remains patent  2/1/2023 2345 by Rachna Mascorro RN  Outcome: Progressing  2/1/2023 2343 by Rachna Mascorro RN  Outcome: Progressing  Flowsheets (Taken 2/1/2023 2000)  Urinary catheter remains patent: Assess patency of urinary catheter     Problem: Infection - Adult  Goal: Absence of infection at discharge  2/1/2023 2345 by Rachna Mascorro RN  Outcome: Progressing  2/1/2023 2343 by Rachna Mascorro RN  Outcome: Progressing  Flowsheets (Taken 2/1/2023 2000)  Absence of infection at discharge:   Assess and monitor for signs and symptoms of infection   Monitor lab/diagnostic results   Monitor all insertion sites i.e., indwelling lines, tubes and drains  Goal: Absence of infection during hospitalization  2/1/2023 2345 by You Brock RN  Outcome: Progressing  2/1/2023 2343 by You Brock RN  Outcome: Progressing  Flowsheets (Taken 2/1/2023 2000)  Absence of infection during hospitalization:   Assess and monitor for signs and symptoms of infection   Monitor lab/diagnostic results   Monitor all insertion sites i.e., indwelling lines, tubes and drains  Goal: Absence of fever/infection during anticipated neutropenic period  2/1/2023 2345 by You Brock RN  Outcome: Progressing  2/1/2023 2343 by You Brock RN  Outcome: Progressing  Flowsheets (Taken 2/1/2023 2000)  Absence of fever/infection during anticipated neutropenic period: Monitor white blood cell count     Problem: Metabolic/Fluid and Electrolytes - Adult  Goal: Electrolytes maintained within normal limits  2/1/2023 2345 by You Brock RN  Outcome: Progressing  2/1/2023 2343 by You Brock RN  Outcome: Progressing  Flowsheets (Taken 2/1/2023 2000)  Electrolytes maintained within normal limits:   Monitor labs and assess patient for signs and symptoms of electrolyte imbalances   Administer electrolyte replacement as ordered   Monitor response to electrolyte replacements, including repeat lab results as appropriate  Goal: Hemodynamic stability and optimal renal function maintained  2/1/2023 2345 by You Brock RN  Outcome: Progressing  2/1/2023 2343 by You Brock RN  Outcome: Progressing  Flowsheets (Taken 2/1/2023 2000)  Hemodynamic stability and optimal renal function maintained:   Monitor labs and assess for signs and symptoms of volume excess or deficit   Monitor intake, output and patient weight  Goal: Glucose maintained within prescribed range  2/1/2023 2345 by You Brock RN  Outcome: Progressing  2/1/2023 2343 by You Brock RN  Outcome: Progressing  Flowsheets (Taken 2/1/2023 2000)  Glucose maintained within prescribed range:   Monitor blood glucose as ordered   Assess for signs and symptoms of hyperglycemia and hypoglycemia   Administer ordered medications to maintain glucose within target range   Assess barriers to adequate nutritional intake and initiate nutrition consult as needed   Instruct patient on self management of diabetes and initiate consult as needed     Problem: Hematologic - Adult  Goal: Maintains hematologic stability  2/1/2023 2345 by Richard Buck RN  Outcome: Progressing  2/1/2023 2343 by Richard Buck RN  Outcome: Progressing  Flowsheets (Taken 2/1/2023 2000)  Maintains hematologic stability:   Assess for signs and symptoms of bleeding or hemorrhage   Monitor labs for bleeding or clotting disorders     Problem: Safety - Adult  Goal: Free from fall injury  2/1/2023 2345 by Richard Buck RN  Outcome: Progressing  2/1/2023 2343 by Richard Buck RN  Outcome: Progressing  Flowsheets (Taken 2/1/2023 2000)  Free From Fall Injury:   Instruct family/caregiver on patient safety   Based on caregiver fall risk screen, instruct family/caregiver to ask for assistance with transferring infant if caregiver noted to have fall risk factors     Problem: Skin/Tissue Integrity  Goal: Absence of new skin breakdown  Description: 1. Monitor for areas of redness and/or skin breakdown  2. Assess vascular access sites hourly  3. Every 4-6 hours minimum:  Change oxygen saturation probe site  4. Every 4-6 hours:  If on nasal continuous positive airway pressure, respiratory therapy assess nares and determine need for appliance change or resting period.   2/1/2023 2345 by Richard Buck RN  Outcome: Progressing  2/1/2023 2343 by Richard Buck RN  Outcome: Progressing     Problem: ABCDS Injury Assessment  Goal: Absence of physical injury  2/1/2023 2345 by Richard Buck RN  Outcome: Progressing  2/1/2023 2343 by Richard Buck RN  Outcome: Progressing  Flowsheets (Taken 2/1/2023 2000)  Absence of Physical Injury: Implement safety measures based on patient assessment

## 2023-02-02 NOTE — PROGRESS NOTES
[address] [facility]  Internal Medicine Residency / [address]    Attending Physician Statement  I have discussed the case, including pertinent history and exam findings with the resident and the team.  I have seen and examined the patient and the key elements of the encounter have been performed by me. I agree with the assessment, plan and orders as documented by the resident. Anion gap closed this AM  Labs reviewed  BS improved<200  HR>100  Impression; Resolving DKA on insulin drip  Follow with Endocrinology in ICU  Remainder of medical problems as per resident note.       Jun Doherty MD [facility]  Internal Medicine Residency Faculty

## 2023-02-02 NOTE — PROGRESS NOTES
Patient with very poor venous access. Troponin and venous pH hemolyzed per lab. Labs able to be drawn by this RN and resent to lab. However, resident notified of need for q4h labs and possible need for line placement in order to continue with q4h blood draws per DKA protocol.

## 2023-02-03 LAB
ANION GAP SERPL CALCULATED.3IONS-SCNC: 12 MMOL/L (ref 7–16)
BASOPHILS ABSOLUTE: 0.05 E9/L (ref 0–0.2)
BASOPHILS RELATIVE PERCENT: 0.4 % (ref 0–2)
BUN BLDV-MCNC: 4 MG/DL (ref 6–20)
CALCIUM IONIZED: 1.3 MMOL/L (ref 1.15–1.33)
CALCIUM SERPL-MCNC: 7.9 MG/DL (ref 8.6–10.2)
CHLORIDE BLD-SCNC: 100 MMOL/L (ref 98–107)
CO2: 16 MMOL/L (ref 22–29)
CREAT SERPL-MCNC: 0.6 MG/DL (ref 0.7–1.2)
EOSINOPHILS ABSOLUTE: 0.05 E9/L (ref 0.05–0.5)
EOSINOPHILS RELATIVE PERCENT: 0.4 % (ref 0–6)
GFR SERPL CREATININE-BSD FRML MDRD: >60 ML/MIN/1.73
GLUCOSE BLD-MCNC: 184 MG/DL (ref 74–99)
HCT VFR BLD CALC: 36.6 % (ref 37–54)
HEMOGLOBIN: 12 G/DL (ref 12.5–16.5)
IMMATURE GRANULOCYTES #: 0.13 E9/L
IMMATURE GRANULOCYTES %: 1.2 % (ref 0–5)
LYMPHOCYTES ABSOLUTE: 3.11 E9/L (ref 1.5–4)
LYMPHOCYTES RELATIVE PERCENT: 27.9 % (ref 20–42)
MAGNESIUM: 1.9 MG/DL (ref 1.6–2.6)
MCH RBC QN AUTO: 26.8 PG (ref 26–35)
MCHC RBC AUTO-ENTMCNC: 34.5 % (ref 32–34.5)
MCV RBC AUTO: 80.3 FL (ref 80–99.9)
METER GLUCOSE: 143 MG/DL (ref 74–99)
METER GLUCOSE: 148 MG/DL (ref 74–99)
METER GLUCOSE: 157 MG/DL (ref 74–99)
METER GLUCOSE: 160 MG/DL (ref 74–99)
METER GLUCOSE: 177 MG/DL (ref 74–99)
METER GLUCOSE: 185 MG/DL (ref 74–99)
METER GLUCOSE: 192 MG/DL (ref 74–99)
METER GLUCOSE: 194 MG/DL (ref 74–99)
METER GLUCOSE: 197 MG/DL (ref 74–99)
METER GLUCOSE: 244 MG/DL (ref 74–99)
METER GLUCOSE: 248 MG/DL (ref 74–99)
METER GLUCOSE: 272 MG/DL (ref 74–99)
MONOCYTES ABSOLUTE: 0.8 E9/L (ref 0.1–0.95)
MONOCYTES RELATIVE PERCENT: 7.2 % (ref 2–12)
NEUTROPHILS ABSOLUTE: 7.01 E9/L (ref 1.8–7.3)
NEUTROPHILS RELATIVE PERCENT: 62.9 % (ref 43–80)
PDW BLD-RTO: 15.4 FL (ref 11.5–15)
PH VENOUS: 7.34 (ref 7.35–7.45)
PHOSPHORUS: 2.7 MG/DL (ref 2.5–4.5)
PLATELET # BLD: 242 E9/L (ref 130–450)
PMV BLD AUTO: 11.9 FL (ref 7–12)
POTASSIUM SERPL-SCNC: 4 MMOL/L (ref 3.5–5)
RBC # BLD: 4.56 E12/L (ref 3.8–5.8)
REASON FOR REJECTION: NORMAL
REASON FOR REJECTION: NORMAL
REJECTED TEST: NORMAL
REJECTED TEST: NORMAL
SODIUM BLD-SCNC: 128 MMOL/L (ref 132–146)
TRIGL SERPL-MCNC: 2398 MG/DL (ref 0–149)
WBC # BLD: 11.2 E9/L (ref 4.5–11.5)

## 2023-02-03 PROCEDURE — 97161 PT EVAL LOW COMPLEX 20 MIN: CPT

## 2023-02-03 PROCEDURE — 84100 ASSAY OF PHOSPHORUS: CPT

## 2023-02-03 PROCEDURE — 97530 THERAPEUTIC ACTIVITIES: CPT

## 2023-02-03 PROCEDURE — S5553 INSULIN LONG ACTING 5 U: HCPCS | Performed by: INTERNAL MEDICINE

## 2023-02-03 PROCEDURE — 82330 ASSAY OF CALCIUM: CPT

## 2023-02-03 PROCEDURE — 6370000000 HC RX 637 (ALT 250 FOR IP): Performed by: INTERNAL MEDICINE

## 2023-02-03 PROCEDURE — 6360000002 HC RX W HCPCS

## 2023-02-03 PROCEDURE — 6370000000 HC RX 637 (ALT 250 FOR IP): Performed by: STUDENT IN AN ORGANIZED HEALTH CARE EDUCATION/TRAINING PROGRAM

## 2023-02-03 PROCEDURE — 80048 BASIC METABOLIC PNL TOTAL CA: CPT

## 2023-02-03 PROCEDURE — 6360000002 HC RX W HCPCS: Performed by: INTERNAL MEDICINE

## 2023-02-03 PROCEDURE — 2580000003 HC RX 258

## 2023-02-03 PROCEDURE — 82800 BLOOD PH: CPT

## 2023-02-03 PROCEDURE — 94660 CPAP INITIATION&MGMT: CPT

## 2023-02-03 PROCEDURE — 2700000000 HC OXYGEN THERAPY PER DAY

## 2023-02-03 PROCEDURE — C9113 INJ PANTOPRAZOLE SODIUM, VIA: HCPCS | Performed by: INTERNAL MEDICINE

## 2023-02-03 PROCEDURE — 2500000003 HC RX 250 WO HCPCS

## 2023-02-03 PROCEDURE — 2580000003 HC RX 258: Performed by: INTERNAL MEDICINE

## 2023-02-03 PROCEDURE — 85025 COMPLETE CBC W/AUTO DIFF WBC: CPT

## 2023-02-03 PROCEDURE — 82962 GLUCOSE BLOOD TEST: CPT

## 2023-02-03 PROCEDURE — 83735 ASSAY OF MAGNESIUM: CPT

## 2023-02-03 PROCEDURE — 2000000000 HC ICU R&B

## 2023-02-03 PROCEDURE — S5553 INSULIN LONG ACTING 5 U: HCPCS | Performed by: STUDENT IN AN ORGANIZED HEALTH CARE EDUCATION/TRAINING PROGRAM

## 2023-02-03 PROCEDURE — 84478 ASSAY OF TRIGLYCERIDES: CPT

## 2023-02-03 PROCEDURE — 97165 OT EVAL LOW COMPLEX 30 MIN: CPT

## 2023-02-03 RX ORDER — INSULIN GLARGINE-YFGN 100 [IU]/ML
26 INJECTION, SOLUTION SUBCUTANEOUS ONCE
Status: COMPLETED | OUTPATIENT
Start: 2023-02-03 | End: 2023-02-03

## 2023-02-03 RX ORDER — INSULIN LISPRO 100 [IU]/ML
15 INJECTION, SOLUTION INTRAVENOUS; SUBCUTANEOUS
Status: DISCONTINUED | OUTPATIENT
Start: 2023-02-03 | End: 2023-02-05 | Stop reason: HOSPADM

## 2023-02-03 RX ORDER — DEXTROSE MONOHYDRATE 100 MG/ML
INJECTION, SOLUTION INTRAVENOUS CONTINUOUS PRN
Status: DISCONTINUED | OUTPATIENT
Start: 2023-02-03 | End: 2023-02-05 | Stop reason: HOSPADM

## 2023-02-03 RX ORDER — OMEGA-3-ACID ETHYL ESTERS 1 G/1
2 CAPSULE, LIQUID FILLED ORAL 2 TIMES DAILY
Status: DISCONTINUED | OUTPATIENT
Start: 2023-02-03 | End: 2023-02-05 | Stop reason: HOSPADM

## 2023-02-03 RX ORDER — LANOLIN ALCOHOL/MO/W.PET/CERES
500 CREAM (GRAM) TOPICAL NIGHTLY
Status: DISCONTINUED | OUTPATIENT
Start: 2023-02-03 | End: 2023-02-05 | Stop reason: HOSPADM

## 2023-02-03 RX ORDER — INSULIN LISPRO 100 [IU]/ML
10 INJECTION, SOLUTION INTRAVENOUS; SUBCUTANEOUS
Status: DISCONTINUED | OUTPATIENT
Start: 2023-02-03 | End: 2023-02-03

## 2023-02-03 RX ORDER — INSULIN GLARGINE-YFGN 100 [IU]/ML
22 INJECTION, SOLUTION SUBCUTANEOUS ONCE
Status: COMPLETED | OUTPATIENT
Start: 2023-02-03 | End: 2023-02-03

## 2023-02-03 RX ORDER — POLYETHYLENE GLYCOL 3350 17 G/17G
17 POWDER, FOR SOLUTION ORAL DAILY
Status: DISCONTINUED | OUTPATIENT
Start: 2023-02-03 | End: 2023-02-05 | Stop reason: HOSPADM

## 2023-02-03 RX ORDER — INSULIN LISPRO 100 [IU]/ML
0-12 INJECTION, SOLUTION INTRAVENOUS; SUBCUTANEOUS NIGHTLY
Status: DISCONTINUED | OUTPATIENT
Start: 2023-02-03 | End: 2023-02-05 | Stop reason: HOSPADM

## 2023-02-03 RX ORDER — INSULIN LISPRO 100 [IU]/ML
0-18 INJECTION, SOLUTION INTRAVENOUS; SUBCUTANEOUS
Status: DISCONTINUED | OUTPATIENT
Start: 2023-02-03 | End: 2023-02-05 | Stop reason: HOSPADM

## 2023-02-03 RX ORDER — INSULIN LISPRO 100 [IU]/ML
0-4 INJECTION, SOLUTION INTRAVENOUS; SUBCUTANEOUS NIGHTLY
Status: DISCONTINUED | OUTPATIENT
Start: 2023-02-03 | End: 2023-02-03

## 2023-02-03 RX ORDER — INSULIN GLARGINE-YFGN 100 [IU]/ML
48 INJECTION, SOLUTION SUBCUTANEOUS EVERY MORNING
Status: DISCONTINUED | OUTPATIENT
Start: 2023-02-04 | End: 2023-02-05 | Stop reason: HOSPADM

## 2023-02-03 RX ORDER — SENNA PLUS 8.6 MG/1
1 TABLET ORAL NIGHTLY
Status: DISCONTINUED | OUTPATIENT
Start: 2023-02-03 | End: 2023-02-05 | Stop reason: HOSPADM

## 2023-02-03 RX ORDER — INSULIN LISPRO 100 [IU]/ML
0-16 INJECTION, SOLUTION INTRAVENOUS; SUBCUTANEOUS
Status: DISCONTINUED | OUTPATIENT
Start: 2023-02-03 | End: 2023-02-03

## 2023-02-03 RX ORDER — INSULIN GLARGINE-YFGN 100 [IU]/ML
48 INJECTION, SOLUTION SUBCUTANEOUS NIGHTLY
Status: DISCONTINUED | OUTPATIENT
Start: 2023-02-03 | End: 2023-02-03

## 2023-02-03 RX ADMIN — INSULIN GLARGINE-YFGN 22 UNITS: 100 INJECTION, SOLUTION SUBCUTANEOUS at 10:24

## 2023-02-03 RX ADMIN — FENOFIBRATE 160 MG: 160 TABLET ORAL at 08:53

## 2023-02-03 RX ADMIN — BUPRENORPHINE HYDROCHLORIDE AND NALOXONE HYDROCHLORIDE DIHYDRATE 1 TABLET: 8; 2 TABLET SUBLINGUAL at 08:53

## 2023-02-03 RX ADMIN — SENNOSIDES 8.6 MG: 8.6 TABLET, FILM COATED ORAL at 21:15

## 2023-02-03 RX ADMIN — POLYETHYLENE GLYCOL 3350 17 G: 17 POWDER, FOR SOLUTION ORAL at 10:24

## 2023-02-03 RX ADMIN — SODIUM CHLORIDE 0.1 UNITS/KG/HR: 9 INJECTION, SOLUTION INTRAVENOUS at 23:24

## 2023-02-03 RX ADMIN — CARBAMAZEPINE 200 MG: 100 TABLET, EXTENDED RELEASE ORAL at 21:13

## 2023-02-03 RX ADMIN — Medication 10 MEQ: at 00:19

## 2023-02-03 RX ADMIN — Medication 2000 UNITS: at 08:52

## 2023-02-03 RX ADMIN — ATORVASTATIN CALCIUM 80 MG: 40 TABLET, FILM COATED ORAL at 21:16

## 2023-02-03 RX ADMIN — SODIUM PHOSPHATE, MONOBASIC, MONOHYDRATE AND SODIUM PHOSPHATE, DIBASIC, ANHYDROUS 10 MMOL: 276; 142 INJECTION, SOLUTION INTRAVENOUS at 06:27

## 2023-02-03 RX ADMIN — Medication 10 MEQ: at 05:19

## 2023-02-03 RX ADMIN — SODIUM CHLORIDE 0.1 UNITS/KG/HR: 9 INJECTION, SOLUTION INTRAVENOUS at 13:35

## 2023-02-03 RX ADMIN — ENOXAPARIN SODIUM 30 MG: 100 INJECTION SUBCUTANEOUS at 08:53

## 2023-02-03 RX ADMIN — Medication 500 MG: at 21:18

## 2023-02-03 RX ADMIN — CARBAMAZEPINE 100 MG: 100 TABLET, EXTENDED RELEASE ORAL at 08:53

## 2023-02-03 RX ADMIN — SODIUM CHLORIDE, PRESERVATIVE FREE 10 ML: 5 INJECTION INTRAVENOUS at 21:27

## 2023-02-03 RX ADMIN — OMEGA-3-ACID ETHYL ESTERS 2 G: 1 CAPSULE, LIQUID FILLED ORAL at 10:29

## 2023-02-03 RX ADMIN — SODIUM CHLORIDE, PRESERVATIVE FREE 10 ML: 5 INJECTION INTRAVENOUS at 08:51

## 2023-02-03 RX ADMIN — INSULIN GLARGINE-YFGN 26 UNITS: 100 INJECTION, SOLUTION SUBCUTANEOUS at 04:59

## 2023-02-03 RX ADMIN — OMEGA-3-ACID ETHYL ESTERS 2 G: 1 CAPSULE, LIQUID FILLED ORAL at 21:14

## 2023-02-03 RX ADMIN — PANTOPRAZOLE SODIUM 40 MG: 40 INJECTION, POWDER, FOR SOLUTION INTRAVENOUS at 08:51

## 2023-02-03 RX ADMIN — ENOXAPARIN SODIUM 30 MG: 100 INJECTION SUBCUTANEOUS at 21:12

## 2023-02-03 RX ADMIN — Medication 10 MEQ: at 06:26

## 2023-02-03 ASSESSMENT — PAIN SCALES - GENERAL
PAINLEVEL_OUTOF10: 0

## 2023-02-03 NOTE — PLAN OF CARE
Problem: Chronic Conditions and Co-morbidities  Goal: Patient's chronic conditions and co-morbidity symptoms are monitored and maintained or improved  2/3/2023 0727 by Dylan Harrington RN  Outcome: Progressing  2/3/2023 0503 by Frankey Gone, RN  Outcome: Progressing     Problem: Discharge Planning  Goal: Discharge to home or other facility with appropriate resources  2/3/2023 0727 by Dylan Harrington RN  Outcome: Progressing  2/3/2023 0503 by Frankey Gone, RN  Outcome: Progressing     Problem: Pain  Goal: Verbalizes/displays adequate comfort level or baseline comfort level  2/3/2023 0727 by Dylan Harrington RN  Outcome: Progressing  2/3/2023 0503 by Frankey Gone, RN  Outcome: Progressing     Problem: Neurosensory - Adult  Goal: Achieves stable or improved neurological status  2/3/2023 0727 by Dylan Harrington RN  Outcome: Progressing  2/3/2023 0503 by Frankey Gone, RN  Outcome: Progressing  Goal: Absence of seizures  2/3/2023 0727 by Dylan Harrington RN  Outcome: Progressing  2/3/2023 0503 by Frankey Gone, RN  Outcome: Progressing  Goal: Remains free of injury related to seizures activity  2/3/2023 0727 by Dylan Harrington RN  Outcome: Progressing  2/3/2023 0503 by Frankey Gone, RN  Outcome: Progressing  Goal: Achieves maximal functionality and self care  Outcome: Progressing     Problem: Respiratory - Adult  Goal: Achieves optimal ventilation and oxygenation  Outcome: Progressing     Problem: Cardiovascular - Adult  Goal: Maintains optimal cardiac output and hemodynamic stability  Outcome: Progressing  Goal: Absence of cardiac dysrhythmias or at baseline  Outcome: Progressing     Problem: Skin/Tissue Integrity - Adult  Goal: Skin integrity remains intact  Outcome: Progressing  Goal: Incisions, wounds, or drain sites healing without S/S of infection  Outcome: Progressing  Goal: Oral mucous membranes remain intact  Outcome: Progressing

## 2023-02-03 NOTE — PROGRESS NOTES
[facility]  Internal Medicine Residency Program  Progress Note - House Team     Patient:  Kevin Or 42 y.o. male MRN: [id number]     Date of Service: 2/3/2023     CC: Chest pain, N/V, feels dehydrated, back pain. Overnight events: AG closed twice and patient was bridged appropriately     Subjective     Patient was seen and examined this morning at bedside in no acute distress. He reports no new complaints. Objective     Physical Exam:  Vitals: BP (!) 140/95   Pulse (!) 112   Temp 98.2 °F (36.8 °C) (Temporal)   Resp 19   Ht 6' 7\" (2.007 m)   Wt 234 lb (106.1 kg)   SpO2 94%   BMI 26.36 kg/m²     I & O - 24hr: No intake/output data recorded. General Appearance: Alert  HEENT:  Head: Normal, normocephalic, atraumatic.   Neck: no carotid bruit  Lung: clear to auscultation bilaterally  Heart: regular rate and rhythm, S1, S2 normal, no murmur, click, rub or gallop  Extremities:  extremities normal, atraumatic, no cyanosis or edema  Neurologic: Mental status: Alert, oriented, thought content appropriate    Pertinent Labs & Imaging Studies     CBC:   Lab Results   Component Value Date/Time    WBC 11.2 02/03/2023 04:10 AM    RBC 4.56 02/03/2023 04:10 AM    HGB 12.0 02/03/2023 04:10 AM    HCT 36.6 02/03/2023 04:10 AM    MCV 80.3 02/03/2023 04:10 AM    MCH 26.8 02/03/2023 04:10 AM    MCHC 34.5 02/03/2023 04:10 AM    RDW 15.4 02/03/2023 04:10 AM     02/03/2023 04:10 AM    MPV 11.9 02/03/2023 04:10 AM     CMP:    Lab Results   Component Value Date/Time     02/03/2023 03:28 AM    K 4.0 02/03/2023 03:28 AM    K 4.0 07/22/2022 11:07 PM     02/03/2023 03:28 AM    CO2 16 02/03/2023 03:28 AM    BUN 4 02/03/2023 03:28 AM    CREATININE 0.6 02/03/2023 03:28 AM    GFRAA >60 07/22/2022 11:07 PM    LABGLOM >60 02/03/2023 03:28 AM    GLUCOSE 184 02/03/2023 03:28 AM    PROT 6.6 02/02/2023 08:17 AM    LABALBU 3.5 02/02/2023 08:17 AM    CALCIUM 7.9 02/03/2023 03:28 AM    BILITOT <0.2 02/02/2023 08:17 AM    ALKPHOS 98 02/02/2023 08:17 AM    AST 35 02/02/2023 08:17 AM    ALT 87 02/02/2023 08:17 AM       CTA CHEST W CONTRAST   Final Result   1. There is no pulmonary embolus   2. There is no thoracic aortic aneurysm or dissection. 3. The lungs are clear. There is no focal infiltrate. 4. No gross upper abdominal pathology is noted. Please note the pancreas is   not included on the examination. CT ABDOMEN PELVIS W IV CONTRAST Additional Contrast? None   Final Result   1. Presence of predominant fluid distension with some dilatation of the   stomach as above discussed. There is also some thickening of the wall of the   distal esophagus across the esophagogastric junction. Findings to be   correlate clinically. See above comments. 2.  No free intraperitoneal air or acute inflammatory process in the   omental/mesentery fat planes no free intra peritoneal fluid accumulation. 3.  Tiny calculus in the upper pole of the right kidney present causes no   obstruction. No obstructive uropathy in the right or in the left kidneys. 4.  Pattern of mild to moderate constipation. CT HEAD WO CONTRAST   Final Result   1. There is no acute intracranial hemorrhage   2. 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 XR CHEST PORTABLE   Final Result   No acute process.               Resident's Assessment and Plan     Assessment and Plan:    Diabetic ketoacidosis, 2/2 medications noncompliance   AG closed twice bridged to 26 U lantus  Will follow endocrinology recommendations regarding insulin management  Start 48 U lantus  nightly, HDSS and 10 U premeal Humalog   Replete electrolytes as needed  Type 2 diabetes, poorly controlled HbA1c 13.1%  Insulin pump, notes he has not sued insulin in over a month  U/A: turbid with proteinuria   Start 48 U lantus  nightly, HDSS and 10 U premeal Humalog Hyponatremia likely 2/2 poor PO intake   Diet resumed  Monitor Na  Concerns for acute hypertriglyceridemic pancreatitis  CT of the abdomen is negative for any pancreatic changes  Continue statin and fibrates   TG trending down, continue to monitor   Leukocytosis, likely reactive secondary to DKA  Sinus tachycardia 2/2 DKA  ANNE stage 1 likely pre-renal (dehydration from DKA, poor intake, GI loss) Baseline creatinine 0.7-0.8 - RESOLVED  HAGMA 2/2 DKA, starvation ketosis - RESOLVED  ----------------------------------------------------------  History of tobacco abuse  History of drug abuse, on Suboxone  Suboxone resumed   History of hepatitis C  History of epilepsy, on carbamazepine - resumed  History of left temporal and left parietal lobe  Hypertriglyceridemia hyperlipoproteinemia likely in the setting of DKA      PT/OT evaluation: -  DVT prophylaxis/ GI prophylaxis: Enoxaparin / pantoprazole   Disposition: continue current care    Liseth Ma MD, PGY-1  Attending physician: Dr. Tomas Allison

## 2023-02-03 NOTE — PROGRESS NOTES
Physical Therapy    Physical Therapy Initial Assessment     Name: Quan West  : 1985  MRN: [id number]      Date of Service: 2/3/2023    Evaluating PT:  Nicole Thurman, PT, DPT  [id number]     Room #:  6033/8399-B  Diagnosis:  Lactic acidosis [E87.20]  DKA, type 2, not at goal [facility]) [E11.10]  Diabetic ketoacidosis without coma associated with other specified diabetes mellitus ([facility] [E13.10]  Leukocytosis, unspecified type [D72.829]  Chest pain, unspecified type [R07.9]  PMHx/PSHx:   has a past medical history of Anxiety, Depression, Seizures ([facility] and Unspecified cerebral artery occlusion with cerebral infarction. Procedure/Surgery:  None   Precautions:  Falls, Hx R hemiparesis   Equipment Needs:  NA     SUBJECTIVE:    Pt lives with his girlfriend and three children a duplex with no steps to enter. Bedroom and bathroom on the 1st level. Pt ambulated with no AD PTA. OBJECTIVE:   Initial Evaluation  Date: 2/3/23 Treatment Short Term/ Long Term   Goals   AM-PAC 6 Clicks 47/55     Was pt agreeable to Eval/treatment? Yes      Does pt have pain? No c/o pain      Bed Mobility  Rolling: SBA  Supine to sit: SBA  Sit to supine: NT  Scooting: SBA  Rolling: Independent   Supine to sit: Independent   Sit to supine: Independent   Scooting: Independent    Transfers Sit to stand: SBA  Stand to sit: SBA  Stand pivot: SBA  Sit to stand: Independent   Stand to sit:  Independent   Stand pivot: Independent    Ambulation    30 feet with no AD SBA  >200 feet with no AD Independent    Stair negotiation: ascended and descended  NT  >8 steps with 1 rail Modified Independent     ROM BUE:  Per OT eval   BLE:  WFL     Strength BUE:  Per OT eval   BLE:  grossly 5/5     Balance Sitting EOB:  SBA  Dynamic Standing:  SBA  Sitting EOB:  Independent   Dynamic Standing:  Independent      Pt is A & O x 4  RASS:  0  CAM-ICU:  NT  Sensation:  Pt denies numbness and tingling to extremities  Edema:  Unremarkable    Vitals:  Blood Pressure at rest 134/76 mmHg  Blood Pressure post session 144/97 mmHg   Heart Rate at rest 109 bpm  Heart Rate post session 121 bpm    SPO2 at rest 95% on RA  SPO2 post session --% on RA         Functional Status Score-Intensive Care Unit (FSS-ICU)   Rolling 5/7   Supine to sit transfer 5/7   Unsupported sitting  5/7   Sit to stand transfers 5/7   Ambulation 1/7   Total  21/35     Therapeutic Exercises:    BLE ROM    Patient education  Pt educated on PT role, safety during functional mobility    Patient response to education:   Pt verbalized understanding Pt demonstrated skill Pt requires further education in this area   yes yes no      ASSESSMENT:    Conditions Requiring Skilled Therapeutic Intervention:    []Decreased strength     []Decreased ROM  [x]Decreased functional mobility  [x]Decreased balance   [x]Decreased endurance   []Decreased posture  []Decreased sensation  []Decreased coordination   []Decreased vision  []Decreased safety awareness   []Increased pain       Comments:  Pt received supine and agreeable to PT evaluation with OT collaboration. Pt cleared for participation by RN prior to session. Vitals monitored during session. Rn present to remove saucedo prior to ambulation. Pt assisted with donning pants. Completed a few STS from EOB. Ambulated short distance around room, 1 LOB self corrected by pt. Pt assisted to bedside chair and set up with tray. Pt left  with call button in reach, lines attached, and needs met. Treatment:  Patient practiced and was instructed in the following treatment:    Bed mobility training - pt given verbal and tactile cues to facilitate proper sequencing and safety during rolling and supine>sit as well as provided with physical assistance to complete task    Gait training- pt was given verbal and tactile cues to facilitate safety/balance during ambulation as well as provided with physical assistance. Pt's/ family goals   1.  Home     Prognosis is good for reaching above PT goals. Patient and or family understand(s) diagnosis, prognosis, and plan of care. yes    PHYSICAL THERAPY PLAN OF CARE:    PT POC is established based on physician order and patient diagnosis     Referring provider/PT Order:  Elva Dubose MD / PT eval and treat   Diagnosis:  Lactic acidosis [E87.20]  DKA, type 2, not at goal [facility]) [E11.10]  Diabetic ketoacidosis without coma associated with other specified diabetes mellitus ([facility] [E13.10]  Leukocytosis, unspecified type [D72.829]  Chest pain, unspecified type [R07.9]  Specific instructions for next treatment:  progress gait/stairs    Current Treatment Recommendations:     [x] Strengthening to improve independence with functional mobility   [] ROM to improve independence with functional mobility   [x] Balance Training to improve static/dynamic balance and to reduce fall risk  [x] Endurance Training to improve activity tolerance during functional mobility   [x] Transfer Training to improve safety and independence with all functional transfers   [x] Gait Training to improve gait mechanics, endurance and asses need for appropriate assistive device  [x] Stair Training in preparation for safe discharge home and/or into the community   [x] Positioning to prevent skin breakdown and contractures  [x] Safety and Education Training   [x] Patient/Caregiver Education   [x] HEP  [] Other     PT long term treatment goals are located in above grid    Frequency of treatments: 2-5x/week x 1-2 weeks. Time in  0945  Time out  1010    Total Treatment Time  10 minutes     Evaluation Time includes thorough review of current medical information, gathering information on past medical history/social history and prior level of function, completion of standardized testing/informal observation of tasks, assessment of data and education on plan of care and goals.     CPT codes:  [x] Low Complexity PT evaluation 85674  [] Moderate Complexity PT evaluation 24907  [] High Complexity PT evaluation [id number]  [] PT Re-evaluation [id number]  [] Gait training 36644 -- minutes  [] Manual therapy [id number] -- minutes  [x] Therapeutic activities 57827 10 minutes  [] Therapeutic exercises 41461 - minutes  [] Neuromuscular reeducation 82560 -- minutes     Florencia Batres, PT, DPT  [id number]

## 2023-02-03 NOTE — PLAN OF CARE
Problem: Chronic Conditions and Co-morbidities  Goal: Patient's chronic conditions and co-morbidity symptoms are monitored and maintained or improved  Outcome: Progressing     Problem: Discharge Planning  Goal: Discharge to home or other facility with appropriate resources  Outcome: Progressing     Problem: Pain  Goal: Verbalizes/displays adequate comfort level or baseline comfort level  Outcome: Progressing     Problem: Neurosensory - Adult  Goal: Achieves stable or improved neurological status  Outcome: Progressing  Goal: Absence of seizures  Outcome: Progressing  Goal: Remains free of injury related to seizures activity  Outcome: Progressing

## 2023-02-03 NOTE — PROGRESS NOTES
ENDOCRINOLOGY PROGRESS NOTE      Date of admission: 2/1/2023  Date of service: 2/4/2023  Admitting physician: Pato Feliz MD   Primary Care Physician: No primary care provider on file. Consultant physician: Donny Garland MD     Reason for the consultation:  Uncontrolled DM, hyperTG     History of Present Illness: The history is provided by the patient. Accuracy of the patient data is excellent    Melisa Mai is a very pleasant 42 y.o. old male with PMH poorly controlled DM , seizure, Hep C, IV drug abuse, and other listed below admitted to [facility] on 2/1/2023 because of DKA, endocrine service was consulted for diabetes management. Subjective   The patient was seen and examined, feels better, on insulin drip, TG improving     2/2/23 11:23 2/3/23 04:10 2/4/23 05:39   Triglycerides 4,410 (H) 2,398 (H) 1,187 (H)   (H): Data is abnormally high  Point of care glucose monitoring   (Independently reviewed)   Recent Labs     02/04/23  0005 02/04/23  0007 02/04/23  0113 02/04/23  0245 02/04/23  0410 02/04/23  0551 02/04/23  0822 02/04/23  1016   GLUMET 80 75 104* 163* 203* 195* 157* 151*       Past medical history:   Past Medical History:   Diagnosis Date    Anxiety     Depression     Seizures ([address]     Unspecified cerebral artery occlusion with cerebral infarction        Past surgical history:  Past Surgical History:   Procedure Laterality Date    ABCESS DRAINAGE Left 08/22/2016    Left Hand    BONE INCISION AND DRAINAGE Left 08/30/2016    HAND W/ WOUND VA C       Social history:   Tobacco:   reports that he has been smoking cigarettes. He has a 7.60 pack-year smoking history. He has never used smokeless tobacco.  Alcohol:   reports no history of alcohol use. Drugs:   reports current drug use. Drug: Other-see comments.     Family history:    Family History   Problem Relation Age of Onset    Kidney Disease Mother     Heart Disease Father        Allergy and drug reactions:   No Known Allergies    Scheduled Meds: magnesium sulfate  1,000 mg IntraVENous Once    [Held by provider] insulin glargine  48 Units SubCUTAneous QAM    [Held by provider] insulin lispro  0-18 Units SubCUTAneous TID WC    [Held by provider] insulin lispro  15 Units SubCUTAneous TID WC    omega-3 acid ethyl esters  2 g Oral BID    niacin  500 mg Oral Nightly    [Held by provider] insulin lispro  0-12 Units SubCUTAneous Nightly    polyethylene glycol  17 g Oral Daily    senna  1 tablet Oral Nightly    atorvastatin  80 mg Oral Nightly    fenofibrate  160 mg Oral Daily    sodium chloride flush  5-40 mL IntraVENous 2 times per day    enoxaparin  30 mg SubCUTAneous BID    buprenorphine-naloxone  1 tablet SubLINGual BID    carBAMazepine  100 mg Oral QAM    carBAMazepine  200 mg Oral Nightly    vitamin D  2,000 Units Oral Daily       PRN Meds:   glucose, 4 tablet, PRN  dextrose bolus, 125 mL, PRN   Or  dextrose bolus, 250 mL, PRN  glucagon (rDNA), 1 mg, PRN  dextrose, , Continuous PRN  fentanNYL, 25 mcg, Q4H PRN  sodium chloride flush, 5-40 mL, PRN  sodium chloride, , PRN  ondansetron, 4 mg, Q8H PRN   Or  ondansetron, 4 mg, Q6H PRN  acetaminophen, 650 mg, Q6H PRN   Or  acetaminophen, 650 mg, Q6H PRN  hydrOXYzine pamoate, 25 mg, TID PRN    Continuous Infusions:   dextrose      insulin 0.1 Units/kg/hr (02/04/23 8621)    sodium chloride Stopped (02/02/23 1509)       Review of Systems  All systems reviewed.  All negative except for symptoms mentioned in HPI     OBJECTIVE    /66   Pulse 99   Temp 98.6 °F (37 °C)   Resp 17   Ht 6' 7\" (2.007 m)   Wt 234 lb (106.1 kg)   SpO2 97%   BMI 26.36 kg/m²     Intake/Output Summary (Last 24 hours) at 2/4/2023 1038  Last data filed at 2/4/2023 0616  Gross per 24 hour   Intake 5321.77 ml   Output 1550 ml   Net 3771.77 ml       Physical examination:  General: awake alert, oriented x3  HEENT: normocephalic non traumatic, no exophthalmos   Neck: supple, No thyroid tenderness,  Pulm: good equal air entry no added sounds  CVS: S1 + S2  Abd: soft lax, no tenderness  Skin: warm, no lesions, no rash. Neuro: CN intact, sensation decreased bilateral , muscle power normal  Psych: normal mood, and affect    Review of Laboratory Data:  I personally reviewed the following labs:   Recent Labs     02/02/23  0817 02/03/23  0410 02/04/23  0539   WBC 13.3* 11.2 7.5   RBC 5.10 4.56 4.37   HGB 14.9 12.0* 12.4*   HCT 39.9 36.6* 34.0*   MCV 78.2* 80.3 77.8*   MCH 29.8 26.8 28.4   MCHC 37.1* 34.5 36.5*   RDW 14.6 15.4* 15.7*    242 162   MPV 10.4 11.9 10.0     Recent Labs     02/01/23  1804 02/01/23  2356 02/02/23  0817 02/02/23  1123 02/02/23  1921 02/03/23  0328 02/04/23  0539      < > 138   < > 130* 128* 134  132   K 3.7   < > 3.7   < > 3.5 4.0 4.0  4.1      < > 109*   < > 102 100 100  101   CO2 10*   < > 15*   < > 16* 16* 21*  21*   BUN 14   < > 7   < > 5* 4* 5*  5*   CREATININE 0.8   < > 0.6*   < > 0.6* 0.6* 0.7  0.7   GLUCOSE 280*   < > 193*   < > 215* 184* 217*  225*   CALCIUM 8.1*   < > 8.6   < > 7.9* 7.9* 8.3*  8.2*   PROT 7.3  --  6.6  --   --   --   --    LABALBU 3.7  --  3.5  --   --   --   --    BILITOT <0.2  --  <0.2  --   --   --   --    ALKPHOS 120  --  98  --   --   --   --    AST 53*  --  35  --   --   --   --    *  --  87*  --   --   --   --     < > = values in this interval not displayed.      Beta-Hydroxybutyrate   Date Value Ref Range Status   02/01/2023 >4.50 (H) 0.02 - 0.27 mmol/L Final   07/22/2022 0.09 0.02 - 0.27 mmol/L Final   07/11/2021 0.16 0.02 - 0.27 mmol/L Final     Lab Results   Component Value Date/Time    LABA1C 13.1 08/02/2020 04:58 AM     No results found for: TSH, T4FREE, Q5URMYS, FT3, T4TUFRR, TSI, TPOABS, THGAB  Lab Results   Component Value Date/Time    LABA1C 13.1 08/02/2020 04:58 AM    GLUCOSE 217 02/04/2023 05:39 AM    GLUCOSE 225 02/04/2023 05:39 AM    MALBCR 452.9 02/01/2023 03:58 PM    LABMICR 172.1 02/01/2023 03:58 PM    LABCREA 38 02/01/2023 03:58 PM    LABCREA 38 02/01/2023 03:58 PM     Lab Results   Component Value Date/Time    TRIG 1,187 02/04/2023 05:39 AM    HDL 11 02/01/2023 12:14 PM    LDLCALC - 02/01/2023 12:14 PM    CHOL 151 02/01/2023 12:14 PM       Blood culture   Lab Results   Component Value Date/Time    BC 5 Days- no growth 08/29/2016 01:22 PM    BC 5 Days- no growth 08/20/2016 08:09 PM       Radiology:  CTA CHEST W CONTRAST   Final Result   1. There is no pulmonary embolus   2. There is no thoracic aortic aneurysm or dissection. 3. The lungs are clear. There is no focal infiltrate. 4. No gross upper abdominal pathology is noted. Please note the pancreas is   not included on the examination. CT ABDOMEN PELVIS W IV CONTRAST Additional Contrast? None   Final Result   1. Presence of predominant fluid distension with some dilatation of the   stomach as above discussed. There is also some thickening of the wall of the   distal esophagus across the esophagogastric junction. Findings to be   correlate clinically. See above comments. 2.  No free intraperitoneal air or acute inflammatory process in the   omental/mesentery fat planes no free intra peritoneal fluid accumulation. 3.  Tiny calculus in the upper pole of the right kidney present causes no   obstruction. No obstructive uropathy in the right or in the left kidneys. 4.  Pattern of mild to moderate constipation. CT HEAD WO CONTRAST   Final Result   1. There is no acute intracranial hemorrhage   2. 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 XR CHEST PORTABLE   Final Result   No acute process.              Medical Records/Labs/Images review:   I personally reviewed and summarized previous records   All labs and imaging were reviewed independently      2/1/23 12:14 2/2/23 11:23 2/3/23 04:10   Triglycerides 840 (H) 4,410 (H) 2,398 (H)     ASSESSMENT & PLAN   Lang Clink, a 37 y. o.-old male seen today for inpatient diabetes management     Diabetes Mellitus type 1 admitted with DKA  Patient's diabetes is uncontrolled due to poor compliance with insulin therapy and follow up visit. Last visit with us was in 9/2021. Uses insulin pump at home. Pump settings: basl rate 2u/hr, CR 12, ISF 20, Goal , Duration of action 4hr   Currently on insulin drip for treatment of HyperTG   Once hyperTG resolve, we recommend the following insulin regimen  Lantus 48 units daily in AM   Humalog 15u with meals   High dose sliding scale   4 carb meals     Continue glucose check with meals and at bedtime   Will titrate insulin dose based on the blood glucose trend & insulin requirement  Will arrange for patient to be seen in endocrinology clinic upon discharge for routine diabetes maintenance and prevention. Check fasting TG level tomorrow morning     Dietary/medications non-compliance  I have discussed with him the great importance of following the treatment plan exactly as directed in order to achieve a good medical outcome. Discussed with patient the importance of eating consistent carbohydrate meals, avoiding high glycemic index food.  Also, discussed with patient the risk and negative consequences of dietary noncompliance on blood glucose control, blood pressure and weight    Hypertriglyceridemia/Acute pancreatitis   Discussed the importance of controlling DM in treatment of hypertriglyceridemia   Advised for strict lifestyle change, Wt loss, strict controlling DM, avoidance of concentrated sugar, complete avoidance of alcohol  Goal to keep on insulin drip until TG level <500   Started on Lipitor 80 mg daily, Fenofibrate 160 mg daily, Omega-3 FA 2 capsules BIB, Niacin to 500 mg at bedtime   Consulted  dietitian for education on dietary management of hyperTG     Interdisciplinary plan for communication with healthcare providers:   Consult recommendations were discussed with the Primary Service/Nursing staff      The above issues were reviewed with the patient who understood and agreed with the plan. Thank you for allowing us to participate in the care of this patient. Please do not hesitate to contact us with any additional questions. Manish Olsen MD  Endocrinologist, [facility] Diabetes Care and Endocrinology   [address] [place] [id number]   Phone: [phone]  Fax: [phone]  --------------------------------------------  An electronic signature was used to authenticate this note.  Cara Hodges MD on 2/4/2023 at 10:38 AM

## 2023-02-03 NOTE — PROGRESS NOTES
Attending attestation note     I have personally seen and examined the patient in the ICU on 2/3/2023. I discussed the case in detail with the resident, nursing and respiratory therapist as needed. I reviewed the pertinent laboratory studies, imaging studies, and records. Key elements of the encounter were performed by me (> 85 % time). Family is updated at the bedside as available. Key issues of the case were discussed among consultants. Past medical history, surgical history, family history, and social history are unchanged unless stated in the history of present illness. I have reviewed the patient's medications and allergies. Please see Resident note. Diabetic ketoacidosis  Severe metabolic acidosis  Acute hypertriglyceridemic Pancreatitits  Severe hypertriglyceridemia     Continue insulin GGT mainly for severe hypertriglyceridemia c/b pancreatitis. DKA protocol  IVF  Statin and fibrate  Trend triglycerides, will consider plasmapheresis if not improving, potential complications with hyperviscosity, worsening acidosis, ischemia. Monitor lites, ionized calcium.      Ny Graff MD MS  Pulmonary & [facility] Pt scheduled with Dr. Wong on 04/28/2022 regarding neck pain.  elw

## 2023-02-03 NOTE — PROGRESS NOTES
[address]       Date:2/3/2023                                                               Patient Name: Sheeba Estrella  MRN: [id number]  : 1985  Room: [address] [place] [address]    Evaluating OT: SONU Maldonado,  OTR/L; [id number]    Referring Provider: Arlette Gatica MD   Specific Provider Orders/Date: OT eval and treat (2/3/23)       Diagnosis: Lactic acidosis [E87.20]  DKA, type 2, not at goal [facility]) [E11.10]  Diabetic ketoacidosis without coma associated with other specified diabetes mellitus ([facility] [E13.10]  Leukocytosis, unspecified type [D72.829]  Chest pain, unspecified type [R07.9]     Reason for admission: Pt admitted with DKA/chest pain. Surgery/Procedures: None this admission     Pertinent Medical History:    Past Medical History:   Diagnosis Date    Anxiety     Depression     Seizures ([facility]     Unspecified cerebral artery occlusion with cerebral infarction         *Precautions:  Fall Risk, Hx CVA R isabelle    Assessment of current deficits   [x] Functional mobility  [x]ADLs  [x] Strength               [x]Cognition   [x] Functional transfers   [x] IADLs         [x] Safety Awareness   [x]Endurance   [x] Fine Coordination        [x] ROM     [] Vision/perception   [x]Sensation    [x]Gross Motor Coordination [x] Balance   [] Delirium                  []Motor Control     [] Communication    OT PLAN OF CARE   OT POC based on physician orders, patient diagnosis and results of clinical assessment.        Frequency/Duration: 1-3 days/wk for 1-2 weeks PRN    Specific OT Treatment Interventions to include:   * Instruction/training on adapted ADL techniques and AE recommendations to increase functional independence within precautions       * Training on energy conservation strategies, correct breathing pattern and techniques to improve independence/tolerance for self-care routine  * Functional transfer/mobility training/DME recommendations for increased independence, safety, and fall prevention  * Patient/Family education to increase follow through with safety techniques and functional independence  * Recommendation of environmental modifications for increased safety with functional transfers/mobility and ADLs  * Cognitive retraining/development of therapeutic activities to improve problem solving, judgement, memory, and attention for increased safety/participation in ADL/IADL tasks  * Therapeutic exercise to improve motor endurance, ROM, and functional strength for ADLs/functional transfers  * Therapeutic activities to facilitate/challenge dynamic balance, stand tolerance for increased safety and independence with ADLs  * Therapeutic activities to facilitate gross/fine motor skills for increased independence with ADLs  * Positioning to improve skin integrity, interaction with environment and functional independence  * Delirium prevention/treatment      Recommended Adaptive Equipment: NA     Home Living: Pt lives with wife and 3 kids  in a duplex with level entry step(s) to enter and 2 rail(s); bed/bath on main level. Pt's wife uses wc d/t hx of amputation. Bathroom setup: tub/shower, stnd commode  Equipment owned: Shower chair, grab bar, FWW, cane    Prior Level of Function: Ind with ADLs; Ind with IADLs. No AD for functional mobility. Driving: ?  Occupation: ?    Pain Level: pt c/o 0/10 pain this session    Cognition: A&O: Grossly oriented; Follows multi step commands    Memory: G   Comprehension: G   Problem solving: G   Judgement/safety: G               Communication skills: WFL- dysarthria           Vision: Functionally WFL               Glasses:  Yes                                                   Hearing: WFL     RASS: 0  CAM-ICU: (NT) Delirium    UE Assessment: ?  Hand Dominance: Right []  Left []     ROM Strength STM goal: PRN   RUE  WFL   Shoulder: 4+/5  Elbow: 4/5  Hand: 3+/5  : Fair  FMC: Impaired  GMC: Impaired Increase overall strength and coordination for participation in ADLs. LUE WFL Grossly 5/5  : WFL  FMC: WFL  GMC: WFL Increase overall strength for participation in ADLs. Sensation: No c/o numbness/tingling in extremities. Tone: WNL   Edema: Unremarkable. Functional Assessment:  AM-PAC Daily Activity Raw Score: 19/24   Initial Eval Status  Date: 2/3/23 Treatment Status  Date:  STGs = LTGs  Time frame: 7-14 days   Feeding Ind                             Grooming SBA                      Ind        UB dressing/bathing SBA                      Ind       LB dressing/bathing Min A  To don pants while seated at EOB, steadying assist in standing                        Ind        Toileting SBA                      Ind     Bed Mobility  Supine to sit:   SBA    Sit to supine:   SBA                      Ind     Functional Transfers Sit to stand:   SBA    Stand to sit:   SBA    Stand Pivot:   SBA                      Ind     Functional Mobility SBA  With no AD for short household distance. 1 self-corrected LOB. Ind        Balance Sitting:     Static: S    Dynamic:SBA  Standing: SBA  Sitting:     Static: Ind    Dynamic: Ind  Standing: Ind                   Endurance/Activity Tolerance   Good- tolerance with light activity. WFL  For full ADL/IADLs   Visual/  Perceptual   WFL                     Vitals:   HR at rest: 109 bpm HR at end of session: 121 bpm   SpO2 at rest: 95% SpO2 at end of session --%   BP at rest: 134/76 mmHg BP at end of session 144/97 mmHg       Treatment: OT treatment provided this date includes:     Instruction/training on safety and adapted techniques for completion of ADLs: Pt completed ADLs at EOB to increase independence and safety in self-care.    Instruction/training on safe bed mobility/functional mobility/transfer techniques: with focus on safety, body mechanics, and precautions   Proper Positioning/Alignment: for optimal healing, skin integrity, to prevent breakdown, decrease edema, reduce risk of contracture, and encourage functional positioning for interaction with environment. Skilled Monitoring of Vitals:  to include BP, spO2, and HR throughout session to maximize safety. Therapeutic activity: to challenge dynamic sitting/standing balance and endurance to promote safety during ADL tasks and functional transfers and mobility. Delirium Prevention: Environmental and sensory modifications assessed and implemented to decrease ICU acquired delirium and to improve overall orientation, mentation and pt interaction with family/staff. Line management and environmental modifications made prior to and end of session to ensure patient safety and to increase efficiency of session. Skilled monitoring of HR, O2 saturation, blood pressure and patient's response to activity performed throughout session. Comments: Pt case discussed in rounds, OK from RN to see patient. Upon arrival, patient semi supine in bed. Pt pleasant and agreeable to participate in therapy session. Pt demo good tolerance with good understanding of education/techniques. At end of session, patient properly positioned in chair with call light within reach, all lines and tubes intact. Pt instructed on use of call light for assistance and fall prevention. Nursing notified of patient positioning. Patient presents with decreased ROM/strength, activity tolerance, dynamic balance, functional mobility limiting completion of ADLs and safety. Pt can benefit from continued skilled OT services to increase safety, functional independence and quality of life. Rehab Potential: Good for established goals    Patient / Family Goal: to return to PLOF    Patient and/or family were instructed/educated on diagnosis, prognosis/goals and plan of care. Patient demonstrated good understanding.       Evaluation Complexity: Low      Time [id number] Time Out: 1010         Total Treatment time: 12 min   Min Units   OT Eval Low 97165 X 1   OT Eval Medium 46105     OT Eval High 58100     OT Re-Eval [id number]     Therapeutic Ex 68970     Therapeutic Activities 73203 12 1   ADL/Self Care 40347     Orthotic Management 94332     Neuro Re-Ed 96955     Non-Billable Time        Evaluation time includes thorough review of current medical information, gathering information on past medical history/social history and prior level of function, completion of standardized testing/informal observation of tasks, assessment of data and development of POC/Goals.      Yoly Peterson, SONU,  OTR/L; [id number]

## 2023-02-03 NOTE — CARE COORDINATION
Care Coordination: LOS 2. Room air, DKA/ noncompliance with Endocrine follow up after last admission. Has insulin pump at home, was to follow with Endocrine. Per chart review, has no pcp, but was provided number to set up. Na 128. Glucose 143-177. Insulin gtt discontinued, Insulin dosing order, dietary consult. Will check transfer when okay with CC team. Plan is home with GF.     Kristin Tavarez

## 2023-02-03 NOTE — PROGRESS NOTES
[address]   Internal Medicine Residency / [address]    Attending Physician Statement  I have discussed the case, including pertinent history and exam findings with the resident and the team.  I have seen and examined the patient and the key elements of the encounter have been performed by me. I agree with the assessment, plan and orders as documented by the resident. A&O  Post DKA, closed anion gap  Na   TG >800  Na 128  Meds reviewed and continue same ICU care  Fenofibrate ordered  Plan; Follow with ICU team  Remainder of medical problems as per resident note.       Akiko Schafer MD [facility]  Internal Medicine Residency Faculty

## 2023-02-03 NOTE — PROGRESS NOTES
[address] [facility]  Department of Internal Medicine   Internal Medicine Residency   MICU Progress Note    Patient:  Weston Alexis 42 y.o. male  MRN: [id number]     Date of Service: 2/3/2023    Allergy: Patient has no known allergies. Subjective     Patient is seen and examined this morning. He is alert and oriented, with complaints of low back pain. He denies headache, dyspnea, chest pain, abdominal/epigastric pain, nausea, vomiting. Vital stable overnight. Patient is hungry and wants to eat.     24h change: AG closed twice, CO2 up to 15>>16. He was bridged this morning, Lantus 26 units sc at 0500. ROS: Denies Fever/chills/CP/SOB/N/V/D/C/Dysuria/Blood in stool or urine  Objective     VS: /85   Pulse (!) 118   Temp 98.9 °F (37.2 °C)   Resp 22   Ht 6' 7\" (2.007 m)   Wt 234 lb (106.1 kg)   SpO2 93%   BMI 26.36 kg/m²           I & O - 24hr:   Intake/Output Summary (Last 24 hours) at 2/3/2023 1428  Last data filed at 2/3/2023 1335  Gross per 24 hour   Intake 4867.08 ml   Output 1900 ml   Net 2967.08 ml       Physical Exam:  General Appearance: alert, appears stated age, and cooperative  Neck: no adenopathy, no carotid bruit, no JVD, supple, symmetrical, trachea midline, and thyroid not enlarged, symmetric, no tenderness/mass/nodules  Lung: clear to auscultation bilaterally  Heart: regular rate and rhythm, S1, S2 normal, no murmur, click, rub or gallop  Abdomen: soft, non-tender; bowel sounds normal; no masses,  no organomegaly  Extremities:  extremities normal, atraumatic, no cyanosis or edema  Musculoskeletal: No joint swelling, no muscle tenderness. ROM normal in all joints of extremities.    Neurologic: Mental status: Alert, oriented, thought content appropriate    Lines     site day    Art line   None    TLC None    PICC None    Hemoaccess None    Oxygen:    Room air  Mechanical Ventilation:  N/A  ABG:     Lab Results   Component Value Date/Time    PH 7.245 02/01/2023 03:11 PM    PCO2 21.1 02/01/2023 03:11 PM    PO2 95.5 02/01/2023 03:11 PM    HCO3 8.9 02/01/2023 03:11 PM    BE -16.1 02/01/2023 03:11 PM    THB 15.4 02/01/2023 03:11 PM    O2SAT 97.6 02/01/2023 03:11 PM        Medications     Infusions: (Fluid, Sedation, Vasopressors)  IVF:   Insulin drip at 0.1 units/kg/hr  Vasopressors  N/A  Sedation  N/A    Nutrition:   NPO except sips of water  ATB:   Antibiotics  Days   None              Skin issues: None  Patient currently has   Urinary cath, dcd 2/3/23  DVT prophylaxis/ GI prophylaxis,    PT/OT    Labs   CBC with Differential:    Lab Results   Component Value Date/Time    WBC 11.2 02/03/2023 04:10 AM    RBC 4.56 02/03/2023 04:10 AM    HGB 12.0 02/03/2023 04:10 AM    HCT 36.6 02/03/2023 04:10 AM     02/03/2023 04:10 AM    MCV 80.3 02/03/2023 04:10 AM    MCH 26.8 02/03/2023 04:10 AM    MCHC 34.5 02/03/2023 04:10 AM    RDW 15.4 02/03/2023 04:10 AM    NRBC 1.8 02/01/2023 07:26 AM    SEGSPCT 72 10/12/2012 07:40 PM    BANDSPCT 1 08/20/2016 05:08 PM    METASPCT 1.8 02/01/2023 07:26 AM    LYMPHOPCT 27.9 02/03/2023 04:10 AM    MONOPCT 7.2 02/03/2023 04:10 AM    BASOPCT 0.4 02/03/2023 04:10 AM    MONOSABS 0.80 02/03/2023 04:10 AM    LYMPHSABS 3.11 02/03/2023 04:10 AM    EOSABS 0.05 02/03/2023 04:10 AM    BASOSABS 0.05 02/03/2023 04:10 AM     CMP:    Lab Results   Component Value Date/Time     02/03/2023 03:28 AM    K 4.0 02/03/2023 03:28 AM    K 4.0 07/22/2022 11:07 PM     02/03/2023 03:28 AM    CO2 16 02/03/2023 03:28 AM    BUN 4 02/03/2023 03:28 AM    CREATININE 0.6 02/03/2023 03:28 AM    GFRAA >60 07/22/2022 11:07 PM    LABGLOM >60 02/03/2023 03:28 AM    GLUCOSE 184 02/03/2023 03:28 AM    PROT 6.6 02/02/2023 08:17 AM    LABALBU 3.5 02/02/2023 08:17 AM    CALCIUM 7.9 02/03/2023 03:28 AM    BILITOT <0.2 02/02/2023 08:17 AM    ALKPHOS 98 02/02/2023 08:17 AM    AST 35 02/02/2023 08:17 AM    ALT 87 02/02/2023 08:17 AM     Magnesium:    Lab Results   Component Value Date/Time    MG 1.9 02/03/2023 03:28 AM     Phosphorus:    Lab Results   Component Value Date/Time    PHOS 2.7 02/03/2023 03:28 AM     Troponin:    Lab Results   Component Value Date/Time    TROPONINI <0.01 08/01/2020 12:07 PM     Last 3 Troponin:    Lab Results   Component Value Date/Time    TROPONINI <0.01 08/01/2020 12:07 PM    TROPONINI <0.01 04/19/2019 08:30 PM    TROPONINI <0.01 03/04/2017 12:06 AM     U/A:    Lab Results   Component Value Date/Time    COLORU Yellow 02/02/2023 06:25 AM    PROTEINU 100 02/02/2023 06:25 AM    PHUR 6.0 02/02/2023 06:25 AM    WBCUA NONE 02/02/2023 06:25 AM    RBCUA 1-3 02/02/2023 06:25 AM    BACTERIA MANY 02/02/2023 06:25 AM    CLARITYU TURBID 02/02/2023 06:25 AM    SPECGRAV >=1.030 02/02/2023 06:25 AM    LEUKOCYTESUR Negative 02/02/2023 06:25 AM    UROBILINOGEN 0.2 02/02/2023 06:25 AM    BILIRUBINUR MODERATE 02/02/2023 06:25 AM    BLOODU MODERATE 02/02/2023 06:25 AM    GLUCOSEU 500 02/02/2023 06:25 AM    AMORPHOUS PRESENT 02/02/2023 06:25 AM     ABG:    Lab Results   Component Value Date/Time    PH 7.245 02/01/2023 03:11 PM    PCO2 21.1 02/01/2023 03:11 PM    PO2 95.5 02/01/2023 03:11 PM    HCO3 8.9 02/01/2023 03:11 PM    BE -16.1 02/01/2023 03:11 PM    O2SAT 97.6 02/01/2023 03:11 PM     HgBA1c:    Lab Results   Component Value Date/Time    LABA1C 13.1 08/02/2020 04:58 AM     Microalbumen/Creatinine ratio:  No components found for: RUCREAT    Imaging Studies:  CT head wo contrast 2/1/23   1. There is no acute intracranial hemorrhage   2. 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 CTA chest w contrast 2/1/23   1. There is no pulmonary embolus   2. There is no thoracic aortic aneurysm or dissection. 3. The lungs are clear. There is no focal infiltrate. 4. No gross upper abdominal pathology is noted. Please note the pancreas is   not included on the examination.          CT abdomen pelvis wo contrast 2/1/23 1.  Presence of predominant fluid distension with some dilatation of the   stomach as above discussed. There is also some thickening of the wall of the   distal esophagus across the esophagogastric junction. Findings to be   correlate clinically. See above comments. 2.  No free intraperitoneal air or acute inflammatory process in the   omental/mesentery fat planes no free intra peritoneal fluid accumulation. 3.  Tiny calculus in the upper pole of the right kidney present causes no   obstruction. No obstructive uropathy in the right or in the left kidneys. 4.  Pattern of mild to moderate constipation. CXR 2/1/23   No acute process. Resident's Assessment and Plan     ASSESSMENT:  Nausea/vomiting likely 2/2 DKA. BHB >4.5, AG 41, CO2 7  DM type 2, poorly controlled. HBA1C 13.1  HAGMA multifactorial 2/2 DKA, starvation ketosis, lactic acidosis. (+) ketonuria, glucosuria  ANNE stage I likely pre-renal (dehydration from DKA, poor intake, GI loss), resolved. Baseline crea 0.7-0.8, creatinine at 1.3 mg/dL>>0.6 mg/dL, UO 1.72L in 24 hrs, (+) 1.4L net since admission  Pseudohyponatremia 2/2 hyperglycemia, now hyponatremic 2/2 likely recovery from   Chest pain can be demand ischemia vs NSTEMI, with elevated troponin, EKG ST, T wave inversion III, AVF (can be rate related changes). . Trop 15>>15>16>17  Sinus tachycardia 2/2 DHN, with ischemic changes on EKG, can be rate related, rpt EKG ST  bpm, with q waves III, AVF, denies chest pain  Acute pancreatitis 2/2 DKA. With elevated lipase at 1552, (+) epigastric pain on admission,  >>4410. CT abdomen negative for pancreatic changes  Leukocytosis, likely reactive 2/2 DKA  Elevated hemoglobin likely hemoconcentration 2/2 dehydration due to DKA, resolved. Hb down to 14.8>>12 (likely dilutional now)  Severe hypertriglyceridemia and hyperlipoproteinemia 2/2 DKA. >>4410>>2398  Hx L temporal and L parietal lobe  Hx epilepsy.  On carbamazepine  Hx hepatitis C, HCV RNA in 2016 3.6 million.  ? If treated  Hx drug abuse, on Suboxone  Hx tobacco abuse        PLAN:  -decrease BMP, Mg, Phos monitoring to q6hrs  -d/c venous pH monitoring  -POCT glucose q2hrs  -triglycerides daily  -d/c DKA protocol  -give another 22 units Lantus now, then start 48 units at HS felicia night per Endo  -start Niacin tab at HS for hypertriglyceridemia per Endo  -start fish oil 2g BID for hypertriglyceridemia per Endo  -since triglycerides are still elevated at 2K, we will continue with insulin drip at 0.1 units/kg/hr until triglyceride is <500 mg/dL  -patient can have diet, carb control 4 gms, low fat  -continue BMP monitoring to q6hrs  -continue atorvastatin 80 mg at HS  -continue fenofibrate 160 mg daily  -strict I & O  -continue Suboxone, carbamazepine   -enoxaparin for DVT prophylaxis once up ad bayron  -d/c pantoprazole for GI prophylaxis once on diet  -hydroxyzine 25 mg TID PO prn for anxiety  -fentanyl 25 mcg q4hrs x abdominal pain  -Endo recs appreciated: Lantus 48 units at HS, HDSS, Humalog 10 units sc pre-meals  -holding Humalog pre-meals while on insulin drip  -PT/OT eval and treat       PT/OT evaluation:  DVT prophylaxis/ GI prophylaxis: enoxaparin/diet  Disposition: home +/- home health / Cesar Huston / Theresa Joyner / Daylin Eubanks MD, PGY-2    Attending physician: Dr. Beena Esqueda

## 2023-02-03 NOTE — CARE COORDINATION
Care Coordination: Spoke with pt at bedside, he is concerned that he needs FMLA papers filled out and signed by a physician. He does not have a pcp. He received an email from [facility] with forms he needs filled out. He  is uncertain how to get them printed out and filled out. I placed a call To [facility] [phone] and requested return phone call regarding the above and possibly have her fax papers as well and they can be placed on chart and ask attending house team to fill out if they would. Faye other contact info is [email or web address]  fax [phone]. Pt works for [address].       Poornima Neumann

## 2023-02-03 NOTE — CONSULTS
ENDOCRINOLOGY INITIAL CONSULTATION NOTE      Date of admission: 2/1/2023  Date of service: 2/2/2023  Admitting physician: Jun Doherty MD   Primary Care Physician: No primary care provider on file. Consultant physician: Lo Levy MD     Reason for the consultation:  Uncontrolled DM    History of Present Illness: The history is provided by the patient. Accuracy of the patient data is excellent    Nathalia Kelsey is a very pleasant 42 y.o. old male with PMH poorly controlled DM , seizure, Hep C, IV drug abuse, and other listed below admitted to [facility] on 2/1/2023 because of DKA, endocrine service was consulted for diabetes management. The pt has been non compliant with his insulin, doesn't remember when was the last time that his insulin pump was filled, and also didn't take his anti seizure medications. Last time was seen at endocrine clinic was in 9/2021   Admission labs , AG 41, Bicarb 7, Cr 1.3, K 4.7, BHB >4.5     Pt was admitted to MICU and currently on insulin drip     Prior to admission  The patient was diagnosed with type 1 DM a long time ago. Before admission, the patient was supposed to be on an insulin pump but stopped using it about two months ago. Pt reports taking Lantus 45 units at bedtime, Humalog 10 units + ss 3:50>150. Pt admits to poor compliance with dietary and frequently missing insulin. The patient was last time seen at our clinic was in 9/2021.  The patient is not up to date with a yearly diabetic eye exam.  Lab Results   Component Value Date/Time    LABA1C 13.1 08/02/2020 04:58 AM       Inpatient diet:   NPO while on insulin drip     Point of care glucose monitoring   (Independently reviewed)   Recent Labs     02/02/23  1130 02/02/23  1227 02/02/23  1342 02/02/23  1443 02/02/23  1534 02/02/23  1620 02/02/23  1731 02/02/23  1842   GLUMET 157* 155* 169* 165* 145* 140* 162* 198*       Past medical history:   Past Medical History:   Diagnosis Date    Anxiety     Depression Seizures ([facility] [address]     Unspecified cerebral artery occlusion with cerebral infarction        Past surgical history:  Past Surgical History:   Procedure Laterality Date    ABCESS DRAINAGE Left 08/22/2016    Left Hand    BONE INCISION AND DRAINAGE Left 08/30/2016    HAND W/ WOUND VA C       Social history:   Tobacco:   reports that he has been smoking cigarettes. He has a 7.60 pack-year smoking history. He has never used smokeless tobacco.  Alcohol:   reports no history of alcohol use. Drugs:   reports current drug use. Drug: Other-see comments.     Family history:    Family History   Problem Relation Age of Onset    Kidney Disease Mother     Heart Disease Father        Allergy and drug reactions:   No Known Allergies    Scheduled Meds:   atorvastatin  80 mg Oral Nightly    [START ON 2/3/2023] fenofibrate  160 mg Oral Daily    pantoprazole  40 mg IntraVENous Daily    sodium chloride flush  5-40 mL IntraVENous 2 times per day    enoxaparin  30 mg SubCUTAneous BID    buprenorphine-naloxone  1 tablet SubLINGual BID    carBAMazepine  100 mg Oral QAM    carBAMazepine  200 mg Oral Nightly    vitamin D  2,000 Units Oral Daily       PRN Meds:   fentanNYL, 25 mcg, Q4H PRN  dextrose bolus, 125 mL, PRN   Or  dextrose bolus, 250 mL, PRN  potassium chloride, 10 mEq, PRN  magnesium sulfate, 1,000 mg, PRN  sodium phosphate IVPB, 10 mmol, PRN   Or  sodium phosphate IVPB, 15 mmol, PRN   Or  sodium phosphate IVPB, 20 mmol, PRN  dextrose 5 % and 0.45 % NaCl, , Continuous PRN  sodium chloride flush, 5-40 mL, PRN  sodium chloride, , PRN  ondansetron, 4 mg, Q8H PRN   Or  ondansetron, 4 mg, Q6H PRN  polyethylene glycol, 17 g, Daily PRN  acetaminophen, 650 mg, Q6H PRN   Or  acetaminophen, 650 mg, Q6H PRN  hydrOXYzine pamoate, 25 mg, TID PRN      Continuous Infusions:   sodium chloride Stopped (02/01/23 1408)    dextrose 5 % and 0.45 % NaCl 150 mL/hr at 02/02/23 1519    insulin 10.6 Units/hr (02/02/23 1930)    sodium chloride Stopped (02/02/23 105 0312)       Review of Systems  All systems reviewed. All negative except for symptoms mentioned in HPI     OBJECTIVE    BP (!) 142/88   Pulse (!) 111   Temp 98.2 °F (36.8 °C) (Temporal)   Resp 19   Ht 6' 7\" (2.007 m)   Wt 234 lb 12.6 oz (106.5 kg)   SpO2 95%   BMI 26.45 kg/m²     Intake/Output Summary (Last 24 hours) at 2/2/2023 1934  Last data filed at 2/2/2023 1800  Gross per 24 hour   Intake 8096.72 ml   Output 1900 ml   Net 6196.72 ml       Physical examination:  General: awake alert, oriented x3  HEENT: normocephalic non traumatic, no exophthalmos   Neck: supple, No thyroid tenderness,  Pulm: good equal air entry no added sounds  CVS: S1 + S2  Abd: soft lax, no tenderness  Skin: warm, no lesions, no rash. Neuro: CN intact, sensation decreased bilateral , muscle power normal  Psych: normal mood, and affect    Review of Laboratory Data:  I personally reviewed the following labs:   Recent Labs     02/01/23  1214 02/02/23  0559 02/02/23  0817   WBC 21.1* 12.1* 13.3*   RBC 5.10 5.07 5.10   HGB 15.6 14.8 14.9   HCT 41.4 40.9 39.9   MCV 81.2 80.7 78.2*   MCH 30.6 29.4 29.8   MCHC 37.7* 36.5* 37.1*   RDW 14.0 14.6 14.6    256 266   MPV 10.2 11.3 10.4     Recent Labs     02/01/23  1804 02/01/23  2356 02/02/23  0817 02/02/23  1123 02/02/23  1527      < > 138 136 133   K 3.7   < > 3.7 3.7 4.1      < > 109* 108* 105   CO2 10*   < > 15* 15* 14*   BUN 14   < > 7 6 5*   CREATININE 0.8   < > 0.6* 0.7 0.6*   GLUCOSE 280*   < > 193* 167* 166*   CALCIUM 8.1*   < > 8.6 8.4* 7.9*   PROT 7.3  --  6.6  --   --    LABALBU 3.7  --  3.5  --   --    BILITOT <0.2  --  <0.2  --   --    ALKPHOS 120  --  98  --   --    AST 53*  --  35  --   --    *  --  87*  --   --     < > = values in this interval not displayed.      Beta-Hydroxybutyrate   Date Value Ref Range Status   02/01/2023 >4.50 (H) 0.02 - 0.27 mmol/L Final   07/22/2022 0.09 0.02 - 0.27 mmol/L Final   07/11/2021 0.16 0.02 - 0.27 mmol/L Final     Lab Results   Component Value Date/Time    LABA1C 13.1 08/02/2020 04:58 AM     No results found for: TSH, T4FREE, X4BWCOW, FT3, B5BTSLL, TSI, TPOABS, THGAB  Lab Results   Component Value Date/Time    LABA1C 13.1 08/02/2020 04:58 AM    GLUCOSE 166 02/02/2023 03:27 PM    MALBCR 452.9 02/01/2023 03:58 PM    LABMICR 172.1 02/01/2023 03:58 PM    LABCREA 38 02/01/2023 03:58 PM    LABCREA 38 02/01/2023 03:58 PM     Lab Results   Component Value Date/Time    TRIG 4,410 02/02/2023 11:23 AM    HDL 11 02/01/2023 12:14 PM    LDLCALC - 02/01/2023 12:14 PM    CHOL 151 02/01/2023 12:14 PM       Blood culture   Lab Results   Component Value Date/Time    BC 5 Days- no growth 08/29/2016 01:22 PM    BC 5 Days- no growth 08/20/2016 08:09 PM       Radiology:  CTA CHEST W CONTRAST   Final Result   1. There is no pulmonary embolus   2. There is no thoracic aortic aneurysm or dissection. 3. The lungs are clear. There is no focal infiltrate. 4. No gross upper abdominal pathology is noted. Please note the pancreas is   not included on the examination. CT ABDOMEN PELVIS W IV CONTRAST Additional Contrast? None   Final Result   1. Presence of predominant fluid distension with some dilatation of the   stomach as above discussed. There is also some thickening of the wall of the   distal esophagus across the esophagogastric junction. Findings to be   correlate clinically. See above comments. 2.  No free intraperitoneal air or acute inflammatory process in the   omental/mesentery fat planes no free intra peritoneal fluid accumulation. 3.  Tiny calculus in the upper pole of the right kidney present causes no   obstruction. No obstructive uropathy in the right or in the left kidneys. 4.  Pattern of mild to moderate constipation. CT HEAD WO CONTRAST   Final Result   1. There is no acute intracranial hemorrhage   2.  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 XR CHEST PORTABLE   Final Result   No acute process. Medical Records/Labs/Images review:   I personally reviewed and summarized previous records   All labs and imaging were reviewed independently     Juana Castellanos, a 42 y.o.-old male seen today for inpatient diabetes management     Diabetes Mellitus type 1 admitted with DKA  Patient's diabetes is uncontrolled due to poor compliance with insulin therapy and follow up visit. Last visit with us was in 9/2021  Currently on insulin drip as per DKA protocol   Once DKA resolve we recommend the following insulin regimen  Lantus 48 units daily  High dose sliding scale   Humalog 10u with meals     Continue glucose check with meals and at bedtime   Will titrate insulin dose based on the blood glucose trend & insulin requirement  Will arrange for patient to be seen in endocrinology clinic upon discharge for routine diabetes maintenance and prevention. Dietary/medications non-compliance  I have discussed with him the great importance of following the treatment plan exactly as directed in order to achieve a good medical outcome. Discussed with patient the importance of eating consistent carbohydrate meals, avoiding high glycemic index food. Also, discussed with patient the risk and negative consequences of dietary noncompliance on blood glucose control, blood pressure and weight      Interdisciplinary plan for communication with healthcare providers:   Consult recommendations were discussed with the Primary Service/Nursing staff      The above issues were reviewed with the patient who understood and agreed with the plan. Thank you for allowing us to participate in the care of this patient. Please do not hesitate to contact us with any additional questions.      Himanshu Singer MD  Endocrinologist, [facility]   [address] [facility] [address]   Phone: [phone]  Fax: [phone]  --------------------------------------------  An electronic signature was used to authenticate this note.  Marva Yi MD on 2/2/2023 at 7:34 PM

## 2023-02-04 PROBLEM — F19.20 POLYSUBSTANCE DEPENDENCE (HCC): Status: ACTIVE | Noted: 2023-02-04

## 2023-02-04 PROBLEM — R76.8 HEPATITIS C ANTIBODY POSITIVE IN BLOOD: Status: ACTIVE | Noted: 2023-02-04

## 2023-02-04 PROBLEM — E10.10 DKA, TYPE 1, NOT AT GOAL (HCC): Status: RESOLVED | Noted: 2020-08-01 | Resolved: 2023-02-04

## 2023-02-04 PROBLEM — E78.1 PURE HYPERTRIGLYCERIDEMIA: Status: ACTIVE | Noted: 2023-02-04

## 2023-02-04 PROBLEM — D63.8 ANEMIA OF CHRONIC DISEASE: Status: ACTIVE | Noted: 2023-02-04

## 2023-02-04 LAB
ANION GAP SERPL CALCULATED.3IONS-SCNC: 10 MMOL/L (ref 7–16)
ANION GAP SERPL CALCULATED.3IONS-SCNC: 13 MMOL/L (ref 7–16)
BASOPHILS ABSOLUTE: 0.02 E9/L (ref 0–0.2)
BASOPHILS RELATIVE PERCENT: 0.3 % (ref 0–2)
BUN BLDV-MCNC: 5 MG/DL (ref 6–20)
BUN BLDV-MCNC: 5 MG/DL (ref 6–20)
CALCIUM SERPL-MCNC: 8.2 MG/DL (ref 8.6–10.2)
CALCIUM SERPL-MCNC: 8.3 MG/DL (ref 8.6–10.2)
CHLORIDE BLD-SCNC: 100 MMOL/L (ref 98–107)
CHLORIDE BLD-SCNC: 101 MMOL/L (ref 98–107)
CO2: 21 MMOL/L (ref 22–29)
CO2: 21 MMOL/L (ref 22–29)
CREAT SERPL-MCNC: 0.7 MG/DL (ref 0.7–1.2)
CREAT SERPL-MCNC: 0.7 MG/DL (ref 0.7–1.2)
EOSINOPHILS ABSOLUTE: 0.03 E9/L (ref 0.05–0.5)
EOSINOPHILS RELATIVE PERCENT: 0.4 % (ref 0–6)
GFR SERPL CREATININE-BSD FRML MDRD: >60 ML/MIN/1.73
GFR SERPL CREATININE-BSD FRML MDRD: >60 ML/MIN/1.73
GLUCOSE BLD-MCNC: 217 MG/DL (ref 74–99)
GLUCOSE BLD-MCNC: 225 MG/DL (ref 74–99)
HCT VFR BLD CALC: 34 % (ref 37–54)
HEMOGLOBIN: 12.4 G/DL (ref 12.5–16.5)
IMMATURE GRANULOCYTES #: 0.08 E9/L
IMMATURE GRANULOCYTES %: 1.1 % (ref 0–5)
LYMPHOCYTES ABSOLUTE: 1.93 E9/L (ref 1.5–4)
LYMPHOCYTES RELATIVE PERCENT: 25.9 % (ref 20–42)
MAGNESIUM: 1.8 MG/DL (ref 1.6–2.6)
MCH RBC QN AUTO: 28.4 PG (ref 26–35)
MCHC RBC AUTO-ENTMCNC: 36.5 % (ref 32–34.5)
MCV RBC AUTO: 77.8 FL (ref 80–99.9)
METER GLUCOSE: 104 MG/DL (ref 74–99)
METER GLUCOSE: 128 MG/DL (ref 74–99)
METER GLUCOSE: 151 MG/DL (ref 74–99)
METER GLUCOSE: 157 MG/DL (ref 74–99)
METER GLUCOSE: 163 MG/DL (ref 74–99)
METER GLUCOSE: 195 MG/DL (ref 74–99)
METER GLUCOSE: 203 MG/DL (ref 74–99)
METER GLUCOSE: 262 MG/DL (ref 74–99)
METER GLUCOSE: 282 MG/DL (ref 74–99)
METER GLUCOSE: 75 MG/DL (ref 74–99)
METER GLUCOSE: 80 MG/DL (ref 74–99)
MONOCYTES ABSOLUTE: 0.43 E9/L (ref 0.1–0.95)
MONOCYTES RELATIVE PERCENT: 5.8 % (ref 2–12)
NEUTROPHILS ABSOLUTE: 4.97 E9/L (ref 1.8–7.3)
NEUTROPHILS RELATIVE PERCENT: 66.5 % (ref 43–80)
PDW BLD-RTO: 15.7 FL (ref 11.5–15)
PHOSPHORUS: 2.7 MG/DL (ref 2.5–4.5)
PLATELET # BLD: 162 E9/L (ref 130–450)
PMV BLD AUTO: 10 FL (ref 7–12)
POTASSIUM SERPL-SCNC: 4 MMOL/L (ref 3.5–5)
POTASSIUM SERPL-SCNC: 4.1 MMOL/L (ref 3.5–5)
RBC # BLD: 4.37 E12/L (ref 3.8–5.8)
REASON FOR REJECTION: NORMAL
REJECTED TEST: NORMAL
SODIUM BLD-SCNC: 132 MMOL/L (ref 132–146)
SODIUM BLD-SCNC: 134 MMOL/L (ref 132–146)
TRIGL SERPL-MCNC: 1187 MG/DL (ref 0–149)
WBC # BLD: 7.5 E9/L (ref 4.5–11.5)

## 2023-02-04 PROCEDURE — 85025 COMPLETE CBC W/AUTO DIFF WBC: CPT

## 2023-02-04 PROCEDURE — 99232 SBSQ HOSP IP/OBS MODERATE 35: CPT | Performed by: INTERNAL MEDICINE

## 2023-02-04 PROCEDURE — 2580000003 HC RX 258: Performed by: INTERNAL MEDICINE

## 2023-02-04 PROCEDURE — 6370000000 HC RX 637 (ALT 250 FOR IP): Performed by: INTERNAL MEDICINE

## 2023-02-04 PROCEDURE — 6360000002 HC RX W HCPCS

## 2023-02-04 PROCEDURE — 6360000002 HC RX W HCPCS: Performed by: INTERNAL MEDICINE

## 2023-02-04 PROCEDURE — 83735 ASSAY OF MAGNESIUM: CPT

## 2023-02-04 PROCEDURE — 94660 CPAP INITIATION&MGMT: CPT

## 2023-02-04 PROCEDURE — 82962 GLUCOSE BLOOD TEST: CPT

## 2023-02-04 PROCEDURE — 2700000000 HC OXYGEN THERAPY PER DAY

## 2023-02-04 PROCEDURE — 80048 BASIC METABOLIC PNL TOTAL CA: CPT

## 2023-02-04 PROCEDURE — 87522 HEPATITIS C REVRS TRNSCRPJ: CPT

## 2023-02-04 PROCEDURE — 80074 ACUTE HEPATITIS PANEL: CPT

## 2023-02-04 PROCEDURE — 84478 ASSAY OF TRIGLYCERIDES: CPT

## 2023-02-04 PROCEDURE — 87902 NFCT AGT GNTYP ALYS HEP C: CPT

## 2023-02-04 PROCEDURE — 84100 ASSAY OF PHOSPHORUS: CPT

## 2023-02-04 PROCEDURE — 2060000000 HC ICU INTERMEDIATE R&B

## 2023-02-04 RX ORDER — MAGNESIUM SULFATE 1 G/100ML
1000 INJECTION INTRAVENOUS ONCE
Status: COMPLETED | OUTPATIENT
Start: 2023-02-04 | End: 2023-02-04

## 2023-02-04 RX ADMIN — BUPRENORPHINE HYDROCHLORIDE AND NALOXONE HYDROCHLORIDE DIHYDRATE 1 TABLET: 8; 2 TABLET SUBLINGUAL at 09:18

## 2023-02-04 RX ADMIN — SENNOSIDES 8.6 MG: 8.6 TABLET, FILM COATED ORAL at 21:49

## 2023-02-04 RX ADMIN — ENOXAPARIN SODIUM 30 MG: 100 INJECTION SUBCUTANEOUS at 21:49

## 2023-02-04 RX ADMIN — INSULIN LISPRO 15 UNITS: 100 INJECTION, SOLUTION INTRAVENOUS; SUBCUTANEOUS at 18:30

## 2023-02-04 RX ADMIN — OMEGA-3-ACID ETHYL ESTERS 2 G: 1 CAPSULE, LIQUID FILLED ORAL at 09:18

## 2023-02-04 RX ADMIN — Medication 500 MG: at 22:12

## 2023-02-04 RX ADMIN — CARBAMAZEPINE 100 MG: 100 TABLET, EXTENDED RELEASE ORAL at 09:18

## 2023-02-04 RX ADMIN — FENOFIBRATE 160 MG: 160 TABLET ORAL at 10:18

## 2023-02-04 RX ADMIN — ENOXAPARIN SODIUM 30 MG: 100 INJECTION SUBCUTANEOUS at 09:19

## 2023-02-04 RX ADMIN — OMEGA-3-ACID ETHYL ESTERS 2 G: 1 CAPSULE, LIQUID FILLED ORAL at 22:12

## 2023-02-04 RX ADMIN — ATORVASTATIN CALCIUM 80 MG: 40 TABLET, FILM COATED ORAL at 21:49

## 2023-02-04 RX ADMIN — INSULIN LISPRO 6 UNITS: 100 INJECTION, SOLUTION INTRAVENOUS; SUBCUTANEOUS at 21:49

## 2023-02-04 RX ADMIN — SODIUM CHLORIDE, PRESERVATIVE FREE 10 ML: 5 INJECTION INTRAVENOUS at 09:20

## 2023-02-04 RX ADMIN — MAGNESIUM SULFATE HEPTAHYDRATE 1000 MG: 1 INJECTION, SOLUTION INTRAVENOUS at 14:00

## 2023-02-04 RX ADMIN — CARBAMAZEPINE 200 MG: 100 TABLET, EXTENDED RELEASE ORAL at 22:10

## 2023-02-04 RX ADMIN — SODIUM CHLORIDE, PRESERVATIVE FREE 5 ML: 5 INJECTION INTRAVENOUS at 21:57

## 2023-02-04 RX ADMIN — POLYETHYLENE GLYCOL 3350 17 G: 17 POWDER, FOR SOLUTION ORAL at 09:19

## 2023-02-04 RX ADMIN — Medication 2000 UNITS: at 09:19

## 2023-02-04 NOTE — PROGRESS NOTES
[facility]  Internal Medicine Residency Program  Progress Note - House Team     Patient:  Nathalia Kelsey 42 y.o. male MRN: [id number]     Date of Service: 2/4/2023     CC: \"Nausea, vomiting, chest pain, and abdominal pain as a result of DKA\"       Subjective     Overnight events: Blood sugars dropped to 80. Insulin drip was held for 4 hours then restarted at 4 AM. Repeat blood sugar was 195. Patient appears to be comfortable this morning. He reports that he is doing well; however, he started having some SOB to which the nurses placed him on [place] oxygen. Recent CT Chest was normal. Other from that no concerns per patient. No BM, but is voiding. Denies headaches, fever, chest pain, and abdominal pain. Objective     Physical Exam:  Vitals: /66   Pulse 99   Temp 98.6 °F (37 °C)   Resp 17   Ht 6' 7\" (2.007 m)   Wt 234 lb (106.1 kg)   SpO2 97%   BMI 26.36 kg/m²     I & O - 24hr:   Intake/Output Summary (Last 24 hours) at 2/4/2023 0756  Last data filed at 2/4/2023 1829  Gross per 24 hour   Intake 5591.77 ml   Output 2075 ml   Net 3516.77 ml      General Appearance: alert, appears stated age, and cooperative  HEENT:  Head: Normocephalic, no lesions, without obvious abnormality. Neck: no adenopathy, no carotid bruit, no JVD, supple, symmetrical, trachea midline, and thyroid not enlarged, symmetric, no tenderness/mass/nodules  Lung: clear to auscultation bilaterally  Heart: regular rate and rhythm, S1, S2 normal, no murmur, click, rub or gallop  Abdomen: soft, non-tender; bowel sounds normal; no masses,  no organomegaly  Extremities:  extremities normal, atraumatic, no cyanosis or edema  Musculokeletal: No joint swelling, no muscle tenderness. ROM normal in all joints of extremities.    Neurologic: Mental status: Alert, oriented, thought content appropriate  Subject  Pertinent Information & Imaging Studies, Consults     CBC:   Lab Results   Component Value Date/Time    WBC 7.5 02/04/2023 05:39 AM    RBC 4.37 02/04/2023 05:39 AM    HGB 12.4 02/04/2023 05:39 AM    HCT 34.0 02/04/2023 05:39 AM    MCV 77.8 02/04/2023 05:39 AM    MCH 28.4 02/04/2023 05:39 AM    MCHC 36.5 02/04/2023 05:39 AM    RDW 15.7 02/04/2023 05:39 AM     02/04/2023 05:39 AM    MPV 10.0 02/04/2023 05:39 AM     CBC with Differential:    Lab Results   Component Value Date/Time    WBC 7.5 02/04/2023 05:39 AM    RBC 4.37 02/04/2023 05:39 AM    HGB 12.4 02/04/2023 05:39 AM    HCT 34.0 02/04/2023 05:39 AM     02/04/2023 05:39 AM    MCV 77.8 02/04/2023 05:39 AM    MCH 28.4 02/04/2023 05:39 AM    MCHC 36.5 02/04/2023 05:39 AM    RDW 15.7 02/04/2023 05:39 AM    NRBC 1.8 02/01/2023 07:26 AM    SEGSPCT 72 10/12/2012 07:40 PM    BANDSPCT 1 08/20/2016 05:08 PM    METASPCT 1.8 02/01/2023 07:26 AM    LYMPHOPCT 25.9 02/04/2023 05:39 AM    MONOPCT 5.8 02/04/2023 05:39 AM    BASOPCT 0.3 02/04/2023 05:39 AM    MONOSABS 0.43 02/04/2023 05:39 AM    LYMPHSABS 1.93 02/04/2023 05:39 AM    EOSABS 0.03 02/04/2023 05:39 AM    BASOSABS 0.02 02/04/2023 05:39 AM     CMP:    Lab Results   Component Value Date/Time     02/04/2023 05:39 AM     02/04/2023 05:39 AM    K 4.0 02/04/2023 05:39 AM    K 4.1 02/04/2023 05:39 AM    K 4.0 07/22/2022 11:07 PM     02/04/2023 05:39 AM     02/04/2023 05:39 AM    CO2 21 02/04/2023 05:39 AM    CO2 21 02/04/2023 05:39 AM    BUN 5 02/04/2023 05:39 AM    BUN 5 02/04/2023 05:39 AM    CREATININE 0.7 02/04/2023 05:39 AM    CREATININE 0.7 02/04/2023 05:39 AM    GFRAA >60 07/22/2022 11:07 PM    LABGLOM >60 02/04/2023 05:39 AM    LABGLOM >60 02/04/2023 05:39 AM    GLUCOSE 217 02/04/2023 05:39 AM    GLUCOSE 225 02/04/2023 05:39 AM    PROT 6.6 02/02/2023 08:17 AM    LABALBU 3.5 02/02/2023 08:17 AM    CALCIUM 8.3 02/04/2023 05:39 AM    CALCIUM 8.2 02/04/2023 05:39 AM    BILITOT <0.2 02/02/2023 08:17 AM    ALKPHOS 98 02/02/2023 08:17 AM    AST 35 02/02/2023 08:17 AM    ALT 87 02/02/2023 08:17 AM     BMP: Lab Results   Component Value Date/Time     02/04/2023 05:39 AM     02/04/2023 05:39 AM    K 4.0 02/04/2023 05:39 AM    K 4.1 02/04/2023 05:39 AM    K 4.0 07/22/2022 11:07 PM     02/04/2023 05:39 AM     02/04/2023 05:39 AM    CO2 21 02/04/2023 05:39 AM    CO2 21 02/04/2023 05:39 AM    BUN 5 02/04/2023 05:39 AM    BUN 5 02/04/2023 05:39 AM    LABALBU 3.5 02/02/2023 08:17 AM    CREATININE 0.7 02/04/2023 05:39 AM    CREATININE 0.7 02/04/2023 05:39 AM    CALCIUM 8.3 02/04/2023 05:39 AM    CALCIUM 8.2 02/04/2023 05:39 AM    GFRAA >60 07/22/2022 11:07 PM    LABGLOM >60 02/04/2023 05:39 AM    LABGLOM >60 02/04/2023 05:39 AM    GLUCOSE 217 02/04/2023 05:39 AM    GLUCOSE 225 02/04/2023 05:39 AM       IMAGING:   Imaging Studies:    CT HEAD WO CONTRAST    Result Date: 2/1/2023  1. There is no acute intracranial hemorrhage 2. Encephalomalacia is seen within the posterior left temporal lobe and posterior left parietal lobe consistent with old infarct. CT ABDOMEN PELVIS W IV CONTRAST Additional Contrast? None    Result Date: 2/1/2023  1. Presence of predominant fluid distension with some dilatation of the stomach as above discussed. There is also some thickening of the wall of the distal esophagus across the esophagogastric junction. Findings to be correlate clinically. See above comments. 2.  No free intraperitoneal air or acute inflammatory process in the omental/mesentery fat planes no free intra peritoneal fluid accumulation. 3.  Tiny calculus in the upper pole of the right kidney present causes no obstruction. No obstructive uropathy in the right or in the left kidneys. 4.  Pattern of mild to moderate constipation. XR CHEST PORTABLE    Result Date: 2/1/2023  No acute process. CTA CHEST W CONTRAST    Result Date: 2/1/2023  1. There is no pulmonary embolus 2. There is no thoracic aortic aneurysm or dissection. 3. The lungs are clear. There is no focal infiltrate.  4. No gross upper abdominal pathology is noted. Please note the pancreas is not included on the examination. OXYGENATION: 3.5 litres NC oxygen     Resident's Assessment and Plan     Nathalia Kelsey is a 42 y.o. male with  has a past medical history of Anxiety, Depression, Seizures ([address] and Unspecified cerebral artery occlusion with cerebral infarction. came here with CC   Chief Complaint   Patient presents with    Chest Pain     Called EMS for chest pain stated that he couldn't tell when it stated but has had N/V that started yesterday and is dehydrated     Back Pain     C/O back pain that started when EMS placed him on the cot        Assessment/Plan:    DKA  medications noncompliance   AG closed twice bridged to 26 U lantus  Follow endocrinology recommendations   Start 48 U lantus  nightly, HDSS and 10 U premeal Humalog   Replete electrolytes as needed  DKA protocol D/C     DM2  HBA1C 13.1%  Insulin pump, notes he has not had insulin in over a month  U/A: turbid with proteinuria   Start 48 U lantus  nightly, HDSS and 10 U premeal Humalog   Endocrinology following     Hyponatremia  Likely PO intake   Diet resumed  Daily BMP    Hypertriglyceridemia   Fibrate, statin, fish oil, and niacin   Monitor TAG level daily   Blood thickening to get labs. All labs changed to Q 6 hours     Acute hypertriglyceridemic pancreatitis  CT of the abdomen is negative for any pancreatic changes  Continue statin and fibrates.  Niacin and fish oil added   TG trending down, continue to monitor     Leukocytosis, likely reactive secondary to DKA  Sinus tachycardia 2/2 DKA  ANNE stage 1 likely pre-renal (dehydration from DKA, poor intake, GI loss) Baseline creatinine 0.7-0.8 -     History of tobacco abuse  History of drug abuse, on Suboxone  Suboxone resumed   History of hepatitis C  History of epilepsy, on carbamazepine - resumed  History of left temporal and left parietal lobe  Hypertriglyceridemia hyperlipoproteinemia likely in the setting of DKA      PT/OT: yes   DVT ppx: enoxaparin   GI ppx: none   Code Status: Full     Yu Guillen MD, PGY-1  Attending physician: Dr. Dejuan Mendoza  Internal Medicine Residency Program  Attending Physician Statement:  Mei Leon M.D., F.A.C.P. I have discussed the case, including pertinent history and exam findings with the resident. I have seen and examined the patient--agree with the resident assessment of ROS, PMHx, PSHx, meds reviewed and assessment--Remainder of medical problems as per resident note. Hospital charts reviewed, including other providers notes, relevant labs and imaging. >50% of time spent coordinating care with residents, other providers and/or counseling patient   re: workup/plan and orders as documented by the resident   For billing--Using Medical decision making level of complexity and time spent on case      Dm2?   [address]  Out of DKA but TG >1000 stil on insulin drip  Asa then niacin?  +fibrate  +statin  Fish oil???    Currently on insulin drip - to drive down TG  He didn't have acute pancreatitis    Insulin non compliance  Used to be on insulin pump till past month  One year ago  45 lantus +10 tid    History of drug abuse, on Suboxone  Suboxone resumed   Hep C ab +viral load 2016    Feels SOB NOT hypoxic  Respiratory distress due to metabolic acidosis

## 2023-02-04 NOTE — PLAN OF CARE
Problem: Pain  Goal: Verbalizes/displays adequate comfort level or baseline comfort level  Outcome: Progressing     Problem: Neurosensory - Adult  Goal: Absence of seizures  Outcome: Progressing     Problem: Respiratory - Adult  Goal: Achieves optimal ventilation and oxygenation  Outcome: Progressing     Problem: Skin/Tissue Integrity - Adult  Goal: Skin integrity remains intact  Outcome: Progressing     Problem: Musculoskeletal - Adult  Goal: Return mobility to safest level of function  Outcome: Progressing     Problem: Gastrointestinal - Adult  Goal: Minimal or absence of nausea and vomiting  Outcome: Progressing     Problem: Genitourinary - Adult  Goal: Absence of urinary retention  Outcome: Progressing     Problem: Safety - Adult  Goal: Free from fall injury  Outcome: Progressing     Problem: Skin/Tissue Integrity  Goal: Absence of new skin breakdown  Outcome: Progressing

## 2023-02-04 NOTE — PROGRESS NOTES
[address] [facility]  Department of Internal Medicine   Internal Medicine Residency   MICU Progress Note    Patient:  Mitch Silva 42 y.o. male  MRN: [id number]     Date of Service: 2/4/2023    Allergy: Patient has no known allergies. Subjective     Patient seen and examined at bedside this morning in no acute distress. He is awake, alert, and oriented. No active complaints at this time. He denies any headache, dyspnea, chest pain, abdominal pain, shortness of breath, nausea, vomiting. Patient states that he is hungry and likes to eat. Expressed not liking to hospital food. But he is able to tolerate chicken and salad if they would be able to give him. 24h change: Triglycerides noted to be trending down. Had an episode of hypoglycemia with blood glucose in the 80s. Insulin drip was held for the meantime until blood sugars improved. ROS: Denies Fever/chills/CP/SOB/N/V/D/C/Dysuria/Blood in stool or urine  Objective     VS: /66   Pulse 99   Temp 98.6 °F (37 °C)   Resp 17   Ht 6' 7\" (2.007 m)   Wt 234 lb (106.1 kg)   SpO2 97%   BMI 26.36 kg/m²           I & O - 24hr:   Intake/Output Summary (Last 24 hours) at 2/4/2023 1558  Last data filed at 2/4/2023 4897  Gross per 24 hour   Intake 5571.77 ml   Output 1775 ml   Net 3796.77 ml       Physical Exam:  General Appearance: alert, appears stated age, cooperative, and no distress  Neck: no adenopathy, no carotid bruit, no JVD, supple, symmetrical, trachea midline, and thyroid not enlarged, symmetric, no tenderness/mass/nodules  Lung: clear to auscultation bilaterally  Heart: regular rate and rhythm, S1, S2 normal, no murmur, click, rub or gallop  Abdomen: soft, non-tender; bowel sounds normal; no masses,  no organomegaly  Extremities:  extremities normal, atraumatic, no cyanosis or edema  Musculoskeletal: No joint swelling, no muscle tenderness. ROM normal in all joints of extremities.    Neurologic: Mental status: Alert, oriented, thought content appropriate    Lines     site day    Art line   None    TLC None    PICC None    Hemoaccess None    Oxygen:    nasal canula at 2L/min    ABG:     Lab Results   Component Value Date/Time    PH 7.245 02/01/2023 03:11 PM    PCO2 21.1 02/01/2023 03:11 PM    PO2 95.5 02/01/2023 03:11 PM    HCO3 8.9 02/01/2023 03:11 PM    BE -16.1 02/01/2023 03:11 PM    THB 15.4 02/01/2023 03:11 PM    O2SAT 97.6 02/01/2023 03:11 PM        Medications     Infusions: (Fluid, Sedation, Vasopressors)  IVF:   Insulin drip  Vasopressors  None  Sedation  None    Nutrition:   Regular adult diet; 4 carbs  ATB:   Antibiotics  Days   None              Skin issues: None  Patient currently has   DVT prophylaxis/ GI prophylaxis,    PT/OT  Labs   CBC with Differential:    Lab Results   Component Value Date/Time    WBC 7.5 02/04/2023 05:39 AM    RBC 4.37 02/04/2023 05:39 AM    HGB 12.4 02/04/2023 05:39 AM    HCT 34.0 02/04/2023 05:39 AM     02/04/2023 05:39 AM    MCV 77.8 02/04/2023 05:39 AM    MCH 28.4 02/04/2023 05:39 AM    MCHC 36.5 02/04/2023 05:39 AM    RDW 15.7 02/04/2023 05:39 AM    NRBC 1.8 02/01/2023 07:26 AM    SEGSPCT 72 10/12/2012 07:40 PM    BANDSPCT 1 08/20/2016 05:08 PM    METASPCT 1.8 02/01/2023 07:26 AM    LYMPHOPCT 25.9 02/04/2023 05:39 AM    MONOPCT 5.8 02/04/2023 05:39 AM    BASOPCT 0.3 02/04/2023 05:39 AM    MONOSABS 0.43 02/04/2023 05:39 AM    LYMPHSABS 1.93 02/04/2023 05:39 AM    EOSABS 0.03 02/04/2023 05:39 AM    BASOSABS 0.02 02/04/2023 05:39 AM     CMP:    Lab Results   Component Value Date/Time     02/04/2023 05:39 AM     02/04/2023 05:39 AM    K 4.0 02/04/2023 05:39 AM    K 4.1 02/04/2023 05:39 AM    K 4.0 07/22/2022 11:07 PM     02/04/2023 05:39 AM     02/04/2023 05:39 AM    CO2 21 02/04/2023 05:39 AM    CO2 21 02/04/2023 05:39 AM    BUN 5 02/04/2023 05:39 AM    BUN 5 02/04/2023 05:39 AM    CREATININE 0.7 02/04/2023 05:39 AM    CREATININE 0.7 02/04/2023 05:39 AM    GFRAA >60 07/22/2022 11:07 PM    LABGLOM >60 02/04/2023 05:39 AM    LABGLOM >60 02/04/2023 05:39 AM    GLUCOSE 217 02/04/2023 05:39 AM    GLUCOSE 225 02/04/2023 05:39 AM    PROT 6.6 02/02/2023 08:17 AM    LABALBU 3.5 02/02/2023 08:17 AM    CALCIUM 8.3 02/04/2023 05:39 AM    CALCIUM 8.2 02/04/2023 05:39 AM    BILITOT <0.2 02/02/2023 08:17 AM    ALKPHOS 98 02/02/2023 08:17 AM    AST 35 02/02/2023 08:17 AM    ALT 87 02/02/2023 08:17 AM     Magnesium:    Lab Results   Component Value Date/Time    MG 1.8 02/04/2023 05:39 AM     Phosphorus:    Lab Results   Component Value Date/Time    PHOS 2.7 02/04/2023 05:39 AM     U/A:    Lab Results   Component Value Date/Time    COLORU Yellow 02/02/2023 06:25 AM    PROTEINU 100 02/02/2023 06:25 AM    PHUR 6.0 02/02/2023 06:25 AM    WBCUA NONE 02/02/2023 06:25 AM    RBCUA 1-3 02/02/2023 06:25 AM    BACTERIA MANY 02/02/2023 06:25 AM    CLARITYU TURBID 02/02/2023 06:25 AM    SPECGRAV >=1.030 02/02/2023 06:25 AM    LEUKOCYTESUR Negative 02/02/2023 06:25 AM    UROBILINOGEN 0.2 02/02/2023 06:25 AM    BILIRUBINUR MODERATE 02/02/2023 06:25 AM    BLOODU MODERATE 02/02/2023 06:25 AM    GLUCOSEU 500 02/02/2023 06:25 AM    AMORPHOUS PRESENT 02/02/2023 06:25 AM     HgBA1c:    Lab Results   Component Value Date/Time    LABA1C 13.1 08/02/2020 04:58 AM       Imaging Studies:  CXR:   Feb 1, 2023   No acute process. CT scan:   CT head February 1, 2023   1. There is no acute intracranial hemorrhage   2. 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 CT A/P February 1, 2023   1. Presence of predominant fluid distension with some dilatation of the   stomach as above discussed. There is also some thickening of the wall of the   distal esophagus across the esophagogastric junction. Findings to be   correlate clinically. See above comments.        2.  No free intraperitoneal air or acute inflammatory process in the omental/mesentery fat planes no free intra peritoneal fluid accumulation. 3.  Tiny calculus in the upper pole of the right kidney present causes no   obstruction. No obstructive uropathy in the right or in the left kidneys. 4.  Pattern of mild to moderate constipation. CTA chest with contrast February 1, 2023   1. There is no pulmonary embolus   2. There is no thoracic aortic aneurysm or dissection. 3. The lungs are clear. There is no focal infiltrate. 4. No gross upper abdominal pathology is noted. Please note the pancreas is   not included on the examination. Resident's Assessment and Plan     Severe hypertriglyceridemia and hyperlipoproteinemia 2/2 DKA. >>4410>>2398  Acute pancreatitis 2/2 DKA. With elevated lipase at 1552, (+) epigastric pain on admission,  >>4410. CT abdomen negative for pancreatic changes  DM type 2, poorly controlled. HBA1C 13.1  Leukocytosis, likely reactive 2/2 DKA  Elevated hemoglobin likely hemoconcentration 2/2 dehydration due to DKA, resolved. Hb down to 14.8>>12 (likely dilutional now)  Hx L temporal and L parietal lobe  Hx epilepsy. On carbamazepine  Hx hepatitis C, HCV RNA in 2016 3.6 million. ? If treated  Hx drug abuse, on Suboxone  Hx tobacco abuse    Resolved problems  Diabetic ketoacidosis  HAGMA multifactorial 2/2 DKA, starvation ketosis, lactic acidosis. (+) ketonuria, glucosuria  ANNE stage I likely pre-renal (dehydration from DKA, poor intake, GI loss), baseline crea  0.7-0.8  Sinus tachycardia 2/2 DHN, with ischemic changes on EKG, can be rate related, rpt EKG ST  bpm, with q waves III, AVF, denies chest pain  Chest pain can be demand ischemia vs NSTEMI, with elevated troponin, EKG ST, T wave inversion III, AVF (can be rate related changes). . Trop 15>>15>16>17  Pseudohyponatremia 2/2 hyperglycemia    Plan:  Discontinue insulin drip  Transition to lantus 48u lantus daily, humalog 15u premeals and HDSS.  Monitor BG AC/HS.   Endocrinology following, appreciate recommendations  Repeat triglycerides level tomorrow AM  Continue statin, fenofibrate, niacin, fish oil  Monitor BMP every 12 hours  Continue Suboxone, carbamazepine  Lovenox for DVT prophylaxis  Hydroxyzine 25 mg 3 times daily as needed for anxiety  Check hep C viral load  May transfer out of the unit today    Zion Wilkins MD, PGY-2    Attending physician: Dr. Luisana Irvin

## 2023-02-05 VITALS
HEIGHT: 78 IN | TEMPERATURE: 98 F | WEIGHT: 234 LBS | DIASTOLIC BLOOD PRESSURE: 66 MMHG | RESPIRATION RATE: 18 BRPM | BODY MASS INDEX: 27.07 KG/M2 | SYSTOLIC BLOOD PRESSURE: 148 MMHG | HEART RATE: 103 BPM | OXYGEN SATURATION: 92 %

## 2023-02-05 PROBLEM — E11.10 DIABETIC ACIDOSIS WITHOUT COMA (HCC): Status: RESOLVED | Noted: 2023-02-01 | Resolved: 2023-02-05

## 2023-02-05 LAB
ANION GAP SERPL CALCULATED.3IONS-SCNC: 12 MMOL/L (ref 7–16)
BASOPHILS ABSOLUTE: 0.02 E9/L (ref 0–0.2)
BASOPHILS RELATIVE PERCENT: 0.3 % (ref 0–2)
BUN BLDV-MCNC: 6 MG/DL (ref 6–20)
CALCIUM SERPL-MCNC: 8.5 MG/DL (ref 8.6–10.2)
CHLORIDE BLD-SCNC: 102 MMOL/L (ref 98–107)
CO2: 19 MMOL/L (ref 22–29)
CREAT SERPL-MCNC: 0.7 MG/DL (ref 0.7–1.2)
EOSINOPHILS ABSOLUTE: 0.02 E9/L (ref 0.05–0.5)
EOSINOPHILS RELATIVE PERCENT: 0.3 % (ref 0–6)
GFR SERPL CREATININE-BSD FRML MDRD: >60 ML/MIN/1.73
GLUCOSE BLD-MCNC: 292 MG/DL (ref 74–99)
HBA1C MFR BLD: >16.5 %
HCT VFR BLD CALC: 34 % (ref 37–54)
HEMOGLOBIN: 12.1 G/DL (ref 12.5–16.5)
IMMATURE GRANULOCYTES #: 0.06 E9/L
IMMATURE GRANULOCYTES %: 0.8 % (ref 0–5)
LYMPHOCYTES ABSOLUTE: 1.78 E9/L (ref 1.5–4)
LYMPHOCYTES RELATIVE PERCENT: 23 % (ref 20–42)
MAGNESIUM: 1.9 MG/DL (ref 1.6–2.6)
MCH RBC QN AUTO: 27.6 PG (ref 26–35)
MCHC RBC AUTO-ENTMCNC: 35.6 % (ref 32–34.5)
MCV RBC AUTO: 77.6 FL (ref 80–99.9)
METER GLUCOSE: 213 MG/DL (ref 74–99)
METER GLUCOSE: 328 MG/DL (ref 74–99)
MONOCYTES ABSOLUTE: 0.75 E9/L (ref 0.1–0.95)
MONOCYTES RELATIVE PERCENT: 9.7 % (ref 2–12)
NEUTROPHILS ABSOLUTE: 5.11 E9/L (ref 1.8–7.3)
NEUTROPHILS RELATIVE PERCENT: 65.9 % (ref 43–80)
PDW BLD-RTO: 15.7 FL (ref 11.5–15)
PHOSPHORUS: 2.6 MG/DL (ref 2.5–4.5)
PLATELET # BLD: 153 E9/L (ref 130–450)
PMV BLD AUTO: 10 FL (ref 7–12)
POTASSIUM SERPL-SCNC: 4.3 MMOL/L (ref 3.5–5)
RBC # BLD: 4.38 E12/L (ref 3.8–5.8)
SODIUM BLD-SCNC: 133 MMOL/L (ref 132–146)
TRIGL SERPL-MCNC: 487 MG/DL (ref 0–149)
WBC # BLD: 7.7 E9/L (ref 4.5–11.5)

## 2023-02-05 PROCEDURE — 99232 SBSQ HOSP IP/OBS MODERATE 35: CPT | Performed by: INTERNAL MEDICINE

## 2023-02-05 PROCEDURE — 99239 HOSP IP/OBS DSCHRG MGMT >30: CPT | Performed by: INTERNAL MEDICINE

## 2023-02-05 PROCEDURE — 84100 ASSAY OF PHOSPHORUS: CPT

## 2023-02-05 PROCEDURE — S5553 INSULIN LONG ACTING 5 U: HCPCS

## 2023-02-05 PROCEDURE — 6370000000 HC RX 637 (ALT 250 FOR IP)

## 2023-02-05 PROCEDURE — 36415 COLL VENOUS BLD VENIPUNCTURE: CPT

## 2023-02-05 PROCEDURE — 83735 ASSAY OF MAGNESIUM: CPT

## 2023-02-05 PROCEDURE — 82962 GLUCOSE BLOOD TEST: CPT

## 2023-02-05 PROCEDURE — 6360000002 HC RX W HCPCS

## 2023-02-05 PROCEDURE — 6370000000 HC RX 637 (ALT 250 FOR IP): Performed by: INTERNAL MEDICINE

## 2023-02-05 PROCEDURE — 80048 BASIC METABOLIC PNL TOTAL CA: CPT

## 2023-02-05 PROCEDURE — 84478 ASSAY OF TRIGLYCERIDES: CPT

## 2023-02-05 PROCEDURE — 85025 COMPLETE CBC W/AUTO DIFF WBC: CPT

## 2023-02-05 RX ORDER — GLUCOSAMINE HCL/CHONDROITIN SU 500-400 MG
CAPSULE ORAL
Qty: 400 STRIP | Refills: 1 | Status: SHIPPED | OUTPATIENT
Start: 2023-02-05

## 2023-02-05 RX ORDER — ASPIRIN 81 MG/1
81 TABLET, CHEWABLE ORAL DAILY
Status: DISCONTINUED | OUTPATIENT
Start: 2023-02-05 | End: 2023-02-05 | Stop reason: HOSPADM

## 2023-02-05 RX ORDER — LANOLIN ALCOHOL/MO/W.PET/CERES
500 CREAM (GRAM) TOPICAL NIGHTLY
Qty: 30 TABLET | Refills: 5 | Status: SHIPPED | OUTPATIENT
Start: 2023-02-05

## 2023-02-05 RX ORDER — GLUCOSAMINE HCL/CHONDROITIN SU 500-400 MG
4 CAPSULE ORAL 4 TIMES DAILY
Qty: 1 EACH | Refills: 1 | Status: SHIPPED | OUTPATIENT
Start: 2023-02-05

## 2023-02-05 RX ORDER — ATORVASTATIN CALCIUM 40 MG/1
40 TABLET, FILM COATED ORAL DAILY
Qty: 30 TABLET | Refills: 5 | Status: SHIPPED | OUTPATIENT
Start: 2023-02-05

## 2023-02-05 RX ORDER — HYDROXYZINE PAMOATE 25 MG/1
25 CAPSULE ORAL 3 TIMES DAILY PRN
Qty: 30 CAPSULE | Refills: 1 | Status: SHIPPED | OUTPATIENT
Start: 2023-02-05 | End: 2023-02-19

## 2023-02-05 RX ORDER — LOSARTAN POTASSIUM 25 MG/1
25 TABLET ORAL DAILY
Status: DISCONTINUED | OUTPATIENT
Start: 2023-02-05 | End: 2023-02-05 | Stop reason: HOSPADM

## 2023-02-05 RX ORDER — ASPIRIN 81 MG/1
81 TABLET, CHEWABLE ORAL DAILY
Qty: 30 TABLET | Refills: 3 | Status: SHIPPED | OUTPATIENT
Start: 2023-02-06

## 2023-02-05 RX ORDER — PEN NEEDLE, DIABETIC 32 GX 1/4"
NEEDLE, DISPOSABLE MISCELLANEOUS
Qty: 250 EACH | Refills: 5 | Status: SHIPPED | OUTPATIENT
Start: 2023-02-05

## 2023-02-05 RX ORDER — OMEGA-3-ACID ETHYL ESTERS 1 G/1
2 CAPSULE, LIQUID FILLED ORAL 2 TIMES DAILY
Qty: 120 CAPSULE | Refills: 5 | Status: SHIPPED | OUTPATIENT
Start: 2023-02-05

## 2023-02-05 RX ORDER — INSULIN GLARGINE 100 [IU]/ML
48 INJECTION, SOLUTION SUBCUTANEOUS EVERY MORNING
Qty: 15 ADJUSTABLE DOSE PRE-FILLED PEN SYRINGE | Refills: 5 | Status: SHIPPED | OUTPATIENT
Start: 2023-02-05

## 2023-02-05 RX ORDER — INSULIN LISPRO 100 [IU]/ML
INJECTION, SOLUTION INTRAVENOUS; SUBCUTANEOUS
Qty: 15 ADJUSTABLE DOSE PRE-FILLED PEN SYRINGE | Refills: 4 | Status: SHIPPED | OUTPATIENT
Start: 2023-02-05

## 2023-02-05 RX ORDER — CHOLECALCIFEROL (VITAMIN D3) 50 MCG
2000 TABLET ORAL DAILY
Qty: 90 TABLET | Refills: 1 | Status: SHIPPED | OUTPATIENT
Start: 2023-02-06

## 2023-02-05 RX ORDER — FENOFIBRATE 160 MG/1
160 TABLET ORAL DAILY
Qty: 30 TABLET | Refills: 5 | Status: SHIPPED | OUTPATIENT
Start: 2023-02-05

## 2023-02-05 RX ORDER — BLOOD-GLUCOSE METER
1 KIT MISCELLANEOUS ONCE
Qty: 1 KIT | Refills: 1 | Status: SHIPPED | OUTPATIENT
Start: 2023-02-05 | End: 2023-02-05

## 2023-02-05 RX ORDER — LOSARTAN POTASSIUM 25 MG/1
25 TABLET ORAL DAILY
Qty: 30 TABLET | Refills: 3 | Status: SHIPPED | OUTPATIENT
Start: 2023-02-06

## 2023-02-05 RX ADMIN — INSULIN LISPRO 15 UNITS: 100 INJECTION, SOLUTION INTRAVENOUS; SUBCUTANEOUS at 09:09

## 2023-02-05 RX ADMIN — FENOFIBRATE 160 MG: 160 TABLET ORAL at 09:07

## 2023-02-05 RX ADMIN — INSULIN LISPRO 15 UNITS: 100 INJECTION, SOLUTION INTRAVENOUS; SUBCUTANEOUS at 12:15

## 2023-02-05 RX ADMIN — INSULIN LISPRO 12 UNITS: 100 INJECTION, SOLUTION INTRAVENOUS; SUBCUTANEOUS at 09:09

## 2023-02-05 RX ADMIN — LOSARTAN POTASSIUM 25 MG: 25 TABLET, FILM COATED ORAL at 12:15

## 2023-02-05 RX ADMIN — ASPIRIN 81 MG CHEWABLE TABLET 81 MG: 81 TABLET CHEWABLE at 12:15

## 2023-02-05 RX ADMIN — INSULIN GLARGINE-YFGN 48 UNITS: 100 INJECTION, SOLUTION SUBCUTANEOUS at 09:09

## 2023-02-05 RX ADMIN — OMEGA-3-ACID ETHYL ESTERS 2 G: 1 CAPSULE, LIQUID FILLED ORAL at 09:05

## 2023-02-05 RX ADMIN — BUPRENORPHINE HYDROCHLORIDE AND NALOXONE HYDROCHLORIDE DIHYDRATE 1 TABLET: 8; 2 TABLET SUBLINGUAL at 09:04

## 2023-02-05 RX ADMIN — CARBAMAZEPINE 100 MG: 100 TABLET, EXTENDED RELEASE ORAL at 09:06

## 2023-02-05 RX ADMIN — ENOXAPARIN SODIUM 30 MG: 100 INJECTION SUBCUTANEOUS at 09:08

## 2023-02-05 RX ADMIN — INSULIN LISPRO 6 UNITS: 100 INJECTION, SOLUTION INTRAVENOUS; SUBCUTANEOUS at 12:15

## 2023-02-05 NOTE — PLAN OF CARE
Problem: Chronic Conditions and Co-morbidities  Goal: Patient's chronic conditions and co-morbidity symptoms are monitored and maintained or improved  2/5/2023 1403 by Asha Dale RN  Outcome: Completed  2/5/2023 0055 by Eduardo Rizo RN  Outcome: Progressing  Flowsheets (Taken 2/4/2023 1958)  Care Plan - Patient's Chronic Conditions and Co-Morbidity Symptoms are Monitored and Maintained or Improved: Monitor and assess patient's chronic conditions and comorbid symptoms for stability, deterioration, or improvement     Problem: Discharge Planning  Goal: Discharge to home or other facility with appropriate resources  2/5/2023 1403 by Asha Dale RN  Outcome: Completed  2/5/2023 0055 by Eduardo Rizo RN  Outcome: Progressing  Flowsheets (Taken 2/4/2023 1958)  Discharge to home or other facility with appropriate resources: Identify barriers to discharge with patient and caregiver     Problem: Pain  Goal: Verbalizes/displays adequate comfort level or baseline comfort level  2/5/2023 1403 by Asha Dale RN  Outcome: Completed  2/5/2023 0055 by Eduardo Rizo RN  Outcome: Progressing     Problem: Neurosensory - Adult  Goal: Achieves stable or improved neurological status  2/5/2023 1403 by Asha Dale RN  Outcome: Completed  2/5/2023 0055 by Eduardo Rizo RN  Outcome: Progressing  Flowsheets (Taken 2/4/2023 1958)  Achieves stable or improved neurological status: Assess for and report changes in neurological status  Goal: Absence of seizures  2/5/2023 1403 by Asha Dale RN  Outcome: Completed  2/5/2023 0055 by Eduardo Rizo RN  Outcome: Progressing  Flowsheets (Taken 2/4/2023 1958)  Absence of seizures: Monitor for seizure activity.   If seizure occurs, document type and location of movements and any associated apnea  Goal: Remains free of injury related to seizures activity  2/5/2023 1403 by Asha Dale RN  Outcome: Completed  2/5/2023 0055 by Eduardo Rizo RN  Outcome: Progressing  Flowsheets (Taken 2/4/2023 1958)  Remains free of injury related to seizure activity: Maintain airway, patient safety  and administer oxygen as ordered  Goal: Achieves maximal functionality and self care  2/5/2023 1403 by Kimberley Davis RN  Outcome: Completed  2/5/2023 0055 by Michael Amin RN  Outcome: Progressing     Problem: Respiratory - Adult  Goal: Achieves optimal ventilation and oxygenation  2/5/2023 1403 by Kimberley Davis RN  Outcome: Completed  2/5/2023 0055 by Michael Amin RN  Outcome: Progressing  Flowsheets (Taken 2/4/2023 1958)  Achieves optimal ventilation and oxygenation: Assess for changes in respiratory status     Problem: Cardiovascular - Adult  Goal: Maintains optimal cardiac output and hemodynamic stability  2/5/2023 1403 by Kimberley Davis RN  Outcome: Completed  2/5/2023 0055 by Michael Amin RN  Outcome: Progressing  Goal: Absence of cardiac dysrhythmias or at baseline  2/5/2023 1403 by Kimberley Davis RN  Outcome: Completed  2/5/2023 0055 by Michael Amin RN  Outcome: Progressing     Problem: Skin/Tissue Integrity - Adult  Goal: Skin integrity remains intact  2/5/2023 1403 by Kimberley Davis RN  Outcome: Completed  Flowsheets (Taken 2/5/2023 0845)  Skin Integrity Remains Intact:   Monitor for areas of redness and/or skin breakdown   Assess vascular access sites hourly   Every 4-6 hours minimum: Change oxygen saturation probe site   Every 4-6 hours: If on nasal continuous positive airway pressure, respiratory therapy assesses nares and determine need for appliance change or resting period  2/5/2023 0055 by Michael Amin RN  Outcome: Progressing  Flowsheets (Taken 2/4/2023 1958)  Skin Integrity Remains Intact: Monitor for areas of redness and/or skin breakdown  Goal: Incisions, wounds, or drain sites healing without S/S of infection  2/5/2023 1403 by Kimberley Davis RN  Outcome: Completed  Flowsheets (Taken 2/5/2023 0845)  Incisions, Wounds, or Drain Sites Healing Without Sign and Symptoms of Infection: ADMISSION and DAILY: Assess and document risk factors for pressure ulcer development  2/5/2023 0055 by Ann Marie Spears RN  Outcome: Progressing  Flowsheets (Taken 2/4/2023 1958)  Incisions, Wounds, or Drain Sites Healing Without Sign and Symptoms of Infection: ADMISSION and DAILY: Assess and document risk factors for pressure ulcer development  Goal: Oral mucous membranes remain intact  2/5/2023 1403 by Phoebe Burton RN  Outcome: Completed  Flowsheets (Taken 2/5/2023 0845)  Oral Mucous Membranes Remain Intact:   Assess oral mucosa and hygiene practices   Implement preventative oral hygiene regimen   Implement oral medicated treatments as ordered  2/5/2023 0055 by Ann Marie Spears RN  Outcome: Progressing  Flowsheets (Taken 2/4/2023 1958)  Oral Mucous Membranes Remain Intact: Assess oral mucosa and hygiene practices     Problem: Musculoskeletal - Adult  Goal: Return mobility to safest level of function  2/5/2023 1403 by Phoebe Burton RN  Outcome: Completed  2/5/2023 0055 by Ann Marie Spears RN  Outcome: Progressing  Goal: Maintain proper alignment of affected body part  2/5/2023 1403 by Phoebe Burton RN  Outcome: Completed  2/5/2023 0055 by Ann Marie Spears RN  Outcome: Progressing  Goal: Return ADL status to a safe level of function  2/5/2023 1403 by Phoebe Burton RN  Outcome: Completed  2/5/2023 0055 by Ann Marie Spears RN  Outcome: Progressing     Problem: Gastrointestinal - Adult  Goal: Minimal or absence of nausea and vomiting  2/5/2023 1403 by Phoebe Burton RN  Outcome: Completed  2/5/2023 0055 by Ann Marie Spears RN  Outcome: Progressing  Goal: Maintains or returns to baseline bowel function  2/5/2023 1403 by Phoebe Burton RN  Outcome: Completed  2/5/2023 0055 by Ann Marie Spears RN  Outcome: Progressing  Goal: Maintains adequate nutritional intake  2/5/2023 1403 by Phoebe Burton RN  Outcome: Completed  2/5/2023 0055 by Ann Marie Spears RN  Outcome: Progressing  Goal: Establish and maintain optimal ostomy function  2/5/2023 1403 by Ximena Hylton RN  Outcome: Completed  2/5/2023 0055 by Angie Richmond RN  Outcome: Progressing     Problem: Genitourinary - Adult  Goal: Absence of urinary retention  2/5/2023 1403 by Ximena Hylton RN  Outcome: Completed  2/5/2023 0055 by Angie Richmond RN  Outcome: Progressing  Goal: Urinary catheter remains patent  2/5/2023 1403 by Ximena Hylton RN  Outcome: Completed  2/5/2023 0055 by Angie Richmond RN  Outcome: Progressing     Problem: Infection - Adult  Goal: Absence of infection at discharge  2/5/2023 1403 by Ximena Hylton RN  Outcome: Completed  2/5/2023 0055 by Angie Richmond RN  Outcome: Progressing  Flowsheets (Taken 2/4/2023 1958)  Absence of infection at discharge: Assess and monitor for signs and symptoms of infection  Goal: Absence of infection during hospitalization  2/5/2023 1403 by Ximena Hylton RN  Outcome: Completed  2/5/2023 0055 by Angie Richmond RN  Outcome: Progressing  Flowsheets (Taken 2/4/2023 1958)  Absence of infection during hospitalization: Assess and monitor for signs and symptoms of infection  Goal: Absence of fever/infection during anticipated neutropenic period  2/5/2023 1403 by Ximena Hylton RN  Outcome: Completed  2/5/2023 0055 by Angie Richmond RN  Outcome: Progressing  Flowsheets (Taken 2/4/2023 1958)  Absence of fever/infection during anticipated neutropenic period: Monitor white blood cell count     Problem: Metabolic/Fluid and Electrolytes - Adult  Goal: Electrolytes maintained within normal limits  2/5/2023 1403 by Ximena Hylton RN  Outcome: Completed  2/5/2023 0055 by Angie Richmond RN  Outcome: Progressing  Flowsheets (Taken 2/4/2023 1958)  Electrolytes maintained within normal limits: Monitor labs and assess patient for signs and symptoms of electrolyte imbalances  Goal: Hemodynamic stability and optimal renal function maintained  2/5/2023 1403 by Ximena Hylton RN  Outcome: Completed  2/5/2023 0055 by Angie Richmond RN  Outcome: Progressing  Flowsheets (Taken 2/4/2023 1958)  Hemodynamic stability and optimal renal function maintained: Monitor labs and assess for signs and symptoms of volume excess or deficit  Goal: Glucose maintained within prescribed range  2/5/2023 1403 by Erickson Lugo RN  Outcome: Completed  2/5/2023 0055 by Efraín Antunez RN  Outcome: Progressing  Flowsheets (Taken 2/4/2023 1958)  Glucose maintained within prescribed range: Monitor blood glucose as ordered     Problem: Hematologic - Adult  Goal: Maintains hematologic stability  2/5/2023 1403 by Erickson Lugo RN  Outcome: Completed  2/5/2023 0055 by Efraín Antunez RN  Outcome: Progressing  Flowsheets (Taken 2/4/2023 1958)  Maintains hematologic stability: Assess for signs and symptoms of bleeding or hemorrhage     Problem: Safety - Adult  Goal: Free from fall injury  2/5/2023 1403 by Erickson Lugo RN  Outcome: Completed  2/5/2023 0055 by Efraín Antunez RN  Outcome: Progressing     Problem: Skin/Tissue Integrity  Goal: Absence of new skin breakdown  Description: 1. Monitor for areas of redness and/or skin breakdown  2. Assess vascular access sites hourly  3. Every 4-6 hours minimum:  Change oxygen saturation probe site  4. Every 4-6 hours:  If on nasal continuous positive airway pressure, respiratory therapy assess nares and determine need for appliance change or resting period.   2/5/2023 1403 by Erickson Lugo RN  Outcome: Completed  2/5/2023 0055 by Efraín Antunez RN  Outcome: Progressing     Problem: ABCDS Injury Assessment  Goal: Absence of physical injury  2/5/2023 1403 by Erickson Lugo RN  Outcome: Completed  2/5/2023 0055 by Efraín Antunez RN  Outcome: Progressing

## 2023-02-05 NOTE — DISCHARGE INSTRUCTIONS
Internal medicine    Follow ups  Please follow up with IM clinic to schedule an appointment. Expect a call back on Monday. If not-please give us a call at the phone number :[phone]  Please keep all other follow up appointments:  Future Appointments   Date Time Provider Yazan Seo   2/15/2023 11:45  Augustine [address], MD [place]   3/16/2023  3:30 PM Neha Ríos, APRN - NP [facility]        Changes in healthcare   Please take all medications as indicated  Diet: diabetic diet   Activity: activity as tolerated  New Medications started during this hospital stay  Lipitor 40 MG daily  Triglide 160 MG daily   Niacin 500 MG nightly  Lovanza 1 g capsule   Lantus Solostar 48 unit pen once per day   Humalog kwikpens 15 units with every meal   Losartan 25 Mg daily   Changes to your medications  None   Medications you should stop taking   None   Additional labs, testing or imaging needed after discharge   Needs a lipid panel within 1 to 2 weeks   Please contact us if you have any concerns, wish to change or make an appointment:  Internal medicine clinic   Phone: [phone]  Fax: [phone]  [address]  Should you have further questions in regards to this visit, you can review your clinical note and after visit summary document on your [employer] account. Other than any new prescriptions given to you today, the list of home medications on this After Visit Summary are based on information provided to us from you and your healthcare providers. This information, including the list, dose, and frequency of medications is only as accurate as the information you provided. If you have any questions or concerns about your home medications, please contact your Primary Care Physician for further clarification.

## 2023-02-05 NOTE — PLAN OF CARE
Problem: Chronic Conditions and Co-morbidities  Goal: Patient's chronic conditions and co-morbidity symptoms are monitored and maintained or improved  Outcome: Progressing  Flowsheets (Taken 2/4/2023 1958)  Care Plan - Patient's Chronic Conditions and Co-Morbidity Symptoms are Monitored and Maintained or Improved: Monitor and assess patient's chronic conditions and comorbid symptoms for stability, deterioration, or improvement     Problem: Discharge Planning  Goal: Discharge to home or other facility with appropriate resources  Outcome: Progressing  Flowsheets (Taken 2/4/2023 1958)  Discharge to home or other facility with appropriate resources: Identify barriers to discharge with patient and caregiver     Problem: Pain  Goal: Verbalizes/displays adequate comfort level or baseline comfort level  Outcome: Progressing     Problem: Neurosensory - Adult  Goal: Achieves stable or improved neurological status  Outcome: Progressing  Flowsheets (Taken 2/4/2023 1958)  Achieves stable or improved neurological status: Assess for and report changes in neurological status  Goal: Absence of seizures  Outcome: Progressing  Flowsheets (Taken 2/4/2023 1958)  Absence of seizures: Monitor for seizure activity.   If seizure occurs, document type and location of movements and any associated apnea  Goal: Remains free of injury related to seizures activity  Outcome: Progressing  Flowsheets (Taken 2/4/2023 1958)  Remains free of injury related to seizure activity: Maintain airway, patient safety  and administer oxygen as ordered  Goal: Achieves maximal functionality and self care  Outcome: Progressing     Problem: Respiratory - Adult  Goal: Achieves optimal ventilation and oxygenation  Outcome: Progressing  Flowsheets (Taken 2/4/2023 1958)  Achieves optimal ventilation and oxygenation: Assess for changes in respiratory status     Problem: Cardiovascular - Adult  Goal: Maintains optimal cardiac output and hemodynamic stability  Outcome: Progressing  Goal: Absence of cardiac dysrhythmias or at baseline  Outcome: Progressing     Problem: Skin/Tissue Integrity - Adult  Goal: Skin integrity remains intact  Outcome: Progressing  Flowsheets (Taken 2/4/2023 1958)  Skin Integrity Remains Intact: Monitor for areas of redness and/or skin breakdown  Goal: Incisions, wounds, or drain sites healing without S/S of infection  Outcome: Progressing  Flowsheets (Taken 2/4/2023 1958)  Incisions, Wounds, or Drain Sites Healing Without Sign and Symptoms of Infection: ADMISSION and DAILY: Assess and document risk factors for pressure ulcer development  Goal: Oral mucous membranes remain intact  Outcome: Progressing  Flowsheets (Taken 2/4/2023 1958)  Oral Mucous Membranes Remain Intact: Assess oral mucosa and hygiene practices     Problem: Musculoskeletal - Adult  Goal: Return mobility to safest level of function  Outcome: Progressing  Goal: Maintain proper alignment of affected body part  Outcome: Progressing  Goal: Return ADL status to a safe level of function  Outcome: Progressing     Problem: Gastrointestinal - Adult  Goal: Minimal or absence of nausea and vomiting  Outcome: Progressing  Goal: Maintains or returns to baseline bowel function  Outcome: Progressing  Goal: Maintains adequate nutritional intake  Outcome: Progressing  Goal: Establish and maintain optimal ostomy function  Outcome: Progressing     Problem: Genitourinary - Adult  Goal: Absence of urinary retention  Outcome: Progressing  Goal: Urinary catheter remains patent  Outcome: Progressing     Problem: Infection - Adult  Goal: Absence of infection at discharge  Outcome: Progressing  Flowsheets (Taken 2/4/2023 1958)  Absence of infection at discharge: Assess and monitor for signs and symptoms of infection  Goal: Absence of infection during hospitalization  Outcome: Progressing  Flowsheets (Taken 2/4/2023 1958)  Absence of infection during hospitalization: Assess and monitor for signs and symptoms of infection  Goal: Absence of fever/infection during anticipated neutropenic period  Outcome: Progressing  Flowsheets (Taken 2/4/2023 1958)  Absence of fever/infection during anticipated neutropenic period: Monitor white blood cell count     Problem: Metabolic/Fluid and Electrolytes - Adult  Goal: Electrolytes maintained within normal limits  Outcome: Progressing  Flowsheets (Taken 2/4/2023 1958)  Electrolytes maintained within normal limits: Monitor labs and assess patient for signs and symptoms of electrolyte imbalances  Goal: Hemodynamic stability and optimal renal function maintained  Outcome: Progressing  Flowsheets (Taken 2/4/2023 1958)  Hemodynamic stability and optimal renal function maintained: Monitor labs and assess for signs and symptoms of volume excess or deficit  Goal: Glucose maintained within prescribed range  Outcome: Progressing  Flowsheets (Taken 2/4/2023 1958)  Glucose maintained within prescribed range: Monitor blood glucose as ordered     Problem: Hematologic - Adult  Goal: Maintains hematologic stability  Outcome: Progressing  Flowsheets (Taken 2/4/2023 1958)  Maintains hematologic stability: Assess for signs and symptoms of bleeding or hemorrhage     Problem: Safety - Adult  Goal: Free from fall injury  Outcome: Progressing     Problem: Skin/Tissue Integrity  Goal: Absence of new skin breakdown  Description: 1. Monitor for areas of redness and/or skin breakdown  2. Assess vascular access sites hourly  3. Every 4-6 hours minimum:  Change oxygen saturation probe site  4. Every 4-6 hours:  If on nasal continuous positive airway pressure, respiratory therapy assess nares and determine need for appliance change or resting period.   Outcome: Progressing     Problem: ABCDS Injury Assessment  Goal: Absence of physical injury  Outcome: Progressing

## 2023-02-05 NOTE — DISCHARGE SUMMARY
[address]  Discharge Summary    PCP: No primary care provider on file. Admit Date:2/1/2023  Discharge Date: 2/5/2023    Chief Complaint   Patient presents with    Chest Pain     Called EMS for chest pain stated that he couldn't tell when it stated but has had N/V that started yesterday and is dehydrated     Back Pain     C/O back pain that started when EMS placed him on the cot          Admission Diagnosis:   DKA due to insulin noncompliance  HAGMA 2/2 DKA and lactic acidosis  ANNE Stage 1   Chest pain 2/2 DKA vs NSTEMI  Elevated lipase-reactive 2/2 DKA vs pancreatitis  Hx of Seizures, on carbamazepine, not taking. Last seizure-July 2022  Hx of Drug abuse, on suboxone, Didn't use drugs for the alst 7 years  Hx of alcohol abuse, last drink-7 years ago  Hx of Hep C, HCV RNA 2016 3/6 MLN  Hx of CVA infarct in L temporal and L parietal lobes    Discharge Diagnosis:  DM2  Hypertriglyceridemia  Hepatitis C Chronic   Hyponatremia   Seizure HX   Sinus Tachycardia   Drug Abuse   ANNE Stage 1   History of tobacco abuse  History of left temporal and left parietal lobe     Hospital Course:     Nathalia Kelsey is a 42 y.o. 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 According to the patient, he has been non compliant with his insulin, doesn't remember when was the last time that his insulin pump was filled, and didn't take his anti seizure medications. Last seizure-approximately a year ago. On the arrival to the ER, pt was in AMS, vitals were stable, later due to desaturation, pt was put on BiPap, weaned down to NC, and to Room air.   Na 134 ( corrected 139), K 4.7, Cl 86, HCO3 7, BUN 23, Creatinine 1.3, Anion ap 41, LA 4.3, , Calcium 9.6, Troponin 15>15, Lipase 1552, beta-hydroxybutyrate >4.5, UDS -negative, Vit D 24, WBC 25.1, RBC 6.01, UA-few bacteria, negative nitrite, Negative Leuk esterase, pH 7.048, pCo2 < 15.0, pO2 315.4, HCO3-8.9, O2 ssat 99.9, pH vein 7.16, CT head-no acute changes, encephalomalacia 2/2 old infarcts, CTA-no signs of PE, CT abdomen-fluid collection with so,e distention of the stomach, no signs of perforation, small calculus non obstructing within the right kidney. Pt was started on DKA protocol and was admitted to ICU for further care. In the ICU he was managed well and slowly improved until he was medically stable. Incidentally, he was found to have elevated triglycerides. Patient was kept on insulin drip until triglycerides went to appropriate range. Once stable he was moved off of the ICU. Patient was started on Lipitor 80 mg daily, Fenofibrate 160 mg daily, Omega-3 FA 2 capsules BIB, Niacin to 500 mg for the high triglycerides. Aspirin may be considered to take with the niacin given it's side effect of skin flushing. Patient insulin changed to SQ insulin Lantus 48 units nightly and humalog 15 units with meals. Post ICU stay patient was doing very well. He was tolerating meals, walking, passing stools, and voiding. Given his new medication changes please follow liver enzymes. Also, check a fasting lipid panel to evaluate his elevated triglycerides. Hep C viral load and hepatis panel are still pending. Please follow. On day of discharge :   Subjective:  Patient is up and walking around his room this morning. He appears to be pleasant and states \"I'm ready to go home since I'm feeling good\". He slept well overnight with no issues. Tolerating meals. He is voiding and producing bowel movements. Patient is on room air. Denies headache, fever, chest pain, shortness of breath, and abdominal pain. Follow up in Clinic:   Endocrinology:  Follow up appointment with Dr. Mami Rosa on 2/15/2023 at 11.45 AM  [facility]: Expect to receive a call back on Monday for follow up appointment time and location. Significant findings (history and exam, laboratory, radiological, pathology, other tests):   Constitutional:       Appearance: Normal appearance. HENT:      Head: Normocephalic. Cardiovascular:      Rate and Rhythm: Regular rhythm. Tachycardia present. Pulses: Normal pulses. Heart sounds: Normal heart sounds. Pulmonary:      Effort: Pulmonary effort is normal.      Breath sounds: Normal breath sounds. Abdominal:      General: Abdomen is flat. Bowel sounds are normal.      Palpations: Abdomen is soft. Musculoskeletal:      Right lower leg: No edema. Left lower leg: No edema. Neurological:      Mental Status: He is alert and oriented to person, place, and time. Psychiatric:         Mood and Affect: Mood normal.         Behavior: Behavior normal.         Thought Content: Thought content normal.         Judgment: Judgment normal.     Pending test results: Hep C Viral load and Hepatitis Panel     Consults:  Critical Care Unit  Endocrinology   Dietary     Condition at discharge: Stable    Disposition: home    Time taken for discharge : < 30 mins [] >30 mins [x]    Discharge Medications:  Current Discharge Medication List        START taking these medications    Details   insulin glargine (LANTUS SOLOSTAR) 100 UNIT/ML injection pen Inject 48 Units into the skin every morning  Qty: 15 Adjustable Dose Pre-filled Pen Syringe, Refills: 5      insulin lispro, 1 Unit Dial, (HUMALOG KWIKPEN) 100 UNIT/ML SOPN Inject 15 units with meals + following sliding scale. -200 add 3U, -250 add 6U, -300 add 9U, -350 add 12U, -400 add 15U, BS over 400 add 18U.  MAX 75u/day  Qty: 15 Adjustable Dose Pre-filled Pen Syringe, Refills: 4      atorvastatin (LIPITOR) 40 MG tablet Take 1 tablet by mouth daily  Qty: 30 tablet, Refills: 5      fenofibrate (TRIGLIDE) 160 MG tablet Take 1 tablet by mouth daily  Qty: 30 tablet, Refills: 5      niacin (SLO-NIACIN) 500 MG extended release tablet Take 1 tablet by mouth nightly  Qty: 30 tablet, Refills: 5      omega-3 acid ethyl esters (LOVAZA) 1 g capsule Take 2 capsules by mouth 2 times daily  Qty: 120 capsule, Refills: 5           CONTINUE these medications which have CHANGED    Details   Insulin Pen Needle (BD PEN NEEDLE MICRO U/F) 32G X 6 MM MISC Uses with insulin 4 times a day  Qty: 250 each, Refills: 5           CONTINUE these medications which have NOT CHANGED    Details   !! carBAMazepine (TEGRETOL XR) 100 MG extended release tablet Take 100 mg by mouth every morning      !! carBAMazepine (TEGRETOL XR) 100 MG extended release tablet Take 200 mg by mouth at bedtime      buprenorphine-naloxone (SUBOXONE) 8-2 MG FILM SL film Place 1 Film under the tongue in the morning and at bedtime       !! - Potential duplicate medications found. Please discuss with provider.         STOP taking these medications       ketoconazole (NIZORAL) 2 % cream Comments:   Reason for Stopping:         nystatin (MYCOSTATIN) 850854 UNIT/GM powder Comments:   Reason for Stopping:         levETIRAcetam (KEPPRA) 500 MG tablet Comments:   Reason for Stopping:         blood glucose test strips (ACCU-CHEK GUIDE) strip Comments:   Reason for Stopping:         Accu-Chek FastClix Lancets MISC Comments:   Reason for Stopping:         insulin lispro (HUMALOG) 100 UNIT/ML injection vial Comments:   Reason for Stopping:         Blood Glucose Monitoring Suppl MISC Comments:   Reason for Stopping:         Continuous Blood Gluc Sensor (GUARDIAN SENSOR 3) MISC Comments:   Reason for Stopping:         Continuous Blood Gluc Transmit (GUARDIAN LINK 3 TRANSMITTER) MISC Comments:   Reason for Stopping:         glucose monitoring kit (FREESTYLE) monitoring kit Comments:   Reason for Stopping:         FreeStyle Lancets MISC Comments:   Reason for Stopping:               Activity: activity as tolerated  Diet: regular diet and Sufficient carbs and low glucose meals. Follow-up appointments:   Endocrinology: Follow up appointment with Dr. Eduardo Lantigua on 2/15/2023 at 11.45 AM  [facility]: Expect to receive a call back on Monday for follow up appointment time and location.      Patient Instructions: explained, printed and given to the patient    Valerio Mantilla MD  PGY 1   1:23 PM 2/5/2023

## 2023-02-05 NOTE — PROGRESS NOTES
[facility]  [facility]  Progress Note - House Team     Patient:  Ghada Melo 42 y.o. male MRN: [id number]     Date of Service: 2/5/2023     CC: \"Nausea, vomiting, chest pain, and abdominal pain as a result of DKA\"     Subjective     Overnight events: Patient transferred out of ICU. Insulin drip discontinued and transitioned to SQ insulin. Interim: Patient is up and walking around his room this morning. He appears to be pleasant and states \"I'm ready to go home since I'm feeling good\". He slept well overnight with no issues. Tolerating meals. He is voiding and producing bowel movements. Patient is on room air. Denies headache, fever, chest pain, shortness of breath, and abdominal pain. Objective     Physical Exam:  Vitals: BP (!) 148/66   Pulse (!) 103   Temp 98 °F (36.7 °C) (Oral)   Resp 18   Ht 6' 7\" (2.007 m)   Wt 234 lb (106.1 kg)   SpO2 92%   BMI 26.36 kg/m²       Physical Exam  Constitutional:       Appearance: Normal appearance. HENT:      Head: Normocephalic. Cardiovascular:      Rate and Rhythm: Regular rhythm. Tachycardia present. Pulses: Normal pulses. Heart sounds: Normal heart sounds. Pulmonary:      Effort: Pulmonary effort is normal.      Breath sounds: Normal breath sounds. Abdominal:      General: Abdomen is flat. Bowel sounds are normal.      Palpations: Abdomen is soft. Musculoskeletal:      Right lower leg: No edema. Left lower leg: No edema. Neurological:      Mental Status: He is alert and oriented to person, place, and time. Psychiatric:         Mood and Affect: Mood normal.         Behavior: Behavior normal.         Thought Content: Thought content normal.         Judgment: Judgment normal.     Resident's Assessment and Plan     Ghada Melo is a 42 y.o. male with  has a past medical history of Anxiety, Depression, Seizures ([address] and Unspecified cerebral artery occlusion with cerebral infarction. came here with CC   Chief Complaint   Patient presents with    Chest Pain     Called EMS for chest pain stated that he couldn't tell when it stated but has had N/V that started yesterday and is dehydrated     Back Pain     C/O back pain that started when EMS placed him on the cot        Assessment/Plan:    DKA (resolved)   Medications noncompliance. No insulin for over a month. AG closed twice bridged to 26 U Lantus  Replete electrolytes as needed  DKA protocol D/C   Insulin drip stopped. Now on SQ insulin  Endocrine Recs: SQ insulin Lantus 48 units nightly and Humalog 15 units with meals. HDSS. 4 carb meal.   Daily vitals     DM2  HBA1C 13.1%  Daily POCT   Insulin pump, notes he has not had insulin in over a month  U/A: turbid with proteinuria   Endocrine Recs: SQ insulin Lantus 48 units nightly and humalog 15 units with meals. HDSS. 4 carb meal.     Hyponatremia   Likely from PO intake   Diet resumed  Daily BMP    Hypertriglyceridemia   Lipitor 80 mg daily, Fenofibrate 160 mg daily, Omega-3 FA 2 capsules BIB, Niacin to 500 mg  Monitor TAG level daily   Concern for pancreatitis as a result. CT Abdomen unrevealing. Endo: Lipid panel fasting in 1 to 2 weeks outpatient     Hepatis C HX  Pending Hep C viral load   Pending hepatitis panel     Seizure HX  Carbamazepine continue     Drug Abuse HX  Suboxone continue     Leukocytosis (resolved)  Found on admission  likely reactive secondary to DKA  Daily CBC to assess WBC     Sinus tachycardia   Likely due to DKA on admission  Ongoing   Daily vitals     ANNE stage 1 likely pre-renal (dehydration from DKA, poor intake, GI loss) Baseline creatinine 0.7-0.8 -   History of tobacco abuse  History of left temporal and left parietal lobe      PT/OT: yes   DVT ppx: enoxaparin   GI ppx: none   Code Status: Full     Lucretia Chery MD, PGY-1  Attending physician: Dr. Gray Rosa   Attending Physician Statement:  Pankaj Morrow M.D., F.A.C.P.      I have discussed the case, including pertinent history and exam findings with the resident. I have seen and examined the patient--agree with the resident assessment of ROS, PMHx, PSHx, meds reviewed and assessment--Remainder of medical problems as per resident note. Hospital charts reviewed, including other providers notes, relevant labs and imaging. >50% of time spent coordinating care with residents, other providers and/or counseling patient   re: workup/plan and orders as documented by the resident   For billing--Using Medical decision making level of complexity and time spent on case       Dm2?   Aic 13  Out of DKA     Was kept TG >1000 stil on insulin drip  160    Asa then niacin-- for home  +fibrate- fenofibrate   +statin-- now 80mg lipitor  Fish oil???  Will write for OTC      Currently on insulin drip - to drive down TG  He didn't have acute pancreatitis     Insulin non compliance  Used to be on insulin pump till past month  One year ago  45 lantus +10 tid     History of drug abuse, on Suboxone  Suboxone resumed   Hep C ab +viral load 2016     Feels SOB NOT hypoxic  Respiratory distress due to metabolic acidosis    48 lantus   15 bid/tid with meals  TG meds above  One month of seizure med +hydroxzyine  Will add losartan for gross proteinuria   Dc time >30min

## 2023-02-05 NOTE — PROGRESS NOTES
ENDOCRINOLOGY PROGRESS NOTE      Date of admission: 2/1/2023  Date of service: 2/5/2023  Admitting physician: Bryn Lainez MD   Primary Care Physician: No primary care provider on file. Consultant physician: Neno Lagunas MD     Reason for the consultation:  Uncontrolled DM, hyperTG     History of Present Illness: The history is provided by the patient. Accuracy of the patient data is excellent    Alison Virgen is a very pleasant 42 y.o. old male with PMH poorly controlled DM , seizure, Hep C, IV drug abuse, and other listed below admitted to [facility] on 2/1/2023 because of DKA, endocrine service was consulted for diabetes management. Subjective   The patient was seen and examined at bedside this AM, feels better, BG high but he didn't receive lantus yesterday     Point of care glucose monitoring   (Independently reviewed)   Recent Labs     02/04/23  0410 02/04/23  0551 02/04/23  0822 02/04/23  1016 02/04/23  1356 02/04/23  1818 02/04/23  2001 02/05/23  0817   GLUMET 203* 195* 157* 151* 128* 282* 262* 328*       Past medical history:   Past Medical History:   Diagnosis Date    Anxiety     Depression     Seizures ([address]     Unspecified cerebral artery occlusion with cerebral infarction        Past surgical history:  Past Surgical History:   Procedure Laterality Date    ABCESS DRAINAGE Left 08/22/2016    Left Hand    BONE INCISION AND DRAINAGE Left 08/30/2016    HAND W/ WOUND VA C       Social history:   Tobacco:   reports that he has been smoking cigarettes. He has a 7.60 pack-year smoking history. He has never used smokeless tobacco.  Alcohol:   reports no history of alcohol use. Drugs:   reports current drug use. Drug: Other-see comments.     Family history:    Family History   Problem Relation Age of Onset    Kidney Disease Mother     Heart Disease Father        Allergy and drug reactions:   No Known Allergies    Scheduled Meds:   insulin glargine  48 Units SubCUTAneous QAM    insulin lispro  0-18 Units SubCUTAneous TID WC    insulin lispro  15 Units SubCUTAneous TID WC    omega-3 acid ethyl esters  2 g Oral BID    niacin  500 mg Oral Nightly    insulin lispro  0-12 Units SubCUTAneous Nightly    polyethylene glycol  17 g Oral Daily    senna  1 tablet Oral Nightly    atorvastatin  80 mg Oral Nightly    fenofibrate  160 mg Oral Daily    sodium chloride flush  5-40 mL IntraVENous 2 times per day    enoxaparin  30 mg SubCUTAneous BID    buprenorphine-naloxone  1 tablet SubLINGual BID    carBAMazepine  100 mg Oral QAM    carBAMazepine  200 mg Oral Nightly    vitamin D  2,000 Units Oral Daily       PRN Meds:   glucose, 4 tablet, PRN  dextrose bolus, 125 mL, PRN   Or  dextrose bolus, 250 mL, PRN  glucagon (rDNA), 1 mg, PRN  dextrose, , Continuous PRN  fentanNYL, 25 mcg, Q4H PRN  sodium chloride flush, 5-40 mL, PRN  sodium chloride, , PRN  ondansetron, 4 mg, Q8H PRN   Or  ondansetron, 4 mg, Q6H PRN  acetaminophen, 650 mg, Q6H PRN   Or  acetaminophen, 650 mg, Q6H PRN  hydrOXYzine pamoate, 25 mg, TID PRN    Continuous Infusions:   dextrose      sodium chloride Stopped (02/02/23 1509)       Review of Systems  All systems reviewed. All negative except for symptoms mentioned in HPI     OBJECTIVE    BP (!) 115/94   Pulse (!) 107   Temp 98.2 °F (36.8 °C)   Resp 13   Ht 6' 7\" (2.007 m)   Wt 234 lb (106.1 kg)   SpO2 93%   BMI 26.36 kg/m²   No intake or output data in the 24 hours ending 02/05/23 0917      Physical examination:  General: awake alert, oriented x3  HEENT: normocephalic non traumatic, no exophthalmos   Neck: supple, No thyroid tenderness,  Pulm: good equal air entry no added sounds  CVS: S1 + S2  Abd: soft lax, no tenderness  Skin: warm, no lesions, no rash.    Neuro: CN intact, sensation decreased bilateral , muscle power normal  Psych: normal mood, and affect    Review of Laboratory Data:  I personally reviewed the following labs:   Recent Labs     02/03/23  0410 02/04/23  0539 02/05/23  0501   WBC 11.2 7.5 7.7 RBC 4.56 4.37 4.38   HGB 12.0* 12.4* 12.1*   HCT 36.6* 34.0* 34.0*   MCV 80.3 77.8* 77.6*   MCH 26.8 28.4 27.6   MCHC 34.5 36.5* 35.6*   RDW 15.4* 15.7* 15.7*    162 153   MPV 11.9 10.0 10.0     Recent Labs     02/03/23  0328 02/04/23  0539 02/05/23  0023   * 134  132 133   K 4.0 4.0  4.1 4.3    100  101 102   CO2 16* 21*  21* 19*   BUN 4* 5*  5* 6   CREATININE 0.6* 0.7  0.7 0.7   GLUCOSE 184* 217*  225* 292*   CALCIUM 7.9* 8.3*  8.2* 8.5*     Beta-Hydroxybutyrate   Date Value Ref Range Status   02/01/2023 >4.50 (H) 0.02 - 0.27 mmol/L Final   07/22/2022 0.09 0.02 - 0.27 mmol/L Final   07/11/2021 0.16 0.02 - 0.27 mmol/L Final     Lab Results   Component Value Date/Time    LABA1C >16.5 02/01/2023 03:58 PM    LABA1C 13.1 08/02/2020 04:58 AM     No results found for: TSH, T4FREE, Y2OJDRO, FT3, P1ITPIP, TSI, TPOABS, THGAB  Lab Results   Component Value Date/Time    LABA1C >16.5 02/01/2023 03:58 PM    GLUCOSE 292 02/05/2023 12:23 AM    MALBCR 452.9 02/01/2023 03:58 PM    LABMICR 172.1 02/01/2023 03:58 PM    LABCREA 38 02/01/2023 03:58 PM    LABCREA 38 02/01/2023 03:58 PM     Lab Results   Component Value Date/Time    TRIG 1,187 02/04/2023 05:39 AM    HDL 11 02/01/2023 12:14 PM    LDLCALC - 02/01/2023 12:14 PM    CHOL 151 02/01/2023 12:14 PM       Blood culture   Lab Results   Component Value Date/Time    BC 5 Days- no growth 08/29/2016 01:22 PM    BC 5 Days- no growth 08/20/2016 08:09 PM       Radiology:  CTA CHEST W CONTRAST   Final Result   1. There is no pulmonary embolus   2. There is no thoracic aortic aneurysm or dissection. 3. The lungs are clear. There is no focal infiltrate. 4. No gross upper abdominal pathology is noted. Please note the pancreas is   not included on the examination. CT ABDOMEN PELVIS W IV CONTRAST Additional Contrast? None   Final Result   1. Presence of predominant fluid distension with some dilatation of the   stomach as above discussed.   There is also some thickening of the wall of the   distal esophagus across the esophagogastric junction. Findings to be   correlate clinically. See above comments. 2.  No free intraperitoneal air or acute inflammatory process in the   omental/mesentery fat planes no free intra peritoneal fluid accumulation. 3.  Tiny calculus in the upper pole of the right kidney present causes no   obstruction. No obstructive uropathy in the right or in the left kidneys. 4.  Pattern of mild to moderate constipation. CT HEAD WO CONTRAST   Final Result   1. There is no acute intracranial hemorrhage   2. 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 XR CHEST PORTABLE   Final Result   No acute process. Medical Records/Labs/Images review:   I personally reviewed and summarized previous records   All labs and imaging were reviewed independently      2/1/23 12:14 2/2/23 11:23 2/3/23 04:10   Triglycerides 840 (H) 4,410 (H) 2,398 (H)     ASSESSMENT & PLAN   Luca Garcias, a 42 y.o.-old male seen today for inpatient diabetes management     Diabetes Mellitus type 1 admitted with DKA  Patient's diabetes is uncontrolled due to poor compliance with insulin therapy and follow up visit   Glucose was high this AM but he didn't receive lantus yesterday   We recommend the following insulin regimen  Lantus 48 units daily in AM   Humalog 15u with meals   High dose sliding scale   4 carb meals   Continue glucose check with meals and at bedtime   Will titrate insulin dose based on the blood glucose trend & insulin requirement  Will arrange for patient to be seen in endocrinology clinic upon discharge for routine diabetes maintenance and prevention.     Hypertriglyceridemia/Acute pancreatitis   Discussed the importance of controlling DM in treatment of hypertriglyceridemia   Advised for strict lifestyle change, Wt loss, strict controlling DM, avoidance of concentrated sugar, complete avoidance of alcohol  We recommend the following regimen:  Lipitor 80 mg daily, Fenofibrate 160 mg daily, Omega-3 FA 2 capsules BIB, Niacin to 500 mg at bedtime   Check level tomorrow if he stays in the hospital. Otherwise, will check fasting lipid panel 1-2 weeks after discharge     Dietary/medications non-compliance  I have discussed with him the great importance of following the treatment plan exactly as directed in order to achieve a good medical outcome. Discussed with patient the importance of eating consistent carbohydrate meals, avoiding high glycemic index food. Also, discussed with patient the risk and negative consequences of dietary noncompliance on blood glucose control, blood pressure and weight    Interdisciplinary plan for communication with healthcare providers:   Consult recommendations were discussed with the Primary Service/Nursing staff      The above issues were reviewed with the patient who understood and agreed with the plan. Thank you for allowing us to participate in the care of this patient. Please do not hesitate to contact us with any additional questions. Avani Leblanc MD  Endocrinologist, [facility] and Endocrinology   [address] [place], [place] [id number]   Phone: [phone]  Fax: [phone]  --------------------------------------------  An electronic signature was used to authenticate this note.  Shahida Santos MD on 2/5/2023 at 9:17 AM

## 2023-02-06 LAB
HAV IGM SER IA-ACNC: ABNORMAL
HEPATITIS B CORE IGM ANTIBODY: ABNORMAL
HEPATITIS B SURFACE ANTIGEN INTERPRETATION: ABNORMAL
HEPATITIS C ANTIBODY INTERPRETATION: REACTIVE

## 2023-02-07 ENCOUNTER — HOSPITAL ENCOUNTER (INPATIENT)
Age: 38
LOS: 2 days | Discharge: HOME | DRG: 282 | End: 2023-02-09
Attending: EMERGENCY MEDICINE | Admitting: INTERNAL MEDICINE
Payer: MEDICAID

## 2023-02-07 ENCOUNTER — APPOINTMENT (OUTPATIENT)
Dept: ULTRASOUND IMAGING | Age: 38
DRG: 282 | End: 2023-02-07
Payer: MEDICAID

## 2023-02-07 ENCOUNTER — APPOINTMENT (OUTPATIENT)
Dept: CT IMAGING | Age: 38
DRG: 282 | End: 2023-02-07
Payer: MEDICAID

## 2023-02-07 DIAGNOSIS — K85.90 ACUTE PANCREATITIS, UNSPECIFIED COMPLICATION STATUS, UNSPECIFIED PANCREATITIS TYPE: Primary | ICD-10-CM

## 2023-02-07 DIAGNOSIS — E11.65 POORLY CONTROLLED DIABETES MELLITUS (HCC): ICD-10-CM

## 2023-02-07 PROBLEM — F10.10 ETOH ABUSE: Status: ACTIVE | Noted: 2023-02-07

## 2023-02-07 LAB
ACETAMINOPHEN LEVEL: <5 MCG/ML (ref 10–30)
ALBUMIN SERPL-MCNC: 3.3 G/DL (ref 3.5–5.2)
ALP BLD-CCNC: 120 U/L (ref 40–129)
ALT SERPL-CCNC: 29 U/L (ref 0–40)
AMPHETAMINE SCREEN, URINE: NOT DETECTED
ANION GAP SERPL CALCULATED.3IONS-SCNC: 12 MMOL/L (ref 7–16)
AST SERPL-CCNC: 24 U/L (ref 0–39)
BACTERIA: ABNORMAL /HPF
BARBITURATE SCREEN URINE: NOT DETECTED
BASOPHILS ABSOLUTE: 0.02 E9/L (ref 0–0.2)
BASOPHILS RELATIVE PERCENT: 0.3 % (ref 0–2)
BENZODIAZEPINE SCREEN, URINE: NOT DETECTED
BETA-HYDROXYBUTYRATE: 0.52 MMOL/L (ref 0.02–0.27)
BILIRUB SERPL-MCNC: 0.4 MG/DL (ref 0–1.2)
BILIRUBIN URINE: NEGATIVE
BLOOD, URINE: NEGATIVE
BUN BLDV-MCNC: 10 MG/DL (ref 6–20)
CALCIUM SERPL-MCNC: 9.5 MG/DL (ref 8.6–10.2)
CANNABINOID SCREEN URINE: NOT DETECTED
CHLORIDE BLD-SCNC: 96 MMOL/L (ref 98–107)
CLARITY: CLEAR
CO2: 26 MMOL/L (ref 22–29)
COCAINE METABOLITE SCREEN URINE: NOT DETECTED
COLOR: YELLOW
CREAT SERPL-MCNC: 0.7 MG/DL (ref 0.7–1.2)
EOSINOPHILS ABSOLUTE: 0.07 E9/L (ref 0.05–0.5)
EOSINOPHILS RELATIVE PERCENT: 0.9 % (ref 0–6)
ETHANOL: <10 MG/DL (ref 0–0.08)
FENTANYL SCREEN, URINE: NOT DETECTED
GFR SERPL CREATININE-BSD FRML MDRD: >60 ML/MIN/1.73
GLUCOSE BLD-MCNC: 359 MG/DL (ref 74–99)
GLUCOSE URINE: >=1000 MG/DL
HCT VFR BLD CALC: 37.8 % (ref 37–54)
HCV QNT BY NAAT IU/ML: ABNORMAL IU/ML
HCV QNT BY NAAT LOG IU/ML: 7.08 LOG IU/ML
HEMOGLOBIN: 13.1 G/DL (ref 12.5–16.5)
IMMATURE GRANULOCYTES #: 0.06 E9/L
IMMATURE GRANULOCYTES %: 0.8 % (ref 0–5)
INTERPRETATION: DETECTED
KETONES, URINE: NEGATIVE MG/DL
LACTIC ACID: 1.4 MMOL/L (ref 0.5–2.2)
LEUKOCYTE ESTERASE, URINE: NEGATIVE
LIPASE: 464 U/L (ref 13–60)
LYMPHOCYTES ABSOLUTE: 2.12 E9/L (ref 1.5–4)
LYMPHOCYTES RELATIVE PERCENT: 28.3 % (ref 20–42)
Lab: NORMAL
MAGNESIUM: 1.6 MG/DL (ref 1.6–2.6)
MCH RBC QN AUTO: 27.8 PG (ref 26–35)
MCHC RBC AUTO-ENTMCNC: 34.7 % (ref 32–34.5)
MCV RBC AUTO: 80.3 FL (ref 80–99.9)
METHADONE SCREEN, URINE: NOT DETECTED
MONOCYTES ABSOLUTE: 1.13 E9/L (ref 0.1–0.95)
MONOCYTES RELATIVE PERCENT: 15.1 % (ref 2–12)
NEUTROPHILS ABSOLUTE: 4.08 E9/L (ref 1.8–7.3)
NEUTROPHILS RELATIVE PERCENT: 54.6 % (ref 43–80)
NITRITE, URINE: NEGATIVE
OPIATE SCREEN URINE: NOT DETECTED
OXYCODONE URINE: NOT DETECTED
PDW BLD-RTO: 14.5 FL (ref 11.5–15)
PH UA: 7.5 (ref 5–9)
PH VENOUS: 7.48 (ref 7.35–7.45)
PHENCYCLIDINE SCREEN URINE: NOT DETECTED
PLATELET # BLD: 179 E9/L (ref 130–450)
PMV BLD AUTO: 9.4 FL (ref 7–12)
POTASSIUM REFLEX MAGNESIUM: 3.4 MMOL/L (ref 3.5–5)
PROTEIN UA: NEGATIVE MG/DL
RBC # BLD: 4.71 E12/L (ref 3.8–5.8)
RBC UA: ABNORMAL /HPF (ref 0–2)
SALICYLATE, SERUM: <0.3 MG/DL (ref 0–30)
SODIUM BLD-SCNC: 134 MMOL/L (ref 132–146)
SPECIFIC GRAVITY UA: 1.01 (ref 1–1.03)
TOTAL PROTEIN: 7.4 G/DL (ref 6.4–8.3)
TRICYCLIC ANTIDEPRESSANTS SCREEN SERUM: NEGATIVE NG/ML
UROBILINOGEN, URINE: 0.2 E.U./DL
WBC # BLD: 7.5 E9/L (ref 4.5–11.5)
WBC UA: ABNORMAL /HPF (ref 0–5)

## 2023-02-07 PROCEDURE — 80053 COMPREHEN METABOLIC PANEL: CPT

## 2023-02-07 PROCEDURE — 96361 HYDRATE IV INFUSION ADD-ON: CPT

## 2023-02-07 PROCEDURE — 80179 DRUG ASSAY SALICYLATE: CPT

## 2023-02-07 PROCEDURE — 82077 ASSAY SPEC XCP UR&BREATH IA: CPT

## 2023-02-07 PROCEDURE — 83690 ASSAY OF LIPASE: CPT

## 2023-02-07 PROCEDURE — 2580000003 HC RX 258: Performed by: STUDENT IN AN ORGANIZED HEALTH CARE EDUCATION/TRAINING PROGRAM

## 2023-02-07 PROCEDURE — 80143 DRUG ASSAY ACETAMINOPHEN: CPT

## 2023-02-07 PROCEDURE — 80307 DRUG TEST PRSMV CHEM ANLYZR: CPT

## 2023-02-07 PROCEDURE — 96366 THER/PROPH/DIAG IV INF ADDON: CPT

## 2023-02-07 PROCEDURE — 83735 ASSAY OF MAGNESIUM: CPT

## 2023-02-07 PROCEDURE — 82010 KETONE BODYS QUAN: CPT

## 2023-02-07 PROCEDURE — 76705 ECHO EXAM OF ABDOMEN: CPT

## 2023-02-07 PROCEDURE — 6360000002 HC RX W HCPCS: Performed by: STUDENT IN AN ORGANIZED HEALTH CARE EDUCATION/TRAINING PROGRAM

## 2023-02-07 PROCEDURE — 96375 TX/PRO/DX INJ NEW DRUG ADDON: CPT

## 2023-02-07 PROCEDURE — 85025 COMPLETE CBC W/AUTO DIFF WBC: CPT

## 2023-02-07 PROCEDURE — 74177 CT ABD & PELVIS W/CONTRAST: CPT

## 2023-02-07 PROCEDURE — 83605 ASSAY OF LACTIC ACID: CPT

## 2023-02-07 PROCEDURE — 96365 THER/PROPH/DIAG IV INF INIT: CPT

## 2023-02-07 PROCEDURE — 6360000004 HC RX CONTRAST MEDICATION: Performed by: RADIOLOGY

## 2023-02-07 PROCEDURE — 82800 BLOOD PH: CPT

## 2023-02-07 PROCEDURE — 99285 EMERGENCY DEPT VISIT HI MDM: CPT

## 2023-02-07 PROCEDURE — 1200000000 HC SEMI PRIVATE

## 2023-02-07 PROCEDURE — 6360000002 HC RX W HCPCS: Performed by: EMERGENCY MEDICINE

## 2023-02-07 PROCEDURE — 81001 URINALYSIS AUTO W/SCOPE: CPT

## 2023-02-07 PROCEDURE — G0378 HOSPITAL OBSERVATION PER HR: HCPCS

## 2023-02-07 RX ORDER — SODIUM CHLORIDE, SODIUM LACTATE, POTASSIUM CHLORIDE, CALCIUM CHLORIDE 600; 310; 30; 20 MG/100ML; MG/100ML; MG/100ML; MG/100ML
INJECTION, SOLUTION INTRAVENOUS CONTINUOUS
Status: DISCONTINUED | OUTPATIENT
Start: 2023-02-07 | End: 2023-02-09 | Stop reason: HOSPADM

## 2023-02-07 RX ORDER — MAGNESIUM SULFATE IN WATER 40 MG/ML
2000 INJECTION, SOLUTION INTRAVENOUS ONCE
Status: COMPLETED | OUTPATIENT
Start: 2023-02-07 | End: 2023-02-07

## 2023-02-07 RX ORDER — MORPHINE SULFATE 4 MG/ML
4 INJECTION, SOLUTION INTRAMUSCULAR; INTRAVENOUS ONCE
Status: COMPLETED | OUTPATIENT
Start: 2023-02-07 | End: 2023-02-07

## 2023-02-07 RX ORDER — 0.9 % SODIUM CHLORIDE 0.9 %
1000 INTRAVENOUS SOLUTION INTRAVENOUS ONCE
Status: COMPLETED | OUTPATIENT
Start: 2023-02-07 | End: 2023-02-07

## 2023-02-07 RX ORDER — KETOROLAC TROMETHAMINE 30 MG/ML
30 INJECTION, SOLUTION INTRAMUSCULAR; INTRAVENOUS ONCE
Status: COMPLETED | OUTPATIENT
Start: 2023-02-07 | End: 2023-02-07

## 2023-02-07 RX ADMIN — KETOROLAC TROMETHAMINE 30 MG: 30 INJECTION, SOLUTION INTRAMUSCULAR; INTRAVENOUS at 22:16

## 2023-02-07 RX ADMIN — IOPAMIDOL 75 ML: 755 INJECTION, SOLUTION INTRAVENOUS at 18:44

## 2023-02-07 RX ADMIN — SODIUM CHLORIDE 1000 ML: 9 INJECTION, SOLUTION INTRAVENOUS at 16:30

## 2023-02-07 RX ADMIN — SODIUM CHLORIDE, POTASSIUM CHLORIDE, SODIUM LACTATE AND CALCIUM CHLORIDE: 600; 310; 30; 20 INJECTION, SOLUTION INTRAVENOUS at 20:34

## 2023-02-07 RX ADMIN — MORPHINE SULFATE 4 MG: 4 INJECTION, SOLUTION INTRAMUSCULAR; INTRAVENOUS at 18:06

## 2023-02-07 RX ADMIN — MAGNESIUM SULFATE HEPTAHYDRATE 2000 MG: 40 INJECTION, SOLUTION INTRAVENOUS at 18:07

## 2023-02-07 ASSESSMENT — PAIN DESCRIPTION - PAIN TYPE: TYPE: ACUTE PAIN

## 2023-02-07 ASSESSMENT — ENCOUNTER SYMPTOMS
WHEEZING: 0
EYE PAIN: 0
SINUS PRESSURE: 0
BACK PAIN: 0
SHORTNESS OF BREATH: 0
COUGH: 0
DIARRHEA: 1
VOMITING: 0
SORE THROAT: 0
EYE REDNESS: 0
ABDOMINAL PAIN: 1
NAUSEA: 0
EYE DISCHARGE: 0

## 2023-02-07 ASSESSMENT — PAIN DESCRIPTION - LOCATION
LOCATION: ABDOMEN;FLANK
LOCATION: ABDOMEN
LOCATION: ABDOMEN

## 2023-02-07 ASSESSMENT — PAIN - FUNCTIONAL ASSESSMENT: PAIN_FUNCTIONAL_ASSESSMENT: 0-10

## 2023-02-07 ASSESSMENT — PAIN DESCRIPTION - ORIENTATION: ORIENTATION: RIGHT

## 2023-02-07 ASSESSMENT — PAIN SCALES - GENERAL
PAINLEVEL_OUTOF10: 10
PAINLEVEL_OUTOF10: 9

## 2023-02-07 ASSESSMENT — PAIN DESCRIPTION - DESCRIPTORS: DESCRIPTORS: CRAMPING

## 2023-02-07 NOTE — ED PROVIDER NOTES
HPI   This is a 38-year-old male patient presenting to emergency department for evaluation of right-sided lower abdominal pain, cramping. Patient reporting this is been intermittent for the last week. Patient was recently admitted here for DKA. He states while he was admitted pain had improved but when he was going home on Sunday pain came back and has been worsening. He notes some diarrhea as well nonbloody. He denies any fevers, chills, chest pain, shortness of breath. He states at home his glucose has been in the 3 and 400s. Denies past abd surgeries. Review of Systems   Constitutional:  Negative for chills and fever. HENT:  Negative for ear pain, sinus pressure and sore throat. Eyes:  Negative for pain, discharge and redness. Respiratory:  Negative for cough, shortness of breath and wheezing. Cardiovascular:  Negative for chest pain. Gastrointestinal:  Positive for abdominal pain and diarrhea. Negative for nausea and vomiting. Genitourinary:  Negative for dysuria and frequency. Musculoskeletal:  Negative for arthralgias and back pain. Skin:  Negative for rash and wound. Neurological:  Negative for weakness and headaches. Hematological:  Negative for adenopathy. All other systems reviewed and are negative. Physical Exam  Vitals and nursing note reviewed. Constitutional:       Appearance: He is well-developed. HENT:      Head: Normocephalic and atraumatic. Mouth/Throat:      Pharynx: Oropharynx is clear. Eyes:      Conjunctiva/sclera: Conjunctivae normal.   Cardiovascular:      Rate and Rhythm: Normal rate and regular rhythm. Heart sounds: Normal heart sounds. No murmur heard. Pulmonary:      Effort: Pulmonary effort is normal. No respiratory distress. Breath sounds: Normal breath sounds. No wheezing or rales. Abdominal:      General: Bowel sounds are normal.      Palpations: Abdomen is soft. Tenderness: There is abdominal tenderness.  There is no guarding or rebound. Musculoskeletal:         General: No tenderness or deformity. Cervical back: Normal range of motion and neck supple. Skin:     General: Skin is warm and dry. Neurological:      Mental Status: He is alert and oriented to person, place, and time. Cranial Nerves: No cranial nerve deficit. Coordination: Coordination normal.        Procedures     MDM  This is a 75-year-old male patient presents emergency department for evaluation of abdominal pain. Initial differential diagnosis concerning for possible DKA, pancreatitis, cholecystitis, choledocholithiasis, gastritis. Labs and imaging obtained here. Potassium slightly low at 3.4. Renal function 0.7 creatinine normal as interpreted by me. Glucose is elevated at 359. Patient is not in DKA however, anion gap normal at 12, venous pH stable at 7.48. Lipase is significant elevated at 464. On review of labs on 2/4/2023 patient's triglycerides were severely elevated at 1187. This may be the cause of his pancreatitis. CT abdomen pelvis showed mild pancreatic fat stranding concerning for pancreatitis. Right upper quadrant ultrasound was negative. At this time patient started on IV fluids, lactated Ringer's and pain controlled. Plan for admission at this time. I had shared decision-making with patient, I discussed all of his findings with him today. Answered all of his questions. Patient was asking me if I think he should go home, I had discussed with him that I believe he needs to be admitted for pain control and IV fluids as well as n.p.o. status. He is agreeable at this time. ED Course as of 02/08/23 0016   Tue Feb 07, 2023   1612 External chart review performed by me patient was admitted under Dr. Dylan Jensen for DKA to the ICU just discharged few days prior.  [FG]      ED Course User Index  [FG] Lala Anthony DO         --------------------------------------------- PAST HISTORY ---------------------------------------------  Past Medical History:  has a past medical history of Anxiety, Depression, Seizures ([address] and Unspecified cerebral artery occlusion with cerebral infarction. Past Surgical History:  has a past surgical history that includes Abscess Drainage (Left, 08/22/2016) and bone incision and drainage (Left, 08/30/2016). Social History:  reports that he has been smoking cigarettes. He has a 7.60 pack-year smoking history. He has never used smokeless tobacco. He reports current drug use. Drug: Other-see comments. He reports that he does not drink alcohol. Family History: family history includes Heart Disease in his father; Kidney Disease in his mother. The patients home medications have been reviewed. Allergies: Patient has no known allergies.     -------------------------------------------------- RESULTS -------------------------------------------------    LABS:  Results for orders placed or performed during the hospital encounter of 02/07/23   CBC with Auto Differential   Result Value Ref Range    WBC 7.5 4.5 - 11.5 E9/L    RBC 4.71 3.80 - 5.80 E12/L    Hemoglobin 13.1 12.5 - 16.5 g/dL    Hematocrit 37.8 37.0 - 54.0 %    MCV 80.3 80.0 - 99.9 fL    MCH 27.8 26.0 - 35.0 pg    MCHC 34.7 (H) 32.0 - 34.5 %    RDW 14.5 11.5 - 15.0 fL    Platelets 238 124 - 808 E9/L    MPV 9.4 7.0 - 12.0 fL    Neutrophils % 54.6 43.0 - 80.0 %    Immature Granulocytes % 0.8 0.0 - 5.0 %    Lymphocytes % 28.3 20.0 - 42.0 %    Monocytes % 15.1 (H) 2.0 - 12.0 %    Eosinophils % 0.9 0.0 - 6.0 %    Basophils % 0.3 0.0 - 2.0 %    Neutrophils Absolute 4.08 1.80 - 7.30 E9/L    Immature Granulocytes # 0.06 E9/L    Lymphocytes Absolute 2.12 1.50 - 4.00 E9/L    Monocytes Absolute 1.13 (H) 0.10 - 0.95 E9/L    Eosinophils Absolute 0.07 0.05 - 0.50 E9/L    Basophils Absolute 0.02 0.00 - 0.20 E9/L   Comprehensive Metabolic Panel w/ Reflex to MG   Result Value Ref Range    Sodium 134 132 - 146 mmol/L Potassium reflex Magnesium 3.4 (L) 3.5 - 5.0 mmol/L    Chloride 96 (L) 98 - 107 mmol/L    CO2 26 22 - 29 mmol/L    Anion Gap 12 7 - 16 mmol/L    Glucose 359 (H) 74 - 99 mg/dL    BUN 10 6 - 20 mg/dL    Creatinine 0.7 0.7 - 1.2 mg/dL    Est, Glom Filt Rate >60 >=60 mL/min/1.73    Calcium 9.5 8.6 - 10.2 mg/dL    Total Protein 7.4 6.4 - 8.3 g/dL    Albumin 3.3 (L) 3.5 - 5.2 g/dL    Total Bilirubin 0.4 0.0 - 1.2 mg/dL    Alkaline Phosphatase 120 40 - 129 U/L    ALT 29 0 - 40 U/L    AST 24 0 - 39 U/L   Urinalysis with Microscopic   Result Value Ref Range    Color, UA Yellow Straw/Yellow    Clarity, UA Clear Clear    Glucose, Ur >=1000 (A) Negative mg/dL    Bilirubin Urine Negative Negative    Ketones, Urine Negative Negative mg/dL    Specific Gravity, UA 1.010 1.005 - 1.030    Blood, Urine Negative Negative    pH, UA 7.5 5.0 - 9.0    Protein, UA Negative Negative mg/dL    Urobilinogen, Urine 0.2 <2.0 E.U./dL    Nitrite, Urine Negative Negative    Leukocyte Esterase, Urine Negative Negative    WBC, UA 0-1 0 - 5 /HPF    RBC, UA 0-1 0 - 2 /HPF    Bacteria, UA FEW (A) None Seen /HPF   PH, VENOUS   Result Value Ref Range    pH, Shaun 7.48 (H) 7.35 - 7.45   Lactic Acid   Result Value Ref Range    Lactic Acid 1.4 0.5 - 2.2 mmol/L   Lipase   Result Value Ref Range    Lipase 464 (H) 13 - 60 U/L   Beta-Hydroxybutyrate   Result Value Ref Range    Beta-Hydroxybutyrate 0.52 (H) 0.02 - 0.27 mmol/L   Urine Drug Screen   Result Value Ref Range    Amphetamine Screen, Urine NOT DETECTED Negative <1000 ng/mL    Barbiturate Screen, Ur NOT DETECTED Negative < 200 ng/mL    Benzodiazepine Screen, Urine NOT DETECTED Negative < 200 ng/mL    Cannabinoid Scrn, Ur NOT DETECTED Negative < 50ng/mL    Cocaine Metabolite Screen, Urine NOT DETECTED Negative < 300 ng/mL    Opiate Scrn, Ur NOT DETECTED Negative < 300ng/mL    PCP Screen, Urine NOT DETECTED Negative < 25 ng/mL    Methadone Screen, Urine NOT DETECTED Negative <300 ng/mL    Oxycodone Urine NOT DETECTED Negative <100 ng/mL    FENTANYL SCREEN, URINE NOT DETECTED Negative <1 ng/mL    Drug Screen Comment: see below    Serum Drug Screen   Result Value Ref Range    Ethanol Lvl <10 mg/dL    Acetaminophen Level <5.0 (L) 10.0 - 56.5 mcg/mL    Salicylate, Serum <3.5 0.0 - 30.0 mg/dL    TCA Scrn NEGATIVE Cutoff:300 ng/mL   Magnesium   Result Value Ref Range    Magnesium 1.6 1.6 - 2.6 mg/dL       RADIOLOGY:  US GALLBLADDER RUQ   Final Result   Normal gallbladder and common bile duct. CT ABDOMEN PELVIS W IV CONTRAST Additional Contrast? None   Final Result   Mild pancreatic fat stranding and small amount of fluid adjacent to the   pancreatic head. Correlate with abnormal amylase and lipase for pancreatitis. Diffuse colonic fecal retention      2 mm nonobstructing right renal calculi.               ------------------------- NURSING NOTES AND VITALS REVIEWED ---------------------------  Date / Time Roomed:  2/7/2023  3:24 PM  ED Bed Assignment:  14B/14B-14    The nursing notes within the ED encounter and vital signs as below have been reviewed. Patient Vitals for the past 24 hrs:   BP Temp Pulse Resp SpO2 Height Weight   02/07/23 2254 (!) 136/97 -- (!) 106 22 96 % -- --   02/07/23 2100 (!) 133/93 -- (!) 103 24 97 % -- --   02/07/23 1526 (!) 126/92 97.2 °F (36.2 °C) (!) 122 24 96 % 6' 7\" (2.007 m) 250 lb (113.4 kg)       Oxygen Saturation Interpretation: Normal    Counseling:  I have spoken with the patient and discussed todays results, in addition to providing specific details for the plan of care and counseling regarding the diagnosis and prognosis. Their questions are answered at this time and they are agreeable with the plan of admission.    --------------------------------- ADDITIONAL PROVIDER NOTES ---------------------------------  Consultations:  Spoke with Dr. Ilir Santoyo. Discussed case. They will admit the patient.   This patient's ED course included: a personal history and physicial examination, re-evaluation prior to disposition, multiple bedside re-evaluations, IV medications, and complex medical decision making and emergency management    This patient has remained hemodynamically stable during their ED course. Diagnosis:  1. Acute pancreatitis, unspecified complication status, unspecified pancreatitis type        Disposition:  Patient's disposition: Admit to telemetry  Patient's condition is stable.          Gita Mcintyre DO  Resident  02/08/23 [id number]

## 2023-02-07 NOTE — RESULT ENCOUNTER NOTE
Patient has been hep C positive since 2016. Its still positive during last admission. He has hospital follow apt on 02/08/2023. Please discuss GI referral and viral load labs with him.      Electronically signed by Tony Mendoza MD on 2/7/23 at 8:40 AM EST

## 2023-02-07 NOTE — ED NOTES
To ED via EMS from home, pt c/o right sided abd pain of a cramping sensation and right flank of a stabbing sensation. Pt states this pain has been ongoing since last Monday. Pt reports last Monday that he had several episodes of vomiting. States on Tuesday he came to the ER for the symptoms, he was admitted for DKA. He was d/c'd home this past Sunday. The pt states he was doing better in symptoms, but on Sunday night symptoms started worsening again and he states he developed diarrhea as well. He states he has been taking ibuprofen for pain control at home, last took at noon today. States little help. States current pain is 8/10. Pt denies blood in the urine, states his urine feels decreased output, denies pain, denies blood in urine. Pt is A&Ox4. Neuro intact. EMS reports BS was 437.       Ollie Ken, RN  02/07/23 [address] [place] [address], RN  02/07/23 8907

## 2023-02-08 PROBLEM — K59.00 FECAL RETENTION: Status: ACTIVE | Noted: 2023-02-08

## 2023-02-08 PROBLEM — F19.11 HX OF DRUG ABUSE (HCC): Status: ACTIVE | Noted: 2023-02-04

## 2023-02-08 PROBLEM — F11.20 OPIOID DEPENDENCE ON MAINTENANCE AGONIST THERAPY, NO SYMPTOMS (HCC): Chronic | Status: ACTIVE | Noted: 2023-02-08

## 2023-02-08 LAB
ALBUMIN SERPL-MCNC: 2.9 G/DL (ref 3.5–5.2)
ALP BLD-CCNC: 93 U/L (ref 40–129)
ALT SERPL-CCNC: 24 U/L (ref 0–40)
ANION GAP SERPL CALCULATED.3IONS-SCNC: 11 MMOL/L (ref 7–16)
AST SERPL-CCNC: 28 U/L (ref 0–39)
BASOPHILS ABSOLUTE: 0.02 E9/L (ref 0–0.2)
BASOPHILS RELATIVE PERCENT: 0.3 % (ref 0–2)
BILIRUB SERPL-MCNC: 0.3 MG/DL (ref 0–1.2)
BUN BLDV-MCNC: 9 MG/DL (ref 6–20)
CALCIUM SERPL-MCNC: 8.7 MG/DL (ref 8.6–10.2)
CHLORIDE BLD-SCNC: 98 MMOL/L (ref 98–107)
CO2: 26 MMOL/L (ref 22–29)
CREAT SERPL-MCNC: 0.7 MG/DL (ref 0.7–1.2)
EOSINOPHILS ABSOLUTE: 0.1 E9/L (ref 0.05–0.5)
EOSINOPHILS RELATIVE PERCENT: 1.5 % (ref 0–6)
GFR SERPL CREATININE-BSD FRML MDRD: >60 ML/MIN/1.73
GLUCOSE BLD-MCNC: 335 MG/DL (ref 74–99)
HCT VFR BLD CALC: 38.2 % (ref 37–54)
HEMOGLOBIN: 12.8 G/DL (ref 12.5–16.5)
IMMATURE GRANULOCYTES #: 0.04 E9/L
IMMATURE GRANULOCYTES %: 0.6 % (ref 0–5)
LYMPHOCYTES ABSOLUTE: 2.62 E9/L (ref 1.5–4)
LYMPHOCYTES RELATIVE PERCENT: 38.2 % (ref 20–42)
MCH RBC QN AUTO: 27.5 PG (ref 26–35)
MCHC RBC AUTO-ENTMCNC: 33.5 % (ref 32–34.5)
MCV RBC AUTO: 82 FL (ref 80–99.9)
METER GLUCOSE: 203 MG/DL (ref 74–99)
METER GLUCOSE: 236 MG/DL (ref 74–99)
METER GLUCOSE: 237 MG/DL (ref 74–99)
METER GLUCOSE: 314 MG/DL (ref 74–99)
METER GLUCOSE: 318 MG/DL (ref 74–99)
MONOCYTES ABSOLUTE: 0.93 E9/L (ref 0.1–0.95)
MONOCYTES RELATIVE PERCENT: 13.6 % (ref 2–12)
NEUTROPHILS ABSOLUTE: 3.14 E9/L (ref 1.8–7.3)
NEUTROPHILS RELATIVE PERCENT: 45.8 % (ref 43–80)
PDW BLD-RTO: 14.3 FL (ref 11.5–15)
PLATELET # BLD: 191 E9/L (ref 130–450)
PMV BLD AUTO: 9.8 FL (ref 7–12)
POTASSIUM REFLEX MAGNESIUM: 4.1 MMOL/L (ref 3.5–5)
RBC # BLD: 4.66 E12/L (ref 3.8–5.8)
SODIUM BLD-SCNC: 135 MMOL/L (ref 132–146)
TOTAL PROTEIN: 6.5 G/DL (ref 6.4–8.3)
WBC # BLD: 6.9 E9/L (ref 4.5–11.5)

## 2023-02-08 PROCEDURE — 2580000003 HC RX 258: Performed by: INTERNAL MEDICINE

## 2023-02-08 PROCEDURE — 36415 COLL VENOUS BLD VENIPUNCTURE: CPT

## 2023-02-08 PROCEDURE — 6360000002 HC RX W HCPCS: Performed by: INTERNAL MEDICINE

## 2023-02-08 PROCEDURE — 96375 TX/PRO/DX INJ NEW DRUG ADDON: CPT

## 2023-02-08 PROCEDURE — 96372 THER/PROPH/DIAG INJ SC/IM: CPT

## 2023-02-08 PROCEDURE — G0378 HOSPITAL OBSERVATION PER HR: HCPCS

## 2023-02-08 PROCEDURE — S5553 INSULIN LONG ACTING 5 U: HCPCS | Performed by: INTERNAL MEDICINE

## 2023-02-08 PROCEDURE — 6370000000 HC RX 637 (ALT 250 FOR IP): Performed by: INTERNAL MEDICINE

## 2023-02-08 PROCEDURE — 82962 GLUCOSE BLOOD TEST: CPT

## 2023-02-08 PROCEDURE — 85025 COMPLETE CBC W/AUTO DIFF WBC: CPT

## 2023-02-08 PROCEDURE — 80053 COMPREHEN METABOLIC PANEL: CPT

## 2023-02-08 PROCEDURE — 1200000000 HC SEMI PRIVATE

## 2023-02-08 PROCEDURE — 96376 TX/PRO/DX INJ SAME DRUG ADON: CPT

## 2023-02-08 PROCEDURE — 83036 HEMOGLOBIN GLYCOSYLATED A1C: CPT

## 2023-02-08 RX ORDER — LORAZEPAM 1 MG/1
2 TABLET ORAL
Status: DISCONTINUED | OUTPATIENT
Start: 2023-02-08 | End: 2023-02-09 | Stop reason: HOSPADM

## 2023-02-08 RX ORDER — POTASSIUM CHLORIDE 7.45 MG/ML
10 INJECTION INTRAVENOUS
Status: COMPLETED | OUTPATIENT
Start: 2023-02-08 | End: 2023-02-08

## 2023-02-08 RX ORDER — SODIUM CHLORIDE 0.9 % (FLUSH) 0.9 %
10 SYRINGE (ML) INJECTION PRN
Status: DISCONTINUED | OUTPATIENT
Start: 2023-02-08 | End: 2023-02-09 | Stop reason: HOSPADM

## 2023-02-08 RX ORDER — FENOFIBRATE 160 MG/1
160 TABLET ORAL DAILY
Status: DISCONTINUED | OUTPATIENT
Start: 2023-02-08 | End: 2023-02-09 | Stop reason: HOSPADM

## 2023-02-08 RX ORDER — ASPIRIN 81 MG/1
81 TABLET, CHEWABLE ORAL DAILY
Status: DISCONTINUED | OUTPATIENT
Start: 2023-02-08 | End: 2023-02-09 | Stop reason: HOSPADM

## 2023-02-08 RX ORDER — LANOLIN ALCOHOL/MO/W.PET/CERES
100 CREAM (GRAM) TOPICAL DAILY
Status: DISCONTINUED | OUTPATIENT
Start: 2023-02-08 | End: 2023-02-09 | Stop reason: HOSPADM

## 2023-02-08 RX ORDER — INSULIN GLARGINE-YFGN 100 [IU]/ML
40 INJECTION, SOLUTION SUBCUTANEOUS
Status: DISCONTINUED | OUTPATIENT
Start: 2023-02-08 | End: 2023-02-09 | Stop reason: HOSPADM

## 2023-02-08 RX ORDER — LORAZEPAM 1 MG/1
3 TABLET ORAL
Status: DISCONTINUED | OUTPATIENT
Start: 2023-02-08 | End: 2023-02-09 | Stop reason: HOSPADM

## 2023-02-08 RX ORDER — INSULIN LISPRO 100 [IU]/ML
0-4 INJECTION, SOLUTION INTRAVENOUS; SUBCUTANEOUS
Status: DISCONTINUED | OUTPATIENT
Start: 2023-02-08 | End: 2023-02-08

## 2023-02-08 RX ORDER — ONDANSETRON 2 MG/ML
4 INJECTION INTRAMUSCULAR; INTRAVENOUS EVERY 6 HOURS PRN
Status: DISCONTINUED | OUTPATIENT
Start: 2023-02-08 | End: 2023-02-09 | Stop reason: HOSPADM

## 2023-02-08 RX ORDER — SODIUM CHLORIDE 0.9 % (FLUSH) 0.9 %
5-40 SYRINGE (ML) INJECTION EVERY 12 HOURS SCHEDULED
Status: DISCONTINUED | OUTPATIENT
Start: 2023-02-08 | End: 2023-02-09 | Stop reason: HOSPADM

## 2023-02-08 RX ORDER — MORPHINE SULFATE 2 MG/ML
2 INJECTION, SOLUTION INTRAMUSCULAR; INTRAVENOUS
Status: DISCONTINUED | OUTPATIENT
Start: 2023-02-08 | End: 2023-02-08

## 2023-02-08 RX ORDER — LORAZEPAM 2 MG/ML
2 INJECTION INTRAMUSCULAR
Status: DISCONTINUED | OUTPATIENT
Start: 2023-02-08 | End: 2023-02-09 | Stop reason: HOSPADM

## 2023-02-08 RX ORDER — INSULIN LISPRO 100 [IU]/ML
0-8 INJECTION, SOLUTION INTRAVENOUS; SUBCUTANEOUS
Status: DISCONTINUED | OUTPATIENT
Start: 2023-02-08 | End: 2023-02-09 | Stop reason: HOSPADM

## 2023-02-08 RX ORDER — INSULIN LISPRO 100 [IU]/ML
10 INJECTION, SOLUTION INTRAVENOUS; SUBCUTANEOUS
Status: DISCONTINUED | OUTPATIENT
Start: 2023-02-08 | End: 2023-02-09 | Stop reason: HOSPADM

## 2023-02-08 RX ORDER — BUPRENORPHINE HYDROCHLORIDE AND NALOXONE HYDROCHLORIDE DIHYDRATE 2; .5 MG/1; MG/1
2 TABLET SUBLINGUAL 2 TIMES DAILY
Status: DISCONTINUED | OUTPATIENT
Start: 2023-02-08 | End: 2023-02-09 | Stop reason: HOSPADM

## 2023-02-08 RX ORDER — LOSARTAN POTASSIUM 25 MG/1
25 TABLET ORAL DAILY
Status: DISCONTINUED | OUTPATIENT
Start: 2023-02-08 | End: 2023-02-09 | Stop reason: HOSPADM

## 2023-02-08 RX ORDER — SODIUM CHLORIDE 0.9 % (FLUSH) 0.9 %
10 SYRINGE (ML) INJECTION EVERY 12 HOURS SCHEDULED
Status: DISCONTINUED | OUTPATIENT
Start: 2023-02-08 | End: 2023-02-09 | Stop reason: HOSPADM

## 2023-02-08 RX ORDER — ACETAMINOPHEN 650 MG/1
650 SUPPOSITORY RECTAL EVERY 6 HOURS PRN
Status: DISCONTINUED | OUTPATIENT
Start: 2023-02-08 | End: 2023-02-09 | Stop reason: HOSPADM

## 2023-02-08 RX ORDER — ENOXAPARIN SODIUM 100 MG/ML
30 INJECTION SUBCUTANEOUS 2 TIMES DAILY
Status: DISCONTINUED | OUTPATIENT
Start: 2023-02-08 | End: 2023-02-09 | Stop reason: HOSPADM

## 2023-02-08 RX ORDER — DEXTROSE MONOHYDRATE 100 MG/ML
INJECTION, SOLUTION INTRAVENOUS CONTINUOUS PRN
Status: DISCONTINUED | OUTPATIENT
Start: 2023-02-08 | End: 2023-02-09 | Stop reason: HOSPADM

## 2023-02-08 RX ORDER — BUPRENORPHINE HYDROCHLORIDE AND NALOXONE HYDROCHLORIDE DIHYDRATE 2; .5 MG/1; MG/1
2 TABLET SUBLINGUAL DAILY
Status: DISCONTINUED | OUTPATIENT
Start: 2023-02-08 | End: 2023-02-08

## 2023-02-08 RX ORDER — LORAZEPAM 1 MG/1
4 TABLET ORAL
Status: DISCONTINUED | OUTPATIENT
Start: 2023-02-08 | End: 2023-02-09 | Stop reason: HOSPADM

## 2023-02-08 RX ORDER — ACETAMINOPHEN 325 MG/1
650 TABLET ORAL EVERY 6 HOURS PRN
Status: DISCONTINUED | OUTPATIENT
Start: 2023-02-08 | End: 2023-02-09 | Stop reason: HOSPADM

## 2023-02-08 RX ORDER — CARBAMAZEPINE 100 MG/1
100 TABLET, EXTENDED RELEASE ORAL EVERY MORNING
Status: DISCONTINUED | OUTPATIENT
Start: 2023-02-08 | End: 2023-02-09 | Stop reason: HOSPADM

## 2023-02-08 RX ORDER — LORAZEPAM 2 MG/ML
1 INJECTION INTRAMUSCULAR
Status: DISCONTINUED | OUTPATIENT
Start: 2023-02-08 | End: 2023-02-09 | Stop reason: HOSPADM

## 2023-02-08 RX ORDER — INSULIN LISPRO 100 [IU]/ML
0-4 INJECTION, SOLUTION INTRAVENOUS; SUBCUTANEOUS NIGHTLY
Status: DISCONTINUED | OUTPATIENT
Start: 2023-02-08 | End: 2023-02-09 | Stop reason: HOSPADM

## 2023-02-08 RX ORDER — SODIUM CHLORIDE 9 MG/ML
INJECTION, SOLUTION INTRAVENOUS PRN
Status: DISCONTINUED | OUTPATIENT
Start: 2023-02-08 | End: 2023-02-08 | Stop reason: SDUPTHER

## 2023-02-08 RX ORDER — LORAZEPAM 2 MG/ML
3 INJECTION INTRAMUSCULAR
Status: DISCONTINUED | OUTPATIENT
Start: 2023-02-08 | End: 2023-02-09 | Stop reason: HOSPADM

## 2023-02-08 RX ORDER — ATORVASTATIN CALCIUM 40 MG/1
40 TABLET, FILM COATED ORAL DAILY
Status: DISCONTINUED | OUTPATIENT
Start: 2023-02-08 | End: 2023-02-09 | Stop reason: HOSPADM

## 2023-02-08 RX ORDER — HYDROXYZINE PAMOATE 25 MG/1
25 CAPSULE ORAL 3 TIMES DAILY PRN
Status: DISCONTINUED | OUTPATIENT
Start: 2023-02-08 | End: 2023-02-09 | Stop reason: HOSPADM

## 2023-02-08 RX ORDER — ONDANSETRON 4 MG/1
4 TABLET, ORALLY DISINTEGRATING ORAL EVERY 8 HOURS PRN
Status: DISCONTINUED | OUTPATIENT
Start: 2023-02-08 | End: 2023-02-09 | Stop reason: HOSPADM

## 2023-02-08 RX ORDER — LORAZEPAM 2 MG/ML
4 INJECTION INTRAMUSCULAR
Status: DISCONTINUED | OUTPATIENT
Start: 2023-02-08 | End: 2023-02-09 | Stop reason: HOSPADM

## 2023-02-08 RX ORDER — SODIUM CHLORIDE 0.9 % (FLUSH) 0.9 %
5-40 SYRINGE (ML) INJECTION PRN
Status: DISCONTINUED | OUTPATIENT
Start: 2023-02-08 | End: 2023-02-09 | Stop reason: HOSPADM

## 2023-02-08 RX ORDER — SODIUM CHLORIDE 9 MG/ML
INJECTION, SOLUTION INTRAVENOUS PRN
Status: DISCONTINUED | OUTPATIENT
Start: 2023-02-08 | End: 2023-02-09 | Stop reason: HOSPADM

## 2023-02-08 RX ORDER — POLYETHYLENE GLYCOL 3350 17 G/17G
17 POWDER, FOR SOLUTION ORAL 2 TIMES DAILY
Status: DISCONTINUED | OUTPATIENT
Start: 2023-02-08 | End: 2023-02-09 | Stop reason: HOSPADM

## 2023-02-08 RX ORDER — INSULIN LISPRO 100 [IU]/ML
0-4 INJECTION, SOLUTION INTRAVENOUS; SUBCUTANEOUS NIGHTLY
Status: DISCONTINUED | OUTPATIENT
Start: 2023-02-08 | End: 2023-02-08

## 2023-02-08 RX ORDER — LORAZEPAM 1 MG/1
1 TABLET ORAL
Status: DISCONTINUED | OUTPATIENT
Start: 2023-02-08 | End: 2023-02-09 | Stop reason: HOSPADM

## 2023-02-08 RX ADMIN — MORPHINE SULFATE 2 MG: 2 INJECTION, SOLUTION INTRAMUSCULAR; INTRAVENOUS at 02:27

## 2023-02-08 RX ADMIN — BUPRENORPHINE HYDROCHLORIDE AND NALOXONE HYDROCHLORIDE DIHYDRATE 2 TABLET: 2; .5 TABLET SUBLINGUAL at 21:41

## 2023-02-08 RX ADMIN — ENOXAPARIN SODIUM 30 MG: 100 INJECTION SUBCUTANEOUS at 21:42

## 2023-02-08 RX ADMIN — INSULIN LISPRO 10 UNITS: 100 INJECTION, SOLUTION INTRAVENOUS; SUBCUTANEOUS at 16:51

## 2023-02-08 RX ADMIN — Medication 10 MEQ: at 04:41

## 2023-02-08 RX ADMIN — ONDANSETRON 4 MG: 2 INJECTION INTRAMUSCULAR; INTRAVENOUS at 02:35

## 2023-02-08 RX ADMIN — ENOXAPARIN SODIUM 30 MG: 100 INJECTION SUBCUTANEOUS at 02:11

## 2023-02-08 RX ADMIN — INSULIN LISPRO 10 UNITS: 100 INJECTION, SOLUTION INTRAVENOUS; SUBCUTANEOUS at 11:41

## 2023-02-08 RX ADMIN — POLYETHYLENE GLYCOL 3350 17 G: 17 POWDER, FOR SOLUTION ORAL at 14:47

## 2023-02-08 RX ADMIN — INSULIN LISPRO 2 UNITS: 100 INJECTION, SOLUTION INTRAVENOUS; SUBCUTANEOUS at 16:50

## 2023-02-08 RX ADMIN — SODIUM CHLORIDE, POTASSIUM CHLORIDE, SODIUM LACTATE AND CALCIUM CHLORIDE: 600; 310; 30; 20 INJECTION, SOLUTION INTRAVENOUS at 19:33

## 2023-02-08 RX ADMIN — Medication 10 MEQ: at 06:00

## 2023-02-08 RX ADMIN — CARBAMAZEPINE 100 MG: 100 TABLET, EXTENDED RELEASE ORAL at 08:34

## 2023-02-08 RX ADMIN — ATORVASTATIN CALCIUM 40 MG: 40 TABLET, FILM COATED ORAL at 08:34

## 2023-02-08 RX ADMIN — INSULIN LISPRO 6 UNITS: 100 INJECTION, SOLUTION INTRAVENOUS; SUBCUTANEOUS at 11:42

## 2023-02-08 RX ADMIN — FENOFIBRATE 160 MG: 160 TABLET ORAL at 08:34

## 2023-02-08 RX ADMIN — LOSARTAN POTASSIUM 25 MG: 25 TABLET, FILM COATED ORAL at 08:34

## 2023-02-08 RX ADMIN — ACETAMINOPHEN 650 MG: 325 TABLET ORAL at 21:41

## 2023-02-08 RX ADMIN — POLYETHYLENE GLYCOL 3350 17 G: 17 POWDER, FOR SOLUTION ORAL at 21:42

## 2023-02-08 RX ADMIN — ACETAMINOPHEN 650 MG: 325 TABLET ORAL at 14:56

## 2023-02-08 RX ADMIN — Medication 10 MEQ: at 03:23

## 2023-02-08 RX ADMIN — Medication 10 MEQ: at 02:11

## 2023-02-08 RX ADMIN — Medication 100 MG: at 08:34

## 2023-02-08 RX ADMIN — ASPIRIN 81 MG CHEWABLE TABLET 81 MG: 81 TABLET CHEWABLE at 08:34

## 2023-02-08 RX ADMIN — BUPRENORPHINE HYDROCHLORIDE AND NALOXONE HYDROCHLORIDE DIHYDRATE 2 TABLET: 2; .5 TABLET SUBLINGUAL at 16:51

## 2023-02-08 RX ADMIN — INSULIN GLARGINE-YFGN 40 UNITS: 100 INJECTION, SOLUTION SUBCUTANEOUS at 11:43

## 2023-02-08 RX ADMIN — SODIUM CHLORIDE, POTASSIUM CHLORIDE, SODIUM LACTATE AND CALCIUM CHLORIDE: 600; 310; 30; 20 INJECTION, SOLUTION INTRAVENOUS at 03:23

## 2023-02-08 RX ADMIN — INSULIN LISPRO 3 UNITS: 100 INJECTION, SOLUTION INTRAVENOUS; SUBCUTANEOUS at 08:34

## 2023-02-08 ASSESSMENT — PAIN DESCRIPTION - LOCATION
LOCATION: ABDOMEN
LOCATION: BACK
LOCATION: ABDOMEN

## 2023-02-08 ASSESSMENT — PAIN DESCRIPTION - DESCRIPTORS
DESCRIPTORS: PRESSURE
DESCRIPTORS: ACHING;DISCOMFORT

## 2023-02-08 ASSESSMENT — PAIN SCALES - GENERAL
PAINLEVEL_OUTOF10: 7
PAINLEVEL_OUTOF10: 5
PAINLEVEL_OUTOF10: 5
PAINLEVEL_OUTOF10: 3
PAINLEVEL_OUTOF10: 0

## 2023-02-08 ASSESSMENT — PAIN DESCRIPTION - ORIENTATION
ORIENTATION: MID;LOWER
ORIENTATION: MID

## 2023-02-08 ASSESSMENT — PAIN DESCRIPTION - PAIN TYPE: TYPE: ACUTE PAIN

## 2023-02-08 ASSESSMENT — LIFESTYLE VARIABLES
HOW MANY STANDARD DRINKS CONTAINING ALCOHOL DO YOU HAVE ON A TYPICAL DAY: PATIENT DOES NOT DRINK
HOW OFTEN DO YOU HAVE A DRINK CONTAINING ALCOHOL: NEVER

## 2023-02-08 NOTE — CARE COORDINATION
2/8, Patient admitted for abdominal pain. Patient was recently discharged from this hospital on Sunday 2/5/23. Patient was previously admitted for DKA. SW was unable to meet with due to patient having testing done. Recent CM admission notes states that patient had an insulin pump at home and was using insulin pens. Patient does not have a PCP and was informed to contact PCP and given phone number to follow up on. Patient lives with his significant other Lola Yuan at home. SW/CM will follow up with patient in am on discharge planning.       Felix Avila, [facility] Case Management  [phone]

## 2023-02-08 NOTE — H&P
Hospitalist History & Physical      PATIENT NAME:  Quan West    MRN:  [id number]  SERVICE DATE:  02/07/23    Primary Care Physician: No primary care provider on file. SUBJECTIVE  CHIEF COMPLAINT:  had concerns including Abdominal Pain (Discharged this past Sunday w DKA. Here today for N/V/D. Blood glucose 414). HPI:  Mr. Quan West, a 42y.o. year old male  who  has a past medical history of Anxiety, Depression, Seizures ([facility] [address] and Unspecified cerebral artery occlusion with cerebral infarction. presents with abdominal pain, upper cramping, was just discharged from the hospital with DKA. But had since worsening abdominal pain. Denies etoh or drug abuse. pending u/s abdomen in ER. No chest pain no SOB or cough no fever or chills. PAST MEDICAL HISTORY:    Past Medical History:   Diagnosis Date    Anxiety     Depression     Seizures ([facility] [address]     Unspecified cerebral artery occlusion with cerebral infarction      PAST SURGICAL HISTORY:    Past Surgical History:   Procedure Laterality Date    ABSCESS DRAINAGE Left 08/22/2016    Left Hand    BONE INCISION AND DRAINAGE Left 08/30/2016    HAND W/ WOUND VA C     FAMILY HISTORY:    Family History   Problem Relation Age of Onset    Kidney Disease Mother     Heart Disease Father      SOCIAL HISTORY:    Social History     Socioeconomic History    Marital status: Single     Spouse name: Not on file    Number of children: Not on file    Years of education: Not on file    Highest education level: Not on file   Occupational History    Not on file   Tobacco Use    Smoking status: Some Days     Packs/day: 0.40     Years: 19.00     Pack years: 7.60     Types: Cigarettes    Smokeless tobacco: Never   Vaping Use    Vaping Use: Never used   Substance and Sexual Activity    Alcohol use: No     Comment: not in three years     Drug use: Yes     Types: Other-see comments     Comment: hx drug history, herion.  Last use 12/2/2016    Sexual activity: Yes     Partners: Female Other Topics Concern    Not on file   Social History Narrative    Not on file     Social Determinants of Health     Financial Resource Strain: Not on file   Food Insecurity: Not on file   Transportation Needs: Not on file   Physical Activity: Not on file   Stress: Not on file   Social Connections: Not on file   Intimate Partner Violence: Not on file   Housing Stability: Not on file    TOBACCO:   reports that he has been smoking cigarettes. He has a 7.60 pack-year smoking history. He has never used smokeless tobacco.  ETOH:   reports no history of alcohol use. MEDICATIONS:   Prior to Admission medications    Medication Sig Start Date End Date Taking? Authorizing Provider   insulin glargine (LANTUS SOLOSTAR) 100 UNIT/ML injection pen Inject 48 Units into the skin every morning 2/5/23   Mariza Vidales MD   Insulin Pen Needle (BD PEN NEEDLE MICRO U/F) 32G X 6 MM MISC Uses with insulin 4 times a day 2/5/23   Mariza Vidales MD   insulin lispro, 1 Unit Dial, (HUMALOG KWIKPEN) 100 UNIT/ML SOPN Inject 15 units with meals + following sliding scale. -200 add 3U, -250 add 6U, -300 add 9U, -350 add 12U, -400 add 15U, BS over 400 add 18U.  MAX 75u/day 2/5/23   Mariza Vidales MD   atorvastatin (LIPITOR) 40 MG tablet Take 1 tablet by mouth daily 2/5/23   Mariza Vidales MD   fenofibrate (TRIGLIDE) 160 MG tablet Take 1 tablet by mouth daily 2/5/23   Mariza Vidales MD   niacin (SLO-NIACIN) 500 MG extended release tablet Take 1 tablet by mouth nightly 2/5/23   Mariza Vidales MD   omega-3 acid ethyl esters (LOVAZA) 1 g capsule Take 2 capsules by mouth 2 times daily 2/5/23   Mariza Vidales MD   aspirin 81 MG chewable tablet Take 1 tablet by mouth daily 2/6/23   Madison Hutchins MD   losartan (COZAAR) 25 MG tablet Take 1 tablet by mouth daily 2/6/23   Madison Hutchins MD   hydrOXYzine pamoate (VISTARIL) 25 MG capsule Take 1 capsule by mouth 3 times daily as needed for Anxiety 2/5/23 2/19/23  Mireya Nielson MD   glucose monitoring (FREESTYLE FREEDOM) kit 1 kit by Does not apply route once for 1 dose 2/5/23 2/5/23  Mireya Nielson MD   Lancets 30G MISC 4 boxes by Does not apply route daily 2/5/23   Mireya Nielson MD   blood glucose monitor strips Test 4 times a day & as needed for symptoms of irregular blood glucose. Dispense sufficient amount for indicated testing frequency plus additional to accommodate PRN testing needs. 2/5/23   Mireya Nielson MD   Alcohol Swabs 70 % PADS 4 boxes by Does not apply route 4 times daily 2/5/23   Mireya Nielson MD   vitamin D (CHOLECALCIFEROL) 50 MCG (2000 UT) TABS tablet Take 1 tablet by mouth daily 2/6/23   Mireya Nielson MD   carBAMazepine (TEGRETOL XR) 100 MG extended release tablet Take 100 mg by mouth every morning    Historical Provider, MD   buprenorphine-naloxone (SUBOXONE) 8-2 MG FILM SL film Place 1 Film under the tongue in the morning and at bedtime    Historical Provider, MD        ALLERGIES: Patient has no known allergies. REVIEW OF SYSTEM:   ROS as noted in HPI, 12 point ROS reviewed and otherwise negative. OBJECTIVE  PHYSICAL EXAM:   Vitals:    02/07/23 1526   BP: (!) 126/92   Pulse: (!) 122   Resp: 24   Temp: 97.2 °F (36.2 °C)   SpO2: 96%   Weight: 250 lb (113.4 kg)   Height: 6' 7\" (2.007 m)       General appearance: alert, appears stated age and cooperative  CONSTITUTIONAL:  no apparent distress  ENT:  normocephalic, without obvious abnormality, atraumatic  NECK:  supple, symmetrical, trachea midline  Heart: regular rate and rhythm, S1, S2 normal   Lungs: clear to auscultation bilaterally  Abdomen: soft lax, upper tenderness no guarding or rebound or rigidity, not distended, positive bowel sounds  Extremities: extremities normal, atraumatic, no cyanosis, edema  Skin: Normal skin color. No rashes or lesions. Neurologic:  Neurovascularly intact without any focal sensory/motor deficits.  Cranial nerves: II-XII intact, grossly non-focal.  Psychiatric: Alert and oriented, thought content appropriate, normal insight, flat affect      DATA:     Diagnostic tests reviewed for today's visit:    Most recent labs and imaging results reviewed.    Labs:   Recent Results (from the past 72 hour(s))   Basic Metabolic Panel    Collection Time: 02/05/23 12:23 AM   Result Value Ref Range    Sodium 133 132 - 146 mmol/L    Potassium 4.3 3.5 - 5.0 mmol/L    Chloride 102 98 - 107 mmol/L    CO2 19 (L) 22 - 29 mmol/L    Anion Gap 12 7 - 16 mmol/L    Glucose 292 (H) 74 - 99 mg/dL    BUN 6 6 - 20 mg/dL    Creatinine 0.7 0.7 - 1.2 mg/dL    Est, Glom Filt Rate >60 >=60 mL/min/1.73    Calcium 8.5 (L) 8.6 - 10.2 mg/dL   Magnesium    Collection Time: 02/05/23 12:23 AM   Result Value Ref Range    Magnesium 1.9 1.6 - 2.6 mg/dL   Phosphorus    Collection Time: 02/05/23 12:23 AM   Result Value Ref Range    Phosphorus 2.6 2.5 - 4.5 mg/dL   CBC with Auto Differential    Collection Time: 02/05/23  5:01 AM   Result Value Ref Range    WBC 7.7 4.5 - 11.5 E9/L    RBC 4.38 3.80 - 5.80 E12/L    Hemoglobin 12.1 (L) 12.5 - 16.5 g/dL    Hematocrit 34.0 (L) 37.0 - 54.0 %    MCV 77.6 (L) 80.0 - 99.9 fL    MCH 27.6 26.0 - 35.0 pg    MCHC 35.6 (H) 32.0 - 34.5 %    RDW 15.7 (H) 11.5 - 15.0 fL    Platelets 902 754 - 982 E9/L    MPV 10.0 7.0 - 12.0 fL    Neutrophils % 65.9 43.0 - 80.0 %    Immature Granulocytes % 0.8 0.0 - 5.0 %    Lymphocytes % 23.0 20.0 - 42.0 %    Monocytes % 9.7 2.0 - 12.0 %    Eosinophils % 0.3 0.0 - 6.0 %    Basophils % 0.3 0.0 - 2.0 %    Neutrophils Absolute 5.11 1.80 - 7.30 E9/L    Immature Granulocytes # 0.06 E9/L    Lymphocytes Absolute 1.78 1.50 - 4.00 E9/L    Monocytes Absolute 0.75 0.10 - 0.95 E9/L    Eosinophils Absolute 0.02 (L) 0.05 - 0.50 E9/L    Basophils Absolute 0.02 0.00 - 0.20 E9/L   POCT Glucose    Collection Time: 02/05/23  8:17 AM   Result Value Ref Range    Meter Glucose 328 (H) 74 - 99 mg/dL   Triglyceride    Collection Time: 02/05/23  8:37 AM Result Value Ref Range    Triglycerides 487 (H) 0 - 149 mg/dL   POCT Glucose    Collection Time: 02/05/23 12:00 PM   Result Value Ref Range    Meter Glucose 213 (H) 74 - 99 mg/dL   CBC with Auto Differential    Collection Time: 02/07/23  4:20 PM   Result Value Ref Range    WBC 7.5 4.5 - 11.5 E9/L    RBC 4.71 3.80 - 5.80 E12/L    Hemoglobin 13.1 12.5 - 16.5 g/dL    Hematocrit 37.8 37.0 - 54.0 %    MCV 80.3 80.0 - 99.9 fL    MCH 27.8 26.0 - 35.0 pg    MCHC 34.7 (H) 32.0 - 34.5 %    RDW 14.5 11.5 - 15.0 fL    Platelets 480 214 - 080 E9/L    MPV 9.4 7.0 - 12.0 fL    Neutrophils % 54.6 43.0 - 80.0 %    Immature Granulocytes % 0.8 0.0 - 5.0 %    Lymphocytes % 28.3 20.0 - 42.0 %    Monocytes % 15.1 (H) 2.0 - 12.0 %    Eosinophils % 0.9 0.0 - 6.0 %    Basophils % 0.3 0.0 - 2.0 %    Neutrophils Absolute 4.08 1.80 - 7.30 E9/L    Immature Granulocytes # 0.06 E9/L    Lymphocytes Absolute 2.12 1.50 - 4.00 E9/L    Monocytes Absolute 1.13 (H) 0.10 - 0.95 E9/L    Eosinophils Absolute 0.07 0.05 - 0.50 E9/L    Basophils Absolute 0.02 0.00 - 0.20 E9/L   Comprehensive Metabolic Panel w/ Reflex to MG    Collection Time: 02/07/23  4:20 PM   Result Value Ref Range    Sodium 134 132 - 146 mmol/L    Potassium reflex Magnesium 3.4 (L) 3.5 - 5.0 mmol/L    Chloride 96 (L) 98 - 107 mmol/L    CO2 26 22 - 29 mmol/L    Anion Gap 12 7 - 16 mmol/L    Glucose 359 (H) 74 - 99 mg/dL    BUN 10 6 - 20 mg/dL    Creatinine 0.7 0.7 - 1.2 mg/dL    Est, Glom Filt Rate >60 >=60 mL/min/1.73    Calcium 9.5 8.6 - 10.2 mg/dL    Total Protein 7.4 6.4 - 8.3 g/dL    Albumin 3.3 (L) 3.5 - 5.2 g/dL    Total Bilirubin 0.4 0.0 - 1.2 mg/dL    Alkaline Phosphatase 120 40 - 129 U/L    ALT 29 0 - 40 U/L    AST 24 0 - 39 U/L   Urinalysis with Microscopic    Collection Time: 02/07/23  4:20 PM   Result Value Ref Range    Color, UA Yellow Straw/Yellow    Clarity, UA Clear Clear    Glucose, Ur >=1000 (A) Negative mg/dL    Bilirubin Urine Negative Negative    Ketones, Urine Negative Negative mg/dL    Specific Gravity, UA 1.010 1.005 - 1.030    Blood, Urine Negative Negative    pH, UA 7.5 5.0 - 9.0    Protein, UA Negative Negative mg/dL    Urobilinogen, Urine 0.2 <2.0 E.U./dL    Nitrite, Urine Negative Negative    Leukocyte Esterase, Urine Negative Negative    WBC, UA 0-1 0 - 5 /HPF    RBC, UA 0-1 0 - 2 /HPF    Bacteria, UA FEW (A) None Seen /HPF   PH, VENOUS    Collection Time: 02/07/23  4:20 PM   Result Value Ref Range    pH, Shaun 7.48 (H) 7.35 - 7.45   Lactic Acid    Collection Time: 02/07/23  4:20 PM   Result Value Ref Range    Lactic Acid 1.4 0.5 - 2.2 mmol/L   Lipase    Collection Time: 02/07/23  4:20 PM   Result Value Ref Range    Lipase 464 (H) 13 - 60 U/L   Beta-Hydroxybutyrate    Collection Time: 02/07/23  4:20 PM   Result Value Ref Range    Beta-Hydroxybutyrate 0.52 (H) 0.02 - 0.27 mmol/L   Urine Drug Screen    Collection Time: 02/07/23  4:20 PM   Result Value Ref Range    Amphetamine Screen, Urine NOT DETECTED Negative <1000 ng/mL    Barbiturate Screen, Ur NOT DETECTED Negative < 200 ng/mL    Benzodiazepine Screen, Urine NOT DETECTED Negative < 200 ng/mL    Cannabinoid Scrn, Ur NOT DETECTED Negative < 50ng/mL    Cocaine Metabolite Screen, Urine NOT DETECTED Negative < 300 ng/mL    Opiate Scrn, Ur NOT DETECTED Negative < 300ng/mL    PCP Screen, Urine NOT DETECTED Negative < 25 ng/mL    Methadone Screen, Urine NOT DETECTED Negative <300 ng/mL    Oxycodone Urine NOT DETECTED Negative <100 ng/mL    FENTANYL SCREEN, URINE NOT DETECTED Negative <1 ng/mL    Drug Screen Comment: see below    Serum Drug Screen    Collection Time: 02/07/23  4:20 PM   Result Value Ref Range    Ethanol Lvl <10 mg/dL    Acetaminophen Level <5.0 (L) 10.0 - 39.1 mcg/mL    Salicylate, Serum <2.6 0.0 - 30.0 mg/dL    TCA Scrn NEGATIVE Cutoff:300 ng/mL   Magnesium    Collection Time: 02/07/23  4:20 PM   Result Value Ref Range    Magnesium 1.6 1.6 - 2.6 mg/dL     Oupatient labs:  Lab Results   Component Value Date    CHOL 151 02/01/2023    TRIG 487 (H) 02/05/2023    HDL 11 02/01/2023    LDLCALC - (AA) 02/01/2023    INR 1.1 08/29/2016    LABA1C >16.5 (H) 02/01/2023       Urinalysis:    Lab Results   Component Value Date/Time    NITRU Negative 02/07/2023 04:20 PM    WBCUA 0-1 02/07/2023 04:20 PM    BACTERIA FEW 02/07/2023 04:20 PM    RBCUA 0-1 02/07/2023 04:20 PM    BLOODU Negative 02/07/2023 04:20 PM    SPECGRAV 1.010 02/07/2023 04:20 PM    GLUCOSEU >=1000 02/07/2023 04:20 PM       Imaging:  CT HEAD WO CONTRAST    Result Date: 2/1/2023  EXAMINATION: CT OF THE HEAD WITHOUT CONTRAST  2/1/2023 7:54 am TECHNIQUE: CT of the head was performed without the administration of intravenous contrast. Automated exposure control, iterative reconstruction, and/or weight based adjustment of the mA/kV was utilized to reduce the radiation dose to as low as reasonably achievable. COMPARISON: None. HISTORY: ORDERING SYSTEM PROVIDED HISTORY: Altered Mental Status TECHNOLOGIST PROVIDED HISTORY: Reason for exam:->Altered Mental Status Has a \"code stroke\" or \"stroke alert\" been called? ->No Decision Support Exception - unselect if not a suspected or confirmed emergency medical condition->Emergency Medical Condition (MA) What reading provider will be dictating this exam?->CRC FINDINGS: BRAIN/VENTRICLES: There is no acute intracranial hemorrhage, mass effect or midline shift. No abnormal extra-axial fluid collection. The gray-white differentiation is maintained without evidence of an acute infarct. There is no evidence of hydrocephalus. There is encephalomalacia is seen within the posterior left temporal lobe and posterior left parietal lobe consistent with an old infarct. These findings are stable. ORBITS: The visualized portion of the orbits demonstrate no acute abnormality. SINUSES: The visualized paranasal sinuses and mastoid air cells demonstrate no acute abnormality.  SOFT TISSUES/SKULL:  No acute abnormality of the visualized skull or soft tissues. 1. There is no acute intracranial hemorrhage 2. Encephalomalacia is seen within the posterior left temporal lobe and posterior left parietal lobe consistent with old infarct. CT ABDOMEN PELVIS W IV CONTRAST Additional Contrast? None    Result Date: 2/7/2023  EXAMINATION: CT OF THE ABDOMEN AND PELVIS WITH CONTRAST 2/7/2023 6:42 pm TECHNIQUE: CT of the abdomen and pelvis was performed with the administration of intravenous contrast. Multiplanar reformatted images are provided for review. Automated exposure control, iterative reconstruction, and/or weight based adjustment of the mA/kV was utilized to reduce the radiation dose to as low as reasonably achievable. COMPARISON: None. HISTORY: ORDERING SYSTEM PROVIDED HISTORY: eval abd pain, left side TECHNOLOGIST PROVIDED HISTORY: Reason for exam:->eval abd pain, left side Additional Contrast?->None Decision Support Exception - unselect if not a suspected or confirmed emergency medical condition->Emergency Medical Condition (MA) What reading provider will be dictating this exam?->CRC FINDINGS: Lower Chest: Visualized lungs are normal. Organs: The liver, spleen, adrenal glands the, gallbladder are normal.  2 mm nonobstructing right renal calculi. No hydronephrosis. Normal left kidney. Mild peripancreatic fat stranding small amount of fluid adjacent to the pancreatic head. Correlate with abnormal amylase and lipase for pancreatitis. GI/Bowel:   Diffuse colonic fecal retention. Normal small bowel and appendix. Pelvis: Normal urinary bladder. Peritoneum/Retroperitoneum: No evidence for free air. Normal abdominal aorta and iliac arteries. Bones/Soft Tissues: Unremarkable. Mild pancreatic fat stranding and small amount of fluid adjacent to the pancreatic head. Correlate with abnormal amylase and lipase for pancreatitis. Diffuse colonic fecal retention 2 mm nonobstructing right renal calculi.      CT ABDOMEN PELVIS W IV CONTRAST Additional Contrast? None    Result Date: 2/1/2023  EXAMINATION: CT OF THE ABDOMEN AND PELVIS WITH CONTRAST 2/1/2023 7:54 am TECHNIQUE: CT of the abdomen and pelvis was performed with the administration of intravenous contrast. Multiplanar reformatted images are provided for review. Automated exposure control, iterative reconstruction, and/or weight based adjustment of the mA/kV was utilized to reduce the radiation dose to as low as reasonably achievable. COMPARISON: None. HISTORY: ORDERING SYSTEM PROVIDED HISTORY: vomiting, pain, r/o perforation TECHNOLOGIST PROVIDED HISTORY: Additional Contrast?->None Reason for exam:->vomiting, pain, r/o perforation Decision Support Exception - unselect if not a suspected or confirmed emergency medical condition->Emergency Medical Condition (MA) What reading provider will be dictating this exam?->CRC FINDINGS: For findings in the chest see report CT chest performed the same occasion. There is no free intraperitoneal air. There are no acute inflammatory changes in the omental/mesenteric fat planes of the abdomen and pelvis. There no stranding inflammatory reaction or fluid accumulation in the retroperitoneal spaces of the abdomen or pelvis. There is predominant fluid distension with some dilatation of the stomach. This has to be correlate clinically. Cannot exclude a component of pylorus spasm or a partial gastric outlet obstruction pattern. There is also some thickening of the mucosa of the distal esophagus at and just above the esophagogastric junction. No conspicuous hiatal hernia is seen. Fair amount of fecal contents throughout the colon represent a component of mild constipation. The appendix seen and has unremarkable appearance. There is no pericolonic inflammatory process This study is in a late portal venous IV contrast enhancement as it follows the CT chest.  The enhancement of the liver is uniform.   The liver appears to be with borderline size the left lobe extends into the left upper quadrant being position of the anterior to the stomach and spleen. No focal lesions are seen in the liver in this more late portal phase IV contrast. Gallbladder is normally distended, it appears unremarkable. The biliary tree and pancreatic duct systems are not dilated. There is partial fat replacement of the pancreas which has normal size. No focal lesions are seen in the pancreas. The spleen appear unremarkable. Adrenals not enlarged. Kidneys are preserved size and cortical thickness. There is normal pattern of cortical enhancement for the kidneys. Small focal scar like is seen in the posterior aspect of the right kidney. There is a 1 mm calculus in the upper pole of the right kidney which present causes obstruction. No calculus seen in the left kidney. There is no obstructive uropathy in the right or the left kidneys. There is no stranding perirenal fat planes. Discrete calcified plaques are seen in the distal abdominal aorta and iliac arteries. There is a discrete dilatation of the most distal abdominal aorta measuring up to 1.6 cm, the infrarenal abdominal aorta measures 1.3 cm, which indicates a small fusiform aneurysm like formation. The IVC appear unremarkable. There is no midline retroperitoneal adenopathy. The bladder is relative well distended and of unremarkable appearance. Prostate gland and seminal vesicles appear unremarkable. Visualized bone structures demonstrate mild degenerative changes in the thoracic and lumbar spine. Loss of height of lower thoracic vertebral bodies can be manifestation of previous Scheuermann's disease. 1.  Presence of predominant fluid distension with some dilatation of the stomach as above discussed. There is also some thickening of the wall of the distal esophagus across the esophagogastric junction. Findings to be correlate clinically. See above comments.  2.  No free intraperitoneal air or acute inflammatory process in the omental/mesentery fat planes no free intra peritoneal fluid accumulation. 3.  Tiny calculus in the upper pole of the right kidney present causes no obstruction. No obstructive uropathy in the right or in the left kidneys. 4.  Pattern of mild to moderate constipation. XR CHEST PORTABLE    Result Date: 2/1/2023  EXAMINATION: ONE XRAY VIEW OF THE CHEST 2/1/2023 7:27 am COMPARISON: None. HISTORY: ORDERING SYSTEM PROVIDED HISTORY: Shortness of breath TECHNOLOGIST PROVIDED HISTORY: Reason for exam:->Shortness of breath What reading provider will be dictating this exam?->CRC FINDINGS: The lungs are without acute focal process. There is no effusion or pneumothorax. The cardiomediastinal silhouette is without acute process. The osseous structures are without acute process. No acute process. CTA CHEST W CONTRAST    Result Date: 2/1/2023  EXAMINATION: CTA OF THE CHEST 2/1/2023 7:54 am TECHNIQUE: CTA of the chest was performed after the administration of intravenous contrast.  Multiplanar reformatted images are provided for review. MIP images are provided for review. Automated exposure control, iterative reconstruction, and/or weight based adjustment of the mA/kV was utilized to reduce the radiation dose to as low as reasonably achievable. COMPARISON: None. HISTORY: ORDERING SYSTEM PROVIDED HISTORY: chest pain, back pain, r/o dissection TECHNOLOGIST PROVIDED HISTORY: Reason for exam:->chest pain, back pain, r/o dissection Decision Support Exception - unselect if not a suspected or confirmed emergency medical condition->Emergency Medical Condition (MA) What reading provider will be dictating this exam?->CRC FINDINGS: Pulmonary Arteries: Pulmonary arteries are adequately opacified for evaluation. No evidence of intraluminal filling defect to suggest pulmonary embolism. Main pulmonary artery is normal in caliber. Mediastinum: No evidence of mediastinal lymphadenopathy. The heart and pericardium demonstrate no acute abnormality.   There is no acute abnormality of the thoracic aorta. Specifically, there is no dissection of the thoracic aorta. Lungs/pleura: The lungs are without acute process. No focal consolidation or pulmonary edema. No evidence of pleural effusion or pneumothorax. Upper Abdomen: Limited images of the upper abdomen are unremarkable. Soft Tissues/Bones: No acute bone or soft tissue abnormality. 1. There is no pulmonary embolus 2. There is no thoracic aortic aneurysm or dissection. 3. The lungs are clear. There is no focal infiltrate. 4. No gross upper abdominal pathology is noted. Please note the pancreas is not included on the examination. CT ABDOMEN PELVIS W IV CONTRAST Additional Contrast? None   Final Result   Mild pancreatic fat stranding and small amount of fluid adjacent to the   pancreatic head. Correlate with abnormal amylase and lipase for pancreatitis. Diffuse colonic fecal retention      2 mm nonobstructing right renal calculi. US GALLBLADDER RUQ    (Results Pending)         ASSESSMENT AND PLAN  Active Problems:    * No active hospital problems. *  Resolved Problems:    * No resolved hospital problems.  *    - Acute pancreatitis   - Hypokalemia   - T2DM  - hx/o seizure  - hx/o etoh abuse, last drink 7 yr ago   - HCV  - hx/o CVA  - HTN  - Anxiety/depression       Plan:  - admit to med surg with tele monitoring   - pain control   - Consult GI  - advance diet as tolerated   - replace K, recheck level  - accuchecks and ssi  - alcohol withdrawal precautions   - resume home regimen       VTE Prophylaxis: low molecular weight heparin -    DVT Prophylaxis: []Lovenox []Heparin []PCD [] [address] []Encouraged ambulation    Diet: No diet orders on file  Code Status: Prior  Surrogate decision maker confirmed with patient:  Primary Emergency Contact: Peemitchell Lzao, Home Phone: [phone]      Disposition: [x]Med/Surg [] Intermediate [] ICU/CCU   Admit status: [] Observation [x] Inpatient       Additional work up or/and treatment plan may be added today or thereafter based on clinical progression. I am managing a portion of pt care. Some medical issues are handled by other specialists. Additional work up and treatment should be done by my colleague hospitalist and at out pt setting by pt PCP and other out pt providers.      Angie Miller MD  DATE: February 7, 2023

## 2023-02-09 VITALS
BODY MASS INDEX: 27.98 KG/M2 | OXYGEN SATURATION: 96 % | WEIGHT: 241.8 LBS | RESPIRATION RATE: 18 BRPM | HEIGHT: 78 IN | TEMPERATURE: 97.7 F | HEART RATE: 95 BPM | DIASTOLIC BLOOD PRESSURE: 84 MMHG | SYSTOLIC BLOOD PRESSURE: 122 MMHG

## 2023-02-09 LAB
HEPATITIS C GENOTYPE: NORMAL
METER GLUCOSE: 169 MG/DL (ref 74–99)
METER GLUCOSE: 193 MG/DL (ref 74–99)

## 2023-02-09 PROCEDURE — 6360000002 HC RX W HCPCS: Performed by: INTERNAL MEDICINE

## 2023-02-09 PROCEDURE — G0378 HOSPITAL OBSERVATION PER HR: HCPCS

## 2023-02-09 PROCEDURE — 6370000000 HC RX 637 (ALT 250 FOR IP): Performed by: INTERNAL MEDICINE

## 2023-02-09 PROCEDURE — S5553 INSULIN LONG ACTING 5 U: HCPCS | Performed by: INTERNAL MEDICINE

## 2023-02-09 PROCEDURE — 96372 THER/PROPH/DIAG INJ SC/IM: CPT

## 2023-02-09 PROCEDURE — 82962 GLUCOSE BLOOD TEST: CPT

## 2023-02-09 RX ADMIN — ATORVASTATIN CALCIUM 40 MG: 40 TABLET, FILM COATED ORAL at 07:52

## 2023-02-09 RX ADMIN — ASPIRIN 81 MG CHEWABLE TABLET 81 MG: 81 TABLET CHEWABLE at 07:51

## 2023-02-09 RX ADMIN — INSULIN LISPRO 10 UNITS: 100 INJECTION, SOLUTION INTRAVENOUS; SUBCUTANEOUS at 07:58

## 2023-02-09 RX ADMIN — INSULIN LISPRO 10 UNITS: 100 INJECTION, SOLUTION INTRAVENOUS; SUBCUTANEOUS at 11:03

## 2023-02-09 RX ADMIN — FENOFIBRATE 160 MG: 160 TABLET ORAL at 07:52

## 2023-02-09 RX ADMIN — INSULIN GLARGINE-YFGN 40 UNITS: 100 INJECTION, SOLUTION SUBCUTANEOUS at 06:31

## 2023-02-09 RX ADMIN — LOSARTAN POTASSIUM 25 MG: 25 TABLET, FILM COATED ORAL at 07:52

## 2023-02-09 RX ADMIN — CARBAMAZEPINE 100 MG: 100 TABLET, EXTENDED RELEASE ORAL at 07:52

## 2023-02-09 RX ADMIN — POLYETHYLENE GLYCOL 3350 17 G: 17 POWDER, FOR SOLUTION ORAL at 07:52

## 2023-02-09 RX ADMIN — BUPRENORPHINE HYDROCHLORIDE AND NALOXONE HYDROCHLORIDE DIHYDRATE 2 TABLET: 2; .5 TABLET SUBLINGUAL at 07:52

## 2023-02-09 RX ADMIN — Medication 100 MG: at 07:52

## 2023-02-09 RX ADMIN — ENOXAPARIN SODIUM 30 MG: 100 INJECTION SUBCUTANEOUS at 07:52

## 2023-02-09 ASSESSMENT — PAIN SCALES - GENERAL: PAINLEVEL_OUTOF10: 0

## 2023-02-09 NOTE — PROGRESS NOTES
Hospitalist Progress Note      Synopsis: Patient admitted on 2/7/2023    42y.o. year old male  who  has a past medical history of Anxiety, Depression, Seizures ([address] and Unspecified cerebral artery occlusion with cerebral infarction. presents with abdominal pain, upper cramping, was just discharged from the hospital with DKA. But had since worsening abdominal pain. Denies etoh or drug abuse. u/s abdomen without cholelithiasis. No chest pain no SOB or cough no fever or chills. Subjective    Feeling better abdominal pain improved denies alcohol abuse denies drug abuse admits to dietary indiscretion  No CP or SOB  No fever or chills   No uncontrolled pain  No vomiting or diarrhea   No events reported overnight     Exam:  /84   Pulse 95   Temp 97.7 °F (36.5 °C) (Temporal)   Resp 18   Ht 6' 7\" (2.007 m)   Wt 241 lb 12.8 oz (109.7 kg)   SpO2 96%   BMI 27.24 kg/m²   General appearance: No apparent distress, appears stated age and cooperative. HEENT: Pupils equal, round, and reactive to light. Conjunctivae/corneas clear. Neck: Supple. No jugular venous distention. Trachea midline. Respiratory:  Normal respiratory effort. Clear to auscultation, bilaterally without Rales/Wheezes/Rhonchi. Cardiovascular: Regular rate and rhythm with normal S1/S2 without murmurs, rubs or gallops. Abdomen: Soft, non-tender, non-distended with normal bowel sounds. Musculoskeletal: No clubbing, cyanosis or edema bilaterally. Brisk capillary refill. 2+radial pulses.    Skin:  No rashes    Neurologic: awake, alert and following commands    Medications:  Reviewed    Infusion Medications    dextrose      sodium chloride      lactated ringers IV soln 125 mL/hr at 02/08/23 1933     Scheduled Medications    aspirin  81 mg Oral Daily    atorvastatin  40 mg Oral Daily    carBAMazepine  100 mg Oral QAM    fenofibrate  160 mg Oral Daily    losartan  25 mg Oral Daily    sodium chloride flush  10 mL IntraVENous 2 times per day enoxaparin  30 mg SubCUTAneous BID    sodium chloride flush  5-40 mL IntraVENous 2 times per day    thiamine  100 mg Oral Daily    insulin glargine  40 Units SubCUTAneous QAM AC    insulin lispro  10 Units SubCUTAneous TID WC    insulin lispro  0-8 Units SubCUTAneous TID WC    insulin lispro  0-4 Units SubCUTAneous Nightly    polyethylene glycol  17 g Oral BID    buprenorphine-naloxone  2 tablet SubLINGual BID     PRN Meds: hydrOXYzine pamoate, glucose, dextrose bolus **OR** dextrose bolus, glucagon (rDNA), dextrose, sodium chloride flush, sodium chloride, ondansetron **OR** ondansetron, magnesium hydroxide, acetaminophen **OR** acetaminophen, sodium chloride flush, LORazepam **OR** LORazepam **OR** LORazepam **OR** LORazepam **OR** LORazepam **OR** LORazepam **OR** LORazepam **OR** LORazepam    I/O    Intake/Output Summary (Last 24 hours) at 2/9/2023 1054  Last data filed at 2/9/2023 0819  Gross per 24 hour   Intake 5105 ml   Output --   Net 5105 ml         Labs:   Recent Labs     02/07/23  1620 02/08/23  0613   WBC 7.5 6.9   HGB 13.1 12.8   HCT 37.8 38.2    191         Recent Labs     02/07/23  1620 02/08/23  0613    135   K 3.4* 4.1   CL 96* 98   CO2 26 26   BUN 10 9   CREATININE 0.7 0.7   CALCIUM 9.5 8.7         Recent Labs     02/07/23  1620 02/08/23  0613   PROT 7.4 6.5   ALKPHOS 120 93   ALT 29 24   AST 24 28   BILITOT 0.4 0.3   LIPASE 464*  --          No results for input(s): INR in the last 72 hours. No results for input(s): Pavel Bates in the last 72 hours. Chronic labs:  Lab Results   Component Value Date    CHOL 151 02/01/2023    TRIG 487 (H) 02/05/2023    HDL 11 02/01/2023    LDLCALC - (AA) 02/01/2023    INR 1.1 08/29/2016    LABA1C >16.5 (H) 02/01/2023       Radiology:  Imaging studies reviewed today.     ASSESSMENT:    Principal Problem:    Acute pancreatitis  Active Problems:    Poorly controlled diabetes mellitus ([facility]    Opioid dependence on maintenance agonist therapy, no symptoms ([address]    Fecal retention    Dietary noncompliance  Resolved Problems:    * No resolved hospital problems. *       PLAN:    Admit  NPO advance diet  IVF resuscitation  Pain control  ETOH withdrawal precautions patient states he is not using alcohol  Monitor labs closely   GI consult canceled  Continue Suboxone therapy at decreased dose  DM UNCONTROLLED WITH RECENT DKA ADMISSION MBS, SSI, A1c Lantus, prandial lispro, sliding scale  Medications for other co morbidities cont as appropriate w dosage adjustments as necessary      Diet: ADULT DIET; Regular; 3 carb choices (45 gm/meal)  Code Status: Full Code  PT/OT Eval Status:     DVT Prophylaxis:     Recommended disposition at discharge: Likely can discharge home tomorrow if tolerating diet and blood sugars are reasonably controlled    +++++++++++++++++++++++++++++++++++++++++++++++++  Alhaji Pritchett MD   [facility].  +++++++++++++++++++++++++++++++++++++++++++++++++  NOTE: This report was transcribed using voice recognition software. Every effort was made to ensure accuracy; however, inadvertent computerized transcription errors may be present.

## 2023-02-09 NOTE — PLAN OF CARE
Problem: Chronic Conditions and Co-morbidities  Goal: Patient's chronic conditions and co-morbidity symptoms are monitored and maintained or improved  2/9/2023 0502 by Osei Clinton RN  Outcome: Progressing

## 2023-02-09 NOTE — PROGRESS NOTES
Hospitalist Progress Note      Synopsis: Patient admitted on 2/7/2023    42y.o. year old male  who  has a past medical history of Anxiety, Depression, Seizures ([address] and Unspecified cerebral artery occlusion with cerebral infarction. presents with abdominal pain, upper cramping, was just discharged from the hospital with DKA. But had since worsening abdominal pain. Denies etoh or drug abuse. u/s abdomen without cholelithiasis. No chest pain no SOB or cough no fever or chills. Subjective    Feeling better abdominal pain improved denies alcohol abuse denies drug abuse admits to dietary indiscretion  No CP or SOB  No fever or chills   No uncontrolled pain  No vomiting or diarrhea   No events reported overnight     Exam:  BP (!) 126/96   Pulse 99   Temp 98.4 °F (36.9 °C)   Resp 18   Ht 6' 7\" (2.007 m)   Wt 250 lb (113.4 kg)   SpO2 96%   BMI 28.16 kg/m²   General appearance: No apparent distress, appears stated age and cooperative. HEENT: Pupils equal, round, and reactive to light. Conjunctivae/corneas clear. Neck: Supple. No jugular venous distention. Trachea midline. Respiratory:  Normal respiratory effort. Clear to auscultation, bilaterally without Rales/Wheezes/Rhonchi. Cardiovascular: Regular rate and rhythm with normal S1/S2 without murmurs, rubs or gallops. Abdomen: Soft, non-tender, non-distended with normal bowel sounds. Musculoskeletal: No clubbing, cyanosis or edema bilaterally. Brisk capillary refill. 2+radial pulses.    Skin:  No rashes    Neurologic: awake, alert and following commands    Medications:  Reviewed    Infusion Medications    dextrose      sodium chloride      lactated ringers IV soln 125 mL/hr at 02/08/23 0323     Scheduled Medications    aspirin  81 mg Oral Daily    atorvastatin  40 mg Oral Daily    carBAMazepine  100 mg Oral QAM    fenofibrate  160 mg Oral Daily    losartan  25 mg Oral Daily    sodium chloride flush  10 mL IntraVENous 2 times per day    enoxaparin  30 mg SubCUTAneous BID    sodium chloride flush  5-40 mL IntraVENous 2 times per day    thiamine  100 mg Oral Daily    insulin glargine  40 Units SubCUTAneous QAM AC    insulin lispro  10 Units SubCUTAneous TID WC    insulin lispro  0-8 Units SubCUTAneous TID WC    insulin lispro  0-4 Units SubCUTAneous Nightly    polyethylene glycol  17 g Oral BID    buprenorphine-naloxone  2 tablet SubLINGual BID     PRN Meds: hydrOXYzine pamoate, glucose, dextrose bolus **OR** dextrose bolus, glucagon (rDNA), dextrose, sodium chloride flush, sodium chloride, ondansetron **OR** ondansetron, magnesium hydroxide, acetaminophen **OR** acetaminophen, sodium chloride flush, LORazepam **OR** LORazepam **OR** LORazepam **OR** LORazepam **OR** LORazepam **OR** LORazepam **OR** LORazepam **OR** LORazepam    I/O    Intake/Output Summary (Last 24 hours) at 2/8/2023 2012  Last data filed at 2/8/2023 9293  Gross per 24 hour   Intake 240 ml   Output --   Net 240 ml       Labs:   Recent Labs     02/07/23  1620 02/08/23  0613   WBC 7.5 6.9   HGB 13.1 12.8   HCT 37.8 38.2    191       Recent Labs     02/07/23  1620 02/08/23  0613    135   K 3.4* 4.1   CL 96* 98   CO2 26 26   BUN 10 9   CREATININE 0.7 0.7   CALCIUM 9.5 8.7       Recent Labs     02/07/23  1620 02/08/23  0613   PROT 7.4 6.5   ALKPHOS 120 93   ALT 29 24   AST 24 28   BILITOT 0.4 0.3   LIPASE 464*  --        No results for input(s): INR in the last 72 hours. No results for input(s): Imagene Plank in the last 72 hours. Chronic labs:  Lab Results   Component Value Date    CHOL 151 02/01/2023    TRIG 487 (H) 02/05/2023    HDL 11 02/01/2023    LDLCALC - (AA) 02/01/2023    INR 1.1 08/29/2016    LABA1C >16.5 (H) 02/01/2023       Radiology:  Imaging studies reviewed today.     ASSESSMENT:    Principal Problem:    Acute pancreatitis  Active Problems:    Poorly controlled diabetes mellitus ([address]    Opioid dependence on maintenance agonist therapy, no symptoms ([facility])    Fecal retention    Dietary noncompliance  Resolved Problems:    * No resolved hospital problems. *       PLAN:    Admit  NPO advance diet  IVF resuscitation  Pain control  ETOH withdrawal precautions patient states he is not using alcohol  Monitor labs closely   GI consult canceled  Continue Suboxone therapy at decreased dose  DM UNCONTROLLED WITH RECENT DKA ADMISSION MBS, SSI, A1c Lantus, prandial lispro, sliding scale  Medications for other co morbidities cont as appropriate w dosage adjustments as necessary      Diet: ADULT DIET; Full Liquid; 3 carb choices (45 gm/meal)  Code Status: Full Code  PT/OT Eval Status:     DVT Prophylaxis:     Recommended disposition at discharge: Likely can discharge home tomorrow if tolerating diet and blood sugars are reasonably controlled    +++++++++++++++++++++++++++++++++++++++++++++++++  Kristel Blake MD   [facility].  +++++++++++++++++++++++++++++++++++++++++++++++++  NOTE: This report was transcribed using voice recognition software. Every effort was made to ensure accuracy; however, inadvertent computerized transcription errors may be present.

## 2023-02-09 NOTE — DISCHARGE SUMMARY
Physician Discharge Summary     Patient ID:  Bo Duong  [id number]  63 y.o.  1985    Admit date: 2/7/2023    Discharge date and time:  2/9/2023    Discharge Diagnoses: Principal Problem:    Acute pancreatitis  Active Problems:    Poorly controlled diabetes mellitus ([facility] [address]    Opioid dependence on maintenance agonist therapy, no symptoms ([facility] [address]    Fecal retention    Dietary noncompliance  Resolved Problems:    * No resolved hospital problems. *      Consults: IP CONSULT TO INTERNAL MEDICINE  IP CONSULT TO SOCIAL WORK    Procedures: See below    Hospital Course: 42y.o. year old male  who  has a past medical history of Anxiety, Depression, Seizures ([facility] [address] and Unspecified cerebral artery occlusion with cerebral infarction. presents with abdominal pain, upper cramping, was just discharged from the hospital with DKA. But had since worsening abdominal pain. Denies etoh or drug abuse. u/s abdomen without cholelithiasis. No chest pain no SOB or cough no fever or chills. Admitted  NPO advance diet as tolerating regular carb control diet  IVF resuscitation  Pain control  ETOH withdrawal precautions patient states he is not using alcohol  Monitor labs closely   GI consult canceled--follow-up as outpatient  Continue Suboxone therapy at decreased dose  DM UNCONTROLLED WITH RECENT DKA ADMISSION MBS, SSI, A1c greater 16.5 indicating very poorly controlled diabetes Lantus, prandial lispro, sliding scale sugars improved counseled on importance of maintaining appropriate diabetic diet.   Outpatient diabetic education ordered  Medications for other co morbidities cont as appropriate w dosage adjustments as necessary          Discharge Exam:  See progress note from today    Condition:  Stable    Disposition: home    Patient Instructions:   Current Discharge Medication List        CONTINUE these medications which have NOT CHANGED    Details   insulin glargine (LANTUS SOLOSTAR) 100 UNIT/ML injection pen Inject 48 Units into the skin every morning  Qty: 15 Adjustable Dose Pre-filled Pen Syringe, Refills: 5      Insulin Pen Needle (BD PEN NEEDLE MICRO U/F) 32G X 6 MM MISC Uses with insulin 4 times a day  Qty: 250 each, Refills: 5      insulin lispro, 1 Unit Dial, (HUMALOG KWIKPEN) 100 UNIT/ML SOPN Inject 15 units with meals + following sliding scale. -200 add 3U, -250 add 6U, -300 add 9U, -350 add 12U, -400 add 15U, BS over 400 add 18U. MAX 75u/day  Qty: 15 Adjustable Dose Pre-filled Pen Syringe, Refills: 4      atorvastatin (LIPITOR) 40 MG tablet Take 1 tablet by mouth daily  Qty: 30 tablet, Refills: 5      fenofibrate (TRIGLIDE) 160 MG tablet Take 1 tablet by mouth daily  Qty: 30 tablet, Refills: 5      niacin (SLO-NIACIN) 500 MG extended release tablet Take 1 tablet by mouth nightly  Qty: 30 tablet, Refills: 5      omega-3 acid ethyl esters (LOVAZA) 1 g capsule Take 2 capsules by mouth 2 times daily  Qty: 120 capsule, Refills: 5      aspirin 81 MG chewable tablet Take 1 tablet by mouth daily  Qty: 30 tablet, Refills: 3      losartan (COZAAR) 25 MG tablet Take 1 tablet by mouth daily  Qty: 30 tablet, Refills: 3      hydrOXYzine pamoate (VISTARIL) 25 MG capsule Take 1 capsule by mouth 3 times daily as needed for Anxiety  Qty: 30 capsule, Refills: 1      glucose monitoring (FREESTYLE FREEDOM) kit 1 kit by Does not apply route once for 1 dose  Qty: 1 kit, Refills: 1      Lancets 30G MISC 4 boxes by Does not apply route daily  Qty: 100 each, Refills: 1    Comments: Can substitute with insurance compatible options      blood glucose monitor strips Test 4 times a day & as needed for symptoms of irregular blood glucose. Dispense sufficient amount for indicated testing frequency plus additional to accommodate PRN testing needs. Qty: 400 strip, Refills: 1    Comments: Brand per patient preference. May round up to next available package size.       Alcohol Swabs 70 % PADS 4 boxes by Does not apply route 4 times daily  Qty: 1 each, Refills: 1      vitamin D (CHOLECALCIFEROL) 50 MCG (2000 UT) TABS tablet Take 1 tablet by mouth daily  Qty: 90 tablet, Refills: 1      carBAMazepine (TEGRETOL XR) 100 MG extended release tablet Take 100 mg by mouth every morning      buprenorphine-naloxone (SUBOXONE) 8-2 MG FILM SL film Place 1 Film under the tongue in the morning and at bedtime           Activity: activity as tolerated  Diet: diabetic diet    Follow-up with 1 week PCP    Note that over 30 minutes was spent in preparing discharge papers, discussing discharge with patient, staff, consultants, medication review, arranging follow up, etc.    Signed:  Marce Olvera MD  2/9/2023  10:58 AM

## 2023-02-09 NOTE — CARE COORDINATION
2/9, SW met with patient at bedside to discuss discharge plan which will be home when medically cleared for discharge. Patient lives in a duplex with [place] significant other and 6year old son and has no steps to enter the home. DME owned is none and patient still drives. [facility]acy [facility]. PCP is Promise Chand. Patient says he will follow up on PCP appointment since he had an appointment this week but missed it due to being in hospital.  Patient is on room air. No needs identified at this time. Family/friends for transportation. Did readmission and assessment. SW to follow. Case Management Assessment  Initial Evaluation    Date/Time of Evaluation: 2/9/2023 10:44 AM  Assessment Completed by: DOLORES Seymour    If patient is discharged prior to next notation, then this note serves as note for discharge by case management. Patient Name: Ervin Brumfield                   YOB: 1985  Diagnosis: ETOH abuse [F10.10]  Acute pancreatitis, unspecified complication status, unspecified pancreatitis type [K85.90]  Severe acute pancreatitis [K85.90]                   Date / Time: 2/7/2023  3:24 PM    Patient Admission Status: Inpatient   Readmission Risk (Low < 19, Mod (19-27), High > 27): Readmission Risk Score: 14.9    Current PCP: Promise Chand  PCP verified by CM? Yes    Chart Reviewed: Yes      History Provided by: Patient  Patient Orientation: Alert and Oriented    Patient Cognition: Alert    Hospitalization in the last 30 days (Readmission):  Yes    If yes, Readmission Assessment in CM Navigator will be completed.     Advance Directives:      Code Status: Full Code   Patient's Primary Decision Maker is:        Discharge Planning:    Patient lives with: Spouse/Significant Other, Children Type of Home: Apartment  Primary Care Giver: Self  Patient Support Systems include: Spouse/Significant Other, Family Members   Current Financial resources:    Current community resources:    Current services prior to admission: None            Current DME:              Type of Home Care services:  None    ADLS  Prior functional level: Independent in ADLs/IADLs  Current functional level: Independent in ADLs/IADLs    PT AM-PAC:   /24  OT AM-PAC:   /24    Family can provide assistance at DC: Yes  Would you like Case Management to discuss the discharge plan with any other family members/significant others, and if so, who? No  Plans to Return to Present Housing: Yes  Other Identified Issues/Barriers to RETURNING to current housing: yes  Potential Assistance needed at discharge: N/A            Potential DME:    Patient expects to discharge to: [address] [place] [address] for transportation at discharge:      Financial    Payor: Dorothy Gray / Plan: Dorothy Gray / Product Type: *No Product type* /     Does insurance require precert for SNF: Yes    Potential assistance Purchasing Medications: No  Meds-to-Beds request: Yes      [id number] [address] [address]  Phone: [phone] Fax: [phone]      Notes:    Factors facilitating achievement of predicted outcomes: Family support    Barriers to discharge: N/A    Additional Case Management Notes: see above    The Plan for Transition of Care is related to the following treatment goals of ETOH abuse [F10.10]  Acute pancreatitis, unspecified complication status, unspecified pancreatitis type [K85.90]  Severe acute pancreatitis [L62.45]    IF APPLICABLE: The Patient and/or patient representative Aniyah Gaston and his family were provided with a choice of provider and agrees with the discharge plan.  Freedom of choice list with basic dialogue that supports the patient's individualized plan of care/goals and shares the quality data associated with the providers was provided to:     Patient Representative Name:       The Patient and/or Patient Representative Agree with the Discharge Plan?       Gurvinder Chung, [facility]  Case Management Department  Ph: [phone] Fax: n/a

## 2023-02-15 ENCOUNTER — OFFICE VISIT (OUTPATIENT)
Dept: ENDOCRINOLOGY | Age: 38
End: 2023-02-15

## 2023-02-15 VITALS
SYSTOLIC BLOOD PRESSURE: 127 MMHG | HEIGHT: 78 IN | WEIGHT: 238 LBS | DIASTOLIC BLOOD PRESSURE: 77 MMHG | RESPIRATION RATE: 18 BRPM | HEART RATE: 110 BPM | BODY MASS INDEX: 27.54 KG/M2 | OXYGEN SATURATION: 99 %

## 2023-02-15 DIAGNOSIS — R73.9 HYPERGLYCEMIA: ICD-10-CM

## 2023-02-15 DIAGNOSIS — E55.9 VITAMIN D DEFICIENCY: ICD-10-CM

## 2023-02-15 DIAGNOSIS — Z91.119 DIETARY NONCOMPLIANCE: Primary | ICD-10-CM

## 2023-02-15 DIAGNOSIS — E11.65 POORLY CONTROLLED DIABETES MELLITUS (HCC): ICD-10-CM

## 2023-02-15 DIAGNOSIS — E78.1 HYPERTRIGLYCERIDEMIA: ICD-10-CM

## 2023-02-15 NOTE — PROGRESS NOTES
[address] [place] [address] Department of Endocrinology Diabetes and Metabolism   [address] [facility] [address]   Phone: [phone]  Fax: [phone]    Date of Service: 2/15/2023  Primary Care Physician: Tess Mo  Provider: Chalnio Girard MD     Reason for the visit:  Poorly controlled MD     History of Present Illness: The history is provided by the patient. No  was used. Accuracy of the patient data is excellent.   Spencer Dong is a very pleasant 42 y.o. male seen today for diabetes management     Pt was admitted to [facility] on 8/1/2020 because of abdominal pain, n/v and blurry vision for 1 week and found to be in DKA (, AG 19, beta-hydroxybutyrate 2.53)   Component 8/1/2020   C-Peptide 2.8     The patient forgot his meter today but reported BG running high    Pt was recently admitted with DKA    Had Medtronic pump at home but doesn't like tubing system     Sugar so uncontrolled, A1c >16.5%     Lab Results   Component Value Date/Time    LABA1C >16.5 02/01/2023 03:58 PM    LABA1C 13.1 08/02/2020 04:58 AM     Patient has had no hypoglycemic episodes   The patient has been mindful of what has been eating and following diabetic diet as encouraged  I reviewed current medications and the patient has no issues with diabetes medications  The patient is due for an eye exam  The patient performs his own feet care  Microvascular complications:  No Retinopathy, Nephropathy or Neuropathy   Macrovascular complications: no CAD, PVD, or Stroke    PAST MEDICAL HISTORY   Past Medical History:   Diagnosis Date    Anxiety     Depression     Seizures ([address]     Unspecified cerebral artery occlusion with cerebral infarction        PAST SURGICAL HISTORY   Past Surgical History:   Procedure Laterality Date    ABSCESS DRAINAGE Left 08/22/2016    Left Hand    BONE INCISION AND DRAINAGE Left 08/30/2016    HAND W/ WOUND VA C       SOCIAL HISTORY   Tobacco:   reports that he has been smoking cigarettes. He has a 7.60 pack-year smoking history. He has never used smokeless tobacco.  Alcohol:   reports no history of alcohol use. Drugs:   reports current drug use. Drug: Other-see comments. FAMILY HISTORY   Family History   Problem Relation Age of Onset    Kidney Disease Mother     Heart Disease Father        ALLERGIES AND DRUG REACTIONS   No Known Allergies    CURRENT MEDICATIONS   Current Outpatient Medications   Medication Sig Dispense Refill    insulin glargine (LANTUS SOLOSTAR) 100 UNIT/ML injection pen Inject 48 Units into the skin every morning 15 Adjustable Dose Pre-filled Pen Syringe 5    Insulin Pen Needle (BD PEN NEEDLE MICRO U/F) 32G X 6 MM MISC Uses with insulin 4 times a day 250 each 5    insulin lispro, 1 Unit Dial, (HUMALOG KWIKPEN) 100 UNIT/ML SOPN Inject 15 units with meals + following sliding scale. -200 add 3U, -250 add 6U, -300 add 9U, -350 add 12U, -400 add 15U, BS over 400 add 18U. MAX 75u/day 15 Adjustable Dose Pre-filled Pen Syringe 4    atorvastatin (LIPITOR) 40 MG tablet Take 1 tablet by mouth daily 30 tablet 5    fenofibrate (TRIGLIDE) 160 MG tablet Take 1 tablet by mouth daily 30 tablet 5    niacin (SLO-NIACIN) 500 MG extended release tablet Take 1 tablet by mouth nightly 30 tablet 5    omega-3 acid ethyl esters (LOVAZA) 1 g capsule Take 2 capsules by mouth 2 times daily 120 capsule 5    aspirin 81 MG chewable tablet Take 1 tablet by mouth daily 30 tablet 3    losartan (COZAAR) 25 MG tablet Take 1 tablet by mouth daily 30 tablet 3    hydrOXYzine pamoate (VISTARIL) 25 MG capsule Take 1 capsule by mouth 3 times daily as needed for Anxiety 30 capsule 1    Lancets 30G MISC 4 boxes by Does not apply route daily 100 each 1    blood glucose monitor strips Test 4 times a day & as needed for symptoms of irregular blood glucose. Dispense sufficient amount for indicated testing frequency plus additional to accommodate PRN testing needs.  400 strip 1 Alcohol Swabs 70 % PADS 4 boxes by Does not apply route 4 times daily 1 each 1    vitamin D (CHOLECALCIFEROL) 50 MCG (2000 UT) TABS tablet Take 1 tablet by mouth daily 90 tablet 1    carBAMazepine (TEGRETOL XR) 100 MG extended release tablet Take 100 mg by mouth every morning      buprenorphine-naloxone (SUBOXONE) 8-2 MG FILM SL film Place 1 Film under the tongue in the morning and at bedtime      glucose monitoring (FREESTYLE FREEDOM) kit 1 kit by Does not apply route once for 1 dose 1 kit 1     No current facility-administered medications for this visit. Review of Systems  Constitutional: No fever, no chills, no diaphoresis, no generalized weakness. HEENT: No blurred vision, No sore throat, no ear pain, no hair loss  Neck: denied any neck swelling, difficulty swallowing,   Cardio-pulmonary: No CP, SOB or palpitation, No orthopnea or PND. No cough or wheezing. GI: No N/V/D, no constipation, No abdominal pain, no melena or hematochezia   : Denied any dysuria, hematuria, flank pain, discharge, or incontinence. Skin: denied any rash, ulcer, Hirsute, or hyperpigmentation. MSK: denied any joint deformity, joint pain/swelling, muscle pain, or back pain. Neuro: no numbness, no tingling, no weakness, _    OBJECTIVE    /77   Pulse (!) 110   Resp 18   Ht 6' 7\" (2.007 m)   Wt 238 lb (108 kg)   SpO2 99%   BMI 26.81 kg/m²   BP Readings from Last 4 Encounters:   02/15/23 127/77   02/09/23 122/84   02/05/23 (!) 148/66   01/18/23 136/83     Wt Readings from Last 6 Encounters:   02/15/23 238 lb (108 kg)   02/09/23 241 lb 12.8 oz (109.7 kg)   02/03/23 234 lb (106.1 kg)   07/22/22 280 lb (127 kg)   07/11/21 (!) 313 lb (142 kg)   03/30/21 (!) 315 lb (142.9 kg)       Physical examination:  General: awake alert, oriented x3, no abnormal position or movements. HEENT: normocephalic non-traumatic, no exophthalmos   Neck: supple, no LN enlargement, no thyromegaly, no thyroid tenderness, no JVD.   Pulm: Clear equal air entry no added sounds, no wheezing or rhonchi    CVS: S1 + S2, no murmur, no heave. Dorsalis pedis pulse palpable   Abd: soft lax, no tenderness, no organomegaly, audible bowel sounds. Skin: warm, no lesions, no rash.  No callus, no Ulcers, No acanthosis nigricans  Musculoskeletal: No back tenderness, no kyphosis/scoliosis    Neuro: CN intact, Monofilament sensation decreased bilateral , muscle power normal  Psych: normal mood, and affect      Review of Laboratory Data:  I personally reviewed the following lab:  Lab Results   Component Value Date/Time    WBC 6.9 02/08/2023 06:13 AM    RBC 4.66 02/08/2023 06:13 AM    HGB 12.8 02/08/2023 06:13 AM    HCT 38.2 02/08/2023 06:13 AM    MCV 82.0 02/08/2023 06:13 AM    MCH 27.5 02/08/2023 06:13 AM    MCHC 33.5 02/08/2023 06:13 AM    RDW 14.3 02/08/2023 06:13 AM     02/08/2023 06:13 AM    MPV 9.8 02/08/2023 06:13 AM      Lab Results   Component Value Date/Time     02/08/2023 06:13 AM    K 4.1 02/08/2023 06:13 AM    CO2 26 02/08/2023 06:13 AM    BUN 9 02/08/2023 06:13 AM    CREATININE 0.7 02/08/2023 06:13 AM    CALCIUM 8.7 02/08/2023 06:13 AM    LABGLOM >60 02/08/2023 06:13 AM    GFRAA >60 07/22/2022 11:07 PM      No results found for: TSH, T4FREE, X8LRHMA, FT3, S4EMMKJ, TSI, TPOABS, THGAB  Lab Results   Component Value Date/Time    LABA1C >16.5 02/01/2023 03:58 PM    GLUCOSE 335 02/08/2023 06:13 AM    MALBCR 452.9 02/01/2023 03:58 PM    LABMICR 172.1 02/01/2023 03:58 PM    LABCREA 38 02/01/2023 03:58 PM    LABCREA 38 02/01/2023 03:58 PM     Lab Results   Component Value Date/Time    LABA1C >16.5 02/01/2023 03:58 PM    LABA1C 13.1 08/02/2020 04:58 AM     Lab Results   Component Value Date/Time    TRIG 487 02/05/2023 08:37 AM    HDL 11 02/01/2023 12:14 PM    LDLCALC - 02/01/2023 12:14 PM    CHOL 151 02/01/2023 12:14 PM     Lab Results   Component Value Date/Time    VITD25 24 02/01/2023 12:14 PM       ASSESSMENT & RECOMMENDATIONS   homa Diaz 37 y. o.-old male seen in for the following issues     Diabetes Mellitus Type 2    Previous work up showed normal C-peptide with negative Abs for Type 1 DM   Patient's diabetes is uncontrol due to poor compliance with diet and insulin therapy   Pt was recently admitted to the hospital with DKA   Had Medtronic insulin pump at home but not using it (doesn't like tube system)   Pt would like to be switched to tubeless automated pump therapy. Will order Ominpod-5 + DEXCOM CGM   The patient was advised to check blood sugars 4 times a day before meals and at bedtime and send BS readings to our office in a week. Discussed with patient A1c and blood sugar goals     Dietary noncompliance  Still an issue   Discussed with patient the importance of eating consistent carbohydrate meals, avoiding high glycemic index food. Also, discussed with patient the risk and negative consequences of dietary noncompliance on blood glucose control, blood pressure and weight    Hypertriglyceridemia   Discussed the importance of controlling DM in treatment of hypertriglyceridemia   Advised for strict lifestyle change, Wt loss, strict controlling DM, avoidance of concentrated sugar, complete avoidance of alcohol  Continue Lipitor 40 mg daily, Fenofibrate 160 mg daily, Omega-3 FA 2 capsules BID  Check lipid panel before next visit     I personally reviewed external notes from PCP and other patient's care team providers, and personally interpreted labs associated with the above diagnosis. I also ordered labs to further assess and manage the above addressed medical conditions    Return in about 3 months (around 5/15/2023) for DM type 1, VitD deficiency. The above issues were reviewed with the patient who understood and agreed with the plan. Thank you for allowing us to participate in the care of this patient. Please do not hesitate to contact us with any additional questions. Diagnosis Orders   1. Dietary noncompliance        2.  Hyperglycemia 3. Poorly controlled diabetes mellitus ([address]  Hemoglobin A1C    Lipid Panel    Basic Metabolic Panel      4. Vitamin D deficiency        5. Hypertriglyceridemia  Hemoglobin A1C    Lipid Panel    Basic Metabolic Panel          Marilee Rodriguez MD  Endocrinologist, [facility] Diabetes [facility] and Endocrinology   [address] [place] [id number]   Phone: [phone]  Fax: [phone]  --------------------------------------------  An electronic signature was used to authenticate this note.  Glendy Drake MD on 2/15/2023 at 1:20 PM

## 2023-02-16 ENCOUNTER — OFFICE VISIT (OUTPATIENT)
Dept: INTERNAL MEDICINE | Age: 38
End: 2023-02-16
Payer: MEDICAID

## 2023-02-16 VITALS
SYSTOLIC BLOOD PRESSURE: 115 MMHG | RESPIRATION RATE: 18 BRPM | WEIGHT: 242.2 LBS | OXYGEN SATURATION: 98 % | HEIGHT: 78 IN | DIASTOLIC BLOOD PRESSURE: 82 MMHG | HEART RATE: 117 BPM | BODY MASS INDEX: 28.02 KG/M2 | TEMPERATURE: 97.2 F

## 2023-02-16 DIAGNOSIS — Z23 FLU VACCINE NEED: ICD-10-CM

## 2023-02-16 DIAGNOSIS — I10 PRIMARY HYPERTENSION: ICD-10-CM

## 2023-02-16 DIAGNOSIS — R56.9 SEIZURE (HCC): ICD-10-CM

## 2023-02-16 DIAGNOSIS — F19.11 HX OF DRUG ABUSE (HCC): ICD-10-CM

## 2023-02-16 DIAGNOSIS — E55.9 VITAMIN D INSUFFICIENCY: ICD-10-CM

## 2023-02-16 DIAGNOSIS — G40.909 NONINTRACTABLE EPILEPSY WITHOUT STATUS EPILEPTICUS, UNSPECIFIED EPILEPSY TYPE (HCC): ICD-10-CM

## 2023-02-16 DIAGNOSIS — F10.10 ETOH ABUSE: ICD-10-CM

## 2023-02-16 DIAGNOSIS — R76.8 HEPATITIS C ANTIBODY POSITIVE IN BLOOD: ICD-10-CM

## 2023-02-16 DIAGNOSIS — K59.00 CONSTIPATION, UNSPECIFIED CONSTIPATION TYPE: ICD-10-CM

## 2023-02-16 DIAGNOSIS — E11.9 TYPE 2 DIABETES MELLITUS WITHOUT COMPLICATION, UNSPECIFIED WHETHER LONG TERM INSULIN USE (HCC): ICD-10-CM

## 2023-02-16 DIAGNOSIS — Z86.73 HISTORY OF CVA (CEREBROVASCULAR ACCIDENT): ICD-10-CM

## 2023-02-16 DIAGNOSIS — E78.1 PURE HYPERTRIGLYCERIDEMIA: ICD-10-CM

## 2023-02-16 DIAGNOSIS — Z09 HOSPITAL DISCHARGE FOLLOW-UP: ICD-10-CM

## 2023-02-16 DIAGNOSIS — E11.9 NEWLY DIAGNOSED DIABETES (HCC): Primary | ICD-10-CM

## 2023-02-16 DIAGNOSIS — E11.65 POORLY CONTROLLED DIABETES MELLITUS (HCC): ICD-10-CM

## 2023-02-16 PROBLEM — F17.200 NICOTINE DEPENDENCE, UNSPECIFIED, UNCOMPLICATED: Status: ACTIVE | Noted: 2022-08-04

## 2023-02-16 PROBLEM — R07.9 CHEST PAIN, UNSPECIFIED: Status: ACTIVE | Noted: 2022-08-04

## 2023-02-16 PROBLEM — R73.9 HYPERGLYCEMIA: Status: RESOLVED | Noted: 2020-08-14 | Resolved: 2023-02-16

## 2023-02-16 PROCEDURE — 99213 OFFICE O/P EST LOW 20 MIN: CPT | Performed by: STUDENT IN AN ORGANIZED HEALTH CARE EDUCATION/TRAINING PROGRAM

## 2023-02-16 SDOH — ECONOMIC STABILITY: HOUSING INSECURITY
IN THE LAST 12 MONTHS, WAS THERE A TIME WHEN YOU DID NOT HAVE A STEADY PLACE TO SLEEP OR SLEPT IN A SHELTER (INCLUDING NOW)?: NO

## 2023-02-16 SDOH — ECONOMIC STABILITY: INCOME INSECURITY: IN THE LAST 12 MONTHS, WAS THERE A TIME WHEN YOU WERE NOT ABLE TO PAY THE MORTGAGE OR RENT ON TIME?: YES

## 2023-02-16 SDOH — ECONOMIC STABILITY: TRANSPORTATION INSECURITY
IN THE PAST 12 MONTHS, HAS LACK OF TRANSPORTATION KEPT YOU FROM MEETINGS, WORK, OR FROM GETTING THINGS NEEDED FOR DAILY LIVING?: NO

## 2023-02-16 SDOH — ECONOMIC STABILITY: HOUSING INSECURITY: IN THE LAST 12 MONTHS, HOW MANY PLACES HAVE YOU LIVED?: 1

## 2023-02-16 SDOH — ECONOMIC STABILITY: FOOD INSECURITY: WITHIN THE PAST 12 MONTHS, THE FOOD YOU BOUGHT JUST DIDN'T LAST AND YOU DIDN'T HAVE MONEY TO GET MORE.: NEVER TRUE

## 2023-02-16 SDOH — ECONOMIC STABILITY: FOOD INSECURITY: WITHIN THE PAST 12 MONTHS, YOU WORRIED THAT YOUR FOOD WOULD RUN OUT BEFORE YOU GOT MONEY TO BUY MORE.: NEVER TRUE

## 2023-02-16 SDOH — ECONOMIC STABILITY: TRANSPORTATION INSECURITY
IN THE PAST 12 MONTHS, HAS THE LACK OF TRANSPORTATION KEPT YOU FROM MEDICAL APPOINTMENTS OR FROM GETTING MEDICATIONS?: NO

## 2023-02-16 ASSESSMENT — PATIENT HEALTH QUESTIONNAIRE - PHQ9
SUM OF ALL RESPONSES TO PHQ QUESTIONS 1-9: 0
1. LITTLE INTEREST OR PLEASURE IN DOING THINGS: 0
SUM OF ALL RESPONSES TO PHQ QUESTIONS 1-9: 0
2. FEELING DOWN, DEPRESSED OR HOPELESS: 0
SUM OF ALL RESPONSES TO PHQ QUESTIONS 1-9: 0
SUM OF ALL RESPONSES TO PHQ QUESTIONS 1-9: 0
SUM OF ALL RESPONSES TO PHQ9 QUESTIONS 1 & 2: 0

## 2023-02-16 ASSESSMENT — ENCOUNTER SYMPTOMS
CONSTIPATION: 0
WHEEZING: 1
NAUSEA: 0
BACK PAIN: 1
ABDOMINAL PAIN: 0
COUGH: 1
DIARRHEA: 0
VOMITING: 0
RHINORRHEA: 1
SHORTNESS OF BREATH: 0
BLOOD IN STOOL: 0

## 2023-02-16 ASSESSMENT — SOCIAL DETERMINANTS OF HEALTH (SDOH): HOW HARD IS IT FOR YOU TO PAY FOR THE VERY BASICS LIKE FOOD, HOUSING, MEDICAL CARE, AND HEATING?: SOMEWHAT HARD

## 2023-02-16 NOTE — ASSESSMENT & PLAN NOTE
Hx of drug abuse - on suboxone 8-2 mg film SL BID, has not used drugs in 7 years  SDS and UDS 2/7/23 negative

## 2023-02-16 NOTE — ASSESSMENT & PLAN NOTE
TG  2/2/23 4,410  2/3/23 2,398  2/4/23 1,187  2/5/23 487     2/1/23  wnl, HDL 11,   LDL can't be calculated 2/2 very high TG  Fenofibrate 160 mg daily, niacin 500 mg nightly, omega-3 2 g BID, aspirin 81 mg, atorvastatin 40 mg daily    Repeat lipid panel in 2 months

## 2023-02-16 NOTE — ASSESSMENT & PLAN NOTE
Checks glucose at home? FBS usually reads high   Diabetic foot check deferred today   Has an eye doctor? Not yet. Discussed importance of seeing an eye doctor yearly. Maria Del Carmen Burrell he will find an eye doctor  2/1/23 A1C >16.5  Creatinine: Cr 0.7   2/1/223 urine microalbumin Cr ratio 452  Continue Lantus 48 u AM  Lipitor 40 mg   Reviewed insulin schedule   Discussed diet and lifestyle modification    Known to . Bety Baldwin would like to be switched to tubeless automated pump therapy.  He will receive Ominpod-5 + DEXCOM CGM in 1-2 weeks

## 2023-02-16 NOTE — PROGRESS NOTES
[facility]    Attending Physician Statement  I have discussed the case, including pertinent history and exam findings with the resident physician. I have seen and examined the patient and the key elements of the encounter have been performed by me. I agree with the assessment, plan and orders as documented by the resident. I have reviewed all pertinent PMHx, PSHx, FamHx, SocialHx, medications, and allergies and updated history as appropriate. Patient here for hospital follow up. Admitted twice this month for DKA (a1c >16.5) and then acute pancreatitis. Discharge summaries reviewed. Triglycerides on first admission was 4410 but was decreased to 487 on 2/5/22. Abdominal pain is significantly improved since discharge. No nausea and vomiting. Regular diet - increasing fiber and fruits, low fat. He is compliant with his insulin regimen. Fasting BS continues to read \"high\", unable to check BS other times of the day. Polydipsia and polyuria improved. Seen by Dr Jess Manzo yesterday - plan to get insulin pump within the next 2 weeks. Compliant with statin, fenofibrate, niacin. Will need rpt lipid panel for next check up. BP is controlled with losartan. Patient requesting referral to Neurology for seizures. Hep C infection, with viral load >12M. Liver normal in CT scan 2/7/23. GI referral for treatment. Patient would like to establish care with out clinic. Remainder of medical problems as per resident note. Ulm Librado Phelan MD  2/16/2023 2:54 PM

## 2023-02-16 NOTE — ASSESSMENT & PLAN NOTE
BP during clinic visit 115/82  BP trend 120-130/  K 4.1  Cr 0.7  Continue Losartan 25 mg daily   Discussed low salt intake and exercising

## 2023-02-16 NOTE — PROGRESS NOTES
Angella Vaughan (:  1985) is a 42 y.o. male,New patient, here for evaluation of the following chief complaint(s):  Follow-Up from Hospital (Admitted 2023 - 2023 for acute pancreatitis. Pt c/o muscle ache in low back and between shoulders )           ASSESSMENT/PLAN:  1. Newly diagnosed diabetes [facility])  Assessment & Plan:  Dizziness, shakiness? Increased thirst, polyuria? Any tingling and numbness? Checks glucose at home? Diabetic foot check! Has an eye doctor? 23 A1C >16.5  Creatinine: Cr 0.7    urine microalbumin Cr ratio 452  Continue Lantus 48 u AM  Lipitor 40 mg   Reviewed insulin schedule   Discussed diet and lifestyle modification  Given a blood glucose chart to bring for next follow up    Known to Dr. Yuni Crespo would like to be switched to tubeless automated pump therapy. Will order Ominpod-5 + DEXCOM CGM   2. Poorly controlled diabetes mellitus ([address]  3. Type 2 diabetes mellitus without complication, unspecified whether long term insulin use ([address]  4. Pure hypertriglyceridemia  Assessment & Plan:  TG  23 4,410  2/3/23 2,398  23 1,187  23 487     23  wnl, HDL 11,   LDL can't be calculated  very high TG  Fenofibrate 160 mg daily, niacin 500 mg nightly, omega-3 2 g BID, aspirin 81 mg  5. Seizure ([address]  6. Nonintractable epilepsy without status epilepticus, unspecified epilepsy type ([address]  Assessment & Plan:  Hx seizures - supposed to be on carbamazepine 100 mg daily   7. ETOH abuse  8. Hepatitis C antibody positive in blood  Assessment & Plan:  Hx of Hep C   23 Quantitave 12 million   9. Constipation, unspecified constipation type  Assessment & Plan:  Incidental finding on CT abdomen pelvis 23  Diffuse colonic fecal retention  10. Hx of drug abuse ([facility]  Assessment & Plan:  Hx of drug abuse - on suboxone 8-2 mg film SL BID, has not used drugs in 7 years  SDS and UDS 23 negative  11. History of CVA (cerebrovascular accident)  15.  Primary hypertension  Assessment & Plan:  Headaches? Checks BP at home? BP during clinic visit  BP trend 120-130/  K 4.1  Cr 0.7  Continue Losartan 25 mg daily   Discussed low salt intake and exercising  Given bp chart, advised to take bp at least 2-3/week and bring log back to next clinic visit  13. Vitamin D insufficiency  Assessment & Plan:  2/1/23 vit D 24 low   Continue Vit D supplements     Hydroxyzine 25 mg TID         No follow-ups on file. Subjective   SUBJECTIVE/OBJECTIVE:  Patient comes in for a hospital follow up. He was recently admitted on 2/1/23 for DKA 2/2 uncontrolled diabetes. He was discharged home and subsequently admitted again on 2/7/23-2/9/23 for acute pancreatitis. Review of Systems       Objective   Physical Exam       {Time Documentation Optional:[id number]}      An electronic signature was used to authenticate this note.     --Junior Wright MD     Incidental finding on CT abdomen pelvis 2/7/23  2 mm nonobstructing right renal calculi  Diffuse colonic fecal retention

## 2023-02-16 NOTE — PATIENT INSTRUCTIONS
[facility] Internal Medicine Resident Service    Activity as tolerated  Diet: diabetic, avoid fast food  Be compliant with your medications and take them as prescribed. Special Instructions:   Please have a repeat lipid panel done in April before your follow up appointment. You will need to fast 10 hours over night. We referred you to the neurologist for management of your seizures. Their clinic will call you to inform you of the date and time of your appointment. We referred you to the gastroenterologist for treatment of your hepatitis C. Their clinic will call you to inform you of the date and time of your appointment. Please be compliant with your insulin regimen. Please follow up regularly with Dr. Joey Ace - diabetes specialist.   Please return for a follow up in 2 months. Hospital Follow up: [id number] [address] with House Team   Call to confirm appointment Tel: [phone] ([address]  Internal Medicine Clinic)     Other Follow-Ups:    Future Appointments   Date Time Provider Yazan Seo   5/17/2023  3:40 PM [address] [place] [address]       Other than any new prescriptions given to you today, the list of home going meds on this After Visit Summary are based on information provided to us from you. This information, including the list, dose, and frequency of medications is only as accurate as the information you provided. If you have any questions or concerns about your home medications, please contact your Primary Care Physician for further clarification.       Óscar House MD PGY-2  2/16/2023  3:09 PM

## 2023-02-16 NOTE — PROGRESS NOTES
Post-Discharge Transitional Care Follow Up      Ania Amin   YOB: 1985    Date of Office Visit:  2/16/2023  Date of Hospital Admission: 2/7/23  Date of Hospital Discharge: 2/9/23  Readmission Risk Score(high >=14%. Medium >=10%):Readmission Risk Score: 15      Care management risk score Rising risk (score 2-5) and Complex Care (Scores >=6): No Risk Score On File     Non face to face  following discharge, date last encounter closed (first attempt may have been earlier): *No documented post hospital discharge outreach found in the last 14 days     Call initiated 2 business days of discharge: *No response recorded in the last 14 days     Below is the assessment and plan developed based on review of pertinent history, physical exam, labs, studies, and medications. Newly diagnosed diabetes [facility])  Assessment & Plan:  Checks glucose at home? FBS usually reads high   Diabetic foot check deferred today   Has an eye doctor? Not yet. Discussed importance of seeing an eye doctor yearly. Erica Quick he will find an eye doctor  2/1/23 A1C >16.5  Creatinine: Cr 0.7   2/1/223 urine microalbumin Cr ratio 452  Continue Lantus 48 u AM  Lipitor 40 mg   Reviewed insulin schedule   Discussed diet and lifestyle modification    Known to Dr. Damaris Whitman would like to be switched to tubeless automated pump therapy. He will receive Ominpod-5 + DEXCOM CGM in 1-2 weeks  Poorly controlled diabetes mellitus ([address]  Type 2 diabetes mellitus without complication, unspecified whether long term insulin use ([address]  Pure hypertriglyceridemia  Assessment & Plan:  TG  2/2/23 4,410  2/3/23 2,398  2/4/23 1,187  2/5/23 487     2/1/23  wnl, HDL 11,   LDL can't be calculated 2/2 very high TG  Fenofibrate 160 mg daily, niacin 500 mg nightly, omega-3 2 g BID, aspirin 81 mg, atorvastatin 40 mg daily    Repeat lipid panel in 2 months  Orders:  -     LIPID PANEL;  Future  Seizure [facility])  -     WILDA Simpson, Neurology, [place]  Nonintractable epilepsy without status epilepticus, unspecified epilepsy type ([facility]  Assessment & Plan:  continue carbamazepine 100 mg daily   Referred to neurology  ETOH abuse  Assessment & Plan:  Last drink was 9 years ago  Hepatitis C antibody positive in blood  Assessment & Plan:  Hx of Hep C   2/4/23 Quantitave 12 million   Referred to GI for treatment  Orders:  -     Florencio Vallecillo MD, Gastroenterology, [place]  Constipation, unspecified constipation type  Assessment & Plan:  Incidental finding on CT abdomen pelvis 2/7/23  Diffuse colonic fecal retention  Hx of drug abuse ([facility] [address]  Assessment & Plan:  Hx of drug abuse - on suboxone 8-2 mg film SL BID, has not used drugs in 7 years  SDS and UDS 2/7/23 negative  History of CVA (cerebrovascular accident)  Primary hypertension  Assessment & Plan:  BP during clinic visit 115/82  BP trend 120-130/  K 4.1  Cr 0.7  Continue Losartan 25 mg daily   Discussed low salt intake and exercising  Vitamin D insufficiency  Assessment & Plan:  2/1/23 vit D 24 low   Continue Vit D supplements   Flu vaccine need  Assessment & Plan:  Declined today  Hospital discharge follow-up  -     OR DISCHARGE MEDS RECONCILED W/ CURRENT OUTPATIENT MED LIST      Medical Decision Making: moderate complexity  Return in 2 months (on 4/16/2023) for follow up for hypertryglyceridemia and IDDM w/ PCP. On this date 2/16/2023 I have spent 20 minutes reviewing previous notes, test results and face to face with the patient discussing the diagnosis and importance of compliance with the treatment plan as well as documenting on the day of the visit. Subjective:   Patient came in for a hospital follow up and to establish care as a new patient. Patient comes in for a hospital follow up. He was recently admitted on 2/1/23 for DKA 2/2 uncontrolled diabetes and hypertriglyceridemia. He was discharged home and subsequently admitted again on 2/7/23-2/9/23 for acute pancreatitis.     He still has mild right upper quadrant pain occasionally but is tolerating diet well now. Denied nausea and vomiting. He has been eating healthier by eating more fruits and vegetables and avoiding fast food. He drinks zero sugar pop. He has increased urination and polydipsia but these have improved in the past 1-2 weeks. He checks his FBS which usually reads high. He is not able to check his BG during the day because of his work. He was seen in endo clinic yesterday. He will be receiving an insulin pump and CGM in 1-2 weeks. He has not followed up with an eye doctor yet. We discussed why it is important for diabetics to follow up with an eye doctor.     He has a history of tonic-clonic seizures. Last episode was July 2022. He admits moderate compliance to intake of carbamazepine but he said he has been more compliant recently with his new regimen of medications. He would like a neuro referral.             Inpatient course: Discharge summary reviewed- see chart.    Interval history/Current status: See HPI above    Patient Active Problem List   Diagnosis    Newly diagnosed diabetes (HCC)    Hyperglycemia    Dietary noncompliance    Poorly controlled diabetes mellitus (HCC)    H/O medication noncompliance    Pure hypertriglyceridemia    Hx of drug abuse (HCC)    Hepatitis C antibody positive in blood    Anemia of chronic disease    Acute pancreatitis    ETOH abuse    Opioid dependence on maintenance agonist therapy, no symptoms (HCC)    Chest pain, unspecified    Epilepsy, unspecified, not intractable, without status epilepticus (HCC)    Nicotine dependence, unspecified, uncomplicated    Type 2 diabetes mellitus without complications (HCC)    History of CVA (cerebrovascular accident)    Primary hypertension    Vitamin D insufficiency    Flu vaccine need       Medications listed as started at the time of discharge from hospital  Cannot display discharge medications since this is not an  admission. Medications marked \"taking\" at this time  Outpatient Medications Marked as Taking for the 2/16/23 encounter (Office Visit) with Zeus Queen MD   Medication Sig Dispense Refill    insulin glargine (LANTUS SOLOSTAR) 100 UNIT/ML injection pen Inject 48 Units into the skin every morning 15 Adjustable Dose Pre-filled Pen Syringe 5    Insulin Pen Needle (BD PEN NEEDLE MICRO U/F) 32G X 6 MM MISC Uses with insulin 4 times a day 250 each 5    insulin lispro, 1 Unit Dial, (HUMALOG KWIKPEN) 100 UNIT/ML SOPN Inject 15 units with meals + following sliding scale. -200 add 3U, -250 add 6U, -300 add 9U, -350 add 12U, -400 add 15U, BS over 400 add 18U. MAX 75u/day 15 Adjustable Dose Pre-filled Pen Syringe 4    atorvastatin (LIPITOR) 40 MG tablet Take 1 tablet by mouth daily 30 tablet 5    fenofibrate (TRIGLIDE) 160 MG tablet Take 1 tablet by mouth daily 30 tablet 5    niacin (SLO-NIACIN) 500 MG extended release tablet Take 1 tablet by mouth nightly 30 tablet 5    omega-3 acid ethyl esters (LOVAZA) 1 g capsule Take 2 capsules by mouth 2 times daily 120 capsule 5    aspirin 81 MG chewable tablet Take 1 tablet by mouth daily 30 tablet 3    losartan (COZAAR) 25 MG tablet Take 1 tablet by mouth daily 30 tablet 3    hydrOXYzine pamoate (VISTARIL) 25 MG capsule Take 1 capsule by mouth 3 times daily as needed for Anxiety 30 capsule 1    Lancets 30G MISC 4 boxes by Does not apply route daily 100 each 1    blood glucose monitor strips Test 4 times a day & as needed for symptoms of irregular blood glucose. Dispense sufficient amount for indicated testing frequency plus additional to accommodate PRN testing needs.  400 strip 1    Alcohol Swabs 70 % PADS 4 boxes by Does not apply route 4 times daily 1 each 1    vitamin D (CHOLECALCIFEROL) 50 MCG (2000 UT) TABS tablet Take 1 tablet by mouth daily 90 tablet 1    carBAMazepine (TEGRETOL XR) 100 MG extended release tablet Take 100 mg by mouth every morning      buprenorphine-naloxone (SUBOXONE) 8-2 MG FILM SL film Place 1 Film under the tongue in the morning and at bedtime          Medications patient taking as of now reconciled against medications ordered at time of hospital discharge: Yes    Review of Systems   Constitutional:  Positive for chills and fatigue. Negative for appetite change and fever. HENT:  Positive for rhinorrhea. Respiratory:  Positive for cough and wheezing. Negative for shortness of breath. Cardiovascular:  Positive for leg swelling. Negative for chest pain and palpitations. Gastrointestinal:  Negative for abdominal pain, blood in stool, constipation, diarrhea, nausea and vomiting. Endocrine: Positive for polydipsia and polyuria. Genitourinary:  Negative for dysuria and hematuria. Musculoskeletal:  Positive for back pain. Neurological:  Negative for dizziness, syncope, light-headedness and headaches. Objective:    Patient-Reported Vitals  No data recorded    Physical Exam  Vitals reviewed. Constitutional:       General: He is not in acute distress. Appearance: Normal appearance. He is normal weight. He is not ill-appearing. HENT:      Head: Normocephalic and atraumatic. Eyes:      General: No scleral icterus. Conjunctiva/sclera: Conjunctivae normal.   Neck:      Vascular: No carotid bruit. Cardiovascular:      Rate and Rhythm: Regular rhythm. Tachycardia present. Pulses: Normal pulses. Heart sounds: Normal heart sounds. No murmur heard. No friction rub. No gallop. Pulmonary:      Effort: Pulmonary effort is normal. No respiratory distress. Breath sounds: Normal breath sounds. No stridor. No wheezing, rhonchi or rales. Abdominal:      General: Bowel sounds are normal. There is no distension. Palpations: Abdomen is soft. There is no mass. Tenderness: There is no guarding or rebound. Musculoskeletal:      Cervical back: No tenderness. Right lower leg: No edema. Left lower leg: No edema. Lymphadenopathy:      Cervical: No cervical adenopathy. Skin:     General: Skin is warm and dry. Capillary Refill: Capillary refill takes less than 2 seconds. Coloration: Skin is not jaundiced. Neurological:      Mental Status: He is alert and oriented to person, place, and time. Mental status is at baseline. Motor: No weakness. Gait: Gait normal.   Psychiatric:         Mood and Affect: Mood normal.      An electronic signature was used to authenticate this note.   --Sterling Allen MD

## 2023-02-21 RX ORDER — INSULIN PMP CART,AUT,G6/7,CNTR
EACH SUBCUTANEOUS
Qty: 30 EACH | Refills: 3 | Status: SHIPPED | OUTPATIENT
Start: 2023-02-21

## 2023-02-21 RX ORDER — BLOOD-GLUCOSE,RECEIVER,CONT
EACH MISCELLANEOUS
Qty: 1 EACH | Refills: 0 | Status: SHIPPED | OUTPATIENT
Start: 2023-02-21

## 2023-02-21 RX ORDER — BLOOD-GLUCOSE SENSOR
EACH MISCELLANEOUS
Qty: 9 EACH | Refills: 3 | Status: SHIPPED | OUTPATIENT
Start: 2023-02-21

## 2023-02-21 RX ORDER — BLOOD-GLUCOSE TRANSMITTER
EACH MISCELLANEOUS
Qty: 4 EACH | Refills: 3 | Status: SHIPPED | OUTPATIENT
Start: 2023-02-21

## 2023-02-21 RX ORDER — INSULIN PMP CART,AUT,G6/7,CNTR
EACH SUBCUTANEOUS
Qty: 1 KIT | Refills: 0 | Status: SHIPPED | OUTPATIENT
Start: 2023-02-21

## 2023-03-02 DIAGNOSIS — R73.9 HYPERGLYCEMIA: Primary | ICD-10-CM

## 2023-03-03 RX ORDER — INSULIN PMP CART,AUT,G6/7,CNTR
EACH SUBCUTANEOUS
Qty: 1 KIT | Refills: 0 | Status: SHIPPED | OUTPATIENT
Start: 2023-03-03

## 2023-03-03 RX ORDER — INSULIN PMP CART,AUT,G6/7,CNTR
EACH SUBCUTANEOUS
Qty: 30 EACH | Refills: 3 | Status: SHIPPED | OUTPATIENT
Start: 2023-03-03

## 2023-03-13 ENCOUNTER — TELEPHONE (OUTPATIENT)
Dept: INTERNAL MEDICINE | Age: 38
End: 2023-03-13

## 2023-03-13 NOTE — TELEPHONE ENCOUNTER
Patient called in stating he has been having pain in his side for about a week , with bs sugars over 400, pt states he has history of recent pancreatitis and DKA and states he feels like he did at that time. With nausea this morning. This nurse suggested ED due to symptoms of pain and nausea and recent history. Pt agrees , I informed him I will send message to an attending for any further advice.

## 2023-03-14 ENCOUNTER — HOSPITAL ENCOUNTER (EMERGENCY)
Age: 38
Discharge: LEFT BEFORE DISCHARGE | DRG: 282 | End: 2023-03-14
Payer: MEDICAID

## 2023-03-14 VITALS
DIASTOLIC BLOOD PRESSURE: 96 MMHG | HEART RATE: 117 BPM | RESPIRATION RATE: 20 BRPM | BODY MASS INDEX: 27.04 KG/M2 | WEIGHT: 240 LBS | TEMPERATURE: 97.5 F | OXYGEN SATURATION: 96 % | SYSTOLIC BLOOD PRESSURE: 156 MMHG

## 2023-03-14 LAB
ALBUMIN SERPL-MCNC: 4.2 G/DL (ref 3.5–5.2)
ALP BLD-CCNC: 147 U/L (ref 40–129)
ALT SERPL-CCNC: 49 U/L (ref 0–40)
AMMONIA: 27 UMOL/L (ref 16–60)
ANION GAP SERPL CALCULATED.3IONS-SCNC: 13 MMOL/L (ref 7–16)
AST SERPL-CCNC: 29 U/L (ref 0–39)
BACTERIA: ABNORMAL /HPF
BASOPHILS ABSOLUTE: 0.04 E9/L (ref 0–0.2)
BASOPHILS RELATIVE PERCENT: 0.4 % (ref 0–2)
BETA-HYDROXYBUTYRATE: 1.04 MMOL/L (ref 0.02–0.27)
BILIRUB SERPL-MCNC: 0.4 MG/DL (ref 0–1.2)
BILIRUBIN DIRECT: <0.2 MG/DL (ref 0–0.3)
BILIRUBIN URINE: NEGATIVE
BILIRUBIN, INDIRECT: ABNORMAL MG/DL (ref 0–1)
BLOOD, URINE: NEGATIVE
BUN BLDV-MCNC: 18 MG/DL (ref 6–20)
CALCIUM SERPL-MCNC: 9.6 MG/DL (ref 8.6–10.2)
CHLORIDE BLD-SCNC: 94 MMOL/L (ref 98–107)
CLARITY: CLEAR
CO2: 22 MMOL/L (ref 22–29)
COLOR: YELLOW
CREAT SERPL-MCNC: 0.7 MG/DL (ref 0.7–1.2)
EOSINOPHILS ABSOLUTE: 0.18 E9/L (ref 0.05–0.5)
EOSINOPHILS RELATIVE PERCENT: 1.7 % (ref 0–6)
EPITHELIAL CELLS, UA: ABNORMAL /HPF
GFR SERPL CREATININE-BSD FRML MDRD: >60 ML/MIN/1.73
GLUCOSE BLD-MCNC: 473 MG/DL (ref 74–99)
GLUCOSE URINE: >=1000 MG/DL
HCT VFR BLD CALC: 46.1 % (ref 37–54)
HEMOGLOBIN: 15.3 G/DL (ref 12.5–16.5)
IMMATURE GRANULOCYTES #: 0.04 E9/L
IMMATURE GRANULOCYTES %: 0.4 % (ref 0–5)
KETONES, URINE: ABNORMAL MG/DL
LACTIC ACID: 1.3 MMOL/L (ref 0.5–2.2)
LEUKOCYTE ESTERASE, URINE: NEGATIVE
LIPASE: 551 U/L (ref 13–60)
LYMPHOCYTES ABSOLUTE: 4.55 E9/L (ref 1.5–4)
LYMPHOCYTES RELATIVE PERCENT: 42.8 % (ref 20–42)
MCH RBC QN AUTO: 27.4 PG (ref 26–35)
MCHC RBC AUTO-ENTMCNC: 33.2 % (ref 32–34.5)
MCV RBC AUTO: 82.6 FL (ref 80–99.9)
MONOCYTES ABSOLUTE: 0.67 E9/L (ref 0.1–0.95)
MONOCYTES RELATIVE PERCENT: 6.3 % (ref 2–12)
NEUTROPHILS ABSOLUTE: 5.14 E9/L (ref 1.8–7.3)
NEUTROPHILS RELATIVE PERCENT: 48.4 % (ref 43–80)
NITRITE, URINE: NEGATIVE
PDW BLD-RTO: 14 FL (ref 11.5–15)
PH UA: 5.5 (ref 5–9)
PH VENOUS: 7.38 (ref 7.35–7.45)
PLATELET # BLD: 308 E9/L (ref 130–450)
PMV BLD AUTO: 9.3 FL (ref 7–12)
POTASSIUM SERPL-SCNC: 4.4 MMOL/L (ref 3.5–5)
PROTEIN UA: NEGATIVE MG/DL
RBC # BLD: 5.58 E12/L (ref 3.8–5.8)
RBC UA: ABNORMAL /HPF (ref 0–2)
SODIUM BLD-SCNC: 129 MMOL/L (ref 132–146)
SPECIFIC GRAVITY UA: <=1.005 (ref 1–1.03)
TOTAL PROTEIN: 8.3 G/DL (ref 6.4–8.3)
UROBILINOGEN, URINE: 0.2 E.U./DL
WBC # BLD: 10.6 E9/L (ref 4.5–11.5)
WBC UA: ABNORMAL /HPF (ref 0–5)

## 2023-03-14 PROCEDURE — 81001 URINALYSIS AUTO W/SCOPE: CPT

## 2023-03-14 PROCEDURE — 82140 ASSAY OF AMMONIA: CPT

## 2023-03-14 PROCEDURE — 85025 COMPLETE CBC W/AUTO DIFF WBC: CPT

## 2023-03-14 PROCEDURE — 83690 ASSAY OF LIPASE: CPT

## 2023-03-14 PROCEDURE — 83605 ASSAY OF LACTIC ACID: CPT

## 2023-03-14 PROCEDURE — 99283 EMERGENCY DEPT VISIT LOW MDM: CPT

## 2023-03-14 PROCEDURE — 82010 KETONE BODYS QUAN: CPT

## 2023-03-14 PROCEDURE — 80048 BASIC METABOLIC PNL TOTAL CA: CPT

## 2023-03-14 PROCEDURE — 80076 HEPATIC FUNCTION PANEL: CPT

## 2023-03-14 PROCEDURE — 82800 BLOOD PH: CPT

## 2023-03-14 ASSESSMENT — PAIN SCALES - GENERAL: PAINLEVEL_OUTOF10: 7

## 2023-03-14 ASSESSMENT — PAIN - FUNCTIONAL ASSESSMENT: PAIN_FUNCTIONAL_ASSESSMENT: 0-10

## 2023-03-14 NOTE — ED NOTES
Department of Emergency Medicine  FIRST PROVIDER TRIAGE NOTE             Independent MLP           3/14/23  9:56 AM EDT    Date of Encounter: 3/14/23   MRN: [id number]      HPI: Shaan Hernandez is a 42 y.o. male who presents to the ED for Abdominal Pain (Right sided for last week, recently had DKA and pancreatitis )     Patient is a 64-year-old presenting with abdominal pain and nausea. Patient recently was in DKA. Patient states that he has been checking his sugars and they are still at 300. Patient states he returned the next day after being here last time was found to have pancreatitis. Patient states he is not sure if it is DKA or pancreatitis    ROS: Negative for cp, sob, diarrhea, or urinary complaints. PE: Gen Appearance/Constitutional: alert  HEENT: NC/NT. PERRLA,  Airway patent. Neck: supple     Initial Plan of Care: All treatment areas with department are currently occupied. Plan to order/Initiate the following while awaiting opening in ED: labs.   Initiate Treatment-Testing, Proceed toTreatment Area When Bed Available for ED Attending/MLP to Continue Care    Electronically signed by Olegario Osman PA-C   DD: 3/14/23       Olegario Osman PA-C  03/14/23 0022

## 2023-03-15 ENCOUNTER — HOSPITAL ENCOUNTER (INPATIENT)
Age: 38
LOS: 1 days | Discharge: HOME | DRG: 282 | End: 2023-03-17
Attending: EMERGENCY MEDICINE | Admitting: INTERNAL MEDICINE
Payer: MEDICAID

## 2023-03-15 ENCOUNTER — APPOINTMENT (OUTPATIENT)
Dept: CT IMAGING | Age: 38
DRG: 282 | End: 2023-03-15
Payer: MEDICAID

## 2023-03-15 DIAGNOSIS — K85.90 ACUTE PANCREATITIS, UNSPECIFIED COMPLICATION STATUS, UNSPECIFIED PANCREATITIS TYPE: Primary | ICD-10-CM

## 2023-03-15 LAB
ALBUMIN SERPL-MCNC: 4 G/DL (ref 3.5–5.2)
ALP SERPL-CCNC: 165 U/L (ref 40–129)
ALT SERPL-CCNC: 51 U/L (ref 0–40)
ANION GAP SERPL CALCULATED.3IONS-SCNC: 12 MMOL/L (ref 7–16)
AST SERPL-CCNC: 28 U/L (ref 0–39)
BACTERIA URNS QL MICRO: ABNORMAL /HPF
BASOPHILS # BLD: 0.03 E9/L (ref 0–0.2)
BASOPHILS NFR BLD: 0.3 % (ref 0–2)
BILIRUB SERPL-MCNC: 0.3 MG/DL (ref 0–1.2)
BILIRUB UR QL STRIP: NEGATIVE
BUN SERPL-MCNC: 18 MG/DL (ref 6–20)
CALCIUM SERPL-MCNC: 9.3 MG/DL (ref 8.6–10.2)
CHLORIDE SERPL-SCNC: 100 MMOL/L (ref 98–107)
CLARITY UR: CLEAR
CO2 SERPL-SCNC: 21 MMOL/L (ref 22–29)
COLOR UR: YELLOW
CREAT SERPL-MCNC: 0.6 MG/DL (ref 0.7–1.2)
EOSINOPHIL # BLD: 0.17 E9/L (ref 0.05–0.5)
EOSINOPHIL NFR BLD: 1.7 % (ref 0–6)
EPI CELLS #/AREA URNS HPF: ABNORMAL /HPF
ERYTHROCYTE [DISTWIDTH] IN BLOOD BY AUTOMATED COUNT: 13.7 FL (ref 11.5–15)
GLUCOSE SERPL-MCNC: 452 MG/DL (ref 74–99)
GLUCOSE UR STRIP-MCNC: >=1000 MG/DL
HCT VFR BLD AUTO: 43.3 % (ref 37–54)
HGB BLD-MCNC: 14.6 G/DL (ref 12.5–16.5)
HGB UR QL STRIP: NEGATIVE
IMM GRANULOCYTES # BLD: 0.03 E9/L
IMM GRANULOCYTES NFR BLD: 0.3 % (ref 0–5)
KETONES UR STRIP-MCNC: 15 MG/DL
LACTATE BLDV-SCNC: 1.2 MMOL/L (ref 0.5–1.9)
LACTATE BLDV-SCNC: 1.2 MMOL/L (ref 0.5–1.9)
LEUKOCYTE ESTERASE UR QL STRIP: NEGATIVE
LIPASE: 773 U/L (ref 13–60)
LYMPHOCYTES # BLD: 3.93 E9/L (ref 1.5–4)
LYMPHOCYTES NFR BLD: 40.1 % (ref 20–42)
MCH RBC QN AUTO: 27.7 PG (ref 26–35)
MCHC RBC AUTO-ENTMCNC: 33.7 % (ref 32–34.5)
MCV RBC AUTO: 82.2 FL (ref 80–99.9)
METER GLUCOSE: 346 MG/DL (ref 74–99)
MONOCYTES # BLD: 0.55 E9/L (ref 0.1–0.95)
MONOCYTES NFR BLD: 5.6 % (ref 2–12)
NEUTROPHILS # BLD: 5.09 E9/L (ref 1.8–7.3)
NEUTS SEG NFR BLD: 52 % (ref 43–80)
NITRITE UR QL STRIP: NEGATIVE
PH UR STRIP: 5.5 [PH] (ref 5–9)
PLATELET # BLD AUTO: 308 E9/L (ref 130–450)
PMV BLD AUTO: 9.3 FL (ref 7–12)
POTASSIUM SERPL-SCNC: 4.3 MMOL/L (ref 3.5–5)
PROT SERPL-MCNC: 8.2 G/DL (ref 6.4–8.3)
PROT UR STRIP-MCNC: NEGATIVE MG/DL
RBC # BLD AUTO: 5.27 E12/L (ref 3.8–5.8)
RBC #/AREA URNS HPF: ABNORMAL /HPF (ref 0–2)
SODIUM SERPL-SCNC: 133 MMOL/L (ref 132–146)
SP GR UR STRIP: 1.01 (ref 1–1.03)
UROBILINOGEN UR STRIP-ACNC: 0.2 E.U./DL
WBC # BLD: 9.8 E9/L (ref 4.5–11.5)
WBC #/AREA URNS HPF: ABNORMAL /HPF (ref 0–5)

## 2023-03-15 PROCEDURE — 74176 CT ABD & PELVIS W/O CONTRAST: CPT

## 2023-03-15 PROCEDURE — 81001 URINALYSIS AUTO W/SCOPE: CPT

## 2023-03-15 PROCEDURE — 83690 ASSAY OF LIPASE: CPT

## 2023-03-15 PROCEDURE — 99285 EMERGENCY DEPT VISIT HI MDM: CPT

## 2023-03-15 PROCEDURE — G0378 HOSPITAL OBSERVATION PER HR: HCPCS

## 2023-03-15 PROCEDURE — 83605 ASSAY OF LACTIC ACID: CPT

## 2023-03-15 PROCEDURE — 96361 HYDRATE IV INFUSION ADD-ON: CPT

## 2023-03-15 PROCEDURE — 2580000003 HC RX 258: Performed by: EMERGENCY MEDICINE

## 2023-03-15 PROCEDURE — 6370000000 HC RX 637 (ALT 250 FOR IP): Performed by: STUDENT IN AN ORGANIZED HEALTH CARE EDUCATION/TRAINING PROGRAM

## 2023-03-15 PROCEDURE — 96375 TX/PRO/DX INJ NEW DRUG ADDON: CPT

## 2023-03-15 PROCEDURE — 6360000002 HC RX W HCPCS: Performed by: EMERGENCY MEDICINE

## 2023-03-15 PROCEDURE — 80053 COMPREHEN METABOLIC PANEL: CPT

## 2023-03-15 PROCEDURE — 96374 THER/PROPH/DIAG INJ IV PUSH: CPT

## 2023-03-15 PROCEDURE — 82962 GLUCOSE BLOOD TEST: CPT

## 2023-03-15 PROCEDURE — 85025 COMPLETE CBC W/AUTO DIFF WBC: CPT

## 2023-03-15 RX ORDER — ASPIRIN 81 MG/1
81 TABLET, CHEWABLE ORAL DAILY
Status: DISCONTINUED | OUTPATIENT
Start: 2023-03-16 | End: 2023-03-17 | Stop reason: HOSPADM

## 2023-03-15 RX ORDER — ONDANSETRON 4 MG/1
4 TABLET, ORALLY DISINTEGRATING ORAL EVERY 8 HOURS PRN
Status: DISCONTINUED | OUTPATIENT
Start: 2023-03-15 | End: 2023-03-16

## 2023-03-15 RX ORDER — SODIUM CHLORIDE 9 MG/ML
INJECTION, SOLUTION INTRAVENOUS CONTINUOUS
Status: DISCONTINUED | OUTPATIENT
Start: 2023-03-15 | End: 2023-03-15

## 2023-03-15 RX ORDER — ACETAMINOPHEN 325 MG/1
650 TABLET ORAL EVERY 6 HOURS PRN
Status: DISCONTINUED | OUTPATIENT
Start: 2023-03-15 | End: 2023-03-17 | Stop reason: HOSPADM

## 2023-03-15 RX ORDER — MORPHINE SULFATE 4 MG/ML
4 INJECTION, SOLUTION INTRAMUSCULAR; INTRAVENOUS ONCE
Status: COMPLETED | OUTPATIENT
Start: 2023-03-15 | End: 2023-03-15

## 2023-03-15 RX ORDER — ATORVASTATIN CALCIUM 40 MG/1
40 TABLET, FILM COATED ORAL DAILY
Status: DISCONTINUED | OUTPATIENT
Start: 2023-03-16 | End: 2023-03-17 | Stop reason: HOSPADM

## 2023-03-15 RX ORDER — 0.9 % SODIUM CHLORIDE 0.9 %
1000 INTRAVENOUS SOLUTION INTRAVENOUS ONCE
Status: COMPLETED | OUTPATIENT
Start: 2023-03-15 | End: 2023-03-16

## 2023-03-15 RX ORDER — SODIUM CHLORIDE 0.9 % (FLUSH) 0.9 %
5-40 SYRINGE (ML) INJECTION PRN
Status: DISCONTINUED | OUTPATIENT
Start: 2023-03-15 | End: 2023-03-17 | Stop reason: HOSPADM

## 2023-03-15 RX ORDER — OMEGA-3-ACID ETHYL ESTERS 1 G/1
2 CAPSULE, LIQUID FILLED ORAL 2 TIMES DAILY
Status: DISCONTINUED | OUTPATIENT
Start: 2023-03-16 | End: 2023-03-17 | Stop reason: HOSPADM

## 2023-03-15 RX ORDER — ONDANSETRON 2 MG/ML
4 INJECTION INTRAMUSCULAR; INTRAVENOUS EVERY 6 HOURS PRN
Status: DISCONTINUED | OUTPATIENT
Start: 2023-03-15 | End: 2023-03-16

## 2023-03-15 RX ORDER — FENTANYL CITRATE 50 UG/ML
50 INJECTION, SOLUTION INTRAMUSCULAR; INTRAVENOUS ONCE
Status: DISCONTINUED | OUTPATIENT
Start: 2023-03-15 | End: 2023-03-15

## 2023-03-15 RX ORDER — FENOFIBRATE 160 MG/1
160 TABLET ORAL DAILY
Status: DISCONTINUED | OUTPATIENT
Start: 2023-03-16 | End: 2023-03-17 | Stop reason: HOSPADM

## 2023-03-15 RX ORDER — CHOLECALCIFEROL (VITAMIN D3) 50 MCG
2000 TABLET ORAL DAILY
Status: DISCONTINUED | OUTPATIENT
Start: 2023-03-16 | End: 2023-03-17 | Stop reason: HOSPADM

## 2023-03-15 RX ORDER — CARBAMAZEPINE 100 MG/1
100 TABLET, EXTENDED RELEASE ORAL EVERY MORNING
Status: DISCONTINUED | OUTPATIENT
Start: 2023-03-16 | End: 2023-03-17 | Stop reason: HOSPADM

## 2023-03-15 RX ORDER — NICOTINE 21 MG/24HR
1 PATCH, TRANSDERMAL 24 HOURS TRANSDERMAL DAILY
Status: DISCONTINUED | OUTPATIENT
Start: 2023-03-16 | End: 2023-03-17 | Stop reason: HOSPADM

## 2023-03-15 RX ORDER — SODIUM CHLORIDE, SODIUM LACTATE, POTASSIUM CHLORIDE, CALCIUM CHLORIDE 600; 310; 30; 20 MG/100ML; MG/100ML; MG/100ML; MG/100ML
INJECTION, SOLUTION INTRAVENOUS CONTINUOUS
Status: DISCONTINUED | OUTPATIENT
Start: 2023-03-15 | End: 2023-03-16

## 2023-03-15 RX ORDER — LANOLIN ALCOHOL/MO/W.PET/CERES
500 CREAM (GRAM) TOPICAL NIGHTLY
Status: DISCONTINUED | OUTPATIENT
Start: 2023-03-16 | End: 2023-03-17 | Stop reason: HOSPADM

## 2023-03-15 RX ORDER — SODIUM CHLORIDE 0.9 % (FLUSH) 0.9 %
5-40 SYRINGE (ML) INJECTION EVERY 12 HOURS SCHEDULED
Status: DISCONTINUED | OUTPATIENT
Start: 2023-03-16 | End: 2023-03-16

## 2023-03-15 RX ORDER — ENOXAPARIN SODIUM 100 MG/ML
40 INJECTION SUBCUTANEOUS DAILY
Status: DISCONTINUED | OUTPATIENT
Start: 2023-03-16 | End: 2023-03-16

## 2023-03-15 RX ORDER — ACETAMINOPHEN 650 MG/1
650 SUPPOSITORY RECTAL EVERY 6 HOURS PRN
Status: DISCONTINUED | OUTPATIENT
Start: 2023-03-15 | End: 2023-03-17 | Stop reason: HOSPADM

## 2023-03-15 RX ORDER — SODIUM CHLORIDE 9 MG/ML
INJECTION, SOLUTION INTRAVENOUS PRN
Status: DISCONTINUED | OUTPATIENT
Start: 2023-03-15 | End: 2023-03-16

## 2023-03-15 RX ORDER — LOSARTAN POTASSIUM 25 MG/1
25 TABLET ORAL DAILY
Status: DISCONTINUED | OUTPATIENT
Start: 2023-03-16 | End: 2023-03-17 | Stop reason: HOSPADM

## 2023-03-15 RX ORDER — DEXTROSE MONOHYDRATE 100 MG/ML
INJECTION, SOLUTION INTRAVENOUS CONTINUOUS PRN
Status: DISCONTINUED | OUTPATIENT
Start: 2023-03-15 | End: 2023-03-17 | Stop reason: HOSPADM

## 2023-03-15 RX ORDER — POLYETHYLENE GLYCOL 3350 17 G/17G
17 POWDER, FOR SOLUTION ORAL DAILY PRN
Status: DISCONTINUED | OUTPATIENT
Start: 2023-03-15 | End: 2023-03-16

## 2023-03-15 RX ADMIN — MORPHINE SULFATE 4 MG: 4 INJECTION, SOLUTION INTRAMUSCULAR; INTRAVENOUS at 19:21

## 2023-03-15 RX ADMIN — SODIUM CHLORIDE 1000 ML: 9 INJECTION, SOLUTION INTRAVENOUS at 19:23

## 2023-03-15 RX ADMIN — INSULIN HUMAN 8 UNITS: 100 INJECTION, SOLUTION PARENTERAL at 19:52

## 2023-03-15 ASSESSMENT — PAIN SCALES - GENERAL: PAINLEVEL_OUTOF10: 7

## 2023-03-15 NOTE — LETTER
March 17, 2023       No Kingston YOB: 1985   [address] [facility] Date of Visit:  3/15/2023       To Whom It May Concern:    . Anne Marie Ventura, was admitted and treated in out facility on 3/15/2023 to 3/17/2023. He may return to work 3/18/2023 with no restriction.     If you have any questions please call our office at [phone]      Sincerely,        Dr. Kala Portillo  [address] [facility] [phone]

## 2023-03-15 NOTE — LETTER
March 17, 2023       Catalina Wesley YOB: 1985   [address] [facility] Date of Visit:  3/15/2023       To Whom It May Concern:    Ruymitchell Fraser was seen in my clinic on 3/15/2023. He {Return to school/sport/work:78489}. If you have any questions or concerns, please don't hesitate to call. Sincerely,        No name on file.

## 2023-03-15 NOTE — ED NOTES
Department of Emergency Medicine  FIRST PROVIDER TRIAGE NOTE             Independent MLP           3/15/23  12:03 PM EDT    Date of Encounter: 3/15/23   MRN: [id number]      HPI: Catherine West is a 42 y.o. male who presents to the ED for No chief complaint on file. Complains of upper abdominal pain and back pain. History of pancreatitis and he stated the pain is similar. Patient stated he was here yesterday but was waiting in the lobby for a long time and left. Denies alcohol or drug use. ROS: Negative for cp, sob, fever, cough, vomiting, or diarrhea. PE: Gen Appearance/Constitutional: alert  HEENT: NC/NT. PERRLA,  Airway patent. Initial Plan of Care: All treatment areas with department are currently occupied. Plan to order/Initiate the following while awaiting opening in ED: labs.   Initiate Treatment-Testing, Proceed toTreatment Area When Bed Available for ED Attending/MLP to Continue Care    Electronically signed by OSVALDO Capone CNP   DD: 3/15/23      OSVALDO Capone CNP  03/15/23 [id number]

## 2023-03-15 NOTE — ED PROVIDER NOTES
HPI:  3/15/23, Time: 6:59 PM EDT         Jose G Bishop is a 42 y.o. male presenting to the ED for abdominal pain. Patient's been having periumbilical abdominal pain for the past week. Sharp in nature, nothing makes it better or worse, does not rating her. Does have history of pancreatitis. Concerned he is having pancreatitis again. Bowel or bladder, no paresthesias, no other symptoms or complaints      Review of Systems:   A complete review of systems was performed and pertinent positives and negatives are stated within HPI, all other systems reviewed and are negative.          --------------------------------------------- PAST HISTORY ---------------------------------------------  Past Medical History:  has a past medical history of Anxiety, Depression, Seizures ([facility] [address] and Unspecified cerebral artery occlusion with cerebral infarction. Past Surgical History:  has a past surgical history that includes Abscess Drainage (Left, 08/22/2016) and bone incision and drainage (Left, 08/30/2016). Social History:  reports that he has been smoking cigarettes. He has a 9.50 pack-year smoking history. He has never used smokeless tobacco. He reports current drug use. Drug: Other-see comments. He reports that he does not drink alcohol. Family History: family history includes Heart Disease in his father; Kidney Disease in his mother. The patients home medications have been reviewed. Allergies: Patient has no known allergies.     -------------------------------------------------- RESULTS -------------------------------------------------  All laboratory and radiology results have been personally reviewed by myself   LABS:  Results for orders placed or performed during the hospital encounter of 03/15/23   CBC with Auto Differential   Result Value Ref Range    WBC 9.8 4.5 - 11.5 E9/L    RBC 5.27 3.80 - 5.80 E12/L    Hemoglobin 14.6 12.5 - 16.5 g/dL    Hematocrit 43.3 37.0 - 54.0 %    MCV 82.2 80.0 - 99.9 fL MCH 27.7 26.0 - 35.0 pg    MCHC 33.7 32.0 - 34.5 %    RDW 13.7 11.5 - 15.0 fL    Platelets 870 059 - 709 E9/L    MPV 9.3 7.0 - 12.0 fL    Neutrophils % 52.0 43.0 - 80.0 %    Immature Granulocytes % 0.3 0.0 - 5.0 %    Lymphocytes % 40.1 20.0 - 42.0 %    Monocytes % 5.6 2.0 - 12.0 %    Eosinophils % 1.7 0.0 - 6.0 %    Basophils % 0.3 0.0 - 2.0 %    Neutrophils Absolute 5.09 1.80 - 7.30 E9/L    Immature Granulocytes # 0.03 E9/L    Lymphocytes Absolute 3.93 1.50 - 4.00 E9/L    Monocytes Absolute 0.55 0.10 - 0.95 E9/L    Eosinophils Absolute 0.17 0.05 - 0.50 E9/L    Basophils Absolute 0.03 0.00 - 0.20 E9/L   Comprehensive Metabolic Panel w/ Reflex to MG   Result Value Ref Range    Sodium 133 132 - 146 mmol/L    Potassium reflex Magnesium 4.3 3.5 - 5.0 mmol/L    Chloride 100 98 - 107 mmol/L    CO2 21 (L) 22 - 29 mmol/L    Anion Gap 12 7 - 16 mmol/L    Glucose 452 (H) 74 - 99 mg/dL    BUN 18 6 - 20 mg/dL    Creatinine 0.6 (L) 0.7 - 1.2 mg/dL    Est, Glom Filt Rate >60 >=60 mL/min/1.73    Calcium 9.3 8.6 - 10.2 mg/dL    Total Protein 8.2 6.4 - 8.3 g/dL    Albumin 4.0 3.5 - 5.2 g/dL    Total Bilirubin 0.3 0.0 - 1.2 mg/dL    Alkaline Phosphatase 165 (H) 40 - 129 U/L    ALT 51 (H) 0 - 40 U/L    AST 28 0 - 39 U/L   Lactate, Sepsis   Result Value Ref Range    Lactic Acid, Sepsis 1.2 0.5 - 1.9 mmol/L   Lactate, Sepsis   Result Value Ref Range    Lactic Acid, Sepsis 1.2 0.5 - 1.9 mmol/L   Lipase   Result Value Ref Range    Lipase 773 (H) 13 - 60 U/L   Urinalysis with Microscopic   Result Value Ref Range    Color, UA Yellow Straw/Yellow    Clarity, UA Clear Clear    Glucose, Ur >=1000 (A) Negative mg/dL    Bilirubin Urine Negative Negative    Ketones, Urine 15 (A) Negative mg/dL    Specific Gravity, UA 1.010 1.005 - 1.030    Blood, Urine Negative Negative    pH, UA 5.5 5.0 - 9.0    Protein, UA Negative Negative mg/dL    Urobilinogen, Urine 0.2 <2.0 E.U./dL    Nitrite, Urine Negative Negative    Leukocyte Esterase, Urine Negative Negative    WBC, UA NONE 0 - 5 /HPF    RBC, UA 0-1 0 - 2 /HPF    Epithelial Cells, UA NONE SEEN /HPF    Bacteria, UA NONE SEEN None Seen /HPF       RADIOLOGY:  Interpreted by Radiologist.  CT ABDOMEN PELVIS WO CONTRAST Additional Contrast? None   Final Result   1. Findings are compatible with acute pancreatitis   2. No cholelithiasis or biliary dilation is seen   3. Suggestion of constipation and ileus             ------------------------- NURSING NOTES AND VITALS REVIEWED ---------------------------   The nursing notes within the ED encounter and vital signs as below have been reviewed. /87   Pulse 100   Temp 97.8 °F (36.6 °C)   Resp 15   SpO2 97%   Oxygen Saturation Interpretation: Normal      ---------------------------------------------------PHYSICAL EXAM--------------------------------------      Constitutional/General: Alert and oriented x3, well appearing, non toxic in NAD  Head: Normocephalic and atraumatic  Eyes: PERRL, EOMI  Mouth: Oropharynx clear, handling secretions, no trismus  Neck: Supple, full ROM,   Pulmonary: Lungs clear to auscultation bilaterally, no wheezes, rales, or rhonchi. Not in respiratory distress  Cardiovascular:  Regular rate and rhythm, no murmurs, gallops, or rubs. 2+ distal pulses  Abdomen: Soft, with periumbilical tenderness, no guarding or rebound, no Rodriguez sign  Extremities: Moves all extremities x 4. Warm and well perfused  Skin: warm and dry without rash  Neurologic: GCS 15,  Psych: Normal Affect      ------------------------------ ED COURSE/MEDICAL DECISION MAKING----------------------  Medications   0.9 % sodium chloride bolus (has no administration in time range)   0.9 % sodium chloride infusion (has no administration in time range)   fentaNYL (SUBLIMAZE) injection 50 mcg (has no administration in time range)         ED COURSE:       Medical Decision Making:      Patient presents with abdominal pain.   Concern for gastritis, perforation, pancreatitis, among other pathologies. Did receive IV fluids along with IV fentanyl. He had a nonacute abdomen otherwise. Labs interpreted by me shows a normal CBC, CMP shows a glucose of 452, lactic is 1.2, lipase is 773. Otherwise labs reviewed as above. CT shows no obvious free air, radiology agrees. Chart reviewed. Patient was seen in his endocrinologist office on 2/15/2023. He has a history of poorly controlled diabetes, hyperglycemia, and hypertriglyceridemia. He is also admitted to hospital on 2/7/2023 for acute pancreatitis. Reevaluation, he is resting. Pain is controlled. However, given his acute pancreatitis, patient will be admitted    Counseling: The emergency provider has spoken with the patient and discussed todays results, in addition to providing specific details for the plan of care and counseling regarding the diagnosis and prognosis. Questions are answered at this time and they are agreeable with the plan.      --------------------------------- IMPRESSION AND DISPOSITION ---------------------------------    IMPRESSION  1. Acute pancreatitis, unspecified complication status, unspecified pancreatitis type        DISPOSITION  Disposition: Admit to med/surg floor  Patient condition is stable      NOTE: This report was transcribed using voice recognition software.  Every effort was made to ensure accuracy; however, inadvertent computerized transcription errors may be present        Vitor Cardona MD  03/16/23 9568

## 2023-03-15 NOTE — LETTER
[facility] [address] [facility]  [address] [facility]  Phone: [phone]         March 17, 2023     Patient: No Kingston   YOB: 1985   Date of Visit: 3/15/2023       To Whom It May Concern:    Anne Marie Ventura was seen and treated in our emergency department on 3/15/2023. The following work duties are recommended. He {Work release (duty restriction):83229}    Treatment and work recommendations given by the emergency department are initial emergency measures only, and follow up should be arranged as soon as possible with the company's occupational health provider* or the specialist to whom the worker was referred.     *If the company does not have an occupational health provider, follow up should be at [facility], [facility] [[email or web address]] [facility] [address]  [phone]    Call in 1 week(s)      Endocrinologist    Call in 3 day(s)  Follow-up with your Endocrinologist after discharge          Sincerely,        Whit Chu MD        Signature:__________________________________

## 2023-03-16 PROBLEM — K85.90 ACUTE PANCREATITIS, UNSPECIFIED COMPLICATION STATUS, UNSPECIFIED PANCREATITIS TYPE: Status: ACTIVE | Noted: 2023-03-16

## 2023-03-16 PROBLEM — K85.90 ACUTE PANCREATITIS WITHOUT INFECTION OR NECROSIS: Status: ACTIVE | Noted: 2023-03-16

## 2023-03-16 LAB
ALBUMIN SERPL-MCNC: 3.2 G/DL (ref 3.5–5.2)
ALP SERPL-CCNC: 104 U/L (ref 40–129)
ALT SERPL-CCNC: 40 U/L (ref 0–40)
ANION GAP SERPL CALCULATED.3IONS-SCNC: 10 MMOL/L (ref 7–16)
ANION GAP SERPL CALCULATED.3IONS-SCNC: 8 MMOL/L (ref 7–16)
AST SERPL-CCNC: 31 U/L (ref 0–39)
BASOPHILS # BLD: 0.04 E9/L (ref 0–0.2)
BASOPHILS NFR BLD: 0.5 % (ref 0–2)
BILIRUB DIRECT SERPL-MCNC: <0.2 MG/DL (ref 0–0.3)
BILIRUB INDIRECT SERPL-MCNC: ABNORMAL MG/DL (ref 0–1)
BILIRUB SERPL-MCNC: 0.4 MG/DL (ref 0–1.2)
BUN SERPL-MCNC: 11 MG/DL (ref 6–20)
BUN SERPL-MCNC: 12 MG/DL (ref 6–20)
CALCIUM SERPL-MCNC: 8.5 MG/DL (ref 8.6–10.2)
CALCIUM SERPL-MCNC: 8.6 MG/DL (ref 8.6–10.2)
CHLORIDE SERPL-SCNC: 105 MMOL/L (ref 98–107)
CHLORIDE SERPL-SCNC: 108 MMOL/L (ref 98–107)
CHOLESTEROL, TOTAL: 129 MG/DL (ref 0–199)
CO2 SERPL-SCNC: 21 MMOL/L (ref 22–29)
CO2 SERPL-SCNC: 22 MMOL/L (ref 22–29)
CREAT SERPL-MCNC: 0.5 MG/DL (ref 0.7–1.2)
CREAT SERPL-MCNC: 0.6 MG/DL (ref 0.7–1.2)
EOSINOPHIL # BLD: 0.2 E9/L (ref 0.05–0.5)
EOSINOPHIL NFR BLD: 2.5 % (ref 0–6)
ERYTHROCYTE [DISTWIDTH] IN BLOOD BY AUTOMATED COUNT: 13.9 FL (ref 11.5–15)
GLUCOSE SERPL-MCNC: 174 MG/DL (ref 74–99)
GLUCOSE SERPL-MCNC: 195 MG/DL (ref 74–99)
HCT VFR BLD AUTO: 39.2 % (ref 37–54)
HDLC SERPL-MCNC: 20 MG/DL
HGB BLD-MCNC: 13 G/DL (ref 12.5–16.5)
IMM GRANULOCYTES # BLD: 0.03 E9/L
IMM GRANULOCYTES NFR BLD: 0.4 % (ref 0–5)
LDLC SERPL CALC-MCNC: 73 MG/DL (ref 0–99)
LYMPHOCYTES # BLD: 3.42 E9/L (ref 1.5–4)
LYMPHOCYTES NFR BLD: 42.2 % (ref 20–42)
MCH RBC QN AUTO: 28.3 PG (ref 26–35)
MCHC RBC AUTO-ENTMCNC: 33.2 % (ref 32–34.5)
MCV RBC AUTO: 85.2 FL (ref 80–99.9)
METER GLUCOSE: 184 MG/DL (ref 74–99)
METER GLUCOSE: 189 MG/DL (ref 74–99)
METER GLUCOSE: 202 MG/DL (ref 74–99)
METER GLUCOSE: 218 MG/DL (ref 74–99)
METER GLUCOSE: 406 MG/DL (ref 74–99)
METER GLUCOSE: 412 MG/DL (ref 74–99)
MONOCYTES # BLD: 0.51 E9/L (ref 0.1–0.95)
MONOCYTES NFR BLD: 6.3 % (ref 2–12)
NEUTROPHILS # BLD: 3.91 E9/L (ref 1.8–7.3)
NEUTS SEG NFR BLD: 48.1 % (ref 43–80)
PLATELET # BLD AUTO: 264 E9/L (ref 130–450)
PMV BLD AUTO: 9.6 FL (ref 7–12)
POTASSIUM SERPL-SCNC: 3.5 MMOL/L (ref 3.5–5)
POTASSIUM SERPL-SCNC: 4.1 MMOL/L (ref 3.5–5)
PROT SERPL-MCNC: 6.4 G/DL (ref 6.4–8.3)
RBC # BLD AUTO: 4.6 E12/L (ref 3.8–5.8)
SODIUM SERPL-SCNC: 136 MMOL/L (ref 132–146)
SODIUM SERPL-SCNC: 138 MMOL/L (ref 132–146)
TRIGL SERPL-MCNC: 180 MG/DL (ref 0–149)
VLDLC SERPL CALC-MCNC: 36 MG/DL
WBC # BLD: 8.1 E9/L (ref 4.5–11.5)

## 2023-03-16 PROCEDURE — 6370000000 HC RX 637 (ALT 250 FOR IP)

## 2023-03-16 PROCEDURE — 82962 GLUCOSE BLOOD TEST: CPT

## 2023-03-16 PROCEDURE — 85025 COMPLETE CBC W/AUTO DIFF WBC: CPT

## 2023-03-16 PROCEDURE — 80048 BASIC METABOLIC PNL TOTAL CA: CPT

## 2023-03-16 PROCEDURE — 6370000000 HC RX 637 (ALT 250 FOR IP): Performed by: STUDENT IN AN ORGANIZED HEALTH CARE EDUCATION/TRAINING PROGRAM

## 2023-03-16 PROCEDURE — 96372 THER/PROPH/DIAG INJ SC/IM: CPT

## 2023-03-16 PROCEDURE — 6360000002 HC RX W HCPCS: Performed by: STUDENT IN AN ORGANIZED HEALTH CARE EDUCATION/TRAINING PROGRAM

## 2023-03-16 PROCEDURE — 6360000002 HC RX W HCPCS

## 2023-03-16 PROCEDURE — 96361 HYDRATE IV INFUSION ADD-ON: CPT

## 2023-03-16 PROCEDURE — 96375 TX/PRO/DX INJ NEW DRUG ADDON: CPT

## 2023-03-16 PROCEDURE — 96376 TX/PRO/DX INJ SAME DRUG ADON: CPT

## 2023-03-16 PROCEDURE — 1200000000 HC SEMI PRIVATE

## 2023-03-16 PROCEDURE — S5553 INSULIN LONG ACTING 5 U: HCPCS

## 2023-03-16 PROCEDURE — 99221 1ST HOSP IP/OBS SF/LOW 40: CPT | Performed by: INTERNAL MEDICINE

## 2023-03-16 PROCEDURE — 80061 LIPID PANEL: CPT

## 2023-03-16 PROCEDURE — 80076 HEPATIC FUNCTION PANEL: CPT

## 2023-03-16 PROCEDURE — 36415 COLL VENOUS BLD VENIPUNCTURE: CPT

## 2023-03-16 PROCEDURE — 2580000003 HC RX 258: Performed by: STUDENT IN AN ORGANIZED HEALTH CARE EDUCATION/TRAINING PROGRAM

## 2023-03-16 RX ORDER — SODIUM CHLORIDE 0.9 % (FLUSH) 0.9 %
5-40 SYRINGE (ML) INJECTION EVERY 12 HOURS SCHEDULED
Status: DISCONTINUED | OUTPATIENT
Start: 2023-03-16 | End: 2023-03-16

## 2023-03-16 RX ORDER — POTASSIUM CHLORIDE 7.45 MG/ML
10 INJECTION INTRAVENOUS
Status: DISPENSED | OUTPATIENT
Start: 2023-03-16 | End: 2023-03-16

## 2023-03-16 RX ORDER — INSULIN LISPRO 100 [IU]/ML
0-4 INJECTION, SOLUTION INTRAVENOUS; SUBCUTANEOUS EVERY 6 HOURS
Status: DISCONTINUED | OUTPATIENT
Start: 2023-03-16 | End: 2023-03-16

## 2023-03-16 RX ORDER — ENOXAPARIN SODIUM 100 MG/ML
30 INJECTION SUBCUTANEOUS 2 TIMES DAILY
Status: DISCONTINUED | OUTPATIENT
Start: 2023-03-17 | End: 2023-03-17 | Stop reason: HOSPADM

## 2023-03-16 RX ORDER — ACETAMINOPHEN 650 MG/1
650 SUPPOSITORY RECTAL EVERY 6 HOURS PRN
Status: DISCONTINUED | OUTPATIENT
Start: 2023-03-16 | End: 2023-03-16

## 2023-03-16 RX ORDER — INSULIN LISPRO 100 [IU]/ML
0-8 INJECTION, SOLUTION INTRAVENOUS; SUBCUTANEOUS
Status: DISCONTINUED | OUTPATIENT
Start: 2023-03-16 | End: 2023-03-17 | Stop reason: HOSPADM

## 2023-03-16 RX ORDER — ONDANSETRON 4 MG/1
4 TABLET, ORALLY DISINTEGRATING ORAL EVERY 8 HOURS PRN
Status: DISCONTINUED | OUTPATIENT
Start: 2023-03-16 | End: 2023-03-17 | Stop reason: HOSPADM

## 2023-03-16 RX ORDER — SODIUM CHLORIDE 9 MG/ML
INJECTION, SOLUTION INTRAVENOUS PRN
Status: DISCONTINUED | OUTPATIENT
Start: 2023-03-16 | End: 2023-03-17 | Stop reason: HOSPADM

## 2023-03-16 RX ORDER — SODIUM CHLORIDE 0.9 % (FLUSH) 0.9 %
5-40 SYRINGE (ML) INJECTION PRN
Status: DISCONTINUED | OUTPATIENT
Start: 2023-03-16 | End: 2023-03-16

## 2023-03-16 RX ORDER — CALCIUM GLUCONATE 94 MG/ML
1000 INJECTION, SOLUTION INTRAVENOUS ONCE
Status: COMPLETED | OUTPATIENT
Start: 2023-03-16 | End: 2023-03-16

## 2023-03-16 RX ORDER — ONDANSETRON 2 MG/ML
4 INJECTION INTRAMUSCULAR; INTRAVENOUS EVERY 6 HOURS PRN
Status: DISCONTINUED | OUTPATIENT
Start: 2023-03-16 | End: 2023-03-17 | Stop reason: HOSPADM

## 2023-03-16 RX ORDER — BUPRENORPHINE HYDROCHLORIDE AND NALOXONE HYDROCHLORIDE DIHYDRATE 8; 2 MG/1; MG/1
1 TABLET SUBLINGUAL 2 TIMES DAILY
Status: DISCONTINUED | OUTPATIENT
Start: 2023-03-16 | End: 2023-03-17 | Stop reason: HOSPADM

## 2023-03-16 RX ORDER — INSULIN LISPRO 100 [IU]/ML
10 INJECTION, SOLUTION INTRAVENOUS; SUBCUTANEOUS
Status: DISCONTINUED | OUTPATIENT
Start: 2023-03-16 | End: 2023-03-16

## 2023-03-16 RX ORDER — ENOXAPARIN SODIUM 100 MG/ML
30 INJECTION SUBCUTANEOUS 2 TIMES DAILY
Status: DISCONTINUED | OUTPATIENT
Start: 2023-03-16 | End: 2023-03-16

## 2023-03-16 RX ORDER — INSULIN GLARGINE-YFGN 100 [IU]/ML
20 INJECTION, SOLUTION SUBCUTANEOUS NIGHTLY
Status: DISCONTINUED | OUTPATIENT
Start: 2023-03-16 | End: 2023-03-17 | Stop reason: HOSPADM

## 2023-03-16 RX ORDER — ACETAMINOPHEN 325 MG/1
650 TABLET ORAL EVERY 6 HOURS PRN
Status: DISCONTINUED | OUTPATIENT
Start: 2023-03-16 | End: 2023-03-16

## 2023-03-16 RX ORDER — POLYETHYLENE GLYCOL 3350 17 G/17G
17 POWDER, FOR SOLUTION ORAL DAILY PRN
Status: DISCONTINUED | OUTPATIENT
Start: 2023-03-16 | End: 2023-03-17 | Stop reason: HOSPADM

## 2023-03-16 RX ORDER — INSULIN LISPRO 100 [IU]/ML
8 INJECTION, SOLUTION INTRAVENOUS; SUBCUTANEOUS
Status: DISCONTINUED | OUTPATIENT
Start: 2023-03-16 | End: 2023-03-17 | Stop reason: HOSPADM

## 2023-03-16 RX ADMIN — INSULIN LISPRO 4 UNITS: 100 INJECTION, SOLUTION INTRAVENOUS; SUBCUTANEOUS at 14:12

## 2023-03-16 RX ADMIN — SODIUM CHLORIDE, POTASSIUM CHLORIDE, SODIUM LACTATE AND CALCIUM CHLORIDE: 600; 310; 30; 20 INJECTION, SOLUTION INTRAVENOUS at 01:00

## 2023-03-16 RX ADMIN — INSULIN GLARGINE-YFGN 20 UNITS: 100 INJECTION, SOLUTION SUBCUTANEOUS at 21:05

## 2023-03-16 RX ADMIN — CARBAMAZEPINE 100 MG: 100 TABLET, EXTENDED RELEASE ORAL at 13:58

## 2023-03-16 RX ADMIN — HYDROMORPHONE HYDROCHLORIDE 0.5 MG: 1 INJECTION, SOLUTION INTRAMUSCULAR; INTRAVENOUS; SUBCUTANEOUS at 14:06

## 2023-03-16 RX ADMIN — ATORVASTATIN CALCIUM 40 MG: 40 TABLET, FILM COATED ORAL at 13:58

## 2023-03-16 RX ADMIN — ASPIRIN 81 MG 81 MG: 81 TABLET ORAL at 13:59

## 2023-03-16 RX ADMIN — INSULIN LISPRO 8 UNITS: 100 INJECTION, SOLUTION INTRAVENOUS; SUBCUTANEOUS at 18:32

## 2023-03-16 RX ADMIN — OMEGA-3-ACID ETHYL ESTERS 2 G: 900 CAPSULE ORAL at 21:02

## 2023-03-16 RX ADMIN — CALCIUM GLUCONATE 1000 MG: 98 INJECTION, SOLUTION INTRAVENOUS at 02:42

## 2023-03-16 RX ADMIN — POTASSIUM CHLORIDE 10 MEQ: 7.46 INJECTION, SOLUTION INTRAVENOUS at 05:08

## 2023-03-16 RX ADMIN — POTASSIUM CHLORIDE 10 MEQ: 7.46 INJECTION, SOLUTION INTRAVENOUS at 06:47

## 2023-03-16 RX ADMIN — BUPRENORPHINE HYDROCHLORIDE AND NALOXONE HYDROCHLORIDE DIHYDRATE 1 TABLET: 8; 2 TABLET SUBLINGUAL at 21:02

## 2023-03-16 RX ADMIN — POTASSIUM CHLORIDE 10 MEQ: 7.46 INJECTION, SOLUTION INTRAVENOUS at 04:05

## 2023-03-16 RX ADMIN — POTASSIUM CHLORIDE 10 MEQ: 7.46 INJECTION, SOLUTION INTRAVENOUS at 02:53

## 2023-03-16 RX ADMIN — Medication 500 MG: at 21:02

## 2023-03-16 RX ADMIN — HYDROMORPHONE HYDROCHLORIDE 0.5 MG: 1 INJECTION, SOLUTION INTRAMUSCULAR; INTRAVENOUS; SUBCUTANEOUS at 06:50

## 2023-03-16 RX ADMIN — HYDROMORPHONE HYDROCHLORIDE 0.5 MG: 1 INJECTION, SOLUTION INTRAMUSCULAR; INTRAVENOUS; SUBCUTANEOUS at 00:29

## 2023-03-16 RX ADMIN — INSULIN LISPRO 8 UNITS: 100 INJECTION, SOLUTION INTRAVENOUS; SUBCUTANEOUS at 18:35

## 2023-03-16 RX ADMIN — ENOXAPARIN SODIUM 40 MG: 100 INJECTION SUBCUTANEOUS at 09:31

## 2023-03-16 RX ADMIN — INSULIN LISPRO 4 UNITS: 100 INJECTION, SOLUTION INTRAVENOUS; SUBCUTANEOUS at 02:42

## 2023-03-16 ASSESSMENT — PAIN DESCRIPTION - FREQUENCY: FREQUENCY: CONTINUOUS

## 2023-03-16 ASSESSMENT — PAIN SCALES - GENERAL
PAINLEVEL_OUTOF10: 4
PAINLEVEL_OUTOF10: 8
PAINLEVEL_OUTOF10: 8
PAINLEVEL_OUTOF10: 0
PAINLEVEL_OUTOF10: 8

## 2023-03-16 ASSESSMENT — PAIN DESCRIPTION - LOCATION
LOCATION: ABDOMEN
LOCATION: ABDOMEN;BACK
LOCATION: BACK

## 2023-03-16 ASSESSMENT — PAIN DESCRIPTION - DESCRIPTORS
DESCRIPTORS: ACHING
DESCRIPTORS: CRAMPING;DISCOMFORT;ACHING
DESCRIPTORS: STABBING;BURNING

## 2023-03-16 ASSESSMENT — PAIN - FUNCTIONAL ASSESSMENT: PAIN_FUNCTIONAL_ASSESSMENT: PREVENTS OR INTERFERES SOME ACTIVE ACTIVITIES AND ADLS

## 2023-03-16 ASSESSMENT — PAIN DESCRIPTION - PAIN TYPE: TYPE: ACUTE PAIN

## 2023-03-16 ASSESSMENT — PAIN DESCRIPTION - ORIENTATION
ORIENTATION: LOWER
ORIENTATION: ANTERIOR

## 2023-03-16 ASSESSMENT — PAIN DESCRIPTION - ONSET: ONSET: ON-GOING

## 2023-03-16 NOTE — PROGRESS NOTES
DVT Prophylaxis Adjustment Policy (DVT Prophylaxis)     This patient is on DVT Prophylaxis medication that requires a dose adjustment      Date Body Weight IBW  Adjusted BW SCr  CrCl Dialysis status   3/16/2023 238 lb (108 kg) Ideal body weight: 93.7 kg (206 lb 9.1 oz)  Adjusted ideal body weight: 99.4 kg (219 lb 2.3 oz) Serum creatinine: 0.5 mg/dL (L) 03/16/23 0700  Estimated creatinine clearance: 263 mL/min (A) N/a       Pharmacy has dose-adjusted the DVT Prophylaxis regimen to match   the recommendations from the following table        Ordered Medication:Lovenox 40mg daily    Order Changed/converted to: Lovenox 30mg BID      These changes were made per protocol according to the [facility] Pharmacist   Review for Appropriate Use and Automatic Dose Adjustments of   Subcutaneous Anticoagulants Policy     *Please note this dose may need readjusted if patient's condition changes. Please contact pharmacy with any questions regarding these changes.     [address], RONNIE BROUSSARD [facility]  3/16/2023  11:12 AM

## 2023-03-16 NOTE — CARE COORDINATION
Case Management Assessment  Initial Evaluation    Date/Time of Evaluation: 3/16/2023 10:07 AM  Assessment Completed by: Aster Stahl RN    If patient is discharged prior to next notation, then this note serves as note for discharge by case management. Patient Name: Kolby Rogers                   YOB: 1985  Diagnosis: Acute recurrent pancreatitis [K85.90]                   Date / Time: 3/15/2023  6:17 PM    Patient Admission Status: Observation   Readmission Risk (Low < 19, Mod (19-27), High > 27): Readmission Risk Score: 15    Current PCP: Mia Sands MD  PCP verified by CM? Yes    Chart Reviewed: Yes      History Provided by: Patient  Patient Orientation: Alert and Oriented    Patient Cognition: Alert    Hospitalization in the last 30 days (Readmission):  No    If yes, Readmission Assessment in  Navigator will be completed. Advance Directives:      Code Status: Full Code   Patient's Primary Decision Maker is: Legal Next of Kin      Discharge Planning:    Patient lives with: Family Members Type of Home: House  Primary Care Giver: Self  Patient Support Systems include: Spouse/Significant Other, Children   Current Financial resources:    Current community resources:    Current services prior to admission: None            Current DME:              Type of Home Care services:  None    ADLS  Prior functional level: Independent in ADLs/IADLs  Current functional level: Independent in ADLs/IADLs    PT AM-PAC:   /24  OT AM-PAC:   /24    Family can provide assistance at DC: Yes  Would you like Case Management to discuss the discharge plan with any other family members/significant others, and if so, who?  No  Plans to Return to Present Housing: Yes  Other Identified Issues/Barriers to RETURNING to current housing:   Potential Assistance needed at discharge: N/A            Potential DME:    Patient expects to discharge to: [address] [place] [address] for transportation at discharge:      [facility] / Plan: [facility] [place] / Product Type: *No Product type* /     Does insurance require precert for SNF: Yes    Potential assistance Purchasing Medications:    Meds-to-Beds request: Yes      [facility]lo[facility] Lowery #[id number] Barry Bui [address]  [facility] [address]  Phone: [phone] Fax: [phone]    [address] [facility] [phone] - F [phone] [address] [facility] [address]  Phone: [phone] Fax: [phone]      Notes:    Factors facilitating achievement of predicted outcomes: Family support    Barriers to discharge: Pain    Additional Case Management Notes: The Plan for Transition of Care is related to the following treatment goals of Acute recurrent pancreatitis [U53.05]    IF APPLICABLE: The Patient and/or patient representative Patrick Milton and his family were provided with a choice of provider and agrees with the discharge plan. Freedom of choice list with basic dialogue that supports the patient's individualized plan of care/goals and shares the quality data associated with the providers was provided to:     Patient Representative Name:       The Patient and/or Patient Representative Agree with the Discharge Plan?       Pedro Cota RN  Case Management Department  Ph: [phone] Fax:

## 2023-03-16 NOTE — CARE COORDINATION
3/16 Care Coordination: Pt  presenting to the ED for abdominal pain. Does have history of pancreatitis. Concerned he is having pancreatitis again. Has past medical history of alcohol abuse, hepatitis C, CVA, polysubstance abuse, hypertension, vitamin D insufficiency, tobacco abuse, hypertriglyceridemia and T2DM. Spoke with pt in his Room. PTA is Independent. Lives with his partner Román Wood and 3 children. Pt still active with  endocrinology, He at this time is using Insulin Pen till his new Insulin pump gets approved. He is also seeing IM at the Clinic here. Plan is discharge home. No further needs. CM/SW will continue to follow for discharge planning.    Jesús GROSSN,RN--BC  [phone]

## 2023-03-16 NOTE — H&P
[facility]  Internal Medicine Residency Program  History and Physical    Patient:  Chitra Gray 42 y.o. male MRN: [id number]     Date of Service: 3/15/2023    Hospital Day: 1      Chief complaint: had concerns including Abdominal Pain (RUQ pain, pancreatitis x1 week ago, lower lumbar pain ). History of Present Illness   The patient is a 42 y.o. male with a past medical history of alcohol abuse, hepatitis C, CVA, polysubstance abuse, hypertension, vitamin D insufficiency, tobacco abuse, hypertriglyceridemia and T2DM presented to the ED for worsening abdominal pain for the past 4 to 5 days. Patient states about 4 days ago he was having some slight abdominal pain that he notices starting to feel similar to when he was previously admitted found to have pancreatitis. The pain was getting worse that yesterday the patient did come to the ER to get assessed and after waiting for multiple hours ended up going home before even being admitted. He stated he did have a few episodes of nausea and vomiting and the pain just continued to get worse especially when walking. He ultimately decided to come back in and fear that this pain was just getting too severe and similar to his previous episode back in February. While in the ED patient's vital signs are stable slight tachycardia a pulse of 111. Blood glucose was found to be 473 and lipase was elevated at 551 and elevated to 773. Beta hydroxybutyrate was 1.04 with UA showing trace ketones and glucose in the urine. Patient is alert and oriented and in immense pain. CBC was within normal limits. Lactic acid was 1.3 and afebrile. Patient was admitted for further management.       Past Medical History:      Diagnosis Date    Anxiety     Depression     Seizures ([address]     Unspecified cerebral artery occlusion with cerebral infarction        Past Surgical History:        Procedure Laterality Date    ABSCESS DRAINAGE Left 08/22/2016    Left Hand    BONE INCISION AND DRAINAGE Left 08/30/2016    HAND W/ WOUND VA C       Medications Prior to Admission:    Prior to Admission medications    Medication Sig Start Date End Date Taking? Authorizing Provider   Insulin Disposable Pump (OMNIPOD 5 G6 INTRO, GEN 5,) KIT As directed 3/3/23  Yes Joselin Kapoor MD   Insulin Disposable Pump (OMNIPOD 5 G6 POD, GEN 5,) MISC Change every day 3/3/23  Yes Joselin Kapoor MD   Continuous Blood Gluc Sensor (DEXCOM G6 SENSOR) MISC Sensor change every 10 days 2/21/23  Yes Joselin Kapoor MD   Continuous Blood Gluc Transmit (DEXCOM G6 TRANSMITTER) MISC Change every 90 days 2/21/23  Yes Joselin Kapoor MD   Continuous Blood Gluc  (DEXCOM G6 ) DIMA Use as directed 2/21/23  Yes Joselin Kapoor MD   insulin glargine (LANTUS SOLOSTAR) 100 UNIT/ML injection pen Inject 48 Units into the skin every morning 2/5/23  Yes Joselin Kapoor MD   Insulin Pen Needle (BD PEN NEEDLE MICRO U/F) 32G X 6 MM MISC Uses with insulin 4 times a day 2/5/23  Yes Joselin Kapoor MD   insulin lispro, 1 Unit Dial, (HUMALOG KWIKPEN) 100 UNIT/ML SOPN Inject 15 units with meals + following sliding scale. -200 add 3U, -250 add 6U, -300 add 9U, -350 add 12U, -400 add 15U, BS over 400 add 18U.  MAX 75u/day 2/5/23  Yes Joselin Kapoor MD   atorvastatin (LIPITOR) 40 MG tablet Take 1 tablet by mouth daily 2/5/23  Yes Joselin Kapoor MD   fenofibrate (TRIGLIDE) 160 MG tablet Take 1 tablet by mouth daily 2/5/23  Yes Joselin Kapoor MD   niacin (SLO-NIACIN) 500 MG extended release tablet Take 1 tablet by mouth nightly 2/5/23  Yes Joselin Kapoor MD   omega-3 acid ethyl esters (LOVAZA) 1 g capsule Take 2 capsules by mouth 2 times daily 2/5/23  Yes Joselin Kapoor MD   aspirin 81 MG chewable tablet Take 1 tablet by mouth daily 2/6/23  Yes Ana Duran MD   losartan (COZAAR) 25 MG tablet Take 1 tablet by mouth daily 2/6/23  Yes Ana Duran MD Lancets 30G MISC 4 boxes by Does not apply route daily 2/5/23  Yes Isa Soliman MD   blood glucose monitor strips Test 4 times a day & as needed for symptoms of irregular blood glucose. Dispense sufficient amount for indicated testing frequency plus additional to accommodate PRN testing needs. 2/5/23  Yes Isa Soliman MD   Alcohol Swabs 70 % PADS 4 boxes by Does not apply route 4 times daily 2/5/23  Yes Isa Soliman MD   vitamin D (CHOLECALCIFEROL) 50 MCG (2000 UT) TABS tablet Take 1 tablet by mouth daily 2/6/23  Yes Isa Soliman MD   carBAMazepine (TEGRETOL XR) 100 MG extended release tablet Take 100 mg by mouth every morning   Yes Historical Provider, MD   buprenorphine-naloxone (SUBOXONE) 8-2 MG FILM SL film Place 1 Film under the tongue in the morning and at bedtime   Yes Historical Provider, MD   glucose monitoring (FREESTYLE FREEDOM) kit 1 kit by Does not apply route once for 1 dose 2/5/23 2/5/23  Isa Soliman MD       Allergies:  Patient has no known allergies. Social History:   TOBACCO:   reports that he has been smoking cigarettes. He has a 9.50 pack-year smoking history. He has never used smokeless tobacco.  ETOH:   reports no history of alcohol use. Family History:       Problem Relation Age of Onset    Kidney Disease Mother     Heart Disease Father        REVIEW OF SYSTEMS:    Constitutional: No fever, no chills, + change in weight; good appetite  HEENT: No blurred vision, no ear problems, no sore throat, no rhinorrhea. Respiratory: No cough, no sputum production, no pleuritic chest pain, no shortness of breath  Cardiology: No angina, no dyspnea on exertion, no paroxysmal nocturnal dyspnea, no orthopnea, no palpitation, no leg swelling. Gastroenterology: No dysphagia, no reflux; + abdominal pain, + nausea or vomiting; no constipation or diarrhea.  No hematochezia   Genitourinary: No dysuria, no frequency, hesitancy; no hematuria  Musculoskeletal: no joint pain, no myalgia, no change in range of movement  Neurology: no focal weakness in extremities, no slurred speech, no double vision, no tingling or numbness sensation  Endocrinology: no temperature intolerance, no polyphagia, polydipsia or polyuria  Hematology: no increased bleeding, no bruising, no lymphadenopathy  Skin: no skin changes noticed by patient  Psychology: no depressed mood, no suicidal ideation    Physical Exam   Vitals: /75   Pulse 90   Temp 97.8 °F (36.6 °C)   Resp 20   SpO2 96%     General Appearance: alert and oriented to person, place and time, well developed and well- nourished, in no acute distress   Skin: warm and dry, no rash or erythema  Head: normocephalic and atraumatic  Neck: supple and non-tender without mass, no thyromegaly or thyroid nodules, no cervical lymphadenopathy  Pulmonary/Chest: clear to auscultation bilaterally- no wheezes, rales or rhonchi, normal air movement, no respiratory distress  Cardiovascular: normal rate, regular rhythm, normal S1 and S2, no murmurs, rubs, clicks, or gallops,  Abdomen: soft, non-distended, normal bowel sounds, no masses or organomegaly; right sided and epigastric  tenderness with severe tenderness in RUQ   Extremities: no cyanosis, clubbing or edema  Musculoskeletal: normal range of motion, no joint swelling, deformity or tenderness  Neurologic: Alert and oriented with appropriate thought content  Labs and Imaging Studies   Basic Labs  Recent Labs     03/14/23  0900 03/15/23  1244   * 133   K 4.4 4.3   CL 94* 100   CO2 22 21*   BUN 18 18   CREATININE 0.7 0.6*   GLUCOSE 473* 452*   CALCIUM 9.6 9.3       Recent Labs     03/14/23  1119 03/15/23  1244   WBC 10.6 9.8   RBC 5.58 5.27   HGB 15.3 14.6   HCT 46.1 43.3   MCV 82.6 82.2   MCH 27.4 27.7   MCHC 33.2 33.7   RDW 14.0 13.7    308   MPV 9.3 9.3         Imaging Studies:  CT ABDOMEN PELVIS WO CONTRAST Additional Contrast? Radiologist Recommendation    (Results Pending)          Resident's Assessment and Plan     Assessment and Plan:    Abdominal pain 2/2 acute pancreatitis, rule out cholecystitis  Lipase 733 on admission with complaints of worsening abdominal pain for the past 5 days  Previously admitted with pancreatitis in February  History of hypertriglyceridemia and smoking  CT abdomen ordered, follow results  BISAP score 0  Consider ultrasound abdomen/RUQ  Keep n.p.o. and use pain control with Dilaudid panel  Continue fluids at 175 cc/h of LR      Hyperglycemia 2/2 type 2 diabetes  Blood glucose on admission 473  Previously admitted for DKA back in February  No anion gap, beta hydroxybutyrate 1.04 with trace ketones on UA  Monitor blood glucose every 2 H while NPO    T2 DM  Home regimen of: 48 units Lantus, and 14 units premeals  Last A1c > 16.5 2/1/23  Follows with endocrinology as outpatient    Hypertriglyceridemia  On  lipitor, tricor, niacin, lovaza at home    History of CVA  History of stroke as an infant with residual right-sided hand weak dexterity and sensation  On carbamazepine    History of hypertension  On losartan at home; on hold    History of vitamin D insufficiency  On vitamin D supplements at home    Tobacco abuse  Smokes about half pack a day  Nicotine patch ordered    History of alcohol abuse  Last drink 8 years ago    History of polysubstance abuse  On Suboxone and follows with on-demand        PT/OT evaluation: not indicated at this time   DVT prophylaxis/ GI prophylaxis: Lovenox  Disposition: admit to House team 1     Yoly Clayton MD, PGY-1  Attending physician: Dr. Grace    Precepted with Dr. Younger

## 2023-03-16 NOTE — PROGRESS NOTES
[facility]  Internal Medicine Residency Program  Progress Note - House Team 1    Patient:  Juancho Sandoval 42 y.o. male MRN: [id number]     Date of Service: 3/16/2023     CC: Abdominal pain  Overnight events: None     Subjective     I have seen and examine the patient at bed side. Patient states of having mild right sided abdominal pain. He moved his bowel today in the morning. Denies any chest pain, SOB. He would like to eat, reassured that clear diet is going to be ordered, and will advance gradually as he tolerates. Educated patient about smoking cessation, patient doesn't have any plan to quit now. Objective     Physical Exam:  Vitals: BP (!) 128/95   Pulse 80   Temp 97.9 °F (36.6 °C) (Temporal)   Resp 18   Ht 6' 7\" (2.007 m)   Wt 238 lb (108 kg)   SpO2 97%   BMI 26.81 kg/m²     I & O - 24hr: I/O this shift:  In: 1050 [I.V.:1050]  Out: -    General Appearance: alert, appears stated age, and cooperative  HEENT:  Head: Normal, normocephalic, atraumatic. Neck: no adenopathy, no carotid bruit, no JVD, supple, symmetrical, trachea midline, and thyroid not enlarged, symmetric, no tenderness/mass/nodules  Lung: clear to auscultation bilaterally  Heart: regular rate and rhythm, S1, S2 normal, no murmur, click, rub or gallop  Abdomen:  Mildly tender in epigastrium, and bowel sound present  Extremities:  extremities normal, atraumatic, no cyanosis or edema  Musculokeletal: No joint swelling, no muscle tenderness. ROM normal in all joints of extremities.    Neurologic: Mental status: Alert, oriented, thought content appropriate  Subject  Pertinent Labs & Imaging Studies   mildred  CBC with Differential:    Lab Results   Component Value Date/Time    WBC 8.1 03/16/2023 06:59 AM    RBC 4.60 03/16/2023 06:59 AM    HGB 13.0 03/16/2023 06:59 AM    HCT 39.2 03/16/2023 06:59 AM     03/16/2023 06:59 AM    MCV 85.2 03/16/2023 06:59 AM    MCH 28.3 03/16/2023 06:59 AM    MCHC 33.2 03/16/2023 06:59 AM RDW 13.9 03/16/2023 06:59 AM    NRBC 1.8 02/01/2023 07:26 AM    SEGSPCT 72 10/12/2012 07:40 PM    BANDSPCT 1 08/20/2016 05:08 PM    METASPCT 1.8 02/01/2023 07:26 AM    LYMPHOPCT 42.2 03/16/2023 06:59 AM    MONOPCT 6.3 03/16/2023 06:59 AM    BASOPCT 0.5 03/16/2023 06:59 AM    MONOSABS 0.51 03/16/2023 06:59 AM    LYMPHSABS 3.42 03/16/2023 06:59 AM    EOSABS 0.20 03/16/2023 06:59 AM    BASOSABS 0.04 03/16/2023 06:59 AM     CMP:    Lab Results   Component Value Date/Time     03/16/2023 07:00 AM    K 4.1 03/16/2023 07:00 AM    K 4.3 03/15/2023 12:44 PM     03/16/2023 07:00 AM    CO2 22 03/16/2023 07:00 AM    BUN 11 03/16/2023 07:00 AM    CREATININE 0.5 03/16/2023 07:00 AM    GFRAA >60 07/22/2022 11:07 PM    LABGLOM >60 03/16/2023 07:00 AM    GLUCOSE 174 03/16/2023 07:00 AM    PROT 6.4 03/16/2023 07:00 AM    LABALBU 3.2 03/16/2023 07:00 AM    CALCIUM 8.6 03/16/2023 07:00 AM    BILITOT 0.4 03/16/2023 07:00 AM    ALKPHOS 104 03/16/2023 07:00 AM    AST 31 03/16/2023 07:00 AM    ALT 40 03/16/2023 07:00 AM       Resident's Assessment and Plan     Lynn Au is a 42 y.o. male    Abdominal pain 2/2 acute pancreatitis in the setting of hypertriglyceridemia, improving  -Patient has recent hx of admission due to pancreatitis  -Has history of hypertriglyceridemia and smoking  -CT abdomen done on 3/15/23 shows acute pancreatitis, no cholelithiasis or biliary dilation is seen.  -Patient is on symptomatic management, dilaudid for pain, and started on clear liquid diet, advance to low fat diet if tolerates.  -Follow up lipid panle    2. Hypertriglyceridemia  -On lipitor, tricor, niacin, lovaza at home, continue   -follow up lipid panel. 3. Type 2 DM  -Home regimen: 48 U Lantus, and 14 U pre meals  -Started on 20 U Lantus, 8 U of Lispro, and MDSS  -Check BS ACHS    4. Hyperglycemia 2/2 type 2 diabetes  -On admission blood sugar 473  -No anion gap, beta hydroxy butyrate normal, trace ketones on UA  -BS ACHS    5. History of CVA  -Residual right-sided hand weak and sensation    6. History of hypertension  -Losartan at home, continue, BP stable    7. History of vitamin D deficiency  -Continue vitamin D supplements    8. Tobacco abuse  -Smokes about half a pack  -nicotine patch ordered    9. History of alcohol abuse  -last drink 8 years ago    10.  History of polysubstance abuse  -On Suboxone    PT/OT evaluation: Not indicated at this time  DVT prophylaxis/ GI prophylaxis: Lovenox  Disposition: home   Byron Arechiga MD, PGY-1  Attending physician: Dr. Natalya Dyer

## 2023-03-16 NOTE — PLAN OF CARE
Pt transferred from PACU to L205 in stable condition via bed with infant in mother's arms. ID bands reviewed with DEVENDRA Valdez    Pt wanted to leave home. Spoke and examined the patient at bedside. According to the patient, pain is getting less severe, than it was on admission, mild tenderness on abdominal examination remains. Mild pain in lower back noted, but is different in nature from his pain during episode of pancreatitis and most likely Msk origin. Pt was tolerating liquid diet well, no nausea, vomiting. Diet was advanced to low carb diet. Pt was agreeable to stay overnight, to monitor clinical picture and asked to be discharged next day if everything remains stable and progresses good.   Pt will need excuse for work for days he spent in the hospital.    Electronically signed by Keron Hope MD on 3/16/2023 at 7:25 PM

## 2023-03-16 NOTE — PROGRESS NOTES
[address] [facility]  Internal Medicine Residency / [address]    Attending Physician Statement  I have discussed the case, including pertinent history and exam findings with the resident and the team.  I have seen and examined the patient and the key elements of the encounter have been performed by me. I agree with the assessment, plan and orders as documented by the resident. Acute pancreatitis and abstinence from ETOH  A&O  Severely elevated TG   30 pound weight loss and hx of DM2  Pain improving on IV hydration  VS stable   Impression: HLD as cause of pancreatitis                     VS gallstones   Plan; Continue same           US GB and CBD           Aggressive treatment of Lipids           Review meds  Remainder of medical problems as per resident note.       Fatemeh Cardona MD [facility] SPECIALTY [facility]  Internal Medicine Residency Faculty

## 2023-03-16 NOTE — PLAN OF CARE
Problem: Chronic Conditions and Co-morbidities  Goal: Patient's chronic conditions and co-morbidity symptoms are monitored and maintained or improved  Outcome: Progressing     Problem: Risk for Elopement  Goal: Patient will not exit the unit/facility without proper excort  Outcome: Completed

## 2023-03-17 VITALS
RESPIRATION RATE: 16 BRPM | HEIGHT: 78 IN | SYSTOLIC BLOOD PRESSURE: 113 MMHG | DIASTOLIC BLOOD PRESSURE: 75 MMHG | WEIGHT: 238 LBS | BODY MASS INDEX: 27.54 KG/M2 | HEART RATE: 70 BPM | TEMPERATURE: 98.8 F | OXYGEN SATURATION: 96 %

## 2023-03-17 LAB
ALBUMIN SERPL-MCNC: 3.5 G/DL (ref 3.5–5.2)
ALP SERPL-CCNC: 118 U/L (ref 40–129)
ALT SERPL-CCNC: 40 U/L (ref 0–40)
ANION GAP SERPL CALCULATED.3IONS-SCNC: 10 MMOL/L (ref 7–16)
AST SERPL-CCNC: 25 U/L (ref 0–39)
BASOPHILS # BLD: 0.02 E9/L (ref 0–0.2)
BASOPHILS NFR BLD: 0.3 % (ref 0–2)
BILIRUB DIRECT SERPL-MCNC: <0.2 MG/DL (ref 0–0.3)
BILIRUB INDIRECT SERPL-MCNC: NORMAL MG/DL (ref 0–1)
BILIRUB SERPL-MCNC: 0.5 MG/DL (ref 0–1.2)
BUN SERPL-MCNC: 6 MG/DL (ref 6–20)
CA-I BLD-SCNC: 1.26 MMOL/L (ref 1.15–1.33)
CALCIUM SERPL-MCNC: 8.7 MG/DL (ref 8.6–10.2)
CHLORIDE SERPL-SCNC: 108 MMOL/L (ref 98–107)
CO2 SERPL-SCNC: 22 MMOL/L (ref 22–29)
CREAT SERPL-MCNC: 0.7 MG/DL (ref 0.7–1.2)
EOSINOPHIL # BLD: 0.13 E9/L (ref 0.05–0.5)
EOSINOPHIL NFR BLD: 2 % (ref 0–6)
ERYTHROCYTE [DISTWIDTH] IN BLOOD BY AUTOMATED COUNT: 13.6 FL (ref 11.5–15)
GLUCOSE SERPL-MCNC: 343 MG/DL (ref 74–99)
HCT VFR BLD AUTO: 38.7 % (ref 37–54)
HGB BLD-MCNC: 12.9 G/DL (ref 12.5–16.5)
IMM GRANULOCYTES # BLD: 0.03 E9/L
IMM GRANULOCYTES NFR BLD: 0.5 % (ref 0–5)
LYMPHOCYTES # BLD: 2.68 E9/L (ref 1.5–4)
LYMPHOCYTES NFR BLD: 42.1 % (ref 20–42)
MCH RBC QN AUTO: 28 PG (ref 26–35)
MCHC RBC AUTO-ENTMCNC: 33.3 % (ref 32–34.5)
MCV RBC AUTO: 84.1 FL (ref 80–99.9)
METER GLUCOSE: 243 MG/DL (ref 74–99)
METER GLUCOSE: 267 MG/DL (ref 74–99)
METER GLUCOSE: 317 MG/DL (ref 74–99)
MONOCYTES # BLD: 0.44 E9/L (ref 0.1–0.95)
MONOCYTES NFR BLD: 6.9 % (ref 2–12)
NEUTROPHILS # BLD: 3.07 E9/L (ref 1.8–7.3)
NEUTS SEG NFR BLD: 48.2 % (ref 43–80)
PLATELET # BLD AUTO: 251 E9/L (ref 130–450)
PMV BLD AUTO: 9.4 FL (ref 7–12)
POTASSIUM SERPL-SCNC: 4.1 MMOL/L (ref 3.5–5)
PROT SERPL-MCNC: 6.9 G/DL (ref 6.4–8.3)
RBC # BLD AUTO: 4.6 E12/L (ref 3.8–5.8)
SODIUM SERPL-SCNC: 140 MMOL/L (ref 132–146)
WBC # BLD: 6.4 E9/L (ref 4.5–11.5)

## 2023-03-17 PROCEDURE — 6370000000 HC RX 637 (ALT 250 FOR IP)

## 2023-03-17 PROCEDURE — 36415 COLL VENOUS BLD VENIPUNCTURE: CPT

## 2023-03-17 PROCEDURE — 99231 SBSQ HOSP IP/OBS SF/LOW 25: CPT | Performed by: INTERNAL MEDICINE

## 2023-03-17 PROCEDURE — 6360000002 HC RX W HCPCS

## 2023-03-17 PROCEDURE — 80076 HEPATIC FUNCTION PANEL: CPT

## 2023-03-17 PROCEDURE — 6370000000 HC RX 637 (ALT 250 FOR IP): Performed by: STUDENT IN AN ORGANIZED HEALTH CARE EDUCATION/TRAINING PROGRAM

## 2023-03-17 PROCEDURE — 85025 COMPLETE CBC W/AUTO DIFF WBC: CPT

## 2023-03-17 PROCEDURE — 82330 ASSAY OF CALCIUM: CPT

## 2023-03-17 PROCEDURE — 80048 BASIC METABOLIC PNL TOTAL CA: CPT

## 2023-03-17 PROCEDURE — 82962 GLUCOSE BLOOD TEST: CPT

## 2023-03-17 RX ORDER — INSULIN LISPRO 100 [IU]/ML
6 INJECTION, SOLUTION INTRAVENOUS; SUBCUTANEOUS ONCE
Status: COMPLETED | OUTPATIENT
Start: 2023-03-17 | End: 2023-03-17

## 2023-03-17 RX ORDER — URSODIOL 300 MG/1
300 CAPSULE ORAL DAILY
Qty: 90 CAPSULE | Refills: 0 | Status: SHIPPED | OUTPATIENT
Start: 2023-03-17 | End: 2023-06-15

## 2023-03-17 RX ADMIN — FENOFIBRATE 160 MG: 160 TABLET ORAL at 09:01

## 2023-03-17 RX ADMIN — OMEGA-3-ACID ETHYL ESTERS 2 G: 900 CAPSULE ORAL at 09:02

## 2023-03-17 RX ADMIN — INSULIN LISPRO 2 UNITS: 100 INJECTION, SOLUTION INTRAVENOUS; SUBCUTANEOUS at 12:05

## 2023-03-17 RX ADMIN — ENOXAPARIN SODIUM 30 MG: 100 INJECTION SUBCUTANEOUS at 09:00

## 2023-03-17 RX ADMIN — Medication 2000 UNITS: at 09:00

## 2023-03-17 RX ADMIN — BUPRENORPHINE HYDROCHLORIDE AND NALOXONE HYDROCHLORIDE DIHYDRATE 1 TABLET: 8; 2 TABLET SUBLINGUAL at 09:00

## 2023-03-17 RX ADMIN — INSULIN LISPRO 8 UNITS: 100 INJECTION, SOLUTION INTRAVENOUS; SUBCUTANEOUS at 09:05

## 2023-03-17 RX ADMIN — INSULIN LISPRO 4 UNITS: 100 INJECTION, SOLUTION INTRAVENOUS; SUBCUTANEOUS at 09:11

## 2023-03-17 RX ADMIN — ASPIRIN 81 MG 81 MG: 81 TABLET ORAL at 09:01

## 2023-03-17 RX ADMIN — INSULIN LISPRO 6 UNITS: 100 INJECTION, SOLUTION INTRAVENOUS; SUBCUTANEOUS at 04:39

## 2023-03-17 RX ADMIN — INSULIN LISPRO 8 UNITS: 100 INJECTION, SOLUTION INTRAVENOUS; SUBCUTANEOUS at 12:01

## 2023-03-17 RX ADMIN — LOSARTAN POTASSIUM 25 MG: 25 TABLET, FILM COATED ORAL at 09:01

## 2023-03-17 RX ADMIN — ATORVASTATIN CALCIUM 40 MG: 40 TABLET, FILM COATED ORAL at 09:00

## 2023-03-17 RX ADMIN — CARBAMAZEPINE 100 MG: 100 TABLET, EXTENDED RELEASE ORAL at 09:00

## 2023-03-17 ASSESSMENT — ENCOUNTER SYMPTOMS
COUGH: 0
NAUSEA: 0
BLOOD IN STOOL: 0
BACK PAIN: 0
SHORTNESS OF BREATH: 0
ABDOMINAL PAIN: 0
CONSTIPATION: 0
DIARRHEA: 0
VOMITING: 0

## 2023-03-17 ASSESSMENT — PAIN SCALES - GENERAL: PAINLEVEL_OUTOF10: 0

## 2023-03-17 NOTE — PROGRESS NOTES
CLINICAL PHARMACY NOTE: MEDS TO BEDS    Total # of Prescriptions Filled: 1   The following medications were delivered to the patient:  Ursodiol 300mg    Additional Documentation:  Delivered to patient 3-17-23 @12:10pm

## 2023-03-17 NOTE — DISCHARGE SUMMARY
[address]  Discharge Summary    PCP: Sukh Elder MD    Admit Date:3/15/2023  Discharge Date: 3/17/2023    Chief Complaint   Patient presents with    Abdominal Pain     RUQ pain, pancreatitis x1 week ago, lower lumbar pain          Admission Diagnosis:   Abdominal pain 2/2 acute pancreatitis, rule out cholecystitis  T2 DM  Hypertriglyceridemia  History of CVA  History of hypertension  History of vitamin D insufficiency  Tobacco abuse  History of alcohol abuse  History of polysubstance abuse    Discharge Diagnosis:  Abdominal pain 2/2 acute pancreatitis in the setting of possible microlithiasis of biliary sludge vs hypertriglyceridemia - resolved  Hypertriglyceridemia  Type 2 DM  History of CVA  History of hypertension  History of vitamin D deficiency  Tobacco abuse  History of alcohol abuse  History of polysubstance abuse    Hospital Course: The patient is a 38-year-old male with past medical history of alcohol abuse, hepatitis C, CVA, polysubstance abuse, hypertension, vitamin D deficiency, tobacco abuse, hypertriglyceridemia and type 2 diabetes mellitus presented to the ED for worsening abdominal pain for the past 4 to 5 days. Patient had abdominal pain similar to the last time he was admitted for pancreatitis in February. He also reported to having a few episodes of nausea and vomiting and then the pain just continued to get worse specially when walking. While in the ED the patient vitals were stable with slight tachycardia, a pulse of 111. BG was found to be 473 and lipase was elevated to 551 > 773. BHB was 1.04 with urinalysis showing trace ketones and glucose in the urine. Patient is alert and oriented and immense pain. CBC was within normal limits. Lactic acid was 1.3 and afebrile. CT abdomen and pelvis without contrast showed findings are compatible with acute pancreatitis. No cholelithiasis or biliary dilation is seen.     Pancreatitis was conservatively managed by n.p.o., fluid resuscitation with Ringer's lactate and pain control with Dilaudid. The patient lipid panel was repeated which showed a decrease in hypertriglyceridemia to 180 from 487. The patient has not drank alcohol in the last 8 years. He does not have a history of gallstones causing acute pancreatitis. It was discussed that the patient might be throwing microlithiasis in the biliary sludge. On his last admission patient had increased bilirubin in his urine which cleared automatically, he also had a bump in his LFTs which resolved on its own. Because of the above-mentioned reason the patient was started on ursodiol 300 mg daily to avoid further pancreatitis attacks. The patient diabetes was managed with 20 units Lantus nightly and 8 units of lispro with medium dose sliding scale. Which is half of the patient's home regimen. Patient's home regimen was resumed on discharge. Rest of her medical problems were managed by resuming home medications. On day of discharge :   Subjective:  Patient was seen by the bedside in the AM.  The patient denied any new complaint. He mentioned that he is ready to go home. He does not want to stay in hospital 1 day. Review of Systems   Constitutional:  Negative for chills and fever. HENT:  Negative for congestion. Respiratory:  Negative for cough and shortness of breath. Cardiovascular:  Negative for chest pain, palpitations and leg swelling. Gastrointestinal:  Negative for abdominal pain, blood in stool, constipation, diarrhea, nausea and vomiting. Musculoskeletal:  Negative for back pain and myalgias. Skin:  Negative for rash. Neurological:  Negative for dizziness and headaches. Psychiatric/Behavioral:  The patient is not nervous/anxious.          Significant findings (history and exam, laboratory, radiological, pathology, other tests):   Vitals: /75   Pulse 70   Temp 98.8 °F (37.1 °C) (Temporal)   Resp 16 Ht 6' 7\" (2.007 m)   Wt 238 lb (108 kg)   SpO2 96%   BMI 26.81 kg/m²       Constitutional: Alert, appears stated. Follows commands. In no apparent distress. Head: Normocephalic and atraumatic. Eyes: Conjunctiva normal, (-) scleral icterus. Mucus membranes moist.  Mouth: Mucus membranes moist. Oropharynx clear. No deviation of the tongue or uvula. Neck: (-)  swelling,   Respiratory: Lungs clear to auscultation bilaterally. (-)  wheezes, (-)  rales, (-)  rhonchi. Cardiovascular: RRR. (-)  murmurs, (-) gallops,  (-) rubs. S1 and S2 were normal.   GI:  Abdomen soft, non-tender, non-distended. (+) BS. (-) guarding, (-) rigidity. Extremities: Warm and well perfused. (-) clubbing, (-) cyanosis. (-) peripheral edema. Neurologic:  No focal neurological deficits. No speech slurring or tremor.       Pending test results: None    Consults:  None    Procedures:  None    Condition at discharge: Stable    Disposition: home    Time taken for discharge : < 30 mins [x] >30 mins []    Discharge Medications:  Discharge Medication List as of 3/17/2023 11:43 AM        START taking these medications    Details   ursodiol (ACTIGALL) 300 MG capsule Take 1 capsule by mouth daily, Disp-90 capsule, R-0Normal           CONTINUE these medications which have NOT CHANGED    Details   Insulin Disposable Pump (OMNIPOD 5 G6 INTRO, GEN 5,) KIT As directed, Disp-1 kit, R-0Normal      Insulin Disposable Pump (OMNIPOD 5 G6 POD, GEN 5,) MISC Change every day, Disp-30 each, R-3Normal      Continuous Blood Gluc Sensor (DEXCOM G6 SENSOR) MISC Sensor change every 10 days, Disp-9 each, R-3Normal      Continuous Blood Gluc Transmit (DEXCOM G6 TRANSMITTER) MISC Change every 90 days, Disp-4 each, R-3Normal      Continuous Blood Gluc  (DEXCOM G6 ) DIMA Use as directed, Disp-1 each, R-0Normal      insulin glargine (LANTUS SOLOSTAR) 100 UNIT/ML injection pen Inject 48 Units into the skin every morning, Disp-15 Adjustable Dose Pre-filled Pen Syringe, R-5Normal      Insulin Pen Needle (BD PEN NEEDLE MICRO U/F) 32G X 6 MM MISC Uses with insulin 4 times a day, Disp-250 each, R-5Normal      insulin lispro, 1 Unit Dial, (HUMALOG KWIKPEN) 100 UNIT/ML SOPN Inject 15 units with meals + following sliding scale. -200 add 3U, -250 add 6U, -300 add 9U, -350 add 12U, -400 add 15U, BS over 400 add 18U. MAX 75u/day, Disp-15 Adjustable Dose Pre-filled Pen Syringe, R-4Normal      atorvastatin (LIPITOR) 40 MG tablet Take 1 tablet by mouth daily, Disp-30 tablet, R-5Normal      fenofibrate (TRIGLIDE) 160 MG tablet Take 1 tablet by mouth daily, Disp-30 tablet, R-5Normal      niacin (SLO-NIACIN) 500 MG extended release tablet Take 1 tablet by mouth nightly, Disp-30 tablet, R-5Normal      omega-3 acid ethyl esters (LOVAZA) 1 g capsule Take 2 capsules by mouth 2 times daily, Disp-120 capsule, R-5Normal      aspirin 81 MG chewable tablet Take 1 tablet by mouth daily, Disp-30 tablet, R-3Normal      losartan (COZAAR) 25 MG tablet Take 1 tablet by mouth daily, Disp-30 tablet, R-3Normal      glucose monitoring (FREESTYLE FREEDOM) kit ONCE Starting Sun 2/5/2023, For 1 dose, Disp-1 kit, R-1, Normal      Lancets 30G MISC DAILY Starting Sun 2/5/2023, Disp-100 each, R-1, Normal      blood glucose monitor strips Test 4 times a day & as needed for symptoms of irregular blood glucose. Dispense sufficient amount for indicated testing frequency plus additional to accommodate PRN testing needs. , Disp-400 strip, R-1, Normal      Alcohol Swabs 70 % PADS 4 TIMES DAILY Starting Sun 2/5/2023, Disp-1 each, R-1, Normal      vitamin D (CHOLECALCIFEROL) 50 MCG (2000 UT) TABS tablet Take 1 tablet by mouth daily, Disp-90 tablet, R-1Normal      carBAMazepine (TEGRETOL XR) 100 MG extended release tablet Take 100 mg by mouth every morningHistorical Med      buprenorphine-naloxone (SUBOXONE) 8-2 MG FILM SL film Place 1 Film under the tongue in the morning and at bedtimeHistorical Med             Follow ups  Please follow up with your primary care physician ( @PCP@) within 10 days of discharge from hospital. Please call as soon as possible to make an appointment. Please contact the internal medicine clinic for an appointment if you are unable to get an appointment with your PCP.   Please keep all other follow up appointments:  Future Appointments   Date Time Provider Department Center   3/28/2023 10:45 AM Vielka Morales MD [facility]   4/26/2023  3:00 PM Edmar Kumar MD [facility]   5/17/2023  3:40 PM [facility]        Changes in healthcare   Please take all medications as indicated  Diet: low fat, low cholesterol diet   Activity: activity as tolerated  New Medications started during this hospital stay  ursodiol 300 MG capsule - Take 1 capsule by mouth daily  Changes to your medications  None  Medications you should stop taking   None  Additional labs, testing or imaging needed after discharge   None  Even if you are feeling better and not having symptoms do not stop taking antibiotic earlier then prescribed   Please contact us if you have any concerns, wish to change or make an appointment:  Internal medicine clinic   Phone: [phone]  Fax: [phone]  [facility] [place] [address]  Should you have further questions in regards to this visit, you can review your clinical note and after visit summary document on your [employer] account.     Other than any new prescriptions given to you today, the list of home medications on this After Visit Summary are based on information provided to us from you and your healthcare providers. This information, including the list, dose, and frequency of medications is only as accurate as the information you provided. If you have any questions or concerns about your home medications, please contact your Primary Care Physician for further clarification.    Everett Bruce MD  PGY 1    2:53 PM 3/17/2023

## 2023-03-17 NOTE — CARE COORDINATION
03/17/23 Update CM Note: Patient is taking orally today with no issues. Dr Combs Sow here to see patient with plan for possible discharge. He will have no home going needs.  Electronically signed by Robbie Gan RN CM on 3/17/2023 at 10:17 AM

## 2023-03-17 NOTE — PROGRESS NOTES
[facility]  Internal Medicine Residency Program  Progress Note - House Team     Patient:  Rossana Nathan 42 y.o. male MRN: [id number]     Date of Service: 3/17/2023     CC: Abdominal Pain     Days since admission: 3    Subjective     Overnight events: Patient was tolerating liquid diet so was advanced to low carb diet. Blood glucose was managed to 267    Patient appears in good spirits this AM. Expresses desire to be discharged. Denies any pain, nausea, or vomiting. Alert and oriented x 3. Objective     Physical Exam:  Vitals: /75   Pulse 70   Temp 98.8 °F (37.1 °C) (Temporal)   Resp 16   Ht 6' 7\" (2.007 m)   Wt 238 lb (108 kg)   SpO2 96%   BMI 26.81 kg/m²     I & O - 24hr:   Intake/Output Summary (Last 24 hours) at 3/17/2023 1114  Last data filed at 3/16/2023 1244  Gross per 24 hour   Intake 1050 ml   Output --   Net 1050 ml      General Appearance: alert, appears stated age, and cooperative  HEENT:  Head: Normal, normocephalic, atraumatic. Neck: no adenopathy, no JVD, and supple, symmetrical, trachea midline  Lung: clear to auscultation bilaterally  Heart: regular rate and rhythm, S1, S2 normal, no murmur, click, rub or gallop  Abdomen: soft, non-tender; bowel sounds normal; no masses,  no organomegaly  Extremities:  extremities normal, atraumatic, no cyanosis or edema  Musculokeletal: No joint swelling, no muscle tenderness. ROM normal in all joints of extremities.    Neurologic: Mental status: Alert, oriented, thought content appropriate  Subject  Pertinent Information & Imaging Studies, Consults   mildred  CBC with Differential:    Lab Results   Component Value Date/Time    WBC 6.4 03/17/2023 08:07 AM    RBC 4.60 03/17/2023 08:07 AM    HGB 12.9 03/17/2023 08:07 AM    HCT 38.7 03/17/2023 08:07 AM     03/17/2023 08:07 AM    MCV 84.1 03/17/2023 08:07 AM    MCH 28.0 03/17/2023 08:07 AM    MCHC 33.3 03/17/2023 08:07 AM    RDW 13.6 03/17/2023 08:07 AM    NRBC 1.8 02/01/2023 07:26 AM    SEGSPCT 72 10/12/2012 07:40 PM    BANDSPCT 1 08/20/2016 05:08 PM    METASPCT 1.8 02/01/2023 07:26 AM    LYMPHOPCT 42.1 03/17/2023 08:07 AM    MONOPCT 6.9 03/17/2023 08:07 AM    BASOPCT 0.3 03/17/2023 08:07 AM    MONOSABS 0.44 03/17/2023 08:07 AM    LYMPHSABS 2.68 03/17/2023 08:07 AM    EOSABS 0.13 03/17/2023 08:07 AM    BASOSABS 0.02 03/17/2023 08:07 AM     WBC:    Lab Results   Component Value Date/Time    WBC 6.4 03/17/2023 08:07 AM     BMP:    Lab Results   Component Value Date/Time     03/17/2023 08:07 AM    K 4.1 03/17/2023 08:07 AM    K 4.3 03/15/2023 12:44 PM     03/17/2023 08:07 AM    CO2 22 03/17/2023 08:07 AM    BUN 6 03/17/2023 08:07 AM    LABALBU 3.5 03/17/2023 08:07 AM    CREATININE 0.7 03/17/2023 08:07 AM    CALCIUM 8.7 03/17/2023 08:07 AM    GFRAA >60 07/22/2022 11:07 PM    LABGLOM >60 03/17/2023 08:07 AM    GLUCOSE 343 03/17/2023 08:07 AM       IMAGING:   Imaging Studies:    CT ABDOMEN PELVIS WO CONTRAST Additional Contrast? None    Result Date: 3/15/2023  1. Findings are compatible with acute pancreatitis 2. No cholelithiasis or biliary dilation is seen 3. Suggestion of constipation and ileus       Notable Cultures:      Blood cultures   Blood Culture, Routine   Date Value Ref Range Status   08/29/2016 5 Days- no growth  Final     Respiratory cultures No results found for: RESPCULTURE   Gram Stain Result   Date Value Ref Range Status   08/22/2016 Refer to ordered Gram stain for results  Final     Urine No results found for: LABURIN  Legionella No results found for: LABLEGI  C Diff PCR No results found for: CDIFPCR  Wound culture/abscess: No results for input(s): WNDABS in the last 72 hours. Tip culture:No results for input(s): CXCATHTIP in the last 72 hours.      Antibiotic  Days  Day started                                Diet- low carb diet       Resident's Assessment and Plan     No Kingston is a 42 y.o. male with  has a past medical history of Anxiety, Depression, Seizures ([facility] [address] and Unspecified cerebral artery occlusion with cerebral infarction. came here with CC   Chief Complaint   Patient presents with    Abdominal Pain     RUQ pain, pancreatitis x1 week ago, lower lumbar pain        Days since admission: 3    Consults:   none    Assessment and Plan:     Abdominal pain 2/2 acute pancreatitis in the setting of hypertriglyceridemia, improving  -Patient has recent hx of admission due to pancreatitis  -Has history of hypertriglyceridemia and smoking  -CT abdomen done on 3/15/23 shows acute pancreatitis, no cholelithiasis or biliary dilation is seen.  -Patient is on symptomatic management, dilaudid for pain, and started on clear liquid diet, advance to low fat diet if tolerates.  -Follow up lipid panel  Thought to be causes by a gallbladder etiology- will be discharged on ursadiol     2. Hypertriglyceridemia  -On lipitor, tricor, niacin, lovaza at home, continue   -follow up lipid panel. 3. Type 2 DM  -Home regimen: 48 U Lantus, and 14 U pre meals  -Started on 20 U Lantus, 8 U of Lispro, and MDSS  -Check BS ACHS     4. Hyperglycemia 2/2 type 2 diabetes  -On admission blood sugar 473  -No anion gap, beta hydroxy butyrate normal, trace ketones on UA  -BS ACHS     5. History of CVA  -Residual right-sided hand weak and sensation     6. History of hypertension  -Losartan at home, continue, BP stable     7. History of vitamin D deficiency  -Continue vitamin D supplements     8. Tobacco abuse  -Smokes about half a pack  -nicotine patch ordered     9. History of alcohol abuse  -last drink 8 years ago     10. History of polysubstance abuse  -On Suboxone  PT/OT: not indicate at this time   DVT ppx: lovenox  GI ppx: none      Disposition:home      Millie Dong, MS3  Attending physician: Dr. Liss Llanos       NOTE: This report was transcribed using voice recognition software. Every effort was made to ensure accuracy; however, inadvertent computerized transcription errors may be present.

## 2023-03-17 NOTE — PROGRESS NOTES
[facility]  Internal Medicine Residency / [address]    Attending Physician Statement  I have discussed the case, including pertinent history and exam findings with the resident and the team.  I have seen and examined the patient and the key elements of the encounter have been performed by me. I agree with the assessment, plan and orders as documented by the resident. A&O  Abdomen improved  Resolving pancreatitis and no recent hx of ETOH  Lipids were well controlled with this episode  Has had transient appearance of bilirubin   in the urine with previous episode  And also mild transient elevation of liver enzymes  Liver/GB and CBD imagery negative   And 30 pound weight loss  Entertaining possibility of microlithiasis of bile  as cause  And discussed with the patient  Plan; Consider therapeutic trial of Actigal daily and follow            Continue to control lipids and BS  Remainder of medical problems as per resident note.       Alexei Ch MD [facility]  Internal Medicine Residency Faculty

## 2023-03-17 NOTE — DISCHARGE INSTRUCTIONS
Learning About Acute Pancreatitis  What is acute pancreatitis? The pancreas is an organ behind the stomach. It makes hormones like insulin that help control how your body uses sugar. It also makes enzymes that help you digest food. Usually these enzymes flow from the pancreas to the intestines. But if they leak into the pancreas, they can irritate it and cause pain and swelling. When this happens suddenly, it's called acute pancreatitis. What causes it? Most of the time, acute pancreatitis is caused by gallstones or by heavy alcohol use. But several other things can cause it, such as:  High levels of fats in the blood. An injury. A problem after a surgery or a procedure. Certain medicines. What are the symptoms? The main symptom is pain in the upper belly. The pain can be severe. You also may have a fever, nausea, or vomiting. Some people get so sick that they have problems breathing. They also may have low blood pressure. How is it diagnosed? Your doctor will diagnose pancreatitis with an exam and by looking at your lab tests. Your doctor may think that you have this problem based on your symptoms and where you have pain in your belly. You may have blood tests of enzymes called amylase and lipase. In pancreatitis, the level of these enzymes is usually much higher than normal.  You also may have imaging tests of the belly. These may include an ultrasound, a CT scan, or an MRI. A special MRI called MRCP can show images of the bile ducts. This test can be very helpful when gallstones are causing the problem. How is it treated? Treatment of acute pancreatitis usually takes place in the hospital. It focuses on taking care of pain and supporting your body while your pancreas heals. In severe cases, treatment may occur in an intensive care unit to support breathing, treat serious infections, or help raise very low blood pressure.   If a gallstone is causing the problem, the gallstone may need to be removed. This is done during a procedure called ERCP. The doctor puts a scope in your mouth and moves it gently through the stomach and into the ducts from the pancreas and gallbladder. The doctor is then able to see a stone and remove it. Sometimes the gallbladder, which makes gallstones, needs to be removed by surgery. People with pancreatitis often need a lot of fluid to help support their other organs and their blood pressure. They get fluids through a vein (intravenous, or IV). Instead of food by mouth, nutrition is sometimes given through a vein while the pancreas heals. Where can you learn more? Go to http://www.woods.com/ and enter F673 to learn more about \"Learning About Acute Pancreatitis. \"  Current as of: June 6, 2022               Content Version: 13.6  © 2695-7114 [employer]. Care instructions adapted under license by [facility] [facility] ([facility]). If you have questions about a medical condition or this instruction, always ask your healthcare professional. [place] [address] any warranty or liability for your use of this information. Internal medicine    Follow ups  Please follow up with your primary care physician ( [email or web address]) within 10 days of discharge from hospital. Please call as soon as possible to make an appointment. Please contact the internal medicine clinic for an appointment if you are unable to get an appointment with your PCP.    Please keep all other follow up appointments:  Future Appointments   Date Time Provider Yazan Seo   3/28/2023 10:45 AM Shelby Mullins MD [facility]   4/26/2023  3:00 PM Alexa Patterson MD [facility]   5/17/2023  3:40 PM OSVALDO Gonzalez - CNS [facility]        Changes in healthcare   Please take all medications as indicated  Diet: low fat, low cholesterol diet   Activity: activity as tolerated  New Medications started during this hospital stay  ursodiol 300 MG capsule - Take 1 capsule by mouth daily  Changes to your medications  None  Medications you should stop taking   None  Additional labs, testing or imaging needed after discharge   None  Even if you are feeling better and not having symptoms do not stop taking antibiotic earlier then prescribed   Please contact us if you have any concerns, wish to change or make an appointment:  Internal medicine clinic   Phone: [phone]  Fax: [phone] [address]  Should you have further questions in regards to this visit, you can review your clinical note and after visit summary document on your [employer] account. Other than any new prescriptions given to you today, the list of home medications on this After Visit Summary are based on information provided to us from you and your healthcare providers. This information, including the list, dose, and frequency of medications is only as accurate as the information you provided. If you have any questions or concerns about your home medications, please contact your Primary Care Physician for further clarification.

## 2023-03-21 ENCOUNTER — TELEPHONE (OUTPATIENT)
Dept: ENDOCRINOLOGY | Age: 38
End: 2023-03-21

## 2023-03-22 DIAGNOSIS — E11.65 POORLY CONTROLLED DIABETES MELLITUS (HCC): Primary | ICD-10-CM

## 2023-03-22 DIAGNOSIS — R73.9 HYPERGLYCEMIA: ICD-10-CM

## 2023-03-23 RX ORDER — INSULIN PMP CART,AUT,G6/7,CNTR
EACH SUBCUTANEOUS
Qty: 30 EACH | Refills: 3 | Status: SHIPPED | OUTPATIENT
Start: 2023-03-23

## 2023-03-28 ENCOUNTER — TELEPHONE (OUTPATIENT)
Dept: INTERNAL MEDICINE | Age: 38
End: 2023-03-28

## 2023-03-28 NOTE — LETTER
Internal [address] [place] [address] [address] [place] [address], [address] [place] [address]  Phone:  [phone]  Fax:  [phone]    3/28/2023     Emily Flores      Dear Bibiana Wills:    We are sorry you were unable to keep your recent appointment at [facility] Internal Medicine and hope that you are doing well. We would like to continue treating your health care needs. Please call the office at the number listed above to reschedule your appointment.       Sincerely,    The Staff at the [address] [place] [address]

## 2023-04-11 PROBLEM — K86.1 ACUTE ON CHRONIC PANCREATITIS (HCC): Status: ACTIVE | Noted: 2023-04-11

## 2023-04-11 PROBLEM — K85.90 ACUTE ON CHRONIC PANCREATITIS (HCC): Status: ACTIVE | Noted: 2023-04-11

## 2023-04-19 ENCOUNTER — TELEPHONE (OUTPATIENT)
Dept: INTERNAL MEDICINE | Age: 38
End: 2023-04-19

## 2023-04-26 ENCOUNTER — TELEPHONE (OUTPATIENT)
Dept: INTERNAL MEDICINE | Age: 38
End: 2023-04-26

## 2023-04-28 PROBLEM — E11.65 POORLY CONTROLLED DIABETES MELLITUS (HCC): Status: RESOLVED | Noted: 2023-02-02 | Resolved: 2023-04-28

## 2023-04-28 PROBLEM — E11.9 TYPE 2 DIABETES MELLITUS WITHOUT COMPLICATIONS (HCC): Chronic | Status: ACTIVE | Noted: 2022-08-04

## 2023-04-28 PROBLEM — E11.9 NEWLY DIAGNOSED DIABETES (HCC): Status: RESOLVED | Noted: 2020-08-14 | Resolved: 2023-04-28

## 2023-04-28 PROBLEM — K85.90 ACUTE PANCREATITIS, UNSPECIFIED COMPLICATION STATUS, UNSPECIFIED PANCREATITIS TYPE: Status: RESOLVED | Noted: 2023-03-16 | Resolved: 2023-04-28

## 2023-04-28 PROBLEM — Z86.73 HISTORY OF CVA (CEREBROVASCULAR ACCIDENT): Status: RESOLVED | Noted: 2023-02-16 | Resolved: 2023-04-28

## 2023-04-28 PROBLEM — E78.1 PURE HYPERTRIGLYCERIDEMIA: Chronic | Status: ACTIVE | Noted: 2023-02-04

## 2023-04-28 PROBLEM — I10 PRIMARY HYPERTENSION: Chronic | Status: ACTIVE | Noted: 2023-02-16

## 2023-04-28 PROBLEM — R07.9 CHEST PAIN, UNSPECIFIED: Status: RESOLVED | Noted: 2022-08-04 | Resolved: 2023-04-28

## 2023-04-28 PROBLEM — Z23 FLU VACCINE NEED: Status: RESOLVED | Noted: 2023-02-16 | Resolved: 2023-04-28

## 2023-04-28 RX ORDER — AMOXICILLIN AND CLAVULANATE POTASSIUM 875; 125 MG/1; MG/1
TABLET, FILM COATED ORAL
COMMUNITY

## 2023-07-18 ENCOUNTER — OFFICE VISIT (OUTPATIENT)
Dept: INTERNAL MEDICINE | Age: 38
End: 2023-07-18
Payer: MEDICAID

## 2023-07-18 VITALS
DIASTOLIC BLOOD PRESSURE: 82 MMHG | SYSTOLIC BLOOD PRESSURE: 120 MMHG | HEART RATE: 97 BPM | HEIGHT: 78 IN | TEMPERATURE: 98.2 F | RESPIRATION RATE: 18 BRPM | WEIGHT: 231.2 LBS | BODY MASS INDEX: 26.75 KG/M2 | OXYGEN SATURATION: 96 %

## 2023-07-18 DIAGNOSIS — E55.9 VITAMIN D INSUFFICIENCY: ICD-10-CM

## 2023-07-18 DIAGNOSIS — E11.9 TYPE 2 DIABETES MELLITUS WITHOUT COMPLICATION, UNSPECIFIED WHETHER LONG TERM INSULIN USE (HCC): Primary | ICD-10-CM

## 2023-07-18 DIAGNOSIS — E78.1 PURE HYPERTRIGLYCERIDEMIA: ICD-10-CM

## 2023-07-18 LAB — HBA1C MFR BLD: NORMAL %

## 2023-07-18 PROCEDURE — 4004F PT TOBACCO SCREEN RCVD TLK: CPT

## 2023-07-18 PROCEDURE — G8419 CALC BMI OUT NRM PARAM NOF/U: HCPCS

## 2023-07-18 PROCEDURE — 3074F SYST BP LT 130 MM HG: CPT

## 2023-07-18 PROCEDURE — G8427 DOCREV CUR MEDS BY ELIG CLIN: HCPCS

## 2023-07-18 PROCEDURE — 3078F DIAST BP <80 MM HG: CPT

## 2023-07-18 PROCEDURE — 83037 HB GLYCOSYLATED A1C HOME DEV: CPT

## 2023-07-18 PROCEDURE — 99214 OFFICE O/P EST MOD 30 MIN: CPT

## 2023-07-18 PROCEDURE — 2022F DILAT RTA XM EVC RTNOPTHY: CPT

## 2023-07-18 PROCEDURE — 99212 OFFICE O/P EST SF 10 MIN: CPT

## 2023-07-18 PROCEDURE — 3046F HEMOGLOBIN A1C LEVEL >9.0%: CPT

## 2023-07-18 RX ORDER — INSULIN LISPRO 100 [IU]/ML
INJECTION, SOLUTION INTRAVENOUS; SUBCUTANEOUS
Qty: 15 ADJUSTABLE DOSE PRE-FILLED PEN SYRINGE | Refills: 4 | Status: SHIPPED | OUTPATIENT
Start: 2023-07-18

## 2023-07-18 RX ORDER — CHOLECALCIFEROL (VITAMIN D3) 50 MCG
2000 TABLET ORAL DAILY
Qty: 90 TABLET | Refills: 1 | Status: SHIPPED | OUTPATIENT
Start: 2023-07-18

## 2023-07-18 RX ORDER — INSULIN GLARGINE 100 [IU]/ML
48 INJECTION, SOLUTION SUBCUTANEOUS EVERY MORNING
Qty: 15 ADJUSTABLE DOSE PRE-FILLED PEN SYRINGE | Refills: 5 | Status: CANCELLED | OUTPATIENT
Start: 2023-07-18

## 2023-07-18 RX ORDER — GABAPENTIN 100 MG/1
100 CAPSULE ORAL 3 TIMES DAILY
Qty: 270 CAPSULE | Refills: 1 | Status: SHIPPED | OUTPATIENT
Start: 2023-07-18 | End: 2024-01-14

## 2023-07-18 ASSESSMENT — ENCOUNTER SYMPTOMS
SHORTNESS OF BREATH: 0
ABDOMINAL PAIN: 1
EYES NEGATIVE: 1
VOMITING: 0
CHEST TIGHTNESS: 0
COUGH: 0
ALLERGIC/IMMUNOLOGIC NEGATIVE: 1
CONSTIPATION: 0
NAUSEA: 0

## 2023-07-18 NOTE — PROGRESS NOTES
[facility]  Internal Medicine Residency Clinic    Attending Physician Statement  I have discussed the case, including pertinent history and exam findings with the resident physician. I agree with the assessment, plan and orders as documented by the resident. I have reviewed all pertinent PMHx, PSHx, FamHx, SocialHx, medications, and allergies and updated history as appropriate. Patient here for routine follow up of medical problems. DM, insulin-requiring. A1c reads at \"too high\". BS at home 180s - 500s - likely dietary noncompliance. No nausea, no vomiting, no confusion, no abdominal pain. Will order a1c, BMP, lipid panel for today. He has not used the CGM, states compliance with current dose of lantus but had issues of compliance in the past. Pharmacy states last got lantus in February -- questionable compliance. Will continue same dose for now, will he needs to get his refills from pharmacy. Can start gabapentin for neuropathy. Next ff up with Endo is in December. Will need close follow up with us to ensure compliance. Hx of hypertriglyceridemia  Chronic pancreatitis - has follow up with GI next week  Hx of seizures - States he is on carbamazepine but pharmacy states this medication is not on file. No episodes. He is unable to recall the name of provider who is prescribing the medication. Hypertriglyceridemia - questionable compliance with medications - rpt lipid panel  Suboxone from [facility]    1 month follow up. Bring all medications. Remainder of medical problems as per resident note. Brianne Joya MD  7/18/2023 4:04 PM

## 2023-07-18 NOTE — PROGRESS NOTES
[address] [facility]  Internal Medicine Residency Program  ACC Note      SUBJECTIVE:  CC: had concerns including Pain (Pt c/o abdominal pain. Pt also c/o constant numbness in upper left thigh and leg and lower right leg.). HPI:  Arlyn Payne is a 42 y.o.male with PMH of Type 2 DM, chronic pancreatitis, alcohol abuse, HTN, Vitamin D deficiency presenting to [facility] for routine follow-up. He was last seen in the clinic in 2/16/2023 for hospital follow-up and episode of DKA and acute pancreatitis. Since his last visit in the office, he has been admitted twice for acute pancreatitis. He was last admitted in April. Since discharge she has been doing well. Type 2 diabetes  He is currently on Lantus 48u daily + SS. He was on insulin pump at one point. He had the Medtronic insulin pump in the past which did not work for him after couple of months. He follows with Dr. Austin Watson. He was on the Omni pump. However patient was not able to use this because he had difficulties figuring out how to use it. He just went back on the subcutaneous insulin. States his blood sugar has been between 120s to 200s but would have episodes as high as 300s and 500s. His next appointment with Dr. Austin Watson is not until December. He does admit that he sometimes misses his insulin doses. Reviewed with pharmacy his medications, he last picked up his Lantus in February and Humalog in April. His A1c today was read as too high. We will check his A1c on blood draw. Microalbumin creatinine ratio was at 452.9, he is currently on losartan. He states he saw ophtha last year, no retinopathy per patient. He is interested to being referred to an ophthalmologist.  He currently complains of neuropathic pains. He also has hyperesthesia in his feet. Upon monofilament test, he had significant pain on his feet and was not able to tolerate it. We will start patient on gabapentin 100 mg 3 times daily.   Educated patient on possible side

## 2023-07-18 NOTE — PATIENT INSTRUCTIONS
Thank you for coming to your follow up appointment   Please take your medications as directed and keep your follow up appointment in 4 weeks. Please continue to take your lantus at 48units daily and sliding scale. Please log your blood sugars. We started you on gabapentin 100mg 3x a day. Please have blood work done tomorrow. Please bring all your meds on next visit.     Call our office if you have any questions or concerns at [phone]      Chipper Libman, MD

## 2023-07-21 ENCOUNTER — TELEPHONE (OUTPATIENT)
Dept: INTERNAL MEDICINE | Age: 38
End: 2023-07-21

## 2023-07-21 NOTE — TELEPHONE ENCOUNTER
----- Message from Mark Rosado sent at 7/21/2023  1:01 PM EDT -----  Subject: Message to Provider    QUESTIONS  Information for Provider? Pt forgot to get a doctor note for his appt on   7/18/23. Could you put one in his mychart. Please call pt to advise.  ---------------------------------------------------------------------------  --------------  Elisabet Wilson INFO  [phone]; OK to leave message on voicemail  ---------------------------------------------------------------------------  --------------  SCRIPT ANSWERS  Relationship to Patient?  Self

## 2023-07-24 ENCOUNTER — TELEPHONE (OUTPATIENT)
Dept: INTERNAL MEDICINE | Age: 38
End: 2023-07-24

## 2023-07-24 ENCOUNTER — PATIENT MESSAGE (OUTPATIENT)
Dept: INTERNAL MEDICINE | Age: 38
End: 2023-07-24

## 2023-07-24 NOTE — TELEPHONE ENCOUNTER
From: Gloria Burrows  To:  Afshin Hodge MD  Sent: 7/24/2023 8:14 AM EDT  Subject: Slip for work     Is it possible for you to send me a paper showing that I was there on Tuesday of last week I forgot to get one while I was there and I need one for my boss so if you can please put one on my chart or email it to me I'd appreciate it or if possible text it to me [phone] thank you

## 2023-07-24 NOTE — TELEPHONE ENCOUNTER
----- Message from Rudolph Garcia sent at 7/24/2023  8:14 AM EDT -----  Regarding: Slip for work   Contact: [phone]  Is it possible for you to send me a paper showing that I was there on Tuesday of last week I forgot to get one while I was there and I need one for my boss so if you can please put one on my chart or email it to me I'd appreciate it or if possible text it to me [phone] thank you

## 2023-08-03 ENCOUNTER — HOSPITAL ENCOUNTER (EMERGENCY)
Age: 38
Discharge: HOME | End: 2023-08-03
Attending: STUDENT IN AN ORGANIZED HEALTH CARE EDUCATION/TRAINING PROGRAM
Payer: MEDICAID

## 2023-08-03 ENCOUNTER — APPOINTMENT (OUTPATIENT)
Dept: ULTRASOUND IMAGING | Age: 38
End: 2023-08-03
Payer: MEDICAID

## 2023-08-03 ENCOUNTER — TELEPHONE (OUTPATIENT)
Dept: INTERNAL MEDICINE | Age: 38
End: 2023-08-03

## 2023-08-03 VITALS
DIASTOLIC BLOOD PRESSURE: 78 MMHG | SYSTOLIC BLOOD PRESSURE: 122 MMHG | RESPIRATION RATE: 17 BRPM | OXYGEN SATURATION: 100 % | TEMPERATURE: 98.2 F | BODY MASS INDEX: 26.84 KG/M2 | HEART RATE: 84 BPM | HEIGHT: 78 IN | WEIGHT: 232 LBS

## 2023-08-03 DIAGNOSIS — M79.671 BILATERAL LEG AND FOOT PAIN: Primary | ICD-10-CM

## 2023-08-03 DIAGNOSIS — Z86.39 HISTORY OF DIABETIC NEUROPATHY: ICD-10-CM

## 2023-08-03 DIAGNOSIS — E11.9 TYPE 2 DIABETES MELLITUS WITHOUT COMPLICATION, UNSPECIFIED WHETHER LONG TERM INSULIN USE (HCC): ICD-10-CM

## 2023-08-03 DIAGNOSIS — M79.672 BILATERAL LEG AND FOOT PAIN: Primary | ICD-10-CM

## 2023-08-03 DIAGNOSIS — M79.604 BILATERAL LEG AND FOOT PAIN: Primary | ICD-10-CM

## 2023-08-03 DIAGNOSIS — M79.605 BILATERAL LEG AND FOOT PAIN: Primary | ICD-10-CM

## 2023-08-03 LAB
ALBUMIN SERPL-MCNC: 4.1 G/DL (ref 3.5–5.2)
ALP SERPL-CCNC: 163 U/L (ref 40–129)
ALT SERPL-CCNC: 77 U/L (ref 0–40)
ANION GAP SERPL CALCULATED.3IONS-SCNC: 10 MMOL/L (ref 7–16)
AST SERPL-CCNC: 37 U/L (ref 0–39)
BASOPHILS # BLD: 0.04 K/UL (ref 0–0.2)
BASOPHILS NFR BLD: 1 % (ref 0–2)
BILIRUB SERPL-MCNC: 0.4 MG/DL (ref 0–1.2)
BUN SERPL-MCNC: 14 MG/DL (ref 6–20)
CALCIUM SERPL-MCNC: 9.7 MG/DL (ref 8.6–10.2)
CHLORIDE SERPL-SCNC: 105 MMOL/L (ref 98–107)
CK SERPL-CCNC: 30 U/L (ref 20–200)
CO2 SERPL-SCNC: 24 MMOL/L (ref 22–29)
CREAT SERPL-MCNC: 0.7 MG/DL (ref 0.7–1.2)
EOSINOPHIL # BLD: 0.06 K/UL (ref 0.05–0.5)
EOSINOPHILS RELATIVE PERCENT: 1 % (ref 0–6)
ERYTHROCYTE [DISTWIDTH] IN BLOOD BY AUTOMATED COUNT: 14 % (ref 11.5–15)
GFR SERPL CREATININE-BSD FRML MDRD: >60 ML/MIN/1.73M2
GLUCOSE SERPL-MCNC: 176 MG/DL (ref 74–99)
HCT VFR BLD AUTO: 44.6 % (ref 37–54)
HGB BLD-MCNC: 15.4 G/DL (ref 12.5–16.5)
IMM GRANULOCYTES # BLD AUTO: <0.03 K/UL (ref 0–0.58)
IMM GRANULOCYTES NFR BLD: 0 % (ref 0–5)
LACTATE BLDV-SCNC: 1.6 MMOL/L (ref 0.5–2.2)
LYMPHOCYTES NFR BLD: 3.78 K/UL (ref 1.5–4)
LYMPHOCYTES RELATIVE PERCENT: 47 % (ref 20–42)
MAGNESIUM SERPL-MCNC: 2 MG/DL (ref 1.6–2.6)
MCH RBC QN AUTO: 27.3 PG (ref 26–35)
MCHC RBC AUTO-ENTMCNC: 34.5 G/DL (ref 32–34.5)
MCV RBC AUTO: 79.1 FL (ref 80–99.9)
MONOCYTES NFR BLD: 0.5 K/UL (ref 0.1–0.95)
MONOCYTES NFR BLD: 6 % (ref 2–12)
NEUTROPHILS NFR BLD: 46 % (ref 43–80)
NEUTS SEG NFR BLD: 3.72 K/UL (ref 1.8–7.3)
PLATELET # BLD AUTO: 236 K/UL (ref 130–450)
PMV BLD AUTO: 9.7 FL (ref 7–12)
POTASSIUM SERPL-SCNC: 3.8 MMOL/L (ref 3.5–5)
PROT SERPL-MCNC: 7.5 G/DL (ref 6.4–8.3)
RBC # BLD AUTO: 5.64 M/UL (ref 3.8–5.8)
SODIUM SERPL-SCNC: 139 MMOL/L (ref 132–146)
WBC OTHER # BLD: 8.1 K/UL (ref 4.5–11.5)

## 2023-08-03 PROCEDURE — 6360000002 HC RX W HCPCS: Performed by: STUDENT IN AN ORGANIZED HEALTH CARE EDUCATION/TRAINING PROGRAM

## 2023-08-03 PROCEDURE — 2580000003 HC RX 258: Performed by: STUDENT IN AN ORGANIZED HEALTH CARE EDUCATION/TRAINING PROGRAM

## 2023-08-03 PROCEDURE — 6370000000 HC RX 637 (ALT 250 FOR IP): Performed by: STUDENT IN AN ORGANIZED HEALTH CARE EDUCATION/TRAINING PROGRAM

## 2023-08-03 PROCEDURE — 85025 COMPLETE CBC W/AUTO DIFF WBC: CPT

## 2023-08-03 PROCEDURE — 96374 THER/PROPH/DIAG INJ IV PUSH: CPT

## 2023-08-03 PROCEDURE — 82550 ASSAY OF CK (CPK): CPT

## 2023-08-03 PROCEDURE — 93971 EXTREMITY STUDY: CPT

## 2023-08-03 PROCEDURE — 96361 HYDRATE IV INFUSION ADD-ON: CPT

## 2023-08-03 PROCEDURE — 99284 EMERGENCY DEPT VISIT MOD MDM: CPT

## 2023-08-03 PROCEDURE — 80053 COMPREHEN METABOLIC PANEL: CPT

## 2023-08-03 PROCEDURE — 83735 ASSAY OF MAGNESIUM: CPT

## 2023-08-03 PROCEDURE — 83605 ASSAY OF LACTIC ACID: CPT

## 2023-08-03 RX ORDER — GABAPENTIN 300 MG/1
300 CAPSULE ORAL ONCE
Status: COMPLETED | OUTPATIENT
Start: 2023-08-03 | End: 2023-08-03

## 2023-08-03 RX ORDER — KETOROLAC TROMETHAMINE 30 MG/ML
30 INJECTION, SOLUTION INTRAMUSCULAR; INTRAVENOUS ONCE
Status: COMPLETED | OUTPATIENT
Start: 2023-08-03 | End: 2023-08-03

## 2023-08-03 RX ORDER — GABAPENTIN 100 MG/1
200 CAPSULE ORAL 3 TIMES DAILY
Qty: 1080 CAPSULE | Refills: 0 | Status: SHIPPED | OUTPATIENT
Start: 2023-08-03 | End: 2024-01-30

## 2023-08-03 RX ORDER — 0.9 % SODIUM CHLORIDE 0.9 %
1000 INTRAVENOUS SOLUTION INTRAVENOUS ONCE
Status: COMPLETED | OUTPATIENT
Start: 2023-08-03 | End: 2023-08-03

## 2023-08-03 RX ADMIN — GABAPENTIN 300 MG: 300 CAPSULE ORAL at 14:13

## 2023-08-03 RX ADMIN — KETOROLAC TROMETHAMINE 30 MG: 30 INJECTION, SOLUTION INTRAMUSCULAR; INTRAVENOUS at 14:13

## 2023-08-03 RX ADMIN — SODIUM CHLORIDE 1000 ML: 9 INJECTION, SOLUTION INTRAVENOUS at 14:15

## 2023-08-03 ASSESSMENT — PAIN DESCRIPTION - ORIENTATION
ORIENTATION: LEFT
ORIENTATION: RIGHT;LEFT
ORIENTATION: RIGHT;LEFT

## 2023-08-03 ASSESSMENT — PAIN DESCRIPTION - PAIN TYPE
TYPE: ACUTE PAIN
TYPE: ACUTE PAIN

## 2023-08-03 ASSESSMENT — PAIN DESCRIPTION - DESCRIPTORS
DESCRIPTORS: ACHING;BURNING;DISCOMFORT
DESCRIPTORS: ACHING;BURNING

## 2023-08-03 ASSESSMENT — ENCOUNTER SYMPTOMS
COLOR CHANGE: 1
BACK PAIN: 0
VOMITING: 0
CONSTIPATION: 0
COUGH: 0
NAUSEA: 0
ABDOMINAL PAIN: 0
SHORTNESS OF BREATH: 0
DIARRHEA: 0

## 2023-08-03 ASSESSMENT — PAIN SCALES - GENERAL
PAINLEVEL_OUTOF10: 8
PAINLEVEL_OUTOF10: 10
PAINLEVEL_OUTOF10: 2
PAINLEVEL_OUTOF10: 5

## 2023-08-03 ASSESSMENT — PAIN DESCRIPTION - FREQUENCY
FREQUENCY: CONTINUOUS
FREQUENCY: CONTINUOUS

## 2023-08-03 ASSESSMENT — PAIN - FUNCTIONAL ASSESSMENT
PAIN_FUNCTIONAL_ASSESSMENT: 0-10
PAIN_FUNCTIONAL_ASSESSMENT: 0-10

## 2023-08-03 ASSESSMENT — PAIN DESCRIPTION - LOCATION
LOCATION: FOOT;LEG;KNEE
LOCATION: FOOT;KNEE;LEG
LOCATION: LEG

## 2023-08-03 ASSESSMENT — PAIN DESCRIPTION - ONSET: ONSET: SUDDEN

## 2023-08-03 NOTE — DISCHARGE INSTRUCTIONS
Please return to the ER for any new or worsening symptoms including but not limited to Fever, Chest pain or difficulty breathing, or Numbness or weakness anywhere  If prescribed, please be sure to  your prescriptions from the pharmacy  Please follow-up with Primary care provider as instructed

## 2023-08-03 NOTE — TELEPHONE ENCOUNTER
Pt states Gabapentin has not been working and now his left leg is going numb pain had been only in feet. Pt states he works and is on his feet a lot. pt has 8/25/ appt and asking for a call back

## 2023-08-04 NOTE — TELEPHONE ENCOUNTER
Pt did go to ER on 8/3/23 and Rx was sent home and instructed to schedule an appt.  Internal medicine for follow-up

## 2023-08-04 NOTE — TELEPHONE ENCOUNTER
Following up with pt in regards to recent ER visit message left to call back so we can schedule a follow-up visit in regards to his leg pain phone number was provided

## 2023-08-21 ENCOUNTER — HOSPITAL ENCOUNTER (INPATIENT)
Age: 38
LOS: 1 days | Discharge: HOME | DRG: 282 | End: 2023-08-23
Attending: EMERGENCY MEDICINE | Admitting: INTERNAL MEDICINE
Payer: MEDICAID

## 2023-08-21 ENCOUNTER — APPOINTMENT (OUTPATIENT)
Dept: CT IMAGING | Age: 38
DRG: 282 | End: 2023-08-21
Payer: MEDICAID

## 2023-08-21 DIAGNOSIS — K85.90 ACUTE PANCREATITIS, UNSPECIFIED COMPLICATION STATUS, UNSPECIFIED PANCREATITIS TYPE: Primary | ICD-10-CM

## 2023-08-21 DIAGNOSIS — R73.9 HYPERGLYCEMIA: ICD-10-CM

## 2023-08-21 LAB
ALBUMIN SERPL-MCNC: 4.5 G/DL (ref 3.5–5.2)
ALP SERPL-CCNC: 211 U/L (ref 40–129)
ALT SERPL-CCNC: 82 U/L (ref 0–40)
ANION GAP SERPL CALCULATED.3IONS-SCNC: 14 MMOL/L (ref 7–16)
AST SERPL-CCNC: 43 U/L (ref 0–39)
BASOPHILS # BLD: 0.03 K/UL (ref 0–0.2)
BASOPHILS NFR BLD: 0 % (ref 0–2)
BILIRUB SERPL-MCNC: 0.4 MG/DL (ref 0–1.2)
BILIRUB UR QL STRIP: NEGATIVE
BUN SERPL-MCNC: 14 MG/DL (ref 6–20)
CALCIUM SERPL-MCNC: 10.1 MG/DL (ref 8.6–10.2)
CHLORIDE SERPL-SCNC: 97 MMOL/L (ref 98–107)
CHP ED QC CHECK: YES
CLARITY UR: CLEAR
CO2 SERPL-SCNC: 23 MMOL/L (ref 22–29)
COLOR UR: YELLOW
COMMENT: ABNORMAL
CREAT SERPL-MCNC: 0.8 MG/DL (ref 0.7–1.2)
EOSINOPHIL # BLD: 0.07 K/UL (ref 0.05–0.5)
EOSINOPHILS RELATIVE PERCENT: 1 % (ref 0–6)
ERYTHROCYTE [DISTWIDTH] IN BLOOD BY AUTOMATED COUNT: 14.7 % (ref 11.5–15)
GFR SERPL CREATININE-BSD FRML MDRD: >60 ML/MIN/1.73M2
GLUCOSE BLD-MCNC: 113 MG/DL (ref 74–99)
GLUCOSE BLD-MCNC: 294 MG/DL
GLUCOSE BLD-MCNC: 294 MG/DL (ref 74–99)
GLUCOSE SERPL-MCNC: 517 MG/DL (ref 74–99)
GLUCOSE UR STRIP-MCNC: >=1000 MG/DL
HCT VFR BLD AUTO: 49.2 % (ref 37–54)
HGB BLD-MCNC: 16.9 G/DL (ref 12.5–16.5)
HGB UR QL STRIP.AUTO: NEGATIVE
IMM GRANULOCYTES # BLD AUTO: 0.05 K/UL (ref 0–0.58)
IMM GRANULOCYTES NFR BLD: 0 % (ref 0–5)
KETONES UR STRIP-MCNC: NEGATIVE MG/DL
LACTATE BLDV-SCNC: 1.8 MMOL/L (ref 0.5–2.2)
LEUKOCYTE ESTERASE UR QL STRIP: NEGATIVE
LIPASE SERPL-CCNC: 230 U/L (ref 13–60)
LYMPHOCYTES NFR BLD: 3.5 K/UL (ref 1.5–4)
LYMPHOCYTES RELATIVE PERCENT: 29 % (ref 20–42)
MCH RBC QN AUTO: 27.6 PG (ref 26–35)
MCHC RBC AUTO-ENTMCNC: 34.3 G/DL (ref 32–34.5)
MCV RBC AUTO: 80.3 FL (ref 80–99.9)
MONOCYTES NFR BLD: 0.77 K/UL (ref 0.1–0.95)
MONOCYTES NFR BLD: 7 % (ref 2–12)
NEUTROPHILS NFR BLD: 63 % (ref 43–80)
NEUTS SEG NFR BLD: 7.48 K/UL (ref 1.8–7.3)
NITRITE UR QL STRIP: NEGATIVE
PH UR STRIP: 5.5 [PH] (ref 5–9)
PLATELET # BLD AUTO: 306 K/UL (ref 130–450)
PMV BLD AUTO: 9.8 FL (ref 7–12)
POTASSIUM SERPL-SCNC: 4.5 MMOL/L (ref 3.5–5)
PROT SERPL-MCNC: 8.5 G/DL (ref 6.4–8.3)
PROT UR STRIP-MCNC: NEGATIVE MG/DL
RBC # BLD AUTO: 6.13 M/UL (ref 3.8–5.8)
SODIUM SERPL-SCNC: 134 MMOL/L (ref 132–146)
SP GR UR STRIP: <1.005 (ref 1–1.03)
UROBILINOGEN UR STRIP-ACNC: 0.2 EU/DL (ref 0–1)
WBC OTHER # BLD: 11.9 K/UL (ref 4.5–11.5)

## 2023-08-21 PROCEDURE — 83605 ASSAY OF LACTIC ACID: CPT

## 2023-08-21 PROCEDURE — 96375 TX/PRO/DX INJ NEW DRUG ADDON: CPT

## 2023-08-21 PROCEDURE — 6370000000 HC RX 637 (ALT 250 FOR IP): Performed by: EMERGENCY MEDICINE

## 2023-08-21 PROCEDURE — 2500000003 HC RX 250 WO HCPCS: Performed by: EMERGENCY MEDICINE

## 2023-08-21 PROCEDURE — 99285 EMERGENCY DEPT VISIT HI MDM: CPT

## 2023-08-21 PROCEDURE — 74177 CT ABD & PELVIS W/CONTRAST: CPT

## 2023-08-21 PROCEDURE — 85025 COMPLETE CBC W/AUTO DIFF WBC: CPT

## 2023-08-21 PROCEDURE — 81003 URINALYSIS AUTO W/O SCOPE: CPT

## 2023-08-21 PROCEDURE — 96374 THER/PROPH/DIAG INJ IV PUSH: CPT

## 2023-08-21 PROCEDURE — 83690 ASSAY OF LIPASE: CPT

## 2023-08-21 PROCEDURE — 2580000003 HC RX 258: Performed by: EMERGENCY MEDICINE

## 2023-08-21 PROCEDURE — 6360000002 HC RX W HCPCS: Performed by: EMERGENCY MEDICINE

## 2023-08-21 PROCEDURE — 80053 COMPREHEN METABOLIC PANEL: CPT

## 2023-08-21 PROCEDURE — 82962 GLUCOSE BLOOD TEST: CPT

## 2023-08-21 PROCEDURE — 6360000004 HC RX CONTRAST MEDICATION: Performed by: RADIOLOGY

## 2023-08-21 RX ORDER — FENTANYL CITRATE 50 UG/ML
50 INJECTION, SOLUTION INTRAMUSCULAR; INTRAVENOUS ONCE
Status: DISCONTINUED | OUTPATIENT
Start: 2023-08-21 | End: 2023-08-23 | Stop reason: HOSPADM

## 2023-08-21 RX ORDER — KETAMINE HYDROCHLORIDE 10 MG/ML
0.2 INJECTION INTRAMUSCULAR; INTRAVENOUS ONCE
Status: COMPLETED | OUTPATIENT
Start: 2023-08-21 | End: 2023-08-21

## 2023-08-21 RX ORDER — KETOROLAC TROMETHAMINE 30 MG/ML
30 INJECTION, SOLUTION INTRAMUSCULAR; INTRAVENOUS ONCE
Status: COMPLETED | OUTPATIENT
Start: 2023-08-21 | End: 2023-08-21

## 2023-08-21 RX ORDER — 0.9 % SODIUM CHLORIDE 0.9 %
1000 INTRAVENOUS SOLUTION INTRAVENOUS ONCE
Status: COMPLETED | OUTPATIENT
Start: 2023-08-21 | End: 2023-08-21

## 2023-08-21 RX ORDER — ONDANSETRON 2 MG/ML
4 INJECTION INTRAMUSCULAR; INTRAVENOUS ONCE
Status: COMPLETED | OUTPATIENT
Start: 2023-08-21 | End: 2023-08-21

## 2023-08-21 RX ADMIN — SODIUM CHLORIDE 1000 ML: 9 INJECTION, SOLUTION INTRAVENOUS at 11:04

## 2023-08-21 RX ADMIN — SODIUM CHLORIDE 1000 ML: 9 INJECTION, SOLUTION INTRAVENOUS at 10:02

## 2023-08-21 RX ADMIN — IOPAMIDOL 75 ML: 755 INJECTION, SOLUTION INTRAVENOUS at 11:30

## 2023-08-21 RX ADMIN — KETAMINE HYDROCHLORIDE 21.1 MG: 10 INJECTION INTRAMUSCULAR; INTRAVENOUS at 15:15

## 2023-08-21 RX ADMIN — ONDANSETRON 4 MG: 2 INJECTION INTRAMUSCULAR; INTRAVENOUS at 10:03

## 2023-08-21 RX ADMIN — INSULIN HUMAN 4 UNITS: 100 INJECTION, SOLUTION PARENTERAL at 13:43

## 2023-08-21 RX ADMIN — KETOROLAC TROMETHAMINE 30 MG: 30 INJECTION, SOLUTION INTRAMUSCULAR; INTRAVENOUS at 10:02

## 2023-08-21 ASSESSMENT — PAIN DESCRIPTION - ORIENTATION
ORIENTATION: RIGHT
ORIENTATION: RIGHT;UPPER
ORIENTATION: RIGHT
ORIENTATION: RIGHT;LOWER

## 2023-08-21 ASSESSMENT — PAIN DESCRIPTION - DESCRIPTORS
DESCRIPTORS: ACHING
DESCRIPTORS: ACHING;DISCOMFORT
DESCRIPTORS: ACHING;DISCOMFORT
DESCRIPTORS: ACHING;CRUSHING;DISCOMFORT

## 2023-08-21 ASSESSMENT — PAIN SCALES - GENERAL
PAINLEVEL_OUTOF10: 10
PAINLEVEL_OUTOF10: 8
PAINLEVEL_OUTOF10: 10
PAINLEVEL_OUTOF10: 8

## 2023-08-21 ASSESSMENT — PAIN DESCRIPTION - PAIN TYPE: TYPE: ACUTE PAIN

## 2023-08-21 ASSESSMENT — PAIN DESCRIPTION - LOCATION
LOCATION: ABDOMEN

## 2023-08-21 ASSESSMENT — ENCOUNTER SYMPTOMS
NAUSEA: 1
ABDOMINAL PAIN: 1
VOMITING: 1

## 2023-08-22 LAB
ALBUMIN SERPL-MCNC: 3.9 G/DL (ref 3.5–5.2)
ALP SERPL-CCNC: 148 U/L (ref 40–129)
ALT SERPL-CCNC: 92 U/L (ref 0–40)
ANION GAP SERPL CALCULATED.3IONS-SCNC: 12 MMOL/L (ref 7–16)
AST SERPL-CCNC: 73 U/L (ref 0–39)
B-OH-BUTYR SERPL-MCNC: 0.32 MMOL/L (ref 0.02–0.27)
BASOPHILS # BLD: 0.02 K/UL (ref 0–0.2)
BASOPHILS NFR BLD: 0 % (ref 0–2)
BILIRUB DIRECT SERPL-MCNC: <0.2 MG/DL (ref 0–0.3)
BILIRUB INDIRECT SERPL-MCNC: ABNORMAL MG/DL (ref 0–1)
BILIRUB SERPL-MCNC: 0.7 MG/DL (ref 0–1.2)
BUN SERPL-MCNC: 11 MG/DL (ref 6–20)
CALCIUM SERPL-MCNC: 8.9 MG/DL (ref 8.6–10.2)
CHLORIDE SERPL-SCNC: 105 MMOL/L (ref 98–107)
CO2 SERPL-SCNC: 23 MMOL/L (ref 22–29)
CREAT SERPL-MCNC: 0.7 MG/DL (ref 0.7–1.2)
EOSINOPHIL # BLD: 0.09 K/UL (ref 0.05–0.5)
EOSINOPHILS RELATIVE PERCENT: 1 % (ref 0–6)
ERYTHROCYTE [DISTWIDTH] IN BLOOD BY AUTOMATED COUNT: 14.6 % (ref 11.5–15)
GFR SERPL CREATININE-BSD FRML MDRD: >60 ML/MIN/1.73M2
GLUCOSE BLD-MCNC: 154 MG/DL (ref 74–99)
GLUCOSE BLD-MCNC: 208 MG/DL (ref 74–99)
GLUCOSE BLD-MCNC: 259 MG/DL (ref 74–99)
GLUCOSE BLD-MCNC: 296 MG/DL (ref 74–99)
GLUCOSE BLD-MCNC: 304 MG/DL (ref 74–99)
GLUCOSE SERPL-MCNC: 153 MG/DL (ref 74–99)
HCT VFR BLD AUTO: 46.7 % (ref 37–54)
HGB BLD-MCNC: 15.6 G/DL (ref 12.5–16.5)
IMM GRANULOCYTES # BLD AUTO: 0.04 K/UL (ref 0–0.58)
IMM GRANULOCYTES NFR BLD: 0 % (ref 0–5)
LYMPHOCYTES NFR BLD: 3.87 K/UL (ref 1.5–4)
LYMPHOCYTES RELATIVE PERCENT: 41 % (ref 20–42)
MCH RBC QN AUTO: 27.3 PG (ref 26–35)
MCHC RBC AUTO-ENTMCNC: 33.4 G/DL (ref 32–34.5)
MCV RBC AUTO: 81.6 FL (ref 80–99.9)
MONOCYTES NFR BLD: 0.7 K/UL (ref 0.1–0.95)
MONOCYTES NFR BLD: 8 % (ref 2–12)
NEUTROPHILS NFR BLD: 50 % (ref 43–80)
NEUTS SEG NFR BLD: 4.66 K/UL (ref 1.8–7.3)
PLATELET # BLD AUTO: 270 K/UL (ref 130–450)
PMV BLD AUTO: 9.7 FL (ref 7–12)
POTASSIUM SERPL-SCNC: 4.5 MMOL/L (ref 3.5–5)
PROT SERPL-MCNC: 7.2 G/DL (ref 6.4–8.3)
RBC # BLD AUTO: 5.72 M/UL (ref 3.8–5.8)
SODIUM SERPL-SCNC: 140 MMOL/L (ref 132–146)
TRIGL SERPL-MCNC: 262 MG/DL
WBC OTHER # BLD: 9.4 K/UL (ref 4.5–11.5)

## 2023-08-22 PROCEDURE — 6370000000 HC RX 637 (ALT 250 FOR IP): Performed by: STUDENT IN AN ORGANIZED HEALTH CARE EDUCATION/TRAINING PROGRAM

## 2023-08-22 PROCEDURE — 2500000003 HC RX 250 WO HCPCS

## 2023-08-22 PROCEDURE — 6370000000 HC RX 637 (ALT 250 FOR IP)

## 2023-08-22 PROCEDURE — 84478 ASSAY OF TRIGLYCERIDES: CPT

## 2023-08-22 PROCEDURE — S5553 INSULIN LONG ACTING 5 U: HCPCS | Performed by: STUDENT IN AN ORGANIZED HEALTH CARE EDUCATION/TRAINING PROGRAM

## 2023-08-22 PROCEDURE — 1200000000 HC SEMI PRIVATE

## 2023-08-22 PROCEDURE — 82248 BILIRUBIN DIRECT: CPT

## 2023-08-22 PROCEDURE — 36415 COLL VENOUS BLD VENIPUNCTURE: CPT

## 2023-08-22 PROCEDURE — 82010 KETONE BODYS QUAN: CPT

## 2023-08-22 PROCEDURE — 6360000002 HC RX W HCPCS

## 2023-08-22 PROCEDURE — 80053 COMPREHEN METABOLIC PANEL: CPT

## 2023-08-22 PROCEDURE — 2580000003 HC RX 258

## 2023-08-22 PROCEDURE — 85025 COMPLETE CBC W/AUTO DIFF WBC: CPT

## 2023-08-22 PROCEDURE — 82962 GLUCOSE BLOOD TEST: CPT

## 2023-08-22 RX ORDER — ACETAMINOPHEN 650 MG/1
650 SUPPOSITORY RECTAL EVERY 6 HOURS PRN
Status: DISCONTINUED | OUTPATIENT
Start: 2023-08-22 | End: 2023-08-23 | Stop reason: HOSPADM

## 2023-08-22 RX ORDER — INSULIN LISPRO 100 [IU]/ML
10 INJECTION, SOLUTION INTRAVENOUS; SUBCUTANEOUS
Status: DISCONTINUED | OUTPATIENT
Start: 2023-08-22 | End: 2023-08-23

## 2023-08-22 RX ORDER — ONDANSETRON 2 MG/ML
4 INJECTION INTRAMUSCULAR; INTRAVENOUS EVERY 6 HOURS PRN
Status: DISCONTINUED | OUTPATIENT
Start: 2023-08-22 | End: 2023-08-23 | Stop reason: HOSPADM

## 2023-08-22 RX ORDER — ENOXAPARIN SODIUM 100 MG/ML
30 INJECTION SUBCUTANEOUS 2 TIMES DAILY
Status: DISCONTINUED | OUTPATIENT
Start: 2023-08-22 | End: 2023-08-23 | Stop reason: HOSPADM

## 2023-08-22 RX ORDER — INSULIN GLARGINE-YFGN 100 [IU]/ML
30 INJECTION, SOLUTION SUBCUTANEOUS NIGHTLY
Status: DISCONTINUED | OUTPATIENT
Start: 2023-08-22 | End: 2023-08-23

## 2023-08-22 RX ORDER — PANTOPRAZOLE SODIUM 40 MG/1
40 TABLET, DELAYED RELEASE ORAL
Status: DISCONTINUED | OUTPATIENT
Start: 2023-08-22 | End: 2023-08-23 | Stop reason: HOSPADM

## 2023-08-22 RX ORDER — BUPRENORPHINE AND NALOXONE 8; 2 MG/1; MG/1
1 FILM, SOLUBLE BUCCAL; SUBLINGUAL 2 TIMES DAILY
Status: DISCONTINUED | OUTPATIENT
Start: 2023-08-22 | End: 2023-08-23 | Stop reason: HOSPADM

## 2023-08-22 RX ORDER — SODIUM CHLORIDE 0.9 % (FLUSH) 0.9 %
5-40 SYRINGE (ML) INJECTION EVERY 12 HOURS SCHEDULED
Status: DISCONTINUED | OUTPATIENT
Start: 2023-08-22 | End: 2023-08-23 | Stop reason: HOSPADM

## 2023-08-22 RX ORDER — ACETAMINOPHEN 325 MG/1
650 TABLET ORAL EVERY 6 HOURS PRN
Status: DISCONTINUED | OUTPATIENT
Start: 2023-08-22 | End: 2023-08-23 | Stop reason: HOSPADM

## 2023-08-22 RX ORDER — INSULIN LISPRO 100 [IU]/ML
0-4 INJECTION, SOLUTION INTRAVENOUS; SUBCUTANEOUS
Status: DISCONTINUED | OUTPATIENT
Start: 2023-08-22 | End: 2023-08-23

## 2023-08-22 RX ORDER — ATORVASTATIN CALCIUM 40 MG/1
40 TABLET, FILM COATED ORAL DAILY
Status: DISCONTINUED | OUTPATIENT
Start: 2023-08-22 | End: 2023-08-23 | Stop reason: HOSPADM

## 2023-08-22 RX ORDER — SODIUM CHLORIDE 9 MG/ML
INJECTION, SOLUTION INTRAVENOUS PRN
Status: DISCONTINUED | OUTPATIENT
Start: 2023-08-22 | End: 2023-08-23 | Stop reason: HOSPADM

## 2023-08-22 RX ORDER — SODIUM CHLORIDE 9 MG/ML
INJECTION, SOLUTION INTRAVENOUS CONTINUOUS
Status: DISCONTINUED | OUTPATIENT
Start: 2023-08-22 | End: 2023-08-23

## 2023-08-22 RX ORDER — DEXTROSE MONOHYDRATE 100 MG/ML
INJECTION, SOLUTION INTRAVENOUS CONTINUOUS PRN
Status: DISCONTINUED | OUTPATIENT
Start: 2023-08-22 | End: 2023-08-23 | Stop reason: HOSPADM

## 2023-08-22 RX ORDER — SODIUM CHLORIDE 0.9 % (FLUSH) 0.9 %
5-40 SYRINGE (ML) INJECTION PRN
Status: DISCONTINUED | OUTPATIENT
Start: 2023-08-22 | End: 2023-08-23 | Stop reason: HOSPADM

## 2023-08-22 RX ORDER — HYDROMORPHONE HYDROCHLORIDE 1 MG/ML
0.25 INJECTION, SOLUTION INTRAMUSCULAR; INTRAVENOUS; SUBCUTANEOUS EVERY 6 HOURS PRN
Status: DISCONTINUED | OUTPATIENT
Start: 2023-08-22 | End: 2023-08-23 | Stop reason: HOSPADM

## 2023-08-22 RX ORDER — POLYETHYLENE GLYCOL 3350 17 G/17G
17 POWDER, FOR SOLUTION ORAL DAILY PRN
Status: DISCONTINUED | OUTPATIENT
Start: 2023-08-22 | End: 2023-08-23 | Stop reason: HOSPADM

## 2023-08-22 RX ORDER — ASPIRIN 81 MG/1
81 TABLET, CHEWABLE ORAL DAILY
Status: DISCONTINUED | OUTPATIENT
Start: 2023-08-22 | End: 2023-08-23 | Stop reason: HOSPADM

## 2023-08-22 RX ORDER — GABAPENTIN 100 MG/1
200 CAPSULE ORAL 3 TIMES DAILY
Status: DISCONTINUED | OUTPATIENT
Start: 2023-08-22 | End: 2023-08-23 | Stop reason: HOSPADM

## 2023-08-22 RX ORDER — INSULIN GLARGINE-YFGN 100 [IU]/ML
20 INJECTION, SOLUTION SUBCUTANEOUS NIGHTLY
Status: DISCONTINUED | OUTPATIENT
Start: 2023-08-22 | End: 2023-08-22

## 2023-08-22 RX ORDER — ONDANSETRON 4 MG/1
4 TABLET, ORALLY DISINTEGRATING ORAL EVERY 8 HOURS PRN
Status: DISCONTINUED | OUTPATIENT
Start: 2023-08-22 | End: 2023-08-23 | Stop reason: HOSPADM

## 2023-08-22 RX ORDER — INSULIN LISPRO 100 [IU]/ML
5 INJECTION, SOLUTION INTRAVENOUS; SUBCUTANEOUS
Status: DISCONTINUED | OUTPATIENT
Start: 2023-08-22 | End: 2023-08-22

## 2023-08-22 RX ORDER — LOSARTAN POTASSIUM 25 MG/1
25 TABLET ORAL DAILY
Status: DISCONTINUED | OUTPATIENT
Start: 2023-08-22 | End: 2023-08-23 | Stop reason: HOSPADM

## 2023-08-22 RX ADMIN — INSULIN GLARGINE-YFGN 30 UNITS: 100 INJECTION, SOLUTION SUBCUTANEOUS at 19:58

## 2023-08-22 RX ADMIN — ASPIRIN 81 MG 81 MG: 81 TABLET ORAL at 08:20

## 2023-08-22 RX ADMIN — INSULIN LISPRO 5 UNITS: 100 INJECTION, SOLUTION INTRAVENOUS; SUBCUTANEOUS at 12:26

## 2023-08-22 RX ADMIN — ATORVASTATIN CALCIUM 40 MG: 40 TABLET, FILM COATED ORAL at 08:20

## 2023-08-22 RX ADMIN — SODIUM CHLORIDE, PRESERVATIVE FREE 10 ML: 5 INJECTION INTRAVENOUS at 08:25

## 2023-08-22 RX ADMIN — PANTOPRAZOLE SODIUM 40 MG: 40 TABLET, DELAYED RELEASE ORAL at 05:39

## 2023-08-22 RX ADMIN — ENOXAPARIN SODIUM 30 MG: 100 INJECTION SUBCUTANEOUS at 19:58

## 2023-08-22 RX ADMIN — INSULIN LISPRO 5 UNITS: 100 INJECTION, SOLUTION INTRAVENOUS; SUBCUTANEOUS at 08:22

## 2023-08-22 RX ADMIN — INSULIN LISPRO 2 UNITS: 100 INJECTION, SOLUTION INTRAVENOUS; SUBCUTANEOUS at 17:46

## 2023-08-22 RX ADMIN — GABAPENTIN 200 MG: 100 CAPSULE ORAL at 19:58

## 2023-08-22 RX ADMIN — INSULIN LISPRO 1 UNITS: 100 INJECTION, SOLUTION INTRAVENOUS; SUBCUTANEOUS at 08:22

## 2023-08-22 RX ADMIN — INSULIN LISPRO 3 UNITS: 100 INJECTION, SOLUTION INTRAVENOUS; SUBCUTANEOUS at 12:27

## 2023-08-22 RX ADMIN — SODIUM CHLORIDE, PRESERVATIVE FREE 10 ML: 5 INJECTION INTRAVENOUS at 19:59

## 2023-08-22 RX ADMIN — HYDROMORPHONE HYDROCHLORIDE 0.25 MG: 1 INJECTION, SOLUTION INTRAMUSCULAR; INTRAVENOUS; SUBCUTANEOUS at 03:08

## 2023-08-22 RX ADMIN — INSULIN LISPRO 10 UNITS: 100 INJECTION, SOLUTION INTRAVENOUS; SUBCUTANEOUS at 17:46

## 2023-08-22 RX ADMIN — GABAPENTIN 200 MG: 100 CAPSULE ORAL at 08:20

## 2023-08-22 RX ADMIN — GABAPENTIN 200 MG: 100 CAPSULE ORAL at 14:37

## 2023-08-22 RX ADMIN — SODIUM CHLORIDE: 9 INJECTION, SOLUTION INTRAVENOUS at 03:12

## 2023-08-22 RX ADMIN — ENOXAPARIN SODIUM 30 MG: 100 INJECTION SUBCUTANEOUS at 08:20

## 2023-08-22 RX ADMIN — LOSARTAN POTASSIUM 25 MG: 50 TABLET, FILM COATED ORAL at 08:20

## 2023-08-22 ASSESSMENT — PAIN DESCRIPTION - LOCATION: LOCATION: ABDOMEN;BACK

## 2023-08-22 ASSESSMENT — ENCOUNTER SYMPTOMS
COUGH: 0
CONSTIPATION: 0
VOMITING: 0
ABDOMINAL PAIN: 1
DIARRHEA: 0
NAUSEA: 1
BACK PAIN: 0
SHORTNESS OF BREATH: 0
BLOOD IN STOOL: 0

## 2023-08-22 ASSESSMENT — PAIN SCALES - GENERAL
PAINLEVEL_OUTOF10: 5
PAINLEVEL_OUTOF10: 7

## 2023-08-22 ASSESSMENT — PAIN - FUNCTIONAL ASSESSMENT: PAIN_FUNCTIONAL_ASSESSMENT: PREVENTS OR INTERFERES SOME ACTIVE ACTIVITIES AND ADLS

## 2023-08-22 ASSESSMENT — PAIN DESCRIPTION - DESCRIPTORS: DESCRIPTORS: SHARP;ACHING;STABBING

## 2023-08-22 ASSESSMENT — PAIN DESCRIPTION - ORIENTATION: ORIENTATION: LOWER;RIGHT

## 2023-08-22 NOTE — CARE COORDINATION
8/22/2023 social work transition of care planning  Pt is from home with GF and children. Pt had been independent. Pt  sees a pcp at [facility]. Explained sw role in transition of care planning. Pt plan is home,pt will call for  a ride at discharge.   Electronically signed by DOLORES Dorsey on 8/22/2023 at 12:37 PM

## 2023-08-22 NOTE — ED NOTES
Called report to [phone] KALEY GAITAN for bed [address] [place] [address], RN  08/22/23 7819

## 2023-08-22 NOTE — PROGRESS NOTES
[address] [facility]  Internal Medicine Residency / [address]    Attending Physician Statement  I have discussed the case, including pertinent history and exam findings with the resident and the team.  I have seen and examined the patient and the key elements of the encounter have been performed by me. I agree with the assessment, plan and orders as documented by the resident. A&O  VS stable  Hungry today   Pain less  Impression Acute pancreatitis etiology? DM1 good control  Plan: Treating lipids and continue           Consider  microlithiasis and empiric treatment with              Actigal on discharge                  Remainder of medical problems as per resident note.       Ivone Holden MD [facility]  Internal Medicine Residency Faculty

## 2023-08-22 NOTE — H&P
thrombus. The veins demonstrate good compressibility with normal color flow study and spectral analysis. No evidence of DVT in the left lower extremity. EKG:     Resident's Assessment and Plan       Michelle Gray is a 34 y.o. male with  has a past medical history of Anxiety, Chest pain, unspecified, Depression, History of CVA (cerebrovascular accident), Seizures ([address]), and Unspecified cerebral artery occlusion with cerebral infarction. Came with CC:       Assessment:  Abdominal pain likely 2/2 Acute pancreatitis (lipase elevated 230) and duodenitis (has previous hx of pancreatitis 04/2023; TG elevated; on fenofibrate and statin) (US on 02/2023 normal); CTA triphasic was done in 04/23 wo evidence of necrosis or pseudocyst  ?? DKA, AG 14, LA normal; sent betahydroxybutyrate  Poorly controlled DM; last A1c 16.5 on 02/2023  Leukocytosis likely 2/2 pancreatitis  Polycythemia (hb 16.9) likely 2/2 dehydration, hct is normal  Mildly elevated transaminitis likely 2/2 fatty liver  Glucosuria in the setting of DM 1, ketones negative  Hx of alcohol abuse, sober for 8 years  Hx of polysubstance abuse, sober for 8 years  Hx of tobacco abuse    Plan:   Follow TG, respiratory panel  Follow UDS, troponin, beta hydroxybutyrate, A1c  Dilaudid for pain management PRN  Continuous fluid @75cc/hr, received 2 L of bolus   Insulin lantus 20 U nightly and 5 U TID and LDSS (home dose 48 U in the morning, 15 u TID with meals, and sliding scale)  POCT BG ACHS  On full liquid diet, advance if tolerates. PT/OT: Not indicated at this time  DVT ppx: Lovenox  GI ppx:  On diet    Code Status:   Full     Flaco Gross MD, PGY-2   Attending physician: Dr. Daniel Pearl

## 2023-08-22 NOTE — PROGRESS NOTES
4 Eyes Skin Assessment     NAME:  Pawan Bernal  YOB: 1985  MEDICAL RECORD NUMBER:  [id number]    The patient is being assessed for  Admission    I agree that at least one RN has performed a thorough Head to Toe Skin Assessment on the patient. ALL assessment sites listed below have been assessed. Areas assessed by both nurses:    Head, Face, Ears, Shoulders, Back, Chest, Arms, Elbows, Hands, Sacrum. Buttock, Coccyx, Ischium, and Legs. Feet and Heels        Does the Patient have a Wound?  No noted wound(s)       Surinedr Prevention initiated by RN: Yes  Wound Care Orders initiated by RN: No    Pressure Injury (Stage 3,4, Unstageable, DTI, NWPT, and Complex wounds) if present, place Wound referral order by RN under : No    New Ostomies, if present place, Ostomy referral order under : No     Nurse 1 eSignature: Electronically signed by Verónica Cho RN on 8/22/23 at 5:04 AM EDT    **SHARE this note so that the co-signing nurse can place an eSignature**    Nurse 2 eSignature: Electronically signed by Edwin Reid RN on 8/22/23 at 5:05 AM EDT

## 2023-08-23 ENCOUNTER — APPOINTMENT (OUTPATIENT)
Dept: ULTRASOUND IMAGING | Age: 38
DRG: 282 | End: 2023-08-23
Payer: MEDICAID

## 2023-08-23 VITALS
TEMPERATURE: 97.8 F | HEART RATE: 65 BPM | BODY MASS INDEX: 26.86 KG/M2 | DIASTOLIC BLOOD PRESSURE: 86 MMHG | SYSTOLIC BLOOD PRESSURE: 134 MMHG | WEIGHT: 232.2 LBS | OXYGEN SATURATION: 100 % | HEIGHT: 78 IN | RESPIRATION RATE: 16 BRPM

## 2023-08-23 LAB
ANION GAP SERPL CALCULATED.3IONS-SCNC: 12 MMOL/L (ref 7–16)
BASOPHILS # BLD: 0.02 K/UL (ref 0–0.2)
BASOPHILS NFR BLD: 0 % (ref 0–2)
BUN SERPL-MCNC: 9 MG/DL (ref 6–20)
CALCIUM SERPL-MCNC: 8.7 MG/DL (ref 8.6–10.2)
CHLORIDE SERPL-SCNC: 106 MMOL/L (ref 98–107)
CO2 SERPL-SCNC: 21 MMOL/L (ref 22–29)
CREAT SERPL-MCNC: 0.6 MG/DL (ref 0.7–1.2)
CRP SERPL HS-MCNC: <3 MG/L (ref 0–5)
EOSINOPHIL # BLD: 0.07 K/UL (ref 0.05–0.5)
EOSINOPHILS RELATIVE PERCENT: 1 % (ref 0–6)
ERYTHROCYTE [DISTWIDTH] IN BLOOD BY AUTOMATED COUNT: 14.3 % (ref 11.5–15)
GFR SERPL CREATININE-BSD FRML MDRD: >60 ML/MIN/1.73M2
GLUCOSE BLD-MCNC: 183 MG/DL (ref 74–99)
GLUCOSE BLD-MCNC: 316 MG/DL (ref 74–99)
GLUCOSE SERPL-MCNC: 332 MG/DL (ref 74–99)
HBA1C MFR BLD: 14.6 % (ref 4–5.6)
HCT VFR BLD AUTO: 44 % (ref 37–54)
HGB BLD-MCNC: 14.4 G/DL (ref 12.5–16.5)
IMM GRANULOCYTES # BLD AUTO: 0.03 K/UL (ref 0–0.58)
IMM GRANULOCYTES NFR BLD: 0 % (ref 0–5)
LYMPHOCYTES NFR BLD: 3.1 K/UL (ref 1.5–4)
LYMPHOCYTES RELATIVE PERCENT: 46 % (ref 20–42)
MCH RBC QN AUTO: 27.2 PG (ref 26–35)
MCHC RBC AUTO-ENTMCNC: 32.7 G/DL (ref 32–34.5)
MCV RBC AUTO: 83.2 FL (ref 80–99.9)
MONOCYTES NFR BLD: 0.55 K/UL (ref 0.1–0.95)
MONOCYTES NFR BLD: 8 % (ref 2–12)
NEUTROPHILS NFR BLD: 44 % (ref 43–80)
NEUTS SEG NFR BLD: 2.98 K/UL (ref 1.8–7.3)
PLATELET # BLD AUTO: 231 K/UL (ref 130–450)
PMV BLD AUTO: 10.1 FL (ref 7–12)
POTASSIUM SERPL-SCNC: 4.2 MMOL/L (ref 3.5–5)
RBC # BLD AUTO: 5.29 M/UL (ref 3.8–5.8)
SODIUM SERPL-SCNC: 139 MMOL/L (ref 132–146)
WBC OTHER # BLD: 6.8 K/UL (ref 4.5–11.5)

## 2023-08-23 PROCEDURE — 36415 COLL VENOUS BLD VENIPUNCTURE: CPT

## 2023-08-23 PROCEDURE — 76705 ECHO EXAM OF ABDOMEN: CPT

## 2023-08-23 PROCEDURE — 6370000000 HC RX 637 (ALT 250 FOR IP)

## 2023-08-23 PROCEDURE — 6360000002 HC RX W HCPCS

## 2023-08-23 PROCEDURE — 80048 BASIC METABOLIC PNL TOTAL CA: CPT

## 2023-08-23 PROCEDURE — 2580000003 HC RX 258

## 2023-08-23 PROCEDURE — 83036 HEMOGLOBIN GLYCOSYLATED A1C: CPT

## 2023-08-23 PROCEDURE — 82962 GLUCOSE BLOOD TEST: CPT

## 2023-08-23 PROCEDURE — 86140 C-REACTIVE PROTEIN: CPT

## 2023-08-23 PROCEDURE — 85025 COMPLETE CBC W/AUTO DIFF WBC: CPT

## 2023-08-23 RX ORDER — LANOLIN ALCOHOL/MO/W.PET/CERES
500 CREAM (GRAM) TOPICAL NIGHTLY
Qty: 30 TABLET | Refills: 5 | Status: CANCELLED | OUTPATIENT
Start: 2023-08-23

## 2023-08-23 RX ORDER — INSULIN LISPRO 100 [IU]/ML
12 INJECTION, SOLUTION INTRAVENOUS; SUBCUTANEOUS
Qty: 90 EACH | Refills: 3 | Status: CANCELLED | OUTPATIENT
Start: 2023-08-23

## 2023-08-23 RX ORDER — INSULIN LISPRO 100 [IU]/ML
0-8 INJECTION, SOLUTION INTRAVENOUS; SUBCUTANEOUS
Status: DISCONTINUED | OUTPATIENT
Start: 2023-08-23 | End: 2023-08-23 | Stop reason: HOSPADM

## 2023-08-23 RX ORDER — ONDANSETRON 4 MG/1
4 TABLET, ORALLY DISINTEGRATING ORAL EVERY 8 HOURS PRN
Qty: 30 TABLET | Refills: 0 | Status: CANCELLED | OUTPATIENT
Start: 2023-08-23

## 2023-08-23 RX ORDER — INSULIN LISPRO 100 [IU]/ML
0-4 INJECTION, SOLUTION INTRAVENOUS; SUBCUTANEOUS NIGHTLY
Status: DISCONTINUED | OUTPATIENT
Start: 2023-08-23 | End: 2023-08-23 | Stop reason: HOSPADM

## 2023-08-23 RX ORDER — INSULIN GLARGINE-YFGN 100 [IU]/ML
40 INJECTION, SOLUTION SUBCUTANEOUS NIGHTLY
Qty: 90 EACH | Refills: 3 | Status: CANCELLED | OUTPATIENT
Start: 2023-08-23

## 2023-08-23 RX ORDER — INSULIN LISPRO 100 [IU]/ML
0-4 INJECTION, SOLUTION INTRAVENOUS; SUBCUTANEOUS NIGHTLY
Qty: 90 EACH | Refills: 3 | Status: CANCELLED | OUTPATIENT
Start: 2023-08-23

## 2023-08-23 RX ORDER — INSULIN LISPRO 100 [IU]/ML
12 INJECTION, SOLUTION INTRAVENOUS; SUBCUTANEOUS
Status: DISCONTINUED | OUTPATIENT
Start: 2023-08-23 | End: 2023-08-23 | Stop reason: HOSPADM

## 2023-08-23 RX ORDER — LOSARTAN POTASSIUM 25 MG/1
25 TABLET ORAL DAILY
Qty: 30 TABLET | Refills: 3 | Status: CANCELLED | OUTPATIENT
Start: 2023-08-23

## 2023-08-23 RX ORDER — PANTOPRAZOLE SODIUM 40 MG/1
40 TABLET, DELAYED RELEASE ORAL
Qty: 30 TABLET | Refills: 3 | Status: CANCELLED | OUTPATIENT
Start: 2023-08-24

## 2023-08-23 RX ORDER — OMEGA-3-ACID ETHYL ESTERS 1 G/1
2 CAPSULE, LIQUID FILLED ORAL 2 TIMES DAILY
Qty: 120 CAPSULE | Refills: 3 | Status: CANCELLED | OUTPATIENT
Start: 2023-08-23

## 2023-08-23 RX ORDER — INSULIN GLARGINE-YFGN 100 [IU]/ML
35 INJECTION, SOLUTION SUBCUTANEOUS NIGHTLY
Status: DISCONTINUED | OUTPATIENT
Start: 2023-08-23 | End: 2023-08-23

## 2023-08-23 RX ORDER — INSULIN LISPRO 100 [IU]/ML
0-8 INJECTION, SOLUTION INTRAVENOUS; SUBCUTANEOUS
Qty: 90 EACH | Refills: 3 | Status: CANCELLED | OUTPATIENT
Start: 2023-08-23

## 2023-08-23 RX ORDER — INSULIN GLARGINE-YFGN 100 [IU]/ML
40 INJECTION, SOLUTION SUBCUTANEOUS NIGHTLY
Status: DISCONTINUED | OUTPATIENT
Start: 2023-08-23 | End: 2023-08-23 | Stop reason: HOSPADM

## 2023-08-23 RX ORDER — FENOFIBRATE 160 MG/1
160 TABLET ORAL DAILY
Qty: 30 TABLET | Refills: 5 | Status: CANCELLED | OUTPATIENT
Start: 2023-08-23

## 2023-08-23 RX ORDER — ATORVASTATIN CALCIUM 40 MG/1
40 TABLET, FILM COATED ORAL DAILY
Qty: 30 TABLET | Refills: 2 | Status: CANCELLED | OUTPATIENT
Start: 2023-08-23

## 2023-08-23 RX ADMIN — ATORVASTATIN CALCIUM 40 MG: 40 TABLET, FILM COATED ORAL at 08:36

## 2023-08-23 RX ADMIN — ASPIRIN 81 MG 81 MG: 81 TABLET ORAL at 08:36

## 2023-08-23 RX ADMIN — INSULIN LISPRO 6 UNITS: 100 INJECTION, SOLUTION INTRAVENOUS; SUBCUTANEOUS at 08:36

## 2023-08-23 RX ADMIN — LOSARTAN POTASSIUM 25 MG: 50 TABLET, FILM COATED ORAL at 08:37

## 2023-08-23 RX ADMIN — SODIUM CHLORIDE, PRESERVATIVE FREE 10 ML: 5 INJECTION INTRAVENOUS at 08:36

## 2023-08-23 RX ADMIN — GABAPENTIN 200 MG: 100 CAPSULE ORAL at 08:37

## 2023-08-23 RX ADMIN — ENOXAPARIN SODIUM 30 MG: 100 INJECTION SUBCUTANEOUS at 08:36

## 2023-08-23 NOTE — CARE COORDINATION
8/23/2023 social work transition of care planning  Pt plan is home,once medically stable. Pt will call for a ride at discharge.   Electronically signed by DOLORES Kenny on 8/23/2023 at 9:10 AM

## 2023-08-23 NOTE — DISCHARGE INSTRUCTIONS
Internal medicine    Follow ups    Please follow up with your primary care Physician Jerry Monteiro on 25 th august, 2023 at 2: 00 PM.    Changes in healthcare     Please take all medications as indicated  Diet: regular diet   Activity: activity as tolerated  New Medications started during this hospital stay  none  Changes to your medications  none  Medications you should stop taking   Augmentin 875-125 mg  Additional labs, testing or imaging needed after discharge   none  Please contact us if you have any concerns, wish to change or make an appointment:  Internal medicine clinic   Phone: [phone]  Fax: [phone]  [address] [place] [address]  Or please call the nurse line at [phone]. Should you have further questions in regards to this visit, you can review your clinical note and after visit summary document on your "[employer] account. Other questions can be directed to our nurse line at [phone]. Other than any new prescriptions given to you today, the list of home medications on this After Visit Summary are based on information provided to us from you and your healthcare providers. This information, including the list, dose, and frequency of medications is only as accurate as the information you provided. If you have any questions or concerns about your home medications, please contact your Primary Care Physician for further clarification.

## 2023-08-23 NOTE — DISCHARGE SUMMARY
[address]  Discharge Summary    PCP: Demetra Odell MD    Admit Date:8/21/2023  Discharge Date: 8/23/2023    Admission Diagnosis:   Abdominal pain likely 2/2 Acute pancreatitis (lipase elevated 230) and duodenitis (has previous hx of pancreatitis 04/2023; TG elevated; on fenofibrate and statin)  ? ? DKA, AG 14, LA normal; betahydroxybutyrate  Poorly controlled DM; last A1c 16.5 on 02/2023  Leukocytosis likely 2/2 pancreatitis vs reactive , resolved  Polycythemia (hb 16.9) likely 2/2 dehydration, Resolved  Mildly elevated transaminitis likely 2/2 fatty liver  Glucosuria in the setting of DM 1, Hx of alcohol abuse, quit 8 years ago  Hx of polysubstance abuse, quit 8 years ago  Hx of tobacco abuse    Discharge Diagnosis:   Acute pancreatitis likely 2/2 hypertriglyceridemia : Resolved  Poorly controlled DM; last A1c 16.5 on 02/2023, blood glucose on discharge 183  Leukocytosis likely 2/2 pancreatitis  Polycythemia (hb 16.9) likely 2/2 dehydration, hct is normal  Mildly elevated transaminitis likely 2/2 fatty liver  Glucosuria in the setting of DM 1, ketones negative  Hx of alcohol abuse, quit 8 years ago  Hx of polysubstance abuse, quit 8 years ago  Hx of tobacco abuse    Hospital Course:   Pawan Bernal is a 34 y.o. male with past medical hx of Type 1 DM with hx of DKA, pancreatitis, hypertension, hx of polysubstance use presented today from [address] ED due to concern for abdominal pain. Patient states that he had abdominal pain since yesterday which was associated with nausea and vomiting, no fever. No aggravating or relieving factor but patient could not tolerate any diet. Patient decided to visit ED as his symptoms worsened. He has had multiple admissions with same chief complaints and has been previously diagnosed with acute pancreatitis. In the [address] ED CAT scan revealed acute pancreatitis, duodenitis, chronic right-sided nephro lithiasis.   Patient was

## 2023-08-23 NOTE — PROGRESS NOTES
[address] [facility]  Internal Medicine Residency / [address]    Attending Physician Statement  I have discussed the case, including pertinent history and exam findings with the resident and the team.  I have seen and examined the patient and the key elements of the encounter have been performed by me. I agree with the assessment, plan and orders as documented by the resident. Pancreatitis resolved  Has high A1C  And elevated TRIGS  Improve DM control and TG control   Tricor as before  Consider empiric Actigal therapy  Follow in clinic  Remainder of medical problems as per resident note.       Tone Dominguez MD [facility]  Internal Medicine Residency Faculty

## 2023-08-24 ENCOUNTER — TELEPHONE (OUTPATIENT)
Dept: INTERNAL MEDICINE | Age: 38
End: 2023-08-24

## 2023-08-24 NOTE — TELEPHONE ENCOUNTER
Last Appointment:  7/18/2023  Future Appointments   Date Time Provider [address]   8/25/2023  2:00 PM Gomez Alba MD [facility]      No Answer-VM message left for patient to return call.

## 2023-08-25 ENCOUNTER — TELEPHONE (OUTPATIENT)
Dept: INTERNAL MEDICINE | Age: 38
End: 2023-08-25

## 2024-02-05 ENCOUNTER — TELEPHONE (OUTPATIENT)
Dept: INTERNAL MEDICINE | Age: 39
End: 2024-02-05

## 2024-02-05 NOTE — TELEPHONE ENCOUNTER
Pt called in stating he is having numbness on left side of his body, feet, calf, and hand, he states he also has blisters on back of left hand, also reports having excessive \"greasy\" diarrhea 3-6 x times a day for about a week as well as pain in abdomen. Spoke with Dr Mason, and per Dr Mason Recommendation is to go to ED immediately. Per pt he is at work in [place] . He states he will go totED when he returns to area after 3 pm . I informed pt that we really recommend he go straight there. Pt stated understanding but states he will go as soon as he gets off

## 2024-02-06 NOTE — TELEPHONE ENCOUNTER
Chart reviewed and patient did not go to a [facility] ED. Please see if he was evaluated at an outlying facility.  If not, please offer appointment here.     Desiree Mason MD  2/6/2024

## 2024-02-06 NOTE — TELEPHONE ENCOUNTER
Called pt mika today to see if pt can come in for a office visit, no answer, VM left for pt to return call

## 2024-02-27 ENCOUNTER — APPOINTMENT (OUTPATIENT)
Dept: CT IMAGING | Age: 39
End: 2024-02-27
Payer: MEDICAID

## 2024-02-27 ENCOUNTER — APPOINTMENT (OUTPATIENT)
Dept: GENERAL RADIOLOGY | Age: 39
End: 2024-02-27
Payer: MEDICAID

## 2024-02-27 ENCOUNTER — HOSPITAL ENCOUNTER (EMERGENCY)
Age: 39
Discharge: LEFT BEFORE DISCHARGE | End: 2024-02-27
Attending: EMERGENCY MEDICINE | Admitting: INTERNAL MEDICINE
Payer: MEDICAID

## 2024-02-27 VITALS
HEART RATE: 72 BPM | SYSTOLIC BLOOD PRESSURE: 118 MMHG | TEMPERATURE: 97.5 F | WEIGHT: 232 LBS | BODY MASS INDEX: 26.14 KG/M2 | OXYGEN SATURATION: 98 % | DIASTOLIC BLOOD PRESSURE: 77 MMHG | RESPIRATION RATE: 14 BRPM

## 2024-02-27 DIAGNOSIS — R73.9 HYPERGLYCEMIA: Primary | ICD-10-CM

## 2024-02-27 PROBLEM — K85.90 PANCREATITIS, UNSPECIFIED PANCREATITIS TYPE: Status: ACTIVE | Noted: 2024-02-27

## 2024-02-27 LAB
ALBUMIN SERPL-MCNC: 4.5 G/DL (ref 3.5–5.2)
ALP SERPL-CCNC: 210 U/L (ref 40–129)
ALT SERPL-CCNC: 64 U/L (ref 0–40)
ANION GAP SERPL CALCULATED.3IONS-SCNC: 12 MMOL/L (ref 7–16)
AST SERPL-CCNC: 30 U/L (ref 0–39)
B-OH-BUTYR SERPL-MCNC: 0.2 MMOL/L (ref 0.02–0.27)
B.E.: -1.9 MMOL/L (ref -3–3)
BASOPHILS # BLD: 0.04 K/UL (ref 0–0.2)
BASOPHILS NFR BLD: 1 % (ref 0–2)
BILIRUB SERPL-MCNC: 0.3 MG/DL (ref 0–1.2)
BNP SERPL-MCNC: 48 PG/ML (ref 0–125)
BUN SERPL-MCNC: 17 MG/DL (ref 6–20)
CALCIUM SERPL-MCNC: 9.9 MG/DL (ref 8.6–10.2)
CHLORIDE SERPL-SCNC: 92 MMOL/L (ref 98–107)
CO2 SERPL-SCNC: 22 MMOL/L (ref 22–29)
COHB: 5.3 % (ref 0–1.5)
CREAT SERPL-MCNC: 0.7 MG/DL (ref 0.7–1.2)
CRITICAL: ABNORMAL
DATE ANALYZED: ABNORMAL
DATE OF COLLECTION: ABNORMAL
EKG ATRIAL RATE: 101 BPM
EKG P AXIS: 24 DEGREES
EKG P-R INTERVAL: 152 MS
EKG Q-T INTERVAL: 332 MS
EKG QRS DURATION: 94 MS
EKG QTC CALCULATION (BAZETT): 430 MS
EKG R AXIS: -15 DEGREES
EKG T AXIS: 36 DEGREES
EKG VENTRICULAR RATE: 101 BPM
EOSINOPHIL # BLD: 0.06 K/UL (ref 0.05–0.5)
EOSINOPHILS RELATIVE PERCENT: 1 % (ref 0–6)
ERYTHROCYTE [DISTWIDTH] IN BLOOD BY AUTOMATED COUNT: 12.6 % (ref 11.5–15)
GFR SERPL CREATININE-BSD FRML MDRD: >60 ML/MIN/1.73M2
GLUCOSE BLD-MCNC: 269 MG/DL (ref 74–99)
GLUCOSE BLD-MCNC: >500 MG/DL (ref 74–99)
GLUCOSE SERPL-MCNC: 660 MG/DL (ref 74–99)
HCO3: 21.3 MMOL/L (ref 22–26)
HCT VFR BLD AUTO: 47.3 % (ref 37–54)
HGB BLD-MCNC: 15.9 G/DL (ref 12.5–16.5)
HHB: 2.9 % (ref 0–5)
IMM GRANULOCYTES # BLD AUTO: <0.03 K/UL (ref 0–0.58)
IMM GRANULOCYTES NFR BLD: 0 % (ref 0–5)
LAB: ABNORMAL
LACTATE BLDV-SCNC: 1.7 MMOL/L (ref 0.5–2.2)
LYMPHOCYTES NFR BLD: 3.37 K/UL (ref 1.5–4)
LYMPHOCYTES RELATIVE PERCENT: 43 % (ref 20–42)
Lab: 1204
MAGNESIUM SERPL-MCNC: 2.3 MG/DL (ref 1.6–2.6)
MCH RBC QN AUTO: 27.7 PG (ref 26–35)
MCHC RBC AUTO-ENTMCNC: 33.6 G/DL (ref 32–34.5)
MCV RBC AUTO: 82.5 FL (ref 80–99.9)
METHB: 0.2 % (ref 0–1.5)
MODE: ABNORMAL
MONOCYTES NFR BLD: 0.55 K/UL (ref 0.1–0.95)
MONOCYTES NFR BLD: 7 % (ref 2–12)
NEUTROPHILS NFR BLD: 49 % (ref 43–80)
NEUTS SEG NFR BLD: 3.81 K/UL (ref 1.8–7.3)
O2 CONTENT: 21.7 ML/DL
O2 SATURATION: 96.9 % (ref 92–98.5)
O2HB: 91.6 % (ref 94–97)
OPERATOR ID: 1023
PATIENT TEMP: 37 C
PCO2: 32.8 MMHG (ref 35–45)
PH BLOOD GAS: 7.43 (ref 7.35–7.45)
PLATELET # BLD AUTO: 286 K/UL (ref 130–450)
PMV BLD AUTO: 10.3 FL (ref 7–12)
PO2: 88.3 MMHG (ref 75–100)
POTASSIUM SERPL-SCNC: 4.9 MMOL/L (ref 3.5–5)
PROT SERPL-MCNC: 8.1 G/DL (ref 6.4–8.3)
RBC # BLD AUTO: 5.73 M/UL (ref 3.8–5.8)
SODIUM SERPL-SCNC: 126 MMOL/L (ref 132–146)
SOURCE, BLOOD GAS: ABNORMAL
THB: 16.8 G/DL (ref 11.5–16.5)
TIME ANALYZED: 1211
TROPONIN I SERPL HS-MCNC: 13 NG/L (ref 0–11)
WBC OTHER # BLD: 7.9 K/UL (ref 4.5–11.5)

## 2024-02-27 PROCEDURE — 82962 GLUCOSE BLOOD TEST: CPT

## 2024-02-27 PROCEDURE — 83605 ASSAY OF LACTIC ACID: CPT

## 2024-02-27 PROCEDURE — 99285 EMERGENCY DEPT VISIT HI MDM: CPT

## 2024-02-27 PROCEDURE — 84484 ASSAY OF TROPONIN QUANT: CPT

## 2024-02-27 PROCEDURE — 6360000004 HC RX CONTRAST MEDICATION: Performed by: RADIOLOGY

## 2024-02-27 PROCEDURE — 93010 ELECTROCARDIOGRAM REPORT: CPT | Performed by: INTERNAL MEDICINE

## 2024-02-27 PROCEDURE — 6370000000 HC RX 637 (ALT 250 FOR IP): Performed by: EMERGENCY MEDICINE

## 2024-02-27 PROCEDURE — 85025 COMPLETE CBC W/AUTO DIFF WBC: CPT

## 2024-02-27 PROCEDURE — 82805 BLOOD GASES W/O2 SATURATION: CPT

## 2024-02-27 PROCEDURE — 96374 THER/PROPH/DIAG INJ IV PUSH: CPT

## 2024-02-27 PROCEDURE — 74177 CT ABD & PELVIS W/CONTRAST: CPT

## 2024-02-27 PROCEDURE — 80053 COMPREHEN METABOLIC PANEL: CPT

## 2024-02-27 PROCEDURE — 93005 ELECTROCARDIOGRAM TRACING: CPT | Performed by: EMERGENCY MEDICINE

## 2024-02-27 PROCEDURE — 71045 X-RAY EXAM CHEST 1 VIEW: CPT

## 2024-02-27 PROCEDURE — 1200000000 HC SEMI PRIVATE

## 2024-02-27 PROCEDURE — 2580000003 HC RX 258: Performed by: EMERGENCY MEDICINE

## 2024-02-27 PROCEDURE — 83880 ASSAY OF NATRIURETIC PEPTIDE: CPT

## 2024-02-27 PROCEDURE — 82010 KETONE BODYS QUAN: CPT

## 2024-02-27 PROCEDURE — 83735 ASSAY OF MAGNESIUM: CPT

## 2024-02-27 RX ORDER — 0.9 % SODIUM CHLORIDE 0.9 %
1000 INTRAVENOUS SOLUTION INTRAVENOUS ONCE
Status: COMPLETED | OUTPATIENT
Start: 2024-02-27 | End: 2024-02-27

## 2024-02-27 RX ORDER — SODIUM CHLORIDE 9 MG/ML
INJECTION, SOLUTION INTRAVENOUS CONTINUOUS
Status: DISCONTINUED | OUTPATIENT
Start: 2024-02-27 | End: 2024-02-27 | Stop reason: HOSPADM

## 2024-02-27 RX ADMIN — IOPAMIDOL 75 ML: 755 INJECTION, SOLUTION INTRAVENOUS at 15:38

## 2024-02-27 RX ADMIN — SODIUM CHLORIDE 1000 ML: 9 INJECTION, SOLUTION INTRAVENOUS at 14:03

## 2024-02-27 RX ADMIN — INSULIN HUMAN 10 UNITS: 100 INJECTION, SOLUTION PARENTERAL at 14:05

## 2024-02-27 RX ADMIN — SODIUM CHLORIDE: 9 INJECTION, SOLUTION INTRAVENOUS at 14:04

## 2024-02-27 RX ADMIN — SODIUM CHLORIDE 1000 ML: 9 INJECTION, SOLUTION INTRAVENOUS at 12:24

## 2024-02-27 ASSESSMENT — PAIN SCALES - GENERAL: PAINLEVEL_OUTOF10: 6

## 2024-02-27 ASSESSMENT — PAIN - FUNCTIONAL ASSESSMENT: PAIN_FUNCTIONAL_ASSESSMENT: 0-10

## 2024-02-27 NOTE — ED PROVIDER NOTES
POCT Glucose   Result Value Ref Range    POC Glucose >500 (HH) 74 - 99 mg/dL   EKG 12 Lead   Result Value Ref Range    Ventricular Rate 101 BPM    Atrial Rate 101 BPM    P-R Interval 152 ms    QRS Duration 94 ms    Q-T Interval 332 ms    QTc Calculation (Bazett) 430 ms    P Axis 24 degrees    R Axis -15 degrees    T Axis 36 degrees   ,       RADIOLOGY:  Interpreted by Radiologist unless otherwise specified  XR CHEST PORTABLE   Final Result   No acute cardiopulmonary process.               ------------------------- NURSING NOTES AND VITALS REVIEWED ---------------------------   The nursing notes within the ED encounter and vital signs as below have been reviewed by myself  BP (!) 141/100   Pulse 76   Temp 97.5 °F (36.4 °C)   Resp 15   Wt 105.2 kg (232 lb)   SpO2 96%   BMI 26.14 kg/m²     Oxygen Saturation Interpretation: Normal    The patient’s available past medical records and past encounters were reviewed.        ------------------------------ ED COURSE/MEDICAL DECISION MAKING----------------------  Medications   0.9 % sodium chloride infusion ( IntraVENous New Bag 2/27/24 1404)   sodium chloride 0.9 % bolus 1,000 mL (1,000 mLs IntraVENous New Bag 2/27/24 1403)   sodium chloride 0.9 % bolus 1,000 mL (0 mLs IntraVENous Stopped 2/27/24 1403)   insulin regular (HUMULIN R;NOVOLIN R) injection 10 Units (10 Units IntraVENous Given 2/27/24 1405)           The cardiac monitor revealed sinus with a heart rate in the 80s as interpreted by me. The cardiac monitor was ordered secondary to the patient's ab pain and to monitor the patient for dysrhythmia.   CPT 16609         Medical Decision Making:     Patient presents for abdominal pain.  Concern for gastritis, enteritis, pancreatitis, among other pathologies.  Did receive IV fluids.  He had a nonacute abdomen.    Labs from me shows a normal CBC, CMP with a uncorrected sodium of 126, blood sugar is 600, no anion gap, ABG reassuring    EKG inter me shows sinus rhythm,

## 2024-02-27 NOTE — PLAN OF CARE
This resident saw the patient in the ED and assessed him. Plan was for admission. However, noted that patient left AMA. Patient left before I was able to advise him against leaving.     Electronically signed by Shivani Snider MD on 2/27/2024 at 6:03 PM

## 2024-02-27 NOTE — ED NOTES
This RN found pt walking out of ED. Pt ripped out IV and said he is leaving. Pt refusing to give reason as to why

## 2024-04-27 ENCOUNTER — HOSPITAL ENCOUNTER (EMERGENCY)
Age: 39
Discharge: HOME | End: 2024-04-27
Payer: MEDICAID

## 2024-04-27 VITALS
SYSTOLIC BLOOD PRESSURE: 136 MMHG | OXYGEN SATURATION: 98 % | TEMPERATURE: 98.1 F | HEART RATE: 98 BPM | DIASTOLIC BLOOD PRESSURE: 88 MMHG | RESPIRATION RATE: 18 BRPM

## 2024-04-27 DIAGNOSIS — H16.002 CORNEAL ULCER OF LEFT EYE: Primary | ICD-10-CM

## 2024-04-27 DIAGNOSIS — H57.89 CONTACT LENS STUCK: ICD-10-CM

## 2024-04-27 PROCEDURE — 99283 EMERGENCY DEPT VISIT LOW MDM: CPT

## 2024-04-27 PROCEDURE — 65205 REMOVE FOREIGN BODY FROM EYE: CPT

## 2024-04-27 RX ORDER — CIPROFLOXACIN HYDROCHLORIDE 3.5 MG/ML
2 SOLUTION/ DROPS TOPICAL 4 TIMES DAILY
Qty: 1 EACH | Refills: 0 | Status: SHIPPED | OUTPATIENT
Start: 2024-04-27 | End: 2024-05-04

## 2024-04-27 ASSESSMENT — PAIN - FUNCTIONAL ASSESSMENT: PAIN_FUNCTIONAL_ASSESSMENT: 0-10

## 2024-04-27 ASSESSMENT — PAIN DESCRIPTION - ORIENTATION: ORIENTATION: LEFT

## 2024-04-27 ASSESSMENT — PAIN DESCRIPTION - LOCATION: LOCATION: EYE

## 2024-04-27 ASSESSMENT — PAIN SCALES - GENERAL: PAINLEVEL_OUTOF10: 8

## 2024-04-27 NOTE — DISCHARGE INSTRUCTIONS
USE THE ANTIBIOTIC EYE DROPS AS PRESCRIBED FOR THE FULL 7 DAYS.  DO NOT WEAR CONTACT LENSES UNTIL YOU ARE EVALUATED BY AN EYE DOCTOR.  USE THE INFORMATION ATTACHED TO CONTACT THE EYE DOCTOR MONDAY MORNING.  RETURN FOR WORSENING SYMPTOMS.

## 2024-04-27 NOTE — ED PROVIDER NOTES
Electronically signed by OSVALDO Lopez CNP   DD: 4/27/24  **This report was transcribed using voice recognition software. Every effort was made to ensure accuracy; however, inadvertent computerized transcription errors may be present.  END OF ED PROVIDER NOTE      Bjorn Short APRN - CNP  04/27/24 0956

## 2024-05-16 RX ORDER — BLOOD SUGAR DIAGNOSTIC
STRIP MISCELLANEOUS
Qty: 400 STRIP | Refills: 1 | OUTPATIENT
Start: 2024-05-16

## 2024-05-18 ENCOUNTER — TRANSCRIBE ORDERS (OUTPATIENT)
Dept: ADMINISTRATIVE | Age: 39
End: 2024-05-18

## 2024-05-18 DIAGNOSIS — K85.90 ACUTE PANCREATITIS, UNSPECIFIED COMPLICATION STATUS, UNSPECIFIED PANCREATITIS TYPE: ICD-10-CM

## 2024-05-18 DIAGNOSIS — R19.7 DIARRHEA, UNSPECIFIED TYPE: ICD-10-CM

## 2024-05-18 DIAGNOSIS — R10.84 ABDOMINAL PAIN, GENERALIZED: ICD-10-CM

## 2024-05-18 DIAGNOSIS — B18.2 HEPATITIS C CARRIER (HCC): Primary | ICD-10-CM

## 2024-05-18 DIAGNOSIS — R79.89 ELEVATED LFTS: ICD-10-CM

## 2024-06-06 ENCOUNTER — HOSPITAL ENCOUNTER (OUTPATIENT)
Age: 39
Discharge: HOME | End: 2024-06-06
Payer: MEDICAID

## 2024-06-06 ENCOUNTER — HOSPITAL ENCOUNTER (OUTPATIENT)
Dept: MRI IMAGING | Age: 39
Discharge: HOME | End: 2024-06-08
Payer: MEDICAID

## 2024-06-06 DIAGNOSIS — R10.84 ABDOMINAL PAIN, GENERALIZED: ICD-10-CM

## 2024-06-06 DIAGNOSIS — R79.89 ELEVATED LFTS: ICD-10-CM

## 2024-06-06 DIAGNOSIS — B18.2 HEPATITIS C CARRIER (HCC): ICD-10-CM

## 2024-06-06 DIAGNOSIS — K85.90 ACUTE PANCREATITIS, UNSPECIFIED COMPLICATION STATUS, UNSPECIFIED PANCREATITIS TYPE: ICD-10-CM

## 2024-06-06 DIAGNOSIS — R19.7 DIARRHEA, UNSPECIFIED TYPE: ICD-10-CM

## 2024-06-06 LAB
ALBUMIN SERPL-MCNC: 4.4 G/DL (ref 3.5–5.2)
ALP SERPL-CCNC: 236 U/L (ref 40–129)
ALT SERPL-CCNC: 61 U/L (ref 0–40)
AMYLASE SERPL-CCNC: 33 U/L (ref 20–100)
ANION GAP SERPL CALCULATED.3IONS-SCNC: 11 MMOL/L (ref 7–16)
AST SERPL-CCNC: 24 U/L (ref 0–39)
BASOPHILS # BLD: 0.02 K/UL (ref 0–0.2)
BASOPHILS NFR BLD: 0 % (ref 0–2)
BILIRUB SERPL-MCNC: 0.4 MG/DL (ref 0–1.2)
BUN SERPL-MCNC: 20 MG/DL (ref 6–20)
CALCIUM SERPL-MCNC: 9.9 MG/DL (ref 8.6–10.2)
CHLORIDE SERPL-SCNC: 98 MMOL/L (ref 98–107)
CO2 SERPL-SCNC: 22 MMOL/L (ref 22–29)
CREAT SERPL-MCNC: 0.6 MG/DL (ref 0.7–1.2)
EOSINOPHIL # BLD: 0.05 K/UL (ref 0.05–0.5)
EOSINOPHILS RELATIVE PERCENT: 1 % (ref 0–6)
ERYTHROCYTE [DISTWIDTH] IN BLOOD BY AUTOMATED COUNT: 12.8 % (ref 11.5–15)
FOLATE SERPL-MCNC: 11.6 NG/ML (ref 4.8–24.2)
GFR, ESTIMATED: >90 ML/MIN/1.73M2
GLUCOSE SERPL-MCNC: 554 MG/DL (ref 74–99)
HBV SURFACE AB SERPL IA-ACNC: <3.1 MIU/ML (ref 0–9.99)
HBV SURFACE AG SERPL QL IA: NONREACTIVE
HCT VFR BLD AUTO: 46.8 % (ref 37–54)
HGB BLD-MCNC: 16.2 G/DL (ref 12.5–16.5)
IGA SERPL-MCNC: 366 MG/DL (ref 70–400)
IMM GRANULOCYTES # BLD AUTO: 0.03 K/UL (ref 0–0.58)
IMM GRANULOCYTES NFR BLD: 1 % (ref 0–5)
LIPASE SERPL-CCNC: 22 U/L (ref 13–60)
LYMPHOCYTES NFR BLD: 2.44 K/UL (ref 1.5–4)
LYMPHOCYTES RELATIVE PERCENT: 37 % (ref 20–42)
MCH RBC QN AUTO: 28.6 PG (ref 26–35)
MCHC RBC AUTO-ENTMCNC: 34.6 G/DL (ref 32–34.5)
MCV RBC AUTO: 82.5 FL (ref 80–99.9)
MONOCYTES NFR BLD: 0.55 K/UL (ref 0.1–0.95)
MONOCYTES NFR BLD: 8 % (ref 2–12)
NEUTROPHILS NFR BLD: 53 % (ref 43–80)
NEUTS SEG NFR BLD: 3.52 K/UL (ref 1.8–7.3)
PLATELET # BLD AUTO: 260 K/UL (ref 130–450)
PMV BLD AUTO: 9.9 FL (ref 7–12)
POTASSIUM SERPL-SCNC: 4.7 MMOL/L (ref 3.5–5)
PROT SERPL-MCNC: 7.8 G/DL (ref 6.4–8.3)
RBC # BLD AUTO: 5.67 M/UL (ref 3.8–5.8)
SODIUM SERPL-SCNC: 131 MMOL/L (ref 132–146)
TSH SERPL DL<=0.05 MIU/L-ACNC: 2.1 UIU/ML (ref 0.27–4.2)
VIT B12 SERPL-MCNC: 839 PG/ML (ref 211–946)
WBC OTHER # BLD: 6.6 K/UL (ref 4.5–11.5)

## 2024-06-06 PROCEDURE — 87449 NOS EACH ORGANISM AG IA: CPT

## 2024-06-06 PROCEDURE — 82784 ASSAY IGA/IGD/IGG/IGM EACH: CPT

## 2024-06-06 PROCEDURE — 87427 SHIGA-LIKE TOXIN AG IA: CPT

## 2024-06-06 PROCEDURE — 84443 ASSAY THYROID STIM HORMONE: CPT

## 2024-06-06 PROCEDURE — 87328 CRYPTOSPORIDIUM AG IA: CPT

## 2024-06-06 PROCEDURE — 86317 IMMUNOASSAY INFECTIOUS AGENT: CPT

## 2024-06-06 PROCEDURE — 36415 COLL VENOUS BLD VENIPUNCTURE: CPT

## 2024-06-06 PROCEDURE — 87329 GIARDIA AG IA: CPT

## 2024-06-06 PROCEDURE — 87046 STOOL CULTR AEROBIC BACT EA: CPT

## 2024-06-06 PROCEDURE — 83516 IMMUNOASSAY NONANTIBODY: CPT

## 2024-06-06 PROCEDURE — 87324 CLOSTRIDIUM AG IA: CPT

## 2024-06-06 PROCEDURE — 87045 FECES CULTURE AEROBIC BACT: CPT

## 2024-06-06 PROCEDURE — 74183 MRI ABD W/O CNTR FLWD CNTR: CPT

## 2024-06-06 PROCEDURE — 83690 ASSAY OF LIPASE: CPT

## 2024-06-06 PROCEDURE — 82746 ASSAY OF FOLIC ACID SERUM: CPT

## 2024-06-06 PROCEDURE — A9579 GAD-BASE MR CONTRAST NOS,1ML: HCPCS | Performed by: RADIOLOGY

## 2024-06-06 PROCEDURE — 86704 HEP B CORE ANTIBODY TOTAL: CPT

## 2024-06-06 PROCEDURE — 82607 VITAMIN B-12: CPT

## 2024-06-06 PROCEDURE — 82653 EL-1 FECAL QUANTITATIVE: CPT

## 2024-06-06 PROCEDURE — 83993 ASSAY FOR CALPROTECTIN FECAL: CPT

## 2024-06-06 PROCEDURE — 85025 COMPLETE CBC W/AUTO DIFF WBC: CPT

## 2024-06-06 PROCEDURE — 86707 HEPATITIS BE ANTIBODY: CPT

## 2024-06-06 PROCEDURE — 82150 ASSAY OF AMYLASE: CPT

## 2024-06-06 PROCEDURE — 80053 COMPREHEN METABOLIC PANEL: CPT

## 2024-06-06 PROCEDURE — 87350 HEPATITIS BE AG IA: CPT

## 2024-06-06 PROCEDURE — 87340 HEPATITIS B SURFACE AG IA: CPT

## 2024-06-06 PROCEDURE — 87205 SMEAR GRAM STAIN: CPT

## 2024-06-06 PROCEDURE — 6360000004 HC RX CONTRAST MEDICATION: Performed by: RADIOLOGY

## 2024-06-06 RX ADMIN — GADOTERIDOL 20 ML: 279.3 INJECTION, SOLUTION INTRAVENOUS at 09:48

## 2024-06-07 LAB
C PARVUM AG STL QL IA: NEGATIVE
G LAMBLIA AG STL QL IA: NEGATIVE
HBV CORE AB SER QL: NONREACTIVE
MICROORGANISM/AGENT SPEC: NORMAL
SOURCE, 60200063: NORMAL
SOURCE: NORMAL
SPECIMEN DESCRIPTION: NORMAL
TISSUE TRANSGLUTAMINASE ANTIBODY IGG: 0.6 U/ML
TTG IGA SER IA-ACNC: 0.5 U/ML

## 2024-06-08 LAB
HBV E AB SERPL QL IA: NEGATIVE
HBV E AG SERPL QL IA: NEGATIVE
MICROORGANISM SPEC CULT: NORMAL
MICROORGANISM SPEC CULT: NORMAL
SPECIMEN DESCRIPTION: NORMAL

## 2024-06-09 LAB — FECAL PANCREATIC ELASTASE-1: <10 UG/G

## 2024-06-10 LAB — CALPROTECTIN, FECAL: 16 UG/G

## 2024-11-29 ENCOUNTER — HOSPITAL ENCOUNTER (EMERGENCY)
Age: 39
Discharge: HOME | End: 2024-11-29
Attending: EMERGENCY MEDICINE
Payer: MEDICAID

## 2024-11-29 ENCOUNTER — APPOINTMENT (OUTPATIENT)
Dept: MRI IMAGING | Age: 39
End: 2024-11-29
Payer: MEDICAID

## 2024-11-29 ENCOUNTER — APPOINTMENT (OUTPATIENT)
Dept: CT IMAGING | Age: 39
End: 2024-11-29
Attending: EMERGENCY MEDICINE
Payer: MEDICAID

## 2024-11-29 VITALS
RESPIRATION RATE: 18 BRPM | DIASTOLIC BLOOD PRESSURE: 84 MMHG | SYSTOLIC BLOOD PRESSURE: 111 MMHG | HEART RATE: 91 BPM | OXYGEN SATURATION: 97 % | TEMPERATURE: 97.7 F

## 2024-11-29 DIAGNOSIS — G89.29 CHRONIC BILATERAL LOW BACK PAIN WITHOUT SCIATICA: Primary | ICD-10-CM

## 2024-11-29 DIAGNOSIS — M51.24: ICD-10-CM

## 2024-11-29 DIAGNOSIS — M54.50 CHRONIC BILATERAL LOW BACK PAIN WITHOUT SCIATICA: Primary | ICD-10-CM

## 2024-11-29 DIAGNOSIS — K86.9 LESION OF PANCREAS: ICD-10-CM

## 2024-11-29 LAB
ALBUMIN SERPL-MCNC: 4.6 G/DL (ref 3.5–5.2)
ALP SERPL-CCNC: 143 U/L (ref 40–129)
ALT SERPL-CCNC: 30 U/L (ref 0–40)
ANION GAP SERPL CALCULATED.3IONS-SCNC: 16 MMOL/L (ref 7–16)
AST SERPL-CCNC: 21 U/L (ref 0–39)
BASOPHILS # BLD: 0.05 K/UL (ref 0–0.2)
BASOPHILS NFR BLD: 0 % (ref 0–2)
BILIRUB SERPL-MCNC: 0.3 MG/DL (ref 0–1.2)
BILIRUB UR QL STRIP: NEGATIVE
BUN SERPL-MCNC: 20 MG/DL (ref 6–20)
CALCIUM SERPL-MCNC: 10 MG/DL (ref 8.6–10.2)
CHLORIDE SERPL-SCNC: 100 MMOL/L (ref 98–107)
CLARITY UR: CLEAR
CO2 SERPL-SCNC: 24 MMOL/L (ref 22–29)
COLOR UR: YELLOW
CREAT SERPL-MCNC: 0.8 MG/DL (ref 0.7–1.2)
EOSINOPHIL # BLD: 0.05 K/UL (ref 0.05–0.5)
EOSINOPHILS RELATIVE PERCENT: 0 % (ref 0–6)
ERYTHROCYTE [DISTWIDTH] IN BLOOD BY AUTOMATED COUNT: 13.1 % (ref 11.5–15)
GFR, ESTIMATED: >90 ML/MIN/1.73M2
GLUCOSE SERPL-MCNC: 272 MG/DL (ref 74–99)
GLUCOSE UR STRIP-MCNC: >=1000 MG/DL
HCT VFR BLD AUTO: 49.6 % (ref 37–54)
HGB BLD-MCNC: 16.6 G/DL (ref 12.5–16.5)
HGB UR QL STRIP.AUTO: NEGATIVE
IMM GRANULOCYTES # BLD AUTO: 0.04 K/UL (ref 0–0.58)
IMM GRANULOCYTES NFR BLD: 0 % (ref 0–5)
KETONES UR STRIP-MCNC: NEGATIVE MG/DL
LEUKOCYTE ESTERASE UR QL STRIP: NEGATIVE
LYMPHOCYTES NFR BLD: 4.2 K/UL (ref 1.5–4)
LYMPHOCYTES RELATIVE PERCENT: 32 % (ref 20–42)
MCH RBC QN AUTO: 28.3 PG (ref 26–35)
MCHC RBC AUTO-ENTMCNC: 33.5 G/DL (ref 32–34.5)
MCV RBC AUTO: 84.5 FL (ref 80–99.9)
MONOCYTES NFR BLD: 0.81 K/UL (ref 0.1–0.95)
MONOCYTES NFR BLD: 6 % (ref 2–12)
NEUTROPHILS NFR BLD: 60 % (ref 43–80)
NEUTS SEG NFR BLD: 7.82 K/UL (ref 1.8–7.3)
NITRITE UR QL STRIP: NEGATIVE
PH UR STRIP: 6 [PH] (ref 5–9)
PLATELET # BLD AUTO: 303 K/UL (ref 130–450)
PMV BLD AUTO: 9.9 FL (ref 7–12)
POTASSIUM SERPL-SCNC: 4.8 MMOL/L (ref 3.5–5)
PROT SERPL-MCNC: 8.1 G/DL (ref 6.4–8.3)
PROT UR STRIP-MCNC: NEGATIVE MG/DL
RBC # BLD AUTO: 5.87 M/UL (ref 3.8–5.8)
RBC #/AREA URNS HPF: ABNORMAL /HPF
SODIUM SERPL-SCNC: 140 MMOL/L (ref 132–146)
SP GR UR STRIP: 1.02 (ref 1–1.03)
UROBILINOGEN UR STRIP-ACNC: 0.2 EU/DL (ref 0–1)
WBC #/AREA URNS HPF: ABNORMAL /HPF
WBC OTHER # BLD: 13 K/UL (ref 4.5–11.5)

## 2024-11-29 PROCEDURE — 80053 COMPREHEN METABOLIC PANEL: CPT

## 2024-11-29 PROCEDURE — 72158 MRI LUMBAR SPINE W/O & W/DYE: CPT

## 2024-11-29 PROCEDURE — 96375 TX/PRO/DX INJ NEW DRUG ADDON: CPT

## 2024-11-29 PROCEDURE — 6360000004 HC RX CONTRAST MEDICATION: Performed by: RADIOLOGY

## 2024-11-29 PROCEDURE — 96374 THER/PROPH/DIAG INJ IV PUSH: CPT

## 2024-11-29 PROCEDURE — 74177 CT ABD & PELVIS W/CONTRAST: CPT

## 2024-11-29 PROCEDURE — A9579 GAD-BASE MR CONTRAST NOS,1ML: HCPCS | Performed by: RADIOLOGY

## 2024-11-29 PROCEDURE — 6370000000 HC RX 637 (ALT 250 FOR IP): Performed by: EMERGENCY MEDICINE

## 2024-11-29 PROCEDURE — 99285 EMERGENCY DEPT VISIT HI MDM: CPT

## 2024-11-29 PROCEDURE — 72157 MRI CHEST SPINE W/O & W/DYE: CPT

## 2024-11-29 PROCEDURE — 6360000002 HC RX W HCPCS: Performed by: EMERGENCY MEDICINE

## 2024-11-29 PROCEDURE — 85025 COMPLETE CBC W/AUTO DIFF WBC: CPT

## 2024-11-29 PROCEDURE — 81001 URINALYSIS AUTO W/SCOPE: CPT

## 2024-11-29 RX ORDER — KETOROLAC TROMETHAMINE 30 MG/ML
30 INJECTION, SOLUTION INTRAMUSCULAR; INTRAVENOUS ONCE
Status: COMPLETED | OUTPATIENT
Start: 2024-11-29 | End: 2024-11-29

## 2024-11-29 RX ORDER — IOPAMIDOL 755 MG/ML
75 INJECTION, SOLUTION INTRAVASCULAR
Status: COMPLETED | OUTPATIENT
Start: 2024-11-29 | End: 2024-11-29

## 2024-11-29 RX ORDER — NAPROXEN 250 MG/1
250 TABLET ORAL 2 TIMES DAILY
Qty: 14 TABLET | Refills: 0 | Status: SHIPPED | OUTPATIENT
Start: 2024-11-29 | End: 2024-12-06

## 2024-11-29 RX ORDER — LIDOCAINE 50 MG/G
1 PATCH TOPICAL EVERY 24 HOURS
Qty: 10 PATCH | Refills: 0 | Status: SHIPPED | OUTPATIENT
Start: 2024-11-29 | End: 2024-12-09

## 2024-11-29 RX ORDER — CYCLOBENZAPRINE HCL 10 MG
10 TABLET ORAL ONCE
Status: COMPLETED | OUTPATIENT
Start: 2024-11-29 | End: 2024-11-29

## 2024-11-29 RX ADMIN — KETOROLAC TROMETHAMINE 30 MG: 30 INJECTION, SOLUTION INTRAMUSCULAR at 16:23

## 2024-11-29 RX ADMIN — GADOTERIDOL 20 ML: 279.3 INJECTION, SOLUTION INTRAVENOUS at 19:56

## 2024-11-29 RX ADMIN — CYCLOBENZAPRINE 10 MG: 10 TABLET, FILM COATED ORAL at 16:24

## 2024-11-29 RX ADMIN — IOPAMIDOL 75 ML: 755 INJECTION, SOLUTION INTRAVENOUS at 18:48

## 2024-11-29 ASSESSMENT — ENCOUNTER SYMPTOMS
EYE REDNESS: 0
SHORTNESS OF BREATH: 0
NAUSEA: 0
VOMITING: 0
ABDOMINAL PAIN: 0
BACK PAIN: 1

## 2024-11-29 ASSESSMENT — PAIN SCALES - GENERAL: PAINLEVEL_OUTOF10: 10

## 2024-11-30 NOTE — ED PROVIDER NOTES
without sciatica    2. Lesion of pancreas    3. Herniation of intervertebral disc between T8 and T9          DISPOSITION/PLAN      Decision To Discharge 11/29/2024 10:58:51 PM    Patient's disposition: Discharge to home  Patient's condition is stable.           (Please note that portions of this note were completed with a voice recognition program.  Efforts were made to edit the dictations but occasionally words are mis-transcribed.)    Yoly Carney DO (electronically signed)       Yoly Carney DO  11/29/24 2335       Yoly Carney DO  11/30/24 0006

## 2024-12-12 ENCOUNTER — APPOINTMENT (OUTPATIENT)
Dept: CT IMAGING | Age: 39
End: 2024-12-12
Payer: MEDICAID

## 2024-12-12 ENCOUNTER — HOSPITAL ENCOUNTER (EMERGENCY)
Age: 39
Discharge: HOME | End: 2024-12-12
Payer: MEDICAID

## 2024-12-12 VITALS
RESPIRATION RATE: 18 BRPM | TEMPERATURE: 97.2 F | HEART RATE: 96 BPM | OXYGEN SATURATION: 99 % | DIASTOLIC BLOOD PRESSURE: 94 MMHG | SYSTOLIC BLOOD PRESSURE: 137 MMHG

## 2024-12-12 DIAGNOSIS — S39.92XA INJURY OF LOW BACK, INITIAL ENCOUNTER: Primary | ICD-10-CM

## 2024-12-12 PROCEDURE — 99284 EMERGENCY DEPT VISIT MOD MDM: CPT

## 2024-12-12 PROCEDURE — 6370000000 HC RX 637 (ALT 250 FOR IP)

## 2024-12-12 PROCEDURE — 96372 THER/PROPH/DIAG INJ SC/IM: CPT

## 2024-12-12 PROCEDURE — 72131 CT LUMBAR SPINE W/O DYE: CPT

## 2024-12-12 PROCEDURE — 6360000002 HC RX W HCPCS

## 2024-12-12 RX ORDER — LIDOCAINE 4 G/G
1 PATCH TOPICAL DAILY
Status: DISCONTINUED | OUTPATIENT
Start: 2024-12-12 | End: 2024-12-12 | Stop reason: HOSPADM

## 2024-12-12 RX ORDER — KETOROLAC TROMETHAMINE 30 MG/ML
30 INJECTION, SOLUTION INTRAMUSCULAR; INTRAVENOUS ONCE
Status: COMPLETED | OUTPATIENT
Start: 2024-12-12 | End: 2024-12-12

## 2024-12-12 RX ORDER — IBUPROFEN 800 MG/1
800 TABLET, FILM COATED ORAL ONCE
Status: DISCONTINUED | OUTPATIENT
Start: 2024-12-12 | End: 2024-12-12

## 2024-12-12 RX ADMIN — KETOROLAC TROMETHAMINE 30 MG: 30 INJECTION, SOLUTION INTRAMUSCULAR at 12:08

## 2024-12-12 ASSESSMENT — ENCOUNTER SYMPTOMS
EYES NEGATIVE: 1
ALLERGIC/IMMUNOLOGIC NEGATIVE: 1
GASTROINTESTINAL NEGATIVE: 1
RESPIRATORY NEGATIVE: 1
BACK PAIN: 1

## 2024-12-12 ASSESSMENT — PAIN DESCRIPTION - ORIENTATION: ORIENTATION: LOWER

## 2024-12-12 ASSESSMENT — PAIN DESCRIPTION - DESCRIPTORS: DESCRIPTORS: TIGHTNESS;PRESSURE

## 2024-12-12 ASSESSMENT — PAIN DESCRIPTION - LOCATION: LOCATION: BACK

## 2024-12-12 ASSESSMENT — PAIN SCALES - GENERAL: PAINLEVEL_OUTOF10: 8

## 2024-12-12 NOTE — ED PROVIDER NOTES
[facility] EMERGENCY DEPARTMENT  EMERGENCY DEPARTMENT ENCOUNTER        Pt Name: Rio Reddy  MRN: [id number]  Birthdate 1985  Date of evaluation: 12/12/2024  Provider: OSVALDO Lozano - CNP  PCP: Caterina Simon MD  Note Started: 11:28 AM EST 12/12/24      [facility]. I have evaluated this patient.        CHIEF COMPLAINT       Chief Complaint   Patient presents with    Back Pain     States he twisted his back at work yesterday , states he has hx of back issues        HISTORY OF PRESENT ILLNESS: 1 or more Elements     History from : Patient    Limitations to history : None    Rio Reddy is a 39 y.o. male who presents to the ED with complaints of a lower back injury that occurred at work yesterday.  Patient states he does have a history of low back problems and recently had an MRI completed.  Patient states yesterday while at work he stepped backwards, twisted, and fell backwards onto the ground.  Patient states that he just wants to make sure that he did not cause any further problems to his back.  Patient denies head injury, loss of consciousness, or use of blood thinners.  Patient did state that he has a history of bowel control problems which is \"normal\" to him.  Patient also states that he has a history of left leg \"burning\" due to diabetic neuropathy which is \"normal\" to him.  Patient denies any loss of bladder control, urinary retention, saddle anesthesia, numbness, tingling, weakness.  Patient denies any other injury.  Patient denies any chest pain, shortness of breath, abdominal pain, headache, lightheadedness, dizziness, fever, chills, body aches.  Patient denies any other symptoms.  Patient has no other complaints at this time.    Nursing Notes were all reviewed and agreed with or any disagreements were addressed in the HPI.    REVIEW OF SYSTEMS :      Review of Systems   Constitutional: Negative.    HENT: Negative.     Eyes: Negative.    Respiratory:

## 2025-03-02 ENCOUNTER — APPOINTMENT (OUTPATIENT)
Dept: GENERAL RADIOLOGY | Age: 40
End: 2025-03-02
Payer: MEDICAID

## 2025-03-02 ENCOUNTER — HOSPITAL ENCOUNTER (EMERGENCY)
Age: 40
Discharge: HOME | End: 2025-03-02
Payer: MEDICAID

## 2025-03-02 VITALS
OXYGEN SATURATION: 97 % | SYSTOLIC BLOOD PRESSURE: 132 MMHG | TEMPERATURE: 97.8 F | HEART RATE: 90 BPM | DIASTOLIC BLOOD PRESSURE: 73 MMHG | RESPIRATION RATE: 17 BRPM

## 2025-03-02 DIAGNOSIS — S60.121A CONTUSION OF RIGHT INDEX FINGER WITH DAMAGE TO NAIL, INITIAL ENCOUNTER: Primary | ICD-10-CM

## 2025-03-02 PROCEDURE — 11730 AVULSION NAIL PLATE SIMPLE 1: CPT

## 2025-03-02 PROCEDURE — 90714 TD VACC NO PRESV 7 YRS+ IM: CPT | Performed by: NURSE PRACTITIONER

## 2025-03-02 PROCEDURE — 99284 EMERGENCY DEPT VISIT MOD MDM: CPT

## 2025-03-02 PROCEDURE — 73140 X-RAY EXAM OF FINGER(S): CPT

## 2025-03-02 PROCEDURE — 6360000002 HC RX W HCPCS: Performed by: NURSE PRACTITIONER

## 2025-03-02 PROCEDURE — 90471 IMMUNIZATION ADMIN: CPT | Performed by: NURSE PRACTITIONER

## 2025-03-02 RX ADMIN — CLOSTRIDIUM TETANI TOXOID ANTIGEN (FORMALDEHYDE INACTIVATED) AND CORYNEBACTERIUM DIPHTHERIAE TOXOID ANTIGEN (FORMALDEHYDE INACTIVATED) 0.5 ML: 5; 2 INJECTION, SUSPENSION INTRAMUSCULAR at 20:22

## 2025-03-02 NOTE — ED PROVIDER NOTES
Independent [facility] Visit.        [facility] EMERGENCY DEPARTMENT  ED  Encounter Note  Admit Date/RoomTime: 3/2/2025  7:49 PM  ED Room:   NAME: Rio Reddy  : 1985  MRN: [id number]  PCP: Caterina Simon MD    CHIEF COMPLAINT     Finger Injury (States he smashed his right finger in car door this am , )    HISTORY OF PRESENT ILLNESS        Rio Reddy is a 39 y.o. male who presents to the ED with complaints of right index finger pain.  Patient states he shot his right index finger in the car door this morning.  Patient complains of pain to the distal aspect of the right index finger.  Patient also states some minor damage to the cuticle of the right index finger.  Patient states cuticle is bleeding and continues to bleed.  Patient maintains full range of motion of the right index finger.  Patient denies any other injuries.    REVIEW OF SYSTEMS     Pertinent positives and negatives are stated within HPI, all other systems reviewed and are negative.    Past Medical History:  has a past medical history of Anxiety, Chest pain, unspecified, Depression, History of CVA (cerebrovascular accident), Seizures (HCC), and Unspecified cerebral artery occlusion with cerebral infarction.  Surgical History:  has a past surgical history that includes Abscess Drainage (Left, 2016) and bone incision and drainage (Left, 2016).  Social History:  reports that he has been smoking cigarettes. He has a 9.5 pack-year smoking history. He has never used smokeless tobacco. He reports current drug use. Drug: Other-see comments. He reports that he does not drink alcohol.  Family History: family history includes Heart Disease in his father; Kidney Disease in his mother.   Allergies: Patient has no known allergies.  CURRENT MEDICATIONS       Discharge Medication List as of 3/2/2025  8:23 PM        CONTINUE these medications which have NOT CHANGED    Details   TRULICITY 0.75 MG/0.5ML SOAJ SC injection

## 2025-03-03 NOTE — DISCHARGE INSTRUCTIONS
Clean the right index finger daily with soap and water and reapply an antibiotic ointment and keep covered with a Band-Aid until healed.  Motrin or Tylenol as needed for pain and/or fever.

## 2025-03-07 ENCOUNTER — HOSPITAL ENCOUNTER (EMERGENCY)
Age: 40
Discharge: HOME | End: 2025-03-07
Payer: MEDICAID

## 2025-03-07 ENCOUNTER — APPOINTMENT (OUTPATIENT)
Dept: GENERAL RADIOLOGY | Age: 40
End: 2025-03-07
Payer: MEDICAID

## 2025-03-07 VITALS
WEIGHT: 232 LBS | TEMPERATURE: 98.5 F | OXYGEN SATURATION: 98 % | HEART RATE: 114 BPM | BODY MASS INDEX: 26.84 KG/M2 | DIASTOLIC BLOOD PRESSURE: 67 MMHG | SYSTOLIC BLOOD PRESSURE: 100 MMHG | HEIGHT: 78 IN | RESPIRATION RATE: 16 BRPM

## 2025-03-07 DIAGNOSIS — J10.1 INFLUENZA A: Primary | ICD-10-CM

## 2025-03-07 LAB
ALBUMIN SERPL-MCNC: 4.7 G/DL (ref 3.5–5.2)
ALP SERPL-CCNC: 135 U/L (ref 40–129)
ALT SERPL-CCNC: 30 U/L (ref 0–40)
ANION GAP SERPL CALCULATED.3IONS-SCNC: 19 MMOL/L (ref 7–16)
AST SERPL-CCNC: 27 U/L (ref 0–39)
BASOPHILS # BLD: 0.03 K/UL (ref 0–0.2)
BASOPHILS NFR BLD: 0 % (ref 0–2)
BILIRUB SERPL-MCNC: 0.4 MG/DL (ref 0–1.2)
BUN SERPL-MCNC: 13 MG/DL (ref 6–20)
CALCIUM SERPL-MCNC: 9.5 MG/DL (ref 8.6–10.2)
CHLORIDE SERPL-SCNC: 104 MMOL/L (ref 98–107)
CO2 SERPL-SCNC: 16 MMOL/L (ref 22–29)
CREAT SERPL-MCNC: 0.9 MG/DL (ref 0.7–1.2)
EOSINOPHIL # BLD: 0.01 K/UL (ref 0.05–0.5)
EOSINOPHILS RELATIVE PERCENT: 0 % (ref 0–6)
ERYTHROCYTE [DISTWIDTH] IN BLOOD BY AUTOMATED COUNT: 13.7 % (ref 11.5–15)
FLUAV RNA RESP QL NAA+PROBE: DETECTED
FLUBV RNA RESP QL NAA+PROBE: NOT DETECTED
GFR, ESTIMATED: >90 ML/MIN/1.73M2
GLUCOSE SERPL-MCNC: 163 MG/DL (ref 74–99)
HCT VFR BLD AUTO: 50.9 % (ref 37–54)
HGB BLD-MCNC: 17 G/DL (ref 12.5–16.5)
IMM GRANULOCYTES # BLD AUTO: 0.05 K/UL (ref 0–0.58)
IMM GRANULOCYTES NFR BLD: 1 % (ref 0–5)
LYMPHOCYTES NFR BLD: 1.59 K/UL (ref 1.5–4)
LYMPHOCYTES RELATIVE PERCENT: 14 % (ref 20–42)
MCH RBC QN AUTO: 28.5 PG (ref 26–35)
MCHC RBC AUTO-ENTMCNC: 33.4 G/DL (ref 32–34.5)
MCV RBC AUTO: 85.3 FL (ref 80–99.9)
MONOCYTES NFR BLD: 0.9 K/UL (ref 0.1–0.95)
MONOCYTES NFR BLD: 8 % (ref 2–12)
NEUTROPHILS NFR BLD: 77 % (ref 43–80)
NEUTS SEG NFR BLD: 8.45 K/UL (ref 1.8–7.3)
PLATELET # BLD AUTO: 257 K/UL (ref 130–450)
PMV BLD AUTO: 9.7 FL (ref 7–12)
POTASSIUM SERPL-SCNC: 4.6 MMOL/L (ref 3.5–5)
PROT SERPL-MCNC: 8.6 G/DL (ref 6.4–8.3)
RBC # BLD AUTO: 5.97 M/UL (ref 3.8–5.8)
SARS-COV-2 RNA RESP QL NAA+PROBE: NOT DETECTED
SODIUM SERPL-SCNC: 139 MMOL/L (ref 132–146)
SOURCE: ABNORMAL
SPECIMEN DESCRIPTION: ABNORMAL
SPECIMEN SOURCE: NORMAL
STREP A, MOLECULAR: NEGATIVE
WBC OTHER # BLD: 11 K/UL (ref 4.5–11.5)

## 2025-03-07 PROCEDURE — 2580000003 HC RX 258: Performed by: PHYSICIAN ASSISTANT

## 2025-03-07 PROCEDURE — 80053 COMPREHEN METABOLIC PANEL: CPT

## 2025-03-07 PROCEDURE — 71046 X-RAY EXAM CHEST 2 VIEWS: CPT

## 2025-03-07 PROCEDURE — 87636 SARSCOV2 & INF A&B AMP PRB: CPT

## 2025-03-07 PROCEDURE — 99284 EMERGENCY DEPT VISIT MOD MDM: CPT

## 2025-03-07 PROCEDURE — 87651 STREP A DNA AMP PROBE: CPT

## 2025-03-07 PROCEDURE — 6370000000 HC RX 637 (ALT 250 FOR IP): Performed by: PHYSICIAN ASSISTANT

## 2025-03-07 PROCEDURE — 85025 COMPLETE CBC W/AUTO DIFF WBC: CPT

## 2025-03-07 PROCEDURE — 96360 HYDRATION IV INFUSION INIT: CPT

## 2025-03-07 RX ORDER — 0.9 % SODIUM CHLORIDE 0.9 %
500 INTRAVENOUS SOLUTION INTRAVENOUS ONCE
Status: COMPLETED | OUTPATIENT
Start: 2025-03-07 | End: 2025-03-07

## 2025-03-07 RX ORDER — METOPROLOL TARTRATE 50 MG
50 TABLET ORAL ONCE
Status: COMPLETED | OUTPATIENT
Start: 2025-03-07 | End: 2025-03-07

## 2025-03-07 RX ORDER — ONDANSETRON 4 MG/1
4 TABLET, ORALLY DISINTEGRATING ORAL 3 TIMES DAILY PRN
Qty: 21 TABLET | Refills: 0 | Status: SHIPPED | OUTPATIENT
Start: 2025-03-07

## 2025-03-07 RX ORDER — IBUPROFEN 400 MG/1
600 TABLET, FILM COATED ORAL ONCE
Status: COMPLETED | OUTPATIENT
Start: 2025-03-07 | End: 2025-03-07

## 2025-03-07 RX ADMIN — SODIUM CHLORIDE 500 ML: 0.9 INJECTION, SOLUTION INTRAVENOUS at 15:17

## 2025-03-07 RX ADMIN — IBUPROFEN 600 MG: 400 TABLET, FILM COATED ORAL at 12:26

## 2025-03-07 RX ADMIN — METOPROLOL TARTRATE 50 MG: 50 TABLET, FILM COATED ORAL at 15:19

## 2025-03-07 ASSESSMENT — LIFESTYLE VARIABLES: HOW OFTEN DO YOU HAVE A DRINK CONTAINING ALCOHOL: NEVER

## 2025-03-07 NOTE — ED PROVIDER NOTES
Height Weight - Scale         03/07/25 1139 03/07/25 1139         2.007 m (6' 7\") 105.2 kg (232 lb)               General:  NAD.  Alert and Oriented.  Ill-appearing.  Skin:  Warm, dry.  No rashes.  Head:  Normocephalic.  Atraumatic.  Eyes:  EOMI.  Conjunctiva normal.  ENT:  Oral mucosa moist.  Airway patent.  Posterior pharynx with minimal erythema and swelling.  Negative exudate.  Uvula is midline with equal rise.  Bilateral TMs retracted.  Neck:  Supple.  Normal ROM.    Respiratory:  No respiratory distress.  No labored breathing.  Lungs clear without rales, rhonchi or wheezing.  Positive hacking cough.  Oxygen saturation 98% on room air.  Cardiovascular: Tachycardia.  No Murmur.  No peripheral edema.  Extremities warm and good color.  Chest:  Abdomen:  Soft, nondistended.  Normal bowel sounds.  Nontender to palpation all 4 quadrants.  Negative rebound, negative guarding.  Rectal:  Gu:  Bladder nontender and non distended.  No CVA tenderness.  Pelvic:  Extremities:  Normal ROM.  Nontender to palpation.  Atraumatic.    Back:  Normal ROM.  Nontender to palpation.  Neuro:  Alert and Oriented to person, place, time and situation.  Normal LOC.  Moves all extremities.  Speech fluent.  Psych:  Calm and Cooperative.  Normal thought process.  Normal judgement.    Lab / Imaging Results   (All laboratory and radiology results have been personally reviewed by myself)  Labs:  Results for orders placed or performed during the hospital encounter of 03/07/25   COVID-19 & Influenza Combo    Specimen: Nasopharyngeal Swab   Result Value Ref Range    Specimen Description .NASOPHARYNGEAL SWAB     Source .NASOPHARYNGEAL SWAB     SARS-CoV-2 RNA, RT PCR Not Detected Not Detected    Influenza A DETECTED (A) Not Detected    Influenza B Not Detected Not Detected   Strep Screen Rapid    Specimen: Throat   Result Value Ref Range    Source .THROAT SWAB     Strep A, Molecular NEGATIVE NEGATIVE   CBC with Auto Differential   Result Value Ref

## 2025-03-14 LAB
EKG ATRIAL RATE: 109 BPM
EKG P AXIS: 35 DEGREES
EKG P-R INTERVAL: 148 MS
EKG Q-T INTERVAL: 304 MS
EKG QRS DURATION: 98 MS
EKG QTC CALCULATION (BAZETT): 409 MS
EKG R AXIS: -5 DEGREES
EKG T AXIS: 13 DEGREES
EKG VENTRICULAR RATE: 109 BPM

## 2025-04-02 ENCOUNTER — HOSPITAL ENCOUNTER (OUTPATIENT)
Dept: NEUROLOGY | Age: 40
Discharge: HOME | End: 2025-04-02
Payer: MEDICAID

## 2025-04-02 DIAGNOSIS — G40.909 SEIZURE DISORDER (HCC): ICD-10-CM

## 2025-04-02 PROCEDURE — 95816 EEG AWAKE AND DROWSY: CPT

## 2025-04-02 PROCEDURE — 95816 EEG AWAKE AND DROWSY: CPT | Performed by: PSYCHIATRY & NEUROLOGY

## 2025-04-02 NOTE — PROCEDURES
[facility] Neurodiagnostic Report    MRN: [id number]  PATIENT NAME: Rio Reddy  DATE OF REPORT: 2025    DATE OF SERVICE: 2025  PHYSICIAN NAME: Alireza Ashby DO  STUDY ORDERED BY: Yelitza Mathias      Patient's : 1985  Patient's Age: 39 y.o.  Gender: male    PROCEDURE: Routine EEG with video      Clinical Interpretation:   This was a normal study during waking and drowsiness.    No seizures or epileptiform discharges were noted during this study.     __________________________  Electronically signed by: Alireza Ashby DO, 2025 7:37 AM      Patient Clinical Information   Reason for Study: The patient is undergoing evaluation for seizure(s)  Patient State: Awake  Primary neurological diagnosis: Seizure  Primary indication for monitoring: Characterization of spell    Pertinent Medications and Treatments    gabapentin     hydrOXYzine     carBAMazepine     buprenorphine-naloxone     Sedatives administered: No  Intubated: No  Pharmacological paralytic: No    Reporting Period  Start of Study: 728, 2025   End of Study:  2025       EEG Description  Digital video and scalp EEG monitoring was performed using the standard protocol for this laboratory. Scalp electrodes were applied in the international 10/20 system. Multiple digital montage arrangements were utilized for evaluation. EKG and video were recorded.     Background:      Occipital rhythm (posterior dominant rhythm or PDR): Present  Frequency: 10 Hz  Voltage: Medium  Organization: good   Reactivity to eye opening/closure: Good    Drowsiness: Present - normal  Sleep: Absent    Technical and Activation Procedures:  Hyperventilation: Done for 4 minutes - no significant generalized irregular build up noted  Photic stimulation: Done - physiologic driving noted  Reactivity to stimulation: Present    Abnormalities:    I. Seizures?  None    II. Rhythmic or Periodic Patterns?  None    III. Other

## 2025-04-14 ENCOUNTER — OFFICE VISIT (OUTPATIENT)
Dept: ENDOCRINOLOGY | Age: 40
End: 2025-04-14
Payer: MEDICAID

## 2025-04-14 VITALS
TEMPERATURE: 98.4 F | HEART RATE: 101 BPM | SYSTOLIC BLOOD PRESSURE: 114 MMHG | OXYGEN SATURATION: 99 % | WEIGHT: 226 LBS | HEIGHT: 78 IN | RESPIRATION RATE: 18 BRPM | DIASTOLIC BLOOD PRESSURE: 82 MMHG | BODY MASS INDEX: 26.15 KG/M2

## 2025-04-14 DIAGNOSIS — E78.2 MIXED HYPERLIPIDEMIA: ICD-10-CM

## 2025-04-14 DIAGNOSIS — E11.9 TYPE 2 DIABETES MELLITUS WITHOUT COMPLICATION, UNSPECIFIED WHETHER LONG TERM INSULIN USE: Primary | Chronic | ICD-10-CM

## 2025-04-14 DIAGNOSIS — E29.1 MALE HYPOGONADISM: ICD-10-CM

## 2025-04-14 LAB — HBA1C MFR BLD: 9.5 %

## 2025-04-14 PROCEDURE — 3079F DIAST BP 80-89 MM HG: CPT | Performed by: INTERNAL MEDICINE

## 2025-04-14 PROCEDURE — 3074F SYST BP LT 130 MM HG: CPT | Performed by: INTERNAL MEDICINE

## 2025-04-14 PROCEDURE — G2211 COMPLEX E/M VISIT ADD ON: HCPCS | Performed by: INTERNAL MEDICINE

## 2025-04-14 PROCEDURE — 3046F HEMOGLOBIN A1C LEVEL >9.0%: CPT | Performed by: INTERNAL MEDICINE

## 2025-04-14 PROCEDURE — G8428 CUR MEDS NOT DOCUMENT: HCPCS | Performed by: INTERNAL MEDICINE

## 2025-04-14 PROCEDURE — 2022F DILAT RTA XM EVC RTNOPTHY: CPT | Performed by: INTERNAL MEDICINE

## 2025-04-14 PROCEDURE — G8419 CALC BMI OUT NRM PARAM NOF/U: HCPCS | Performed by: INTERNAL MEDICINE

## 2025-04-14 PROCEDURE — 83036 HEMOGLOBIN GLYCOSYLATED A1C: CPT | Performed by: INTERNAL MEDICINE

## 2025-04-14 PROCEDURE — 99214 OFFICE O/P EST MOD 30 MIN: CPT | Performed by: INTERNAL MEDICINE

## 2025-04-14 PROCEDURE — 4004F PT TOBACCO SCREEN RCVD TLK: CPT | Performed by: INTERNAL MEDICINE

## 2025-04-14 RX ORDER — INSULIN GLARGINE 100 [IU]/ML
48 INJECTION, SOLUTION SUBCUTANEOUS EVERY MORNING
Qty: 15 ADJUSTABLE DOSE PRE-FILLED PEN SYRINGE | Refills: 5 | Status: SHIPPED | OUTPATIENT
Start: 2025-04-14

## 2025-04-14 NOTE — PROGRESS NOTES
[facility] Department of Endocrinology Diabetes and Metabolism   [address] [place] [id number]   Phone: [phone]  Fax: [phone]    Date of Service: 4/14/2025  Primary Care Physician: Caterina Simon MD  Provider: Massimo Vail MD     Reason for the visit:  Poorly controlled type 2 diabetes    History of Present Illness:  The history is provided by the patient. No  was used. Accuracy of the patient data is excellent.  Rio Reddy is a very pleasant 39 y.o. male seen today for diabetes management     Pt was admitted to [facility] on 8/1/2020 because of abdominal pain, n/v and blurry vision for 1 week and found to be in DKA (, AG 19, beta-hydroxybutyrate 2.53)     Lantus 48u daily in the morning, Jardiance 25 mg daily, Trulicity 0.75 mg/wk     The patient has been checking glucose level to 3 times daily and reading has been high but better than before.  A1c 9.5% and used to be and 14% all the time.    Component 8/1/2020   C-Peptide 2.8     The patient tried both Medtronic and OmniPod insulin pump in the past and he did not like being on a pump.  The patient would like to stay on insulin shots    Lab Results   Component Value Date/Time    LABA1C 9.5 04/14/2025 08:17 AM    LABA1C 14.6 08/23/2023 08:36 AM    LABA1C  07/18/2023 03:31 PM      Comment:      error code #106 result to high     Patient has had no hypoglycemic episodes   The patient has been mindful of what has been eating and following diabetic diet as encouraged  I reviewed current medications and the patient has no issues with diabetes medications  The patient is due for an eye exam  The patient performs his own feet care  Microvascular complications:  No Retinopathy, Nephropathy or Neuropathy   Macrovascular complications: no CAD, PVD, or Stroke    PAST MEDICAL HISTORY   Past Medical History:   Diagnosis Date    Anxiety     Chest pain, unspecified 8/4/2022    Depression     History of CVA

## 2025-04-29 ENCOUNTER — HOSPITAL ENCOUNTER (OUTPATIENT)
Age: 40
Discharge: HOME | End: 2025-04-29
Payer: MEDICAID

## 2025-04-29 DIAGNOSIS — E29.1 MALE HYPOGONADISM: ICD-10-CM

## 2025-04-29 LAB
FSH SERPL-ACNC: 7 MIU/ML
HCT VFR BLD AUTO: 46.6 % (ref 37–54)
LH SERPL-ACNC: 8.4 MIU/ML
PROLACTIN SERPL-MCNC: 3.94 NG/ML
PSA SERPL-MCNC: 0.55 NG/ML (ref 0–4)

## 2025-04-29 PROCEDURE — 85014 HEMATOCRIT: CPT

## 2025-04-29 PROCEDURE — G0103 PSA SCREENING: HCPCS

## 2025-04-29 PROCEDURE — 36415 COLL VENOUS BLD VENIPUNCTURE: CPT

## 2025-04-29 PROCEDURE — 83002 ASSAY OF GONADOTROPIN (LH): CPT

## 2025-04-29 PROCEDURE — 84403 ASSAY OF TOTAL TESTOSTERONE: CPT

## 2025-04-29 PROCEDURE — 84270 ASSAY OF SEX HORMONE GLOBUL: CPT

## 2025-04-29 PROCEDURE — 84146 ASSAY OF PROLACTIN: CPT

## 2025-04-29 PROCEDURE — 83001 ASSAY OF GONADOTROPIN (FSH): CPT

## 2025-04-30 LAB
SHBG SERPL-SCNC: 30 NMOL/L (ref 17–56)
TESTOST FREE MFR SERPL: 66.3 PG/ML (ref 47–244)
TESTOST SERPL-MCNC: 317 NG/DL (ref 249–836)
TESTOSTERONE, BIOAVAILABLE: 155.3 NG/DL (ref 130–680)

## 2025-05-01 ENCOUNTER — RESULTS FOLLOW-UP (OUTPATIENT)
Dept: ENDOCRINOLOGY | Age: 40
End: 2025-05-01

## 2025-05-01 NOTE — TELEPHONE ENCOUNTER
Notify patient  Testosterone level and the full male hormonal axis is normal.  We do not recommend testosterone treatment in your case

## 2025-07-21 ENCOUNTER — TRANSCRIBE ORDERS (OUTPATIENT)
Dept: ADMINISTRATIVE | Age: 40
End: 2025-07-21

## 2025-07-21 DIAGNOSIS — M54.17 LUMBOSACRAL RADICULOPATHY: Primary | ICD-10-CM

## 2025-08-08 ENCOUNTER — HOSPITAL ENCOUNTER (OUTPATIENT)
Dept: MRI IMAGING | Age: 40
Discharge: HOME | End: 2025-08-10
Payer: MEDICAID

## 2025-08-08 DIAGNOSIS — M54.17 LUMBOSACRAL RADICULOPATHY: ICD-10-CM

## 2025-08-08 PROCEDURE — 72148 MRI LUMBAR SPINE W/O DYE: CPT

## 2025-08-12 DIAGNOSIS — E11.9 TYPE 2 DIABETES MELLITUS WITHOUT COMPLICATION, UNSPECIFIED WHETHER LONG TERM INSULIN USE (HCC): Primary | ICD-10-CM

## 2025-08-12 RX ORDER — ACYCLOVIR 400 MG/1
TABLET ORAL
Qty: 3 EACH | Refills: 5 | Status: SHIPPED | OUTPATIENT
Start: 2025-08-12

## 2025-08-25 ENCOUNTER — OFFICE VISIT (OUTPATIENT)
Dept: ENDOCRINOLOGY | Age: 40
End: 2025-08-25
Payer: MEDICAID

## 2025-08-25 VITALS
SYSTOLIC BLOOD PRESSURE: 125 MMHG | RESPIRATION RATE: 18 BRPM | WEIGHT: 230 LBS | TEMPERATURE: 98.4 F | BODY MASS INDEX: 26.61 KG/M2 | HEART RATE: 81 BPM | DIASTOLIC BLOOD PRESSURE: 85 MMHG | OXYGEN SATURATION: 99 % | HEIGHT: 78 IN

## 2025-08-25 DIAGNOSIS — E29.1 MALE HYPOGONADISM: ICD-10-CM

## 2025-08-25 DIAGNOSIS — E11.9 TYPE 2 DIABETES MELLITUS WITHOUT COMPLICATION, UNSPECIFIED WHETHER LONG TERM INSULIN USE (HCC): Primary | ICD-10-CM

## 2025-08-25 DIAGNOSIS — E78.2 MIXED HYPERLIPIDEMIA: ICD-10-CM

## 2025-08-25 LAB — HBA1C MFR BLD: 8.8 %

## 2025-08-25 PROCEDURE — 3074F SYST BP LT 130 MM HG: CPT | Performed by: INTERNAL MEDICINE

## 2025-08-25 PROCEDURE — 3046F HEMOGLOBIN A1C LEVEL >9.0%: CPT | Performed by: INTERNAL MEDICINE

## 2025-08-25 PROCEDURE — 2022F DILAT RTA XM EVC RTNOPTHY: CPT | Performed by: INTERNAL MEDICINE

## 2025-08-25 PROCEDURE — 83036 HEMOGLOBIN GLYCOSYLATED A1C: CPT | Performed by: INTERNAL MEDICINE

## 2025-08-25 PROCEDURE — 99214 OFFICE O/P EST MOD 30 MIN: CPT | Performed by: INTERNAL MEDICINE

## 2025-08-25 PROCEDURE — G8427 DOCREV CUR MEDS BY ELIG CLIN: HCPCS | Performed by: INTERNAL MEDICINE

## 2025-08-25 PROCEDURE — 3079F DIAST BP 80-89 MM HG: CPT | Performed by: INTERNAL MEDICINE

## 2025-08-25 PROCEDURE — G8419 CALC BMI OUT NRM PARAM NOF/U: HCPCS | Performed by: INTERNAL MEDICINE

## 2025-08-25 PROCEDURE — 4004F PT TOBACCO SCREEN RCVD TLK: CPT | Performed by: INTERNAL MEDICINE

## 2025-08-25 PROCEDURE — G2211 COMPLEX E/M VISIT ADD ON: HCPCS | Performed by: INTERNAL MEDICINE

## 2025-08-25 RX ORDER — INSULIN GLARGINE 100 [IU]/ML
48 INJECTION, SOLUTION SUBCUTANEOUS EVERY MORNING
Qty: 20 ADJUSTABLE DOSE PRE-FILLED PEN SYRINGE | Refills: 11 | Status: SHIPPED | OUTPATIENT
Start: 2025-08-25